# Patient Record
Sex: FEMALE | Race: WHITE | NOT HISPANIC OR LATINO | Employment: UNEMPLOYED | URBAN - NONMETROPOLITAN AREA
[De-identification: names, ages, dates, MRNs, and addresses within clinical notes are randomized per-mention and may not be internally consistent; named-entity substitution may affect disease eponyms.]

---

## 2018-06-15 ENCOUNTER — OFFICE VISIT (OUTPATIENT)
Dept: FAMILY MEDICINE CLINIC | Facility: CLINIC | Age: 58
End: 2018-06-15

## 2018-06-15 VITALS
TEMPERATURE: 98.6 F | WEIGHT: 146 LBS | HEART RATE: 90 BPM | SYSTOLIC BLOOD PRESSURE: 132 MMHG | HEIGHT: 65 IN | DIASTOLIC BLOOD PRESSURE: 88 MMHG | OXYGEN SATURATION: 99 % | BODY MASS INDEX: 24.32 KG/M2

## 2018-06-15 DIAGNOSIS — Z13.220 SCREENING FOR HYPERLIPIDEMIA: ICD-10-CM

## 2018-06-15 DIAGNOSIS — R03.0 ELEVATED BLOOD PRESSURE READING: ICD-10-CM

## 2018-06-15 DIAGNOSIS — B02.9 HERPES ZOSTER WITHOUT COMPLICATION: Primary | ICD-10-CM

## 2018-06-15 PROCEDURE — 99203 OFFICE O/P NEW LOW 30 MIN: CPT | Performed by: PHYSICIAN ASSISTANT

## 2018-06-15 RX ORDER — VALACYCLOVIR HYDROCHLORIDE 1 G/1
1000 TABLET, FILM COATED ORAL 3 TIMES DAILY
Qty: 21 TABLET | Refills: 0 | Status: SHIPPED | OUTPATIENT
Start: 2018-06-15 | End: 2018-07-18

## 2018-06-15 NOTE — PROGRESS NOTES
"Subjective   Rohini Burch is a 58 y.o. female.     Chief complaint-establish care and rash    History of Present Illness     She is here today to establish care.    Rash-  She complains of a painful rash on the left side of her chest with associated blistering that occurred spontaneously one week ago.  She reports moderate burning at the site of the rash with exquisite tenderness to palpation.  Even her shirt touching her chest causes pain.  Minimal relief with Neosporin or Benadryl cream.    The following portions of the patient's history were reviewed and updated as appropriate: allergies, current medications, past family history, past medical history, past social history, past surgical history and problem list.    Review of Systems   Constitutional: Negative for activity change, appetite change and fever.   HENT: Negative for ear pain, sinus pressure and sore throat.    Eyes: Negative for pain and visual disturbance.   Respiratory: Negative for cough and chest tightness.    Cardiovascular: Negative for chest pain and palpitations.   Gastrointestinal: Negative for abdominal pain, constipation, diarrhea, nausea and vomiting.   Endocrine: Negative for polydipsia and polyuria.   Genitourinary: Negative for dysuria and frequency.   Musculoskeletal: Negative for back pain and myalgias.   Skin: Positive for rash. Negative for color change.   Allergic/Immunologic: Negative for food allergies and immunocompromised state.   Neurological: Negative for dizziness, syncope and headaches.   Hematological: Negative for adenopathy. Does not bruise/bleed easily.   Psychiatric/Behavioral: Negative for hallucinations and suicidal ideas. The patient is not nervous/anxious.        /88   Pulse 90   Temp 98.6 °F (37 °C) (Oral)   Ht 165.1 cm (65\")   Wt 66.2 kg (146 lb)   SpO2 99%   BMI 24.30 kg/m²     Physical Exam   Constitutional: She is oriented to person, place, and time. She appears well-developed and well-nourished. "   HENT:   Head: Normocephalic and atraumatic.   Nose: Nose normal.   Mouth/Throat: Oropharynx is clear and moist.   Eyes: Conjunctivae and EOM are normal. Pupils are equal, round, and reactive to light.   Neck: Normal range of motion. Neck supple. No tracheal deviation present. No thyromegaly present.   Cardiovascular: Normal rate, regular rhythm, normal heart sounds and intact distal pulses.    No murmur heard.  Pulmonary/Chest: Effort normal and breath sounds normal. No respiratory distress. She has no wheezes.   Abdominal: Soft. Bowel sounds are normal. There is no tenderness. There is no guarding.   Musculoskeletal: Normal range of motion. She exhibits no edema or tenderness.   Lymphadenopathy:     She has no cervical adenopathy.   Neurological: She is alert and oriented to person, place, and time.   Skin: Skin is warm and dry. Rash noted.   Erythematous vesicular rash in dermatomal distribution noted left chest    Psychiatric: She has a normal mood and affect. Her behavior is normal.   Nursing note and vitals reviewed.      Assessment/Plan     Diagnoses and all orders for this visit:    Herpes zoster without complication  -     valACYclovir (VALTREX) 1000 MG tablet; Take 1 tablet by mouth 3 (Three) Times a Day.    Screening for hyperlipidemia  -     Lipid Panel; Future    Elevated blood pressure reading  -     CBC & Differential; Future  -     Comprehensive Metabolic Panel; Future  -     TSH; Future      Home wound care instructions were given verbally.  I will follow-up with her in 1 month's time to update health maintenance and review lab work.  I'll be glad to see her sooner if needed.         This document has been electronically signed by:  Elena Holbrook PA-C

## 2018-07-18 ENCOUNTER — OFFICE VISIT (OUTPATIENT)
Dept: FAMILY MEDICINE CLINIC | Facility: CLINIC | Age: 58
End: 2018-07-18

## 2018-07-18 VITALS
DIASTOLIC BLOOD PRESSURE: 90 MMHG | WEIGHT: 146 LBS | HEIGHT: 65 IN | BODY MASS INDEX: 24.32 KG/M2 | TEMPERATURE: 98.4 F | SYSTOLIC BLOOD PRESSURE: 134 MMHG | HEART RATE: 70 BPM

## 2018-07-18 DIAGNOSIS — B02.9 HERPES ZOSTER WITHOUT COMPLICATION: Primary | ICD-10-CM

## 2018-07-18 DIAGNOSIS — Z00.00 ROUTINE HEALTH MAINTENANCE: ICD-10-CM

## 2018-07-18 DIAGNOSIS — M79.604 PAIN IN BOTH LOWER EXTREMITIES: ICD-10-CM

## 2018-07-18 DIAGNOSIS — Z13.220 SCREENING FOR HYPERLIPIDEMIA: ICD-10-CM

## 2018-07-18 DIAGNOSIS — R03.0 ELEVATED BLOOD PRESSURE READING: ICD-10-CM

## 2018-07-18 DIAGNOSIS — M79.605 PAIN IN BOTH LOWER EXTREMITIES: ICD-10-CM

## 2018-07-18 LAB
25(OH)D3 SERPL-MCNC: 22 NG/ML
ALBUMIN SERPL-MCNC: 4.3 G/DL (ref 3.5–5)
ALBUMIN/GLOB SERPL: 1.5 G/DL (ref 1.5–2.5)
ALP SERPL-CCNC: 46 U/L (ref 35–104)
ALT SERPL W P-5'-P-CCNC: 17 U/L (ref 10–36)
ANION GAP SERPL CALCULATED.3IONS-SCNC: 3.2 MMOL/L (ref 3.6–11.2)
AST SERPL-CCNC: 19 U/L (ref 10–30)
BASOPHILS # BLD AUTO: 0.04 10*3/MM3 (ref 0–0.3)
BASOPHILS NFR BLD AUTO: 0.7 % (ref 0–2)
BILIRUB SERPL-MCNC: 0.4 MG/DL (ref 0.2–1.8)
BUN BLD-MCNC: 8 MG/DL (ref 7–21)
BUN/CREAT SERPL: 9.3 (ref 7–25)
CALCIUM SPEC-SCNC: 9.5 MG/DL (ref 7.7–10)
CHLORIDE SERPL-SCNC: 105 MMOL/L (ref 99–112)
CHOLEST SERPL-MCNC: 190 MG/DL (ref 0–200)
CO2 SERPL-SCNC: 29.8 MMOL/L (ref 24.3–31.9)
CREAT BLD-MCNC: 0.86 MG/DL (ref 0.43–1.29)
DEPRECATED RDW RBC AUTO: 43.6 FL (ref 37–54)
EOSINOPHIL # BLD AUTO: 0.07 10*3/MM3 (ref 0–0.7)
EOSINOPHIL NFR BLD AUTO: 1.3 % (ref 0–5)
ERYTHROCYTE [DISTWIDTH] IN BLOOD BY AUTOMATED COUNT: 12 % (ref 11.5–14.5)
GFR SERPL CREATININE-BSD FRML MDRD: 68 ML/MIN/1.73
GLOBULIN UR ELPH-MCNC: 2.8 GM/DL
GLUCOSE BLD-MCNC: 75 MG/DL (ref 70–110)
HBA1C MFR BLD: 5.1 % (ref 4.5–5.7)
HCT VFR BLD AUTO: 39.4 % (ref 37–47)
HDLC SERPL-MCNC: 66 MG/DL (ref 60–100)
HGB BLD-MCNC: 13.6 G/DL (ref 12–16)
IMM GRANULOCYTES # BLD: 0.02 10*3/MM3 (ref 0–0.03)
IMM GRANULOCYTES NFR BLD: 0.4 % (ref 0–0.5)
LDLC SERPL CALC-MCNC: 90 MG/DL (ref 0–100)
LDLC/HDLC SERPL: 1.37 {RATIO}
LYMPHOCYTES # BLD AUTO: 2.04 10*3/MM3 (ref 1–3)
LYMPHOCYTES NFR BLD AUTO: 37.9 % (ref 21–51)
MCH RBC QN AUTO: 34.7 PG (ref 27–33)
MCHC RBC AUTO-ENTMCNC: 34.5 G/DL (ref 33–37)
MCV RBC AUTO: 100.5 FL (ref 80–94)
MONOCYTES # BLD AUTO: 0.72 10*3/MM3 (ref 0.1–0.9)
MONOCYTES NFR BLD AUTO: 13.4 % (ref 0–10)
NEUTROPHILS # BLD AUTO: 2.49 10*3/MM3 (ref 1.4–6.5)
NEUTROPHILS NFR BLD AUTO: 46.3 % (ref 30–70)
OSMOLALITY SERPL CALC.SUM OF ELEC: 272.7 MOSM/KG (ref 273–305)
PLATELET # BLD AUTO: 232 10*3/MM3 (ref 130–400)
PMV BLD AUTO: 9.8 FL (ref 6–10)
POTASSIUM BLD-SCNC: 3.9 MMOL/L (ref 3.5–5.3)
PROT SERPL-MCNC: 7.1 G/DL (ref 6–8)
RBC # BLD AUTO: 3.92 10*6/MM3 (ref 4.2–5.4)
SODIUM BLD-SCNC: 138 MMOL/L (ref 135–153)
T4 FREE SERPL-MCNC: 0.87 NG/DL (ref 0.89–1.76)
TRIGL SERPL-MCNC: 168 MG/DL (ref 0–150)
TSH SERPL DL<=0.05 MIU/L-ACNC: 1.38 MIU/ML (ref 0.55–4.78)
VIT B12 BLD-MCNC: 322 PG/ML (ref 211–911)
VLDLC SERPL-MCNC: 33.6 MG/DL
WBC NRBC COR # BLD: 5.38 10*3/MM3 (ref 4.5–12.5)

## 2018-07-18 PROCEDURE — 80061 LIPID PANEL: CPT | Performed by: PHYSICIAN ASSISTANT

## 2018-07-18 PROCEDURE — 85025 COMPLETE CBC W/AUTO DIFF WBC: CPT | Performed by: PHYSICIAN ASSISTANT

## 2018-07-18 PROCEDURE — 36415 COLL VENOUS BLD VENIPUNCTURE: CPT | Performed by: NURSE PRACTITIONER

## 2018-07-18 PROCEDURE — 83036 HEMOGLOBIN GLYCOSYLATED A1C: CPT | Performed by: NURSE PRACTITIONER

## 2018-07-18 PROCEDURE — 80053 COMPREHEN METABOLIC PANEL: CPT | Performed by: PHYSICIAN ASSISTANT

## 2018-07-18 PROCEDURE — 84439 ASSAY OF FREE THYROXINE: CPT | Performed by: PHYSICIAN ASSISTANT

## 2018-07-18 PROCEDURE — 82306 VITAMIN D 25 HYDROXY: CPT | Performed by: PHYSICIAN ASSISTANT

## 2018-07-18 PROCEDURE — 84443 ASSAY THYROID STIM HORMONE: CPT | Performed by: PHYSICIAN ASSISTANT

## 2018-07-18 PROCEDURE — 82607 VITAMIN B-12: CPT | Performed by: PHYSICIAN ASSISTANT

## 2018-07-18 PROCEDURE — 99214 OFFICE O/P EST MOD 30 MIN: CPT | Performed by: NURSE PRACTITIONER

## 2018-07-18 RX ORDER — ROPINIROLE 0.5 MG/1
0.5 TABLET, FILM COATED ORAL NIGHTLY
Qty: 30 TABLET | Refills: 0 | Status: SHIPPED | OUTPATIENT
Start: 2018-07-18 | End: 2019-05-03 | Stop reason: SINTOL

## 2018-07-18 NOTE — PROGRESS NOTES
"Subjective   Rohini Burch is a 58 y.o. female.     Chief Complaint   Patient presents with   • Follow-up     follow up for shingles       History of Present Illness     Here for a follow up for shingles.  She has taken all of Valtrex that was prescribed.  She continues to have some itching if \"I get hot\".  Mostly burning and stinging.  Pain seems to be most present at night.  Areas continue to be present on her left upper chest wall but does not have any on her back.  She does have sticking pain on her back  On the upper left side over scapular region.    Leg Pain-reports she has been having leg pain that is worse with standing.  She does have some vessels that are dilated when she stands for prolonged periods.  She has obtained some compression socks to wear for when she stands.  She reports aching at night when she lays down.  She has to \"get up and down\" and tries to prop legs up.  She reports has been \"off and on\" for the last year.  Some sensation of being cold.  No injury to her legs.  Not at goal.    Labs-needs some updated labs.  Former patient at [facility].      The following portions of the patient's history were reviewed and updated as appropriate: allergies, current medications, past family history, past medical history, past social history, past surgical history and problem list.    Review of Systems   Constitutional: Negative for appetite change and unexpected weight change.   HENT: Positive for congestion, rhinorrhea and sinus pressure. Negative for trouble swallowing.    Eyes: Negative for pain and visual disturbance.   Respiratory: Negative for cough, chest tightness and shortness of breath.    Cardiovascular: Positive for leg swelling. Negative for chest pain and palpitations.   Gastrointestinal: Negative for abdominal pain, blood in stool, constipation and nausea.   Genitourinary: Negative for difficulty urinating, dysuria and hematuria.   Musculoskeletal: Positive for arthralgias. " "Negative for back pain and neck pain.   Neurological: Negative for dizziness and headaches.   Psychiatric/Behavioral: Positive for sleep disturbance (due to leg discomfort). Negative for suicidal ideas.   All other systems reviewed and are negative.      Objective     /90   Pulse 70   Temp 98.4 °F (36.9 °C) (Oral)   Ht 165.1 cm (65\")   Wt 66.2 kg (146 lb)   BMI 24.30 kg/m²     Physical Exam   Constitutional: She is oriented to person, place, and time. She appears well-developed and well-nourished.   HENT:   Head: Normocephalic and atraumatic.   Nose: Nose normal.   Mouth/Throat: Oropharynx is clear and moist.   Eyes: Pupils are equal, round, and reactive to light. Conjunctivae and EOM are normal.   Neck: Normal range of motion. Neck supple. No tracheal deviation present. No thyromegaly present.   Cardiovascular: Normal rate, regular rhythm, normal heart sounds and intact distal pulses.    No murmur heard.  Pulmonary/Chest: Effort normal and breath sounds normal. No respiratory distress. She has no wheezes.   Abdominal: Soft. Bowel sounds are normal. There is no tenderness. There is no guarding.   Musculoskeletal: Normal range of motion. She exhibits no edema or tenderness.   Lymphadenopathy:     She has no cervical adenopathy.   Neurological: She is alert and oriented to person, place, and time.   Skin: Skin is warm and dry. Capillary refill takes less than 2 seconds. No rash noted.   Erythematous vesicular rash in dermatomal distribution noted left chest.  Lesions drying.    Multiple varicosities on BLE   Psychiatric: She has a normal mood and affect. Her behavior is normal.   Nursing note and vitals reviewed.    Assessment/Plan     Problem List Items Addressed This Visit     None      Visit Diagnoses     Herpes zoster without complication    -  Primary    resolving.    Routine health maintenance        Relevant Orders    Hemoglobin A1c (Completed)    Vitamin D 25 Hydroxy (Completed)    Vitamin B12 " (Completed)    T4, Free (Completed)    Pain in both lower extremities        Relevant Medications    rOPINIRole (REQUIP) 0.5 MG tablet    Elevated blood pressure reading        Relevant Orders    CBC Auto Differential (Completed)    Osmolality, Calculated (Completed)    Screening for hyperlipidemia            Labs today.  Skin care advised for skin lesions.  No further need for valtrex.    Trial of Requip for leg complaints.  Understands disease processes and need for medications.  Understands reasons for urgent and emergent care.  Patient (& family) verbalized agreement for treatment plan.   RTC 1 month, sooner if needed.         This document has been electronically signed by:  STEVE Gonsales, FNP-C

## 2018-07-20 NOTE — PROGRESS NOTES
Her vit d is low.  Kidney and liver function are fine.  Thyroid is normal.  Total cholesterol is normal but trigs are 168 (150 or less is normal).  Watch her diet and cut fried and fatty foods.  Blood sugars are good.

## 2018-07-23 ENCOUNTER — TELEPHONE (OUTPATIENT)
Dept: FAMILY MEDICINE CLINIC | Facility: CLINIC | Age: 58
End: 2018-07-23

## 2018-08-15 ENCOUNTER — OFFICE VISIT (OUTPATIENT)
Dept: FAMILY MEDICINE CLINIC | Facility: CLINIC | Age: 58
End: 2018-08-15

## 2018-08-15 VITALS
WEIGHT: 149 LBS | SYSTOLIC BLOOD PRESSURE: 100 MMHG | OXYGEN SATURATION: 97 % | HEART RATE: 75 BPM | DIASTOLIC BLOOD PRESSURE: 76 MMHG | HEIGHT: 65 IN | TEMPERATURE: 98.4 F | BODY MASS INDEX: 24.83 KG/M2

## 2018-08-15 DIAGNOSIS — M77.11 LATERAL EPICONDYLITIS OF RIGHT ELBOW: Primary | ICD-10-CM

## 2018-08-15 PROCEDURE — 99213 OFFICE O/P EST LOW 20 MIN: CPT | Performed by: NURSE PRACTITIONER

## 2018-08-15 PROCEDURE — 20605 DRAIN/INJ JOINT/BURSA W/O US: CPT | Performed by: NURSE PRACTITIONER

## 2018-08-15 RX ORDER — HYDROCODONE BITARTRATE AND ACETAMINOPHEN 5; 325 MG/1; MG/1
1 TABLET ORAL EVERY 6 HOURS PRN
Qty: 12 TABLET | Refills: 0 | Status: SHIPPED | OUTPATIENT
Start: 2018-08-15 | End: 2019-05-03 | Stop reason: DRUGHIGH

## 2018-08-15 NOTE — PROGRESS NOTES
Subjective   Rohini Burch is a 58 y.o. female.     Chief Complaint   Patient presents with   • Right arm swollen     sharp to aching pain- been happening since yesterday        History of Present Illness     Right elbow/forearm complaint-some discomfort intermittently but reports she noted an increase while she was lifting something at work.  Greatest pain is over her elbow but she does note the pain down the top of her arm and into her 4th and 5th digit.  She has noted increase in pain in her .  She reports she does do repetetive lifting at work and she is right hand dominant.   No numbness in fingers.  She reports she did apply ice today to her arm at work.  She reports it did ease the pain minimally.  No falls on her hand or arm.  No acute injury.  She did hit her elbow last year on a cart but there was not symptoms after that incident.  Ongoing.      The following portions of the patient's history were reviewed and updated as appropriate: allergies, current medications, past family history, past medical history, past social history, past surgical history and problem list.    Review of Systems   Constitutional: Negative for appetite change and unexpected weight change.   HENT: Negative for congestion, rhinorrhea, sinus pressure and trouble swallowing.    Eyes: Negative for pain and visual disturbance.   Respiratory: Negative for cough, chest tightness and shortness of breath.    Cardiovascular: Positive for leg swelling. Negative for chest pain and palpitations.   Gastrointestinal: Negative for abdominal pain, blood in stool, constipation and nausea.   Genitourinary: Negative for difficulty urinating, dysuria and hematuria.   Musculoskeletal: Positive for arthralgias and joint swelling. Negative for back pain and neck pain.   Neurological: Negative for dizziness and headaches.   Psychiatric/Behavioral: Negative for sleep disturbance and suicidal ideas.   All other systems reviewed and are  "negative.      Objective     /76 (BP Location: Right arm, Patient Position: Sitting, Cuff Size: Adult)   Pulse 75   Temp 98.4 °F (36.9 °C) (Oral)   Ht 165.1 cm (65\")   Wt 67.6 kg (149 lb)   SpO2 97%   BMI 24.79 kg/m²     Physical Exam   Constitutional: She is oriented to person, place, and time. She appears well-developed and well-nourished. No distress.   HENT:   Head: Normocephalic and atraumatic.   Right Ear: External ear normal.   Left Ear: External ear normal.   Nose: Nose normal.   Mouth/Throat: Oropharynx is clear and moist.   Eyes: Pupils are equal, round, and reactive to light. EOM are normal. No scleral icterus.   Neck: Normal range of motion. Neck supple. No JVD present. No thyromegaly present.   Cardiovascular: Normal rate, regular rhythm and normal heart sounds.    Pulmonary/Chest: Effort normal and breath sounds normal.   Abdominal: Soft. Bowel sounds are normal. There is no tenderness.   Musculoskeletal:        Right elbow: Tenderness found. Lateral epicondyle tenderness noted.        Right wrist: She exhibits tenderness and swelling (mild).        Right forearm: She exhibits tenderness.        Right hand: She exhibits tenderness and swelling (over 4th and 5th digit). She exhibits normal capillary refill. Decreased strength noted. She exhibits wrist extension trouble.   Neurological: She is alert and oriented to person, place, and time. No cranial nerve deficit. Coordination normal.   Skin: Skin is warm and dry.   Psychiatric: She has a normal mood and affect. Her behavior is normal. Judgment and thought content normal.   Vitals reviewed.      Assessment/Plan     Problem List Items Addressed This Visit     None      Visit Diagnoses     Lateral epicondylitis of right elbow    -  Primary    Relevant Medications    HYDROcodone-acetaminophen (NORCO) 5-325 MG per tablet        Understands disease processes and need for medications.  Understands reasons for urgent and emergent care.  Patient (& " family) verbalized agreement for treatment plan.   Emotional support and active listening provided.  Patient provided time to verbalize feelings.  Risk and benefits explained of procedure including but not limited to:  Red man syndrome, blood sugar changes, jitteriness, injection site reaction, bleeding, risk of infection, nerve paralysis, and/or tendon rupture.  Patient wishes to proceed with procedure.  Verbal wound instructions provided to patient.  Questions answered.   RTC PRN 3-5 days for worsening or non resolving symptoms        PROCEDURE NOTE:    Procedure:right elbow intra-articular injection  The procedure risks and benefits were explained to patient and the patient gave verbal consent to have the procedure performed.  Indication: right elbow pain/tendonitis  Provider: STEVE Vásquez FNP-C  Description: The right elbow was prepped and draped in sterile fashion.  An injection was given using 1 cc of 1% lidocaine, 1 cc Decadron and 1 cc of Kenalog.  Pressure was held and a sterile dressing was placed.  No consultations  Estimated blood loss: Minimal  Patient tolerated the procedure well.     1% lidocaine 20 mg per 2 mL NDC # 64813-361-22 Batch: [id number], Exp 03/2020  Kenalog 40 mg per 1 mL NDC #6503-5965-66 Lot # [id number], Exp 12/2019  Decadron 4 mg per 1 mL NDC #14575-704-04 Lot # [id number], Exp 09/2019          This document has been electronically signed by:  STEVE Gonsales, REYNA-C

## 2019-05-03 ENCOUNTER — OFFICE VISIT (OUTPATIENT)
Dept: FAMILY MEDICINE CLINIC | Facility: CLINIC | Age: 59
End: 2019-05-03

## 2019-05-03 ENCOUNTER — PRIOR AUTHORIZATION (OUTPATIENT)
Dept: FAMILY MEDICINE CLINIC | Facility: CLINIC | Age: 59
End: 2019-05-03

## 2019-05-03 VITALS
OXYGEN SATURATION: 97 % | TEMPERATURE: 97.7 F | WEIGHT: 142 LBS | HEART RATE: 91 BPM | HEIGHT: 65 IN | SYSTOLIC BLOOD PRESSURE: 110 MMHG | DIASTOLIC BLOOD PRESSURE: 70 MMHG | BODY MASS INDEX: 23.66 KG/M2

## 2019-05-03 DIAGNOSIS — I83.813 VARICOSE VEINS OF BOTH LOWER EXTREMITIES WITH PAIN: ICD-10-CM

## 2019-05-03 DIAGNOSIS — M25.50 MULTIPLE JOINT PAIN: Primary | ICD-10-CM

## 2019-05-03 PROCEDURE — 99214 OFFICE O/P EST MOD 30 MIN: CPT | Performed by: NURSE PRACTITIONER

## 2019-05-03 RX ORDER — MELOXICAM 7.5 MG/1
7.5 TABLET ORAL DAILY
Qty: 30 TABLET | Refills: 5 | Status: SHIPPED | OUTPATIENT
Start: 2019-05-03 | End: 2019-06-04 | Stop reason: SDUPTHER

## 2019-05-03 RX ORDER — HYDROCODONE BITARTRATE AND ACETAMINOPHEN 7.5; 325 MG/1; MG/1
.5-1 TABLET ORAL EVERY 8 HOURS PRN
Qty: 45 TABLET | Refills: 0 | Status: SHIPPED | OUTPATIENT
Start: 2019-05-03 | End: 2019-06-04 | Stop reason: SDUPTHER

## 2019-05-03 NOTE — PROGRESS NOTES
"Subjective   Rohini Burch is a 58 y.o. female.     Chief Complaint   Patient presents with   • Epicondylitis of right elbow       History of Present Illness     Joint pain-reports \"elbows and legs are killing me\".  She does stand for work.  she reports she is wearing linda copper compression every day she works.  She reports she did try the Ropinirole 0.5 mg nightly but reports it made her very fatigued the next day.  She has not been on any other meds.  Pain in her bilateral legs from knees down.  Throbbing and stinging in nature.  Multiple varicosities noted.      The following portions of the patient's history were reviewed and updated as appropriate: allergies, current medications, past family history, past medical history, past social history, past surgical history and problem list.    Review of Systems   Constitutional: Negative for appetite change and fatigue.   HENT: Positive for congestion and rhinorrhea.         Using OTC meds for sinus relief   Respiratory: Negative for cough and shortness of breath.    Cardiovascular: Positive for leg swelling. Negative for chest pain and palpitations.   Gastrointestinal: Negative for abdominal pain, constipation and diarrhea.   Genitourinary: Negative for dysuria and hematuria.   Musculoskeletal: Positive for arthralgias and joint swelling.        R elbow pain     Neurological: Negative for dizziness and headaches.   Hematological: Bruises/bleeds easily.   Psychiatric/Behavioral: Negative for dysphoric mood, sleep disturbance and suicidal ideas. The patient is not nervous/anxious.    All other systems reviewed and are negative.      Objective     /70 (BP Location: Right arm, Patient Position: Sitting, Cuff Size: Adult)   Pulse 91   Temp 97.7 °F (36.5 °C) (Temporal)   Ht 165.1 cm (65\")   Wt 64.4 kg (142 lb)   SpO2 97%   BMI 23.63 kg/m²     Physical Exam   Constitutional: She is oriented to person, place, and time. She appears well-developed and " well-nourished. No distress.   HENT:   Head: Normocephalic and atraumatic.   Right Ear: Hearing, tympanic membrane, external ear and ear canal normal.   Left Ear: Hearing, tympanic membrane, external ear and ear canal normal.   Nose: No mucosal edema or rhinorrhea.   Mouth/Throat: Oropharynx is clear and moist and mucous membranes are normal.   Eyes: Conjunctivae, EOM and lids are normal. Pupils are equal, round, and reactive to light. No scleral icterus. Right eye exhibits normal extraocular motion and no nystagmus. Left eye exhibits normal extraocular motion and no nystagmus.   Neck: Normal range of motion. Neck supple. No JVD present. No tracheal tenderness present. Carotid bruit is not present. No thyromegaly present.   Cardiovascular: Normal rate, regular rhythm, S1 normal, S2 normal, normal heart sounds and intact distal pulses.   No murmur heard.  Pulmonary/Chest: Effort normal and breath sounds normal. She exhibits no tenderness.   Abdominal: Soft. Bowel sounds are normal. She exhibits no mass. There is no hepatosplenomegaly. There is no tenderness.   Musculoskeletal: Normal range of motion. She exhibits no edema.        Right elbow: Tenderness found. Lateral epicondyle tenderness noted.        Left elbow: Tenderness found.        Right knee: Tenderness found. Medial joint line and lateral joint line tenderness noted.        Left knee: Tenderness found. Medial joint line tenderness noted.        Right lower leg: She exhibits tenderness.        Left lower leg: She exhibits tenderness.   Gait normal.   equal bilaterally. No muscular atrophy or flaccidity.   Lymphadenopathy:     She has no cervical adenopathy.        Right cervical: No superficial cervical adenopathy present.       Left cervical: No superficial cervical adenopathy present.   Neurological: She is alert and oriented to person, place, and time. She has normal strength and normal reflexes. She displays no tremor. No cranial nerve deficit or  sensory deficit. She exhibits normal muscle tone. Coordination and gait normal.   Skin: Skin is warm and dry. Capillary refill takes less than 2 seconds. She is not diaphoretic. No cyanosis. No pallor. Nails show no clubbing.   Multiple varicosities in BLE   Psychiatric: She has a normal mood and affect. Her speech is normal and behavior is normal. Judgment and thought content normal. She is not actively hallucinating. Cognition and memory are normal. She is attentive.   Vitals reviewed.      Assessment/Plan     Problem List Items Addressed This Visit        Cardiovascular and Mediastinum    Varicose veins of both lower extremities with pain    Current Assessment & Plan     Continue compression sock use.            Nervous and Auditory    Multiple joint pain - Primary    Relevant Medications    HYDROcodone-acetaminophen (NORCO) 7.5-325 MG per tablet    meloxicam (MOBIC) 7.5 MG tablet          Patient's Body mass index is 23.63 kg/m². BMI is above normal parameters. Recommendations include: nutrition counseling.   (Normal BMI:  18.5-24.9, OW 25-29.9, Obesity 30 or greater)  Rohini does not use tobacco products.      Understands disease processes and need for medications.  Understands reasons for urgent and emergent care.  Patient (& family) verbalized agreement for treatment plan.   Emotional support and active listening provided.  Patient provided time to verbalize feelings.    MICHELLE reviewed today and consistent.  Will refill prescribed controlled medication today.  Patient is aware they cannot receive narcotics from any other provider except if under care of pain management or speciality clinic.  Risk and benefits of medication use has been reviewed.  History and physical exam exhibit continued safe and appropriate use of controlled substances.  The patient is aware of the potential for addiction and dependence.  This patient has been made aware of the appropriate use of such medications, including potential risk  of somnolence, limited ability to drive and / or work safely, and potential for overdose.    It has also been made clear that these medications are for use by this patient only, without concomitant use of alcohol or other substances unless prescribed/advised by medical provider.  Patient understands they may be subject to UDS and pill counts at random.      Patient considered to be low risk for addiction due to use of Single controlled medications.  Patient understands and accepts these risks.      Goal of [place]: Patient will have reduction in her joint pain and be able to tolerate multiple shifts consecutively for work.  Patient will not have any negative side effects of medication  Discussed KAREEM of Mobic.  Patient understands to limit other NSAID use.  Understands to stop if she notes any increase in bleeding or GI discomfort.    RTC 1 month, sooner if needed.           This document has been electronically signed by:  STEVE Gonsales, REYNA-C

## 2019-06-04 ENCOUNTER — OFFICE VISIT (OUTPATIENT)
Dept: FAMILY MEDICINE CLINIC | Facility: CLINIC | Age: 59
End: 2019-06-04

## 2019-06-04 VITALS
OXYGEN SATURATION: 98 % | DIASTOLIC BLOOD PRESSURE: 60 MMHG | TEMPERATURE: 98.4 F | HEIGHT: 65 IN | HEART RATE: 78 BPM | BODY MASS INDEX: 23.66 KG/M2 | SYSTOLIC BLOOD PRESSURE: 100 MMHG | WEIGHT: 142 LBS

## 2019-06-04 DIAGNOSIS — M25.50 MULTIPLE JOINT PAIN: Primary | ICD-10-CM

## 2019-06-04 DIAGNOSIS — M25.511 ACUTE PAIN OF RIGHT SHOULDER: ICD-10-CM

## 2019-06-04 DIAGNOSIS — M77.11 RIGHT LATERAL EPICONDYLITIS: ICD-10-CM

## 2019-06-04 PROBLEM — M77.12 LEFT LATERAL EPICONDYLITIS: Status: ACTIVE | Noted: 2019-06-04

## 2019-06-04 PROCEDURE — 99214 OFFICE O/P EST MOD 30 MIN: CPT | Performed by: NURSE PRACTITIONER

## 2019-06-04 PROCEDURE — 20605 DRAIN/INJ JOINT/BURSA W/O US: CPT | Performed by: NURSE PRACTITIONER

## 2019-06-04 RX ORDER — MELOXICAM 7.5 MG/1
7.5 TABLET ORAL 2 TIMES DAILY
Qty: 60 TABLET | Refills: 5 | Status: SHIPPED | OUTPATIENT
Start: 2019-06-04 | End: 2019-11-19

## 2019-06-04 RX ORDER — HYDROCODONE BITARTRATE AND ACETAMINOPHEN 7.5; 325 MG/1; MG/1
.5-1 TABLET ORAL EVERY 8 HOURS PRN
Qty: 45 TABLET | Refills: 0 | Status: SHIPPED | OUTPATIENT
Start: 2019-06-04 | End: 2019-07-02 | Stop reason: SDUPTHER

## 2019-06-04 NOTE — PROGRESS NOTES
"Subjective   Rohini Burch is a 59 y.o. female.     Chief Complaint   Patient presents with   • Joint Pain       History of Present Illness     Joint pain- patient was started on meloxicam 7.5 mg at her last appointment with addition of PRN Norco for her pain symptoms.  She has changed jobs since her last visit.  She reports she is now doing a lot of walking.  She reports leg pain has been increased so she has been wearing her compression socks.    Elbow pain-right.  She reports she is having elbow pain with ROM and lifting.  She reports some pain and  changes like had before.  She request a joint injection.    Right shoulder pain-has noted she feels her shoulder is coming out of socket when she reaches her arm backward .  She reports with return to normal position the bone \"goes back\".  She reports she has not had any shoulder injury but about \"8-9 years ago\" she hurt rotator cuff on the left.  She reports the area will be painful the next day to her elbow region.  She reports initially she was afraid she \"broke my shoulder\".  Ongoing.     The following portions of the patient's history were reviewed and updated as appropriate: allergies, current medications, past family history, past medical history, past social history, past surgical history and problem list.    Review of Systems   Constitutional: Negative for appetite change and fatigue.   HENT: Positive for congestion and rhinorrhea.         Using OTC meds for sinus relief   Respiratory: Negative for cough and shortness of breath.    Cardiovascular: Positive for leg swelling. Negative for chest pain and palpitations.   Gastrointestinal: Negative for abdominal pain, constipation and diarrhea.   Genitourinary: Negative for dysuria and hematuria.   Musculoskeletal: Positive for arthralgias and joint swelling.        R elbow pain     Neurological: Negative for dizziness and headaches.   Hematological: Bruises/bleeds easily.   Psychiatric/Behavioral: Negative " "for dysphoric mood, sleep disturbance and suicidal ideas. The patient is not nervous/anxious.    All other systems reviewed and are negative.      Objective     /60   Pulse 78   Temp 98.4 °F (36.9 °C) (Temporal)   Ht 165.1 cm (65\")   Wt 64.4 kg (142 lb)   SpO2 98%   BMI 23.63 kg/m²     Physical Exam   Constitutional: She is oriented to person, place, and time. She appears well-developed and well-nourished. No distress.   HENT:   Head: Normocephalic and atraumatic.   Right Ear: Hearing, tympanic membrane, external ear and ear canal normal.   Left Ear: Hearing, tympanic membrane, external ear and ear canal normal.   Nose: No mucosal edema or rhinorrhea.   Mouth/Throat: Oropharynx is clear and moist and mucous membranes are normal.   Eyes: Conjunctivae, EOM and lids are normal. Pupils are equal, round, and reactive to light. No scleral icterus. Right eye exhibits normal extraocular motion and no nystagmus. Left eye exhibits normal extraocular motion and no nystagmus.   Neck: Normal range of motion. Neck supple. No JVD present. No tracheal tenderness present. Carotid bruit is not present. No thyromegaly present.   Cardiovascular: Normal rate, regular rhythm, S1 normal, S2 normal, normal heart sounds and intact distal pulses.   No murmur heard.  Pulmonary/Chest: Effort normal and breath sounds normal. She exhibits no tenderness.   Abdominal: Soft. Bowel sounds are normal. She exhibits no mass. There is no hepatosplenomegaly. There is no tenderness.   Musculoskeletal: Normal range of motion. She exhibits no edema.        Right elbow: Tenderness found. Lateral epicondyle tenderness noted.        Left elbow: Tenderness found.        Right knee: Tenderness found. Medial joint line and lateral joint line tenderness noted.        Left knee: Tenderness found. Medial joint line tenderness noted.        Right lower leg: She exhibits tenderness.        Left lower leg: She exhibits tenderness.   Gait normal.   " equal bilaterally. No muscular atrophy or flaccidity.   Lymphadenopathy:     She has no cervical adenopathy.        Right cervical: No superficial cervical adenopathy present.       Left cervical: No superficial cervical adenopathy present.   Neurological: She is alert and oriented to person, place, and time. She has normal strength and normal reflexes. She displays no tremor. No cranial nerve deficit or sensory deficit. She exhibits normal muscle tone. Coordination and gait normal.   Skin: Skin is warm and dry. Capillary refill takes less than 2 seconds. She is not diaphoretic. No cyanosis. No pallor. Nails show no clubbing.   Multiple varicosities in BLE   Psychiatric: She has a normal mood and affect. Her speech is normal and behavior is normal. Judgment and thought content normal. She is not actively hallucinating. Cognition and memory are normal. She is attentive.   Vitals reviewed.      Assessment/Plan     Problem List Items Addressed This Visit        Nervous and Auditory    Multiple joint pain - Primary    Relevant Medications    HYDROcodone-acetaminophen (NORCO) 7.5-325 MG per tablet    meloxicam (MOBIC) 7.5 MG tablet       Musculoskeletal and Integument    Right lateral epicondylitis    Current Assessment & Plan     Joint injection today           Other Visit Diagnoses     Acute pain of right shoulder        Relevant Orders    XR Shoulder 2+ View Right          Patient's Body mass index is 23.63 kg/m². BMI is above normal parameters. Recommendations include: nutrition counseling.   (Normal BMI:  18.5-24.9, OW 25-29.9, Obesity 30 or greater)    Understands disease processes and need for medications.  Understands reasons for urgent and emergent care.  Patient (& family) verbalized agreement for treatment plan.   Emotional support and active listening provided.  Patient provided time to verbalize feelings.    MICHELLE # [id number] reviewed today and consistent.  Will refill prescribed controlled medication today.   Patient is aware they cannot receive narcotics from any other provider except if under care of pain management or speciality clinic.  Risk and benefits of medication use has been reviewed.  History and physical exam exhibit continued safe and appropriate use of controlled substances.  The patient is aware of the potential for addiction and dependence.  This patient has been made aware of the appropriate use of such medications, including potential risk of somnolence, limited ability to drive and / or work safely, and potential for overdose.    It has also been made clear that these medications are for use by this patient only, without concomitant use of alcohol or other substances unless prescribed/advised by medical provider.  Patient understands they may be subject to UDS and pill counts at random.      Patient considered to be low risk for addiction due to use of single controlled medications.  Patient understands and accepts these risks.  Patient need for medication will be reassessed at each visit.  Doses will be adjusted according to patient need and findings.    Goal of TX: Patient will not have any adverse reactions of medication.  Patient will have reduction in her joint pain symptoms with use of PRN [place].  UDS requested per [facility] while patient in office today.  Buccal swab or blood sample will be obtained if patient is unable to provide specimen.    RTC 1 month, sooner if needed for problems or concerns    PROCEDURE NOTE:    Procedure: Right elbow intra-articular injection  The procedure risks and benefits were explained to patient and the patient gave verbal consent to have the procedure performed.  Indication: Right elbow pain/tendonitis  Provider: STEVE Vásquez, WILLIAMP-C  Description: The right elbow was prepped and draped in sterile fashion.  An injection was given using 1 cc of 1% lidocaine, 1 cc Decadron and 1 cc of Kenalog.  Pressure was held and a sterile dressing was placed.  No  consultations  Estimated blood loss: Minimal  Patient tolerated the procedure well.     1% lidocaine 500 mg per 50 mL NDC # 5962-3788-23 lot #[id number] EXP October 1, 2020  Triamcinolone acetate 40 mg per 1 mL NDC #[address]-6 lot number [id number] exp 4/2020  Decadron 4 mg per 1 mL NDC #91621-296-79 lot #[id number] EXP 10/2020              This document has been electronically signed by:  STEVE Gonsales, WILLIAMP-C

## 2019-06-05 ENCOUNTER — TELEPHONE (OUTPATIENT)
Dept: FAMILY MEDICINE CLINIC | Facility: CLINIC | Age: 59
End: 2019-06-05

## 2019-06-05 NOTE — TELEPHONE ENCOUNTER
Called [facility] about the patient's norco medication. They had to do a manual override and now the patient should be able to  medication. The reference number to the call was [id number].

## 2019-07-02 ENCOUNTER — OFFICE VISIT (OUTPATIENT)
Dept: FAMILY MEDICINE CLINIC | Facility: CLINIC | Age: 59
End: 2019-07-02

## 2019-07-02 VITALS
HEIGHT: 65 IN | SYSTOLIC BLOOD PRESSURE: 110 MMHG | OXYGEN SATURATION: 96 % | DIASTOLIC BLOOD PRESSURE: 70 MMHG | TEMPERATURE: 97.8 F | HEART RATE: 112 BPM | BODY MASS INDEX: 23.16 KG/M2 | WEIGHT: 139 LBS

## 2019-07-02 DIAGNOSIS — M77.11 RIGHT LATERAL EPICONDYLITIS: ICD-10-CM

## 2019-07-02 DIAGNOSIS — J30.1 SEASONAL ALLERGIC RHINITIS DUE TO POLLEN: Primary | ICD-10-CM

## 2019-07-02 DIAGNOSIS — M25.50 MULTIPLE JOINT PAIN: ICD-10-CM

## 2019-07-02 PROCEDURE — 99214 OFFICE O/P EST MOD 30 MIN: CPT | Performed by: NURSE PRACTITIONER

## 2019-07-02 RX ORDER — KETOTIFEN FUMARATE 0.35 MG/ML
1 SOLUTION/ DROPS OPHTHALMIC 2 TIMES DAILY
Qty: 10 ML | Refills: 5 | Status: SHIPPED | OUTPATIENT
Start: 2019-07-02 | End: 2019-08-27 | Stop reason: SDUPTHER

## 2019-07-02 RX ORDER — CETIRIZINE HYDROCHLORIDE 10 MG/1
10 TABLET ORAL DAILY PRN
Qty: 30 TABLET | Refills: 5 | Status: SHIPPED | OUTPATIENT
Start: 2019-07-02 | End: 2020-04-27 | Stop reason: SDUPTHER

## 2019-07-02 RX ORDER — HYDROCODONE BITARTRATE AND ACETAMINOPHEN 7.5; 325 MG/1; MG/1
.5-1 TABLET ORAL EVERY 8 HOURS PRN
Qty: 60 TABLET | Refills: 0 | Status: SHIPPED | OUTPATIENT
Start: 2019-07-02 | End: 2019-07-30 | Stop reason: SDUPTHER

## 2019-07-02 NOTE — PROGRESS NOTES
"Subjective   Rohini Burch is a 59 y.o. female.     Chief Complaint   Patient presents with   • Shoulder Pain       History of Present Illness     Shoulder pain-\"has not been too bad\".  She reports she has been moving furniture at work but is not having any difficulty.  She reports today that is mostly been her knees that are bothering her the most and she is climbing steps multiple times per day.  Ongoing  Leg pain-reports has been worse since she is walking more.  She is is wearing her compression hose.  She is taking Meloxicam 7.5 mg.    Varicose veins-reports she had a muscle spasm in her LLE and since that time she has had tenderness and bruising on her tib/fib area on the same leg.  She reports has been present 4-5 days.  Area of ecchymosis remains over the region.  She has not noted any swelling or warmth.  Ongoing  Elbow Pain-impoved since joint injection.  She denies any acute concerns today.  Eye irritation-reports she has noted some dryness and itching of her eyes.  She reports is worse when allergy season is worse.  Recent pollen count has been elevated.  She requests medication for allergies and any eyedrops that might alleviate her symptoms of itching.    The following portions of the patient's history were reviewed and updated as appropriate: allergies, current medications, past family history, past medical history, past social history, past surgical history and problem list.    Review of Systems   Constitutional: Negative for appetite change, fatigue and unexpected weight change.   HENT: Negative for congestion, ear pain, nosebleeds, postnasal drip, rhinorrhea, sore throat, trouble swallowing and voice change.    Eyes: Negative for pain and visual disturbance.   Respiratory: Negative for cough, shortness of breath and wheezing.    Cardiovascular: Negative for chest pain and palpitations.   Gastrointestinal: Negative for abdominal pain, blood in stool, constipation and diarrhea.   Endocrine: Negative " "for cold intolerance and polydipsia.   Genitourinary: Negative for difficulty urinating, flank pain and hematuria.   Musculoskeletal: Negative for arthralgias, back pain, gait problem, joint swelling and myalgias.   Skin: Negative for color change and rash.   Allergic/Immunologic: Negative.    Neurological: Negative for syncope, numbness and headaches.   Hematological: Negative.    Psychiatric/Behavioral: Negative for sleep disturbance and suicidal ideas.   All other systems reviewed and are negative.      Objective     /70   Pulse 112   Temp 97.8 °F (36.6 °C) (Tympanic)   Ht 165.1 cm (65\")   Wt 63 kg (139 lb)   SpO2 96%   BMI 23.13 kg/m²     Physical Exam   Constitutional: She is oriented to person, place, and time. She appears well-developed and well-nourished. No distress.   HENT:   Head: Normocephalic and atraumatic.   Right Ear: Hearing, tympanic membrane, external ear and ear canal normal.   Left Ear: Hearing, tympanic membrane, external ear and ear canal normal.   Nose: No mucosal edema or rhinorrhea.   Mouth/Throat: Oropharynx is clear and moist and mucous membranes are normal.   Eyes: Conjunctivae, EOM and lids are normal. Pupils are equal, round, and reactive to light. No scleral icterus. Right eye exhibits normal extraocular motion and no nystagmus. Left eye exhibits normal extraocular motion and no nystagmus.   Neck: Normal range of motion. Neck supple. No JVD present. No tracheal tenderness present. Carotid bruit is not present. No thyromegaly present.   Cardiovascular: Normal rate, regular rhythm, S1 normal, S2 normal, normal heart sounds and intact distal pulses.   No murmur heard.  Pulmonary/Chest: Effort normal and breath sounds normal. She exhibits no tenderness.   Abdominal: Soft. Bowel sounds are normal. She exhibits no mass. There is no hepatosplenomegaly. There is no tenderness.   Musculoskeletal: Normal range of motion. She exhibits no edema.        Right elbow: Tenderness found. " Lateral epicondyle tenderness noted.        Left elbow: Tenderness found.        Right knee: Tenderness found. Medial joint line and lateral joint line tenderness noted.        Left knee: Tenderness found. Medial joint line tenderness noted.        Right lower leg: She exhibits tenderness.        Left lower leg: She exhibits tenderness.   Gait normal.   equal bilaterally. No muscular atrophy or flaccidity.   Lymphadenopathy:     She has no cervical adenopathy.        Right cervical: No superficial cervical adenopathy present.       Left cervical: No superficial cervical adenopathy present.   Neurological: She is alert and oriented to person, place, and time. She has normal strength and normal reflexes. She displays no tremor. No cranial nerve deficit or sensory deficit. She exhibits normal muscle tone. Coordination and gait normal.   Skin: Skin is warm and dry. Capillary refill takes less than 2 seconds. She is not diaphoretic. No cyanosis. No pallor. Nails show no clubbing.   Multiple varicosities in BLE   Psychiatric: She has a normal mood and affect. Her speech is normal and behavior is normal. Judgment and thought content normal. She is not actively hallucinating. Cognition and memory are normal. She is attentive.   Vitals reviewed.      Assessment/Plan     Problem List Items Addressed This Visit        Nervous and Auditory    Multiple joint pain    Relevant Medications    HYDROcodone-acetaminophen (NORCO) 7.5-325 MG per tablet       Musculoskeletal and Integument    Right lateral epicondylitis    Current Assessment & Plan     Stable.  Continue conservative measures PRN heat and ice.           Other Visit Diagnoses     Seasonal allergic rhinitis due to pollen    -  Primary    Relevant Medications    ketotifen (ALAWAY) 0.025 % ophthalmic solution    cetirizine (zyrTEC) 10 MG tablet          Patient's Body mass index is 23.13 kg/m². BMI is above normal parameters. Recommendations include: nutrition  counseling.   (Normal BMI:  18.5-24.9, OW 25-29.9, Obesity 30 or greater)  Understands disease processes and need for medications.  Understands reasons for urgent and emergent care.  Patient (& family) verbalized agreement for treatment plan.   Emotional support and active listening provided.  Patient provided time to verbalize feelings.    MICHELLE #[id number] reviewed today and consistent.  Will refill prescribed controlled medication today.  Patient is aware they cannot receive narcotics from any other provider except if under care of pain management or speciality clinic.  Risk and benefits of medication use has been reviewed.  History and physical exam exhibit continued safe and appropriate use of controlled substances.  The patient is aware of the potential for addiction and dependence.  This patient has been made aware of the appropriate use of such medications, including potential risk of somnolence, limited ability to drive and / or work safely, and potential for overdose.    It has also been made clear that these medications are for use by this patient only, without concomitant use of alcohol or other substances unless prescribed/advised by medical provider.  Patient understands they may be subject to UDS and pill counts at random.      Patient considered to be low risk for addiction due to use of single controlled medications.  Patient understands and accepts these risks.  Patient need for medication will be reassessed at each visit.  Doses will be adjusted according to patient need and findings.    Goal of TX: Patient will not have any adverse reactions of medication.  Patient will have reduction in her shoulder and back pain symptoms with use of Norco PRN.  Patient will be able to remain active and work in an outside of her home without interference from voiding symptoms.    RTC 1 month, sooner if needed for problems or concerns          This document has been electronically signed by:  STEVE Gonsales,  FNP-C    Dragon disclaimer:  Much of this encounter note is an electronic transcription/translation of spoken language to printed text. The electronic translation of spoken language may permit erroneous, or at times, nonsensical words or phrases to be inadvertently transcribed; Although I have reviewed the note for such errors, some may still exist.

## 2019-07-30 ENCOUNTER — OFFICE VISIT (OUTPATIENT)
Dept: FAMILY MEDICINE CLINIC | Facility: CLINIC | Age: 59
End: 2019-07-30

## 2019-07-30 VITALS
BODY MASS INDEX: 23.49 KG/M2 | WEIGHT: 141 LBS | DIASTOLIC BLOOD PRESSURE: 80 MMHG | HEIGHT: 65 IN | HEART RATE: 85 BPM | TEMPERATURE: 98 F | SYSTOLIC BLOOD PRESSURE: 122 MMHG | OXYGEN SATURATION: 97 %

## 2019-07-30 DIAGNOSIS — M25.50 MULTIPLE JOINT PAIN: ICD-10-CM

## 2019-07-30 DIAGNOSIS — J30.1 SEASONAL ALLERGIC RHINITIS DUE TO POLLEN: ICD-10-CM

## 2019-07-30 DIAGNOSIS — I83.813 VARICOSE VEINS OF BOTH LOWER EXTREMITIES WITH PAIN: Primary | ICD-10-CM

## 2019-07-30 PROCEDURE — 99214 OFFICE O/P EST MOD 30 MIN: CPT | Performed by: NURSE PRACTITIONER

## 2019-07-30 RX ORDER — HYDROCODONE BITARTRATE AND ACETAMINOPHEN 7.5; 325 MG/1; MG/1
1 TABLET ORAL EVERY 8 HOURS PRN
Qty: 75 TABLET | Refills: 0 | Status: SHIPPED | OUTPATIENT
Start: 2019-07-30 | End: 2019-08-27 | Stop reason: SDUPTHER

## 2019-08-01 PROBLEM — J30.1 SEASONAL ALLERGIC RHINITIS DUE TO POLLEN: Status: ACTIVE | Noted: 2019-08-01

## 2019-08-01 NOTE — ASSESSMENT & PLAN NOTE
Continue zyrtec and [place] for symptoms.  Avoid known allergy and respiratory triggers.  May use Saline spray PRN as desired

## 2019-08-27 ENCOUNTER — OFFICE VISIT (OUTPATIENT)
Dept: FAMILY MEDICINE CLINIC | Facility: CLINIC | Age: 59
End: 2019-08-27

## 2019-08-27 VITALS
DIASTOLIC BLOOD PRESSURE: 70 MMHG | OXYGEN SATURATION: 98 % | HEART RATE: 78 BPM | TEMPERATURE: 99.1 F | SYSTOLIC BLOOD PRESSURE: 110 MMHG | WEIGHT: 136 LBS | BODY MASS INDEX: 22.66 KG/M2 | HEIGHT: 65 IN

## 2019-08-27 DIAGNOSIS — M25.50 MULTIPLE JOINT PAIN: ICD-10-CM

## 2019-08-27 DIAGNOSIS — J30.1 SEASONAL ALLERGIC RHINITIS DUE TO POLLEN: ICD-10-CM

## 2019-08-27 DIAGNOSIS — H10.533 CONTACT BLEPHAROCONJUNCTIVITIS OF BOTH EYES: Primary | ICD-10-CM

## 2019-08-27 PROCEDURE — 99214 OFFICE O/P EST MOD 30 MIN: CPT | Performed by: NURSE PRACTITIONER

## 2019-08-27 RX ORDER — KETOTIFEN FUMARATE 0.35 MG/ML
1 SOLUTION/ DROPS OPHTHALMIC 2 TIMES DAILY
Qty: 10 ML | Refills: 5 | Status: SHIPPED | OUTPATIENT
Start: 2019-08-27 | End: 2020-03-24 | Stop reason: SDUPTHER

## 2019-08-27 RX ORDER — HYDROCODONE BITARTRATE AND ACETAMINOPHEN 7.5; 325 MG/1; MG/1
1 TABLET ORAL EVERY 8 HOURS PRN
Qty: 75 TABLET | Refills: 0 | Status: SHIPPED | OUTPATIENT
Start: 2019-08-27 | End: 2019-09-24 | Stop reason: SDUPTHER

## 2019-08-27 RX ORDER — NEOMYCIN/POLYMYXIN B/HYDROCORT 3.5-10K-1
1 SUSPENSION, DROPS(FINAL DOSAGE FORM)(ML) OPHTHALMIC (EYE)
Qty: 7.5 ML | Refills: 0 | Status: SHIPPED | OUTPATIENT
Start: 2019-08-27 | End: 2019-10-22

## 2019-08-27 RX ORDER — AZITHROMYCIN 250 MG/1
TABLET, FILM COATED ORAL
Qty: 6 TABLET | Refills: 0 | Status: SHIPPED | OUTPATIENT
Start: 2019-08-27 | End: 2019-09-24

## 2019-08-27 NOTE — PROGRESS NOTES
"Subjective   Rohini Burch is a 59 y.o. female.     Chief Complaint   Patient presents with   • Leg Pain       History of Present Illness     Leg and back pain-reports leg pain has been bad enough that \"I didn't go to work today\".  She is wearing her compression stockings for work.    Eyes itching and watering-reports some allergies for last several days.  Some congestion and face pressure.  Coughing and sneezing.  She has zyrtec to take but \"forgot it this morning\".      The following portions of the patient's history were reviewed and updated as appropriate: allergies, current medications, past family history, past medical history, past social history, past surgical history and problem list.    Review of Systems   Constitutional: Negative for appetite change, fatigue and unexpected weight change.   HENT: Negative for congestion, ear pain, nosebleeds, postnasal drip, rhinorrhea, sore throat, trouble swallowing and voice change.    Eyes: Negative for pain and visual disturbance.   Respiratory: Negative for cough, shortness of breath and wheezing.    Cardiovascular: Positive for leg swelling. Negative for chest pain and palpitations.   Gastrointestinal: Negative for abdominal pain, blood in stool, constipation and diarrhea.   Endocrine: Negative for cold intolerance and polydipsia.   Genitourinary: Negative for difficulty urinating, flank pain and hematuria.   Musculoskeletal: Negative for arthralgias, back pain, gait problem, joint swelling and myalgias.   Skin: Negative for color change and rash.   Allergic/Immunologic: Negative.    Neurological: Negative for syncope, numbness and headaches.   Hematological: Negative.    Psychiatric/Behavioral: Negative for sleep disturbance and suicidal ideas.   All other systems reviewed and are negative.      Objective     /70   Pulse 78   Temp 99.1 °F (37.3 °C) (Temporal)   Ht 165.1 cm (65\")   Wt 61.7 kg (136 lb)   SpO2 98%   BMI 22.63 kg/m²     Physical Exam "   Constitutional: She is oriented to person, place, and time. She appears well-developed and well-nourished. No distress.   HENT:   Head: Normocephalic and atraumatic.   Right Ear: Hearing, external ear and ear canal normal. A middle ear effusion is present.   Left Ear: Hearing, external ear and ear canal normal. A middle ear effusion is present.   Nose: Rhinorrhea present. No mucosal edema. Right sinus exhibits maxillary sinus tenderness. Left sinus exhibits maxillary sinus tenderness.   Mouth/Throat: Oropharynx is clear and moist and mucous membranes are normal.   Eyes: Conjunctivae, EOM and lids are normal. Pupils are equal, round, and reactive to light. Right eye exhibits no discharge. Left eye exhibits no discharge. Right conjunctiva is not injected. Left conjunctiva is not injected. No scleral icterus. Right eye exhibits normal extraocular motion and no nystagmus. Left eye exhibits normal extraocular motion and no nystagmus.   Neck: Normal range of motion. Neck supple. No JVD present. No tracheal tenderness present. Carotid bruit is not present. No thyromegaly present.   Cardiovascular: Normal rate, regular rhythm, S1 normal, S2 normal, normal heart sounds and intact distal pulses.   No murmur heard.  Pulmonary/Chest: Effort normal and breath sounds normal. She has no wheezes. She exhibits no tenderness.   Abdominal: Soft. Bowel sounds are normal. She exhibits no mass. There is no hepatosplenomegaly. There is no tenderness.   Musculoskeletal: Normal range of motion. She exhibits no edema.        Right elbow: Tenderness found. No lateral epicondyle tenderness noted.        Left elbow: Tenderness found.        Right knee: Tenderness found. Medial joint line and lateral joint line tenderness noted.        Left knee: Tenderness found. Medial joint line tenderness noted.        Right lower leg: She exhibits tenderness.        Left lower leg: She exhibits tenderness.   Gait normal.   equal bilaterally. No  muscular atrophy or flaccidity.   Lymphadenopathy:     She has no cervical adenopathy.        Right cervical: No superficial cervical adenopathy present.       Left cervical: No superficial cervical adenopathy present.   Neurological: She is alert and oriented to person, place, and time. She has normal strength and normal reflexes. She displays no tremor. No cranial nerve deficit or sensory deficit. She exhibits normal muscle tone. Coordination and gait normal.   Skin: Skin is warm and dry. Capillary refill takes less than 2 seconds. She is not diaphoretic. No cyanosis. No pallor. Nails show no clubbing.   Multiple varicosities in BLE   Psychiatric: She has a normal mood and affect. Her speech is normal and behavior is normal. Judgment and thought content normal. She is not actively hallucinating. Cognition and memory are normal. She is attentive.   Vitals reviewed.      Assessment/Plan     Problem List Items Addressed This Visit        Respiratory    Seasonal allergic rhinitis due to pollen    Relevant Medications    ketotifen (ALAWAY) 0.025 % ophthalmic solution    azithromycin (ZITHROMAX) 250 MG tablet       Nervous and Auditory    Multiple joint pain    Relevant Medications    HYDROcodone-acetaminophen (NORCO) 7.5-325 MG per tablet      Other Visit Diagnoses     Contact blepharoconjunctivitis of both eyes    -  Primary    Relevant Medications    ketotifen (ALAWAY) 0.025 % ophthalmic solution    neomycin-polymyxin-hydrocortisone (CORTISPORIN) 3.5-92909-7 ophthalmic suspension          Patient's Body mass index is 22.63 kg/m². BMI is above normal parameters. Recommendations include: nutrition counseling.   (Normal BMI:  18.5-24.9, OW 25-29.9, Obesity 30 or greater)    Understands disease processes and need for medications.  Understands reasons for urgent and emergent care.  Patient (& family) verbalized agreement for treatment plan.   Emotional support and active listening provided.  Patient provided time to  verbalize feelings.    [facility] #[id number] reviewed today and consistent.  Will refill prescribed controlled medication today.  Patient is aware they cannot receive narcotics from any other provider except if under care of pain management or speciality clinic.  Risk and benefits of medication use has been reviewed.  History and physical exam exhibit continued safe and appropriate use of controlled substances.  The patient is aware of the potential for addiction and dependence.  This patient has been made aware of the appropriate use of such medications, including potential risk of somnolence, limited ability to drive and / or work safely, and potential for overdose.    It has also been made clear that these medications are for use by this patient only, without concomitant use of alcohol or other substances unless prescribed/advised by medical provider.  Patient understands they may be subject to UDS and pill counts at random.      Patient considered to be low risk for addiction due to use of single controlled medications.  Patient understands and accepts these risks.  Patient need for medication will be reassessed at each visit.  Doses will be adjusted according to patient need and findings.    Goal of TX: Patient will not have any adverse reactions of medication.  Patient will have reduction in her joint pain and leg pain symptoms with use of hydrocodone PRN.    Z-Roderick ordered.  Patient advised to start medication if she begins to have thick productive sputum that changes color or increase in her facial pain and headache symptoms.    RTC 1 month, sooner if needed.           This document has been electronically signed by:  STEVE Gonsales FNP-C Dragon disclaimer:  Much of this encounter note is an electronic transcription/translation of spoken language to printed text. The electronic translation of spoken language may permit erroneous, or at times, nonsensical words or phrases to be inadvertently transcribed;  Although I have reviewed the note for such errors, some may still exist.

## 2019-09-24 ENCOUNTER — OFFICE VISIT (OUTPATIENT)
Dept: FAMILY MEDICINE CLINIC | Facility: CLINIC | Age: 59
End: 2019-09-24

## 2019-09-24 VITALS
OXYGEN SATURATION: 95 % | SYSTOLIC BLOOD PRESSURE: 110 MMHG | HEART RATE: 96 BPM | TEMPERATURE: 98.9 F | WEIGHT: 141 LBS | DIASTOLIC BLOOD PRESSURE: 70 MMHG | BODY MASS INDEX: 23.49 KG/M2 | HEIGHT: 65 IN

## 2019-09-24 DIAGNOSIS — M25.50 MULTIPLE JOINT PAIN: ICD-10-CM

## 2019-09-24 DIAGNOSIS — J30.1 SEASONAL ALLERGIC RHINITIS DUE TO POLLEN: Primary | ICD-10-CM

## 2019-09-24 PROCEDURE — 99213 OFFICE O/P EST LOW 20 MIN: CPT | Performed by: NURSE PRACTITIONER

## 2019-09-24 RX ORDER — HYDROCODONE BITARTRATE AND ACETAMINOPHEN 7.5; 325 MG/1; MG/1
1 TABLET ORAL EVERY 8 HOURS PRN
Qty: 75 TABLET | Refills: 0 | Status: SHIPPED | OUTPATIENT
Start: 2019-09-24 | End: 2019-10-22 | Stop reason: SDUPTHER

## 2019-09-24 NOTE — PROGRESS NOTES
"Subjective   Rohini Burch is a 59 y.o. female.     Chief Complaint   Patient presents with   • Varicose Veins       History of Present Illness     Leg pain-chronic and ongoing.  She does do excessive walking with work.  She reports she got her feet wet this morning and had to take her compression socks off.  She reports legs are \"throbbing\".  She is taking Meloxicam 7.5 mg but mostly PRN.  She reports she is taking Norco as directed.      The following portions of the patient's history were reviewed and updated as appropriate: allergies, current medications, past family history, past medical history, past social history, past surgical history and problem list.    Review of Systems   Constitutional: Negative for appetite change, fatigue and unexpected weight change.   HENT: Negative for congestion, ear pain, nosebleeds, postnasal drip, rhinorrhea, sore throat, trouble swallowing and voice change.    Eyes: Negative for pain and visual disturbance.   Respiratory: Negative for cough, shortness of breath and wheezing.    Cardiovascular: Negative for chest pain and palpitations.   Gastrointestinal: Negative for abdominal pain, blood in stool, constipation and diarrhea.   Endocrine: Negative for cold intolerance and polydipsia.   Genitourinary: Negative for difficulty urinating, flank pain and hematuria.   Musculoskeletal: Negative for arthralgias, back pain, gait problem, joint swelling and myalgias.   Skin: Negative for color change and rash.   Allergic/Immunologic: Negative.    Neurological: Negative for syncope, numbness and headaches.   Hematological: Negative.    Psychiatric/Behavioral: Negative for sleep disturbance and suicidal ideas.   All other systems reviewed and are negative.      Objective     /70   Pulse 96   Temp 98.9 °F (37.2 °C) (Temporal)   Ht 165.1 cm (65\")   Wt 64 kg (141 lb)   SpO2 95%   BMI 23.46 kg/m²     Physical Exam   Constitutional: She is oriented to person, place, and time. She " appears well-developed and well-nourished. No distress.   HENT:   Head: Normocephalic and atraumatic.   Right Ear: Hearing, tympanic membrane, external ear and ear canal normal.   Left Ear: Hearing, tympanic membrane, external ear and ear canal normal.   Nose: No mucosal edema or rhinorrhea.   Mouth/Throat: Oropharynx is clear and moist and mucous membranes are normal.   Eyes: Conjunctivae, EOM and lids are normal. Pupils are equal, round, and reactive to light. Right eye exhibits no discharge. Left eye exhibits no discharge. Right conjunctiva is not injected. Left conjunctiva is not injected. No scleral icterus. Right eye exhibits normal extraocular motion and no nystagmus. Left eye exhibits normal extraocular motion and no nystagmus.   Neck: Normal range of motion. Neck supple. No JVD present. No tracheal tenderness present. Carotid bruit is not present. No thyromegaly present.   Cardiovascular: Normal rate, regular rhythm, S1 normal, S2 normal, normal heart sounds and intact distal pulses.   No murmur heard.  Pulmonary/Chest: Effort normal and breath sounds normal. She has no wheezes. She exhibits no tenderness.   Abdominal: Soft. Bowel sounds are normal. She exhibits no mass. There is no hepatosplenomegaly. There is no tenderness.   Musculoskeletal: Normal range of motion. She exhibits no edema.        Right elbow: Tenderness found. No lateral epicondyle tenderness noted.        Left elbow: Tenderness found.        Right knee: Tenderness found. Medial joint line and lateral joint line tenderness noted.        Left knee: Tenderness found. Medial joint line tenderness noted.        Right lower leg: She exhibits tenderness.        Left lower leg: She exhibits tenderness.   Gait normal.   equal bilaterally. No muscular atrophy or flaccidity.   Lymphadenopathy:     She has no cervical adenopathy.        Right cervical: No superficial cervical adenopathy present.       Left cervical: No superficial cervical  adenopathy present.   Neurological: She is alert and oriented to person, place, and time. She has normal strength and normal reflexes. She displays no tremor. No cranial nerve deficit or sensory deficit. She exhibits normal muscle tone. Coordination and gait normal.   Skin: Skin is warm and dry. Capillary refill takes less than 2 seconds. She is not diaphoretic. No cyanosis. No pallor. Nails show no clubbing.   Multiple varicosities in BLE   Psychiatric: She has a normal mood and affect. Her speech is normal and behavior is normal. Judgment and thought content normal. She is not actively hallucinating. Cognition and memory are normal. She is attentive.   Vitals reviewed.      Assessment/Plan     Problem List Items Addressed This Visit        Respiratory    Seasonal allergic rhinitis due to pollen - Primary    Current Assessment & Plan     Continue zyrtec and Alaway for symptoms.  Avoid known allergy and respiratory triggers.  May use Saline spray PRN as desired            Nervous and Auditory    Multiple joint pain    Relevant Medications    HYDROcodone-acetaminophen (NORCO) 7.5-325 MG per tablet          Patient's Body mass index is 23.46 kg/m². BMI is within normal parameters. No follow-up required..   (Normal BMI:  18.5-24.9, OW 25-29.9, Obesity 30 or greater)    Understands disease processes and need for medications.  Understands reasons for urgent and emergent care.  Patient (& family) verbalized agreement for treatment plan.   Emotional support and active listening provided.  Patient provided time to verbalize feelings.    [facility] #[id number] reviewed today and consistent.  Will refill prescribed controlled medication today.  Patient is aware they cannot receive narcotics from any other provider except if under care of pain management or speciality clinic.  Risk and benefits of medication use has been reviewed.  History and physical exam exhibit continued safe and appropriate use of controlled substances.  The  patient is aware of the potential for addiction and dependence.  This patient has been made aware of the appropriate use of such medications, including potential risk of somnolence, limited ability to drive and / or work safely, and potential for overdose.    It has also been made clear that these medications are for use by this patient only, without concomitant use of alcohol or other substances unless prescribed/advised by medical provider.  Patient understands they may be subject to UDS and pill counts at random.      Patient considered to be low risk for addiction due to use of single controlled medications.  Patient understands and accepts these risks.  Patient need for medication will be reassessed at each visit.  Doses will be adjusted according to patient need and findings.    Goal of TX: Patient will not have any adverse reactions of medication.  Patient will have reduction in symptoms with use of Norco PRN.      RTC 1 month, sooner if needed.           This document has been electronically signed by:  STEVE Gonsales, FNP-C    Dragon disclaimer:  Much of this encounter note is an electronic transcription/translation of spoken language to printed text. The electronic translation of spoken language may permit erroneous, or at times, nonsensical words or phrases to be inadvertently transcribed; Although I have reviewed the note for such errors, some may still exist.

## 2019-10-22 ENCOUNTER — OFFICE VISIT (OUTPATIENT)
Dept: FAMILY MEDICINE CLINIC | Facility: CLINIC | Age: 59
End: 2019-10-22

## 2019-10-22 VITALS
OXYGEN SATURATION: 98 % | HEIGHT: 65 IN | DIASTOLIC BLOOD PRESSURE: 84 MMHG | WEIGHT: 135 LBS | BODY MASS INDEX: 22.49 KG/M2 | HEART RATE: 88 BPM | SYSTOLIC BLOOD PRESSURE: 130 MMHG | TEMPERATURE: 98.9 F

## 2019-10-22 DIAGNOSIS — M25.50 MULTIPLE JOINT PAIN: ICD-10-CM

## 2019-10-22 DIAGNOSIS — J30.1 SEASONAL ALLERGIC RHINITIS DUE TO POLLEN: Primary | ICD-10-CM

## 2019-10-22 PROCEDURE — 99213 OFFICE O/P EST LOW 20 MIN: CPT | Performed by: NURSE PRACTITIONER

## 2019-10-22 RX ORDER — HYDROCODONE BITARTRATE AND ACETAMINOPHEN 7.5; 325 MG/1; MG/1
1 TABLET ORAL EVERY 8 HOURS PRN
Qty: 75 TABLET | Refills: 0 | Status: SHIPPED | OUTPATIENT
Start: 2019-10-22 | End: 2019-11-19 | Stop reason: SDUPTHER

## 2019-10-22 NOTE — PROGRESS NOTES
"Subjective   Rohini Burch is a 59 y.o. female.     Chief Complaint   Patient presents with   • Joint Pain       History of Present Illness     Chronic joint pain-exacerbated due to recent damp weather.  Reports she had to get up last night multiple times due to discomfort.  She is taking norco 7.5 mg as directed.  She reports her shoulder and elbow are doing good for \"now\".  She does try to avoid lifting and tugging.    Allergies-chronic and ongoing.  Taking zyrtec and using allergy eye drops.      The following portions of the patient's history were reviewed and updated as appropriate: ROS, allergies, current medications, past family history, past medical history, past social history, past surgical history and problem list.      Review of Systems   Constitutional: Negative for appetite change and fatigue.   HENT: Positive for congestion and rhinorrhea. Negative for ear pain, postnasal drip and sore throat.    Eyes: Negative for photophobia.   Respiratory: Negative for cough and shortness of breath.    Cardiovascular: Negative for chest pain, palpitations and leg swelling.   Gastrointestinal: Negative for abdominal pain, constipation, diarrhea and nausea.   Genitourinary: Negative for dysuria and hematuria.   Musculoskeletal: Positive for arthralgias and myalgias.   Neurological: Negative for dizziness and headaches.   Psychiatric/Behavioral: Negative for dysphoric mood, sleep disturbance and suicidal ideas. The patient is not nervous/anxious.    All other systems reviewed and are negative.      Objective     /84   Pulse 88   Temp 98.9 °F (37.2 °C) (Temporal)   Ht 165.1 cm (65\")   Wt 61.2 kg (135 lb)   SpO2 98%   BMI 22.47 kg/m²     Physical Exam   Constitutional: She is oriented to person, place, and time. She appears well-developed and well-nourished. No distress.   HENT:   Head: Normocephalic and atraumatic.   Right Ear: Hearing, tympanic membrane, external ear and ear canal normal.   Left Ear: " Hearing, tympanic membrane, external ear and ear canal normal.   Nose: Nose normal. No mucosal edema or rhinorrhea.   Mouth/Throat: Oropharynx is clear and moist and mucous membranes are normal. No oropharyngeal exudate.   Eyes: Conjunctivae, EOM and lids are normal. Pupils are equal, round, and reactive to light. Right eye exhibits no discharge. Left eye exhibits no discharge. Right conjunctiva is not injected. Left conjunctiva is not injected. No scleral icterus. Right eye exhibits normal extraocular motion and no nystagmus. Left eye exhibits normal extraocular motion and no nystagmus.   Neck: Normal range of motion. Neck supple. No JVD present. No tracheal tenderness present. Carotid bruit is not present. No thyromegaly present.   Cardiovascular: Normal rate, regular rhythm, S1 normal, S2 normal, normal heart sounds and intact distal pulses.   No murmur heard.  Pulmonary/Chest: Effort normal and breath sounds normal. She has no wheezes. She exhibits no tenderness.   Abdominal: Soft. Bowel sounds are normal. She exhibits no mass. There is no hepatosplenomegaly. There is no tenderness.   Musculoskeletal: Normal range of motion. She exhibits no edema.        Right elbow: Tenderness found. No lateral epicondyle tenderness noted.        Left elbow: Tenderness found.        Right knee: Tenderness found. Medial joint line and lateral joint line tenderness noted.        Left knee: Tenderness found. Medial joint line tenderness noted.        Right lower leg: She exhibits tenderness.        Left lower leg: She exhibits tenderness.   Gait normal.   equal bilaterally. No muscular atrophy or flaccidity.   Lymphadenopathy:     She has no cervical adenopathy.        Right cervical: No superficial cervical adenopathy present.       Left cervical: No superficial cervical adenopathy present.   Neurological: She is alert and oriented to person, place, and time. She has normal strength and normal reflexes. She displays no  tremor. No cranial nerve deficit or sensory deficit. She exhibits normal muscle tone. Coordination and gait normal.   Skin: Skin is warm and dry. Capillary refill takes less than 2 seconds. She is not diaphoretic. No cyanosis. No pallor. Nails show no clubbing.   Multiple varicosities in BLE   Psychiatric: She has a normal mood and affect. Her speech is normal and behavior is normal. Judgment and thought content normal. She is not actively hallucinating. Cognition and memory are normal. She is attentive.   Vitals reviewed.      Assessment/Plan     Problem List Items Addressed This Visit        Respiratory    Seasonal allergic rhinitis due to pollen - Primary    Current Assessment & Plan     Continue zyrtec and Alaway for symptoms.  Avoid known allergy and respiratory triggers.  May use Saline spray PRN as desired            Nervous and Auditory    Multiple joint pain    Relevant Medications    HYDROcodone-acetaminophen (NORCO) 7.5-325 MG per tablet          Patient's Body mass index is 22.47 kg/m². BMI is within normal parameters. No follow-up required..     Understands disease processes and need for medications.  Understands reasons for urgent and emergent care.  Patient (& family) verbalized agreement for treatment plan.     Emotional support and active listening provided.  Patient provided time to verbalize feelings.    [facility] #[id number] reviewed today and consistent.  Will refill prescribed controlled medication today.  Patient is aware they cannot receive narcotics from any other provider except if under care of pain management or speciality clinic.  Risk and benefits of medication use has been reviewed.  History and physical exam exhibit continued safe and appropriate use of controlled substances.  The patient is aware of the potential for addiction and dependence.  This patient has been made aware of the appropriate use of such medications, including potential risk of somnolence, limited ability to drive and /  or work safely, and potential for overdose.    It has also been made clear that these medications are for use by this patient only, without concomitant use of alcohol or other substances unless prescribed/advised by medical provider.  Patient understands they may be subject to UDS and pill counts at random.      Patient considered to be low risk for addiction due to use of single controlled medications.  Patient understands and accepts these risks.  Patient need for medication will be reassessed at each visit.  Doses will be adjusted according to patient need and findings.    Goal of TX: Patient will not have any adverse reactions of medication.  Patient will have reduction in symptoms with use of Norco PRN.      RTC 1 month, sooner if needed.               This document has been electronically signed by:  STEVE Gonsales, FNP-C    Dragon disclaimer:  Much of this encounter note is an electronic transcription/translation of spoken language to printed text. The electronic translation of spoken language may permit erroneous, or at times, nonsensical words or phrases to be inadvertently transcribed; Although I have reviewed the note for such errors, some may still exist.

## 2019-11-19 ENCOUNTER — OFFICE VISIT (OUTPATIENT)
Dept: FAMILY MEDICINE CLINIC | Facility: CLINIC | Age: 59
End: 2019-11-19

## 2019-11-19 VITALS
BODY MASS INDEX: 23.99 KG/M2 | HEIGHT: 65 IN | OXYGEN SATURATION: 96 % | TEMPERATURE: 98.6 F | DIASTOLIC BLOOD PRESSURE: 82 MMHG | WEIGHT: 144 LBS | SYSTOLIC BLOOD PRESSURE: 138 MMHG | HEART RATE: 94 BPM

## 2019-11-19 DIAGNOSIS — M25.50 MULTIPLE JOINT PAIN: Primary | ICD-10-CM

## 2019-11-19 DIAGNOSIS — J30.1 SEASONAL ALLERGIC RHINITIS DUE TO POLLEN: ICD-10-CM

## 2019-11-19 PROCEDURE — 99213 OFFICE O/P EST LOW 20 MIN: CPT | Performed by: NURSE PRACTITIONER

## 2019-11-19 RX ORDER — CELECOXIB 100 MG/1
100 CAPSULE ORAL 2 TIMES DAILY PRN
Qty: 60 CAPSULE | Refills: 2 | Status: SHIPPED | OUTPATIENT
Start: 2019-11-19 | End: 2020-03-24 | Stop reason: SDUPTHER

## 2019-11-19 RX ORDER — HYDROCODONE BITARTRATE AND ACETAMINOPHEN 7.5; 325 MG/1; MG/1
1 TABLET ORAL EVERY 8 HOURS PRN
Qty: 75 TABLET | Refills: 0 | Status: SHIPPED | OUTPATIENT
Start: 2019-11-19 | End: 2019-12-16 | Stop reason: SDUPTHER

## 2019-11-19 NOTE — PROGRESS NOTES
"Subjective   Rohini Burch is a 59 y.o. female.     Chief Complaint   Patient presents with   • Allergic Rhinitis   • Joint pain       History of Present Illness     Allergic rhinitis-reports she has had an increase in congestion and eye itching.  She is having some rhinorrhea.  She is taking zyrtec but \"helps some\".  She reports heating with wood at home.  She reports \"like allergies\".  No sore throat or ear pain.  Ongoing.   Leg, shoulder, and elbow pain-reports doing \"alright\".  She reports leg pain is worse after work day.  She reports the right leg \"is about to kill me\" for approx 4 days.  She has been elevating.  No leg edema.  She reports over her hip area.  \"like a tooth ache\".   She has not been taking Mobic due to some bleeding and bruising more.  She did use a OTC rub today.  She reports it was somewhat helpful.  She continues to wear her compression hose.      The following portions of the patient's history were reviewed and updated as appropriate: CC, ROS, allergies, current medications, past family history, past medical history, past social history, past surgical history and problem list.    Review of Systems   Constitutional: Negative for appetite change, fatigue and unexpected weight change.   HENT: Positive for congestion and rhinorrhea. Negative for ear pain, nosebleeds, postnasal drip, sore throat, trouble swallowing and voice change.    Eyes: Negative for photophobia, pain and visual disturbance.   Respiratory: Negative for cough, shortness of breath and wheezing.    Cardiovascular: Negative for chest pain, palpitations and leg swelling.   Gastrointestinal: Negative for abdominal pain, blood in stool, constipation, diarrhea and nausea.   Endocrine: Negative for cold intolerance and polydipsia.   Genitourinary: Negative for difficulty urinating, dysuria, flank pain and hematuria.   Musculoskeletal: Positive for arthralgias and myalgias. Negative for back pain, gait problem and joint swelling. " "  Skin: Negative for color change and rash.   Allergic/Immunologic: Negative.    Neurological: Negative for dizziness, syncope, numbness and headaches.   Hematological: Negative.    Psychiatric/Behavioral: Negative for dysphoric mood, sleep disturbance and suicidal ideas. The patient is not nervous/anxious.    All other systems reviewed and are negative.      Objective     /82   Pulse 94   Temp 98.6 °F (37 °C) (Temporal)   Ht 165.1 cm (65\")   Wt 65.3 kg (144 lb)   SpO2 96%   BMI 23.96 kg/m²     Physical Exam   Constitutional: She is oriented to person, place, and time. She appears well-developed and well-nourished. No distress.   HENT:   Head: Normocephalic and atraumatic.   Right Ear: Hearing, tympanic membrane, external ear and ear canal normal.   Left Ear: Hearing, tympanic membrane, external ear and ear canal normal.   Nose: Rhinorrhea present. No mucosal edema. Right sinus exhibits frontal sinus tenderness. Left sinus exhibits frontal sinus tenderness.   Mouth/Throat: Oropharynx is clear and moist and mucous membranes are normal. No oropharyngeal exudate.   Eyes: Conjunctivae, EOM and lids are normal. Pupils are equal, round, and reactive to light. Right eye exhibits no discharge. Left eye exhibits no discharge. Right conjunctiva is not injected. Left conjunctiva is not injected. No scleral icterus. Right eye exhibits normal extraocular motion and no nystagmus. Left eye exhibits normal extraocular motion and no nystagmus.   Neck: Normal range of motion. Neck supple. No JVD present. No tracheal tenderness present. Carotid bruit is not present. No thyromegaly present.   Cardiovascular: Normal rate, regular rhythm, S1 normal, S2 normal, normal heart sounds and intact distal pulses.   No murmur heard.  Pulmonary/Chest: Effort normal and breath sounds normal. She has no wheezes. She exhibits no tenderness.   Abdominal: Soft. Bowel sounds are normal. She exhibits no mass. There is no hepatosplenomegaly. " There is no tenderness.   Musculoskeletal: Normal range of motion. She exhibits no edema.        Right elbow: Tenderness found. No lateral epicondyle tenderness noted.        Left elbow: Tenderness found.        Right knee: Tenderness found. Medial joint line and lateral joint line tenderness noted.        Left knee: Tenderness found. Medial joint line tenderness noted.        Right lower leg: She exhibits tenderness.        Left lower leg: She exhibits tenderness.   Gait normal.   equal bilaterally. No muscular atrophy or flaccidity.   Lymphadenopathy:     She has no cervical adenopathy.        Right cervical: No superficial cervical adenopathy present.       Left cervical: No superficial cervical adenopathy present.   Neurological: She is alert and oriented to person, place, and time. She has normal strength and normal reflexes. She displays no tremor. No cranial nerve deficit or sensory deficit. She exhibits normal muscle tone. Coordination and gait normal.   Skin: Skin is warm and dry. Capillary refill takes less than 2 seconds. She is not diaphoretic. No cyanosis. No pallor. Nails show no clubbing.   Multiple varicosities in BLE   Psychiatric: She has a normal mood and affect. Her speech is normal and behavior is normal. Judgment and thought content normal. She is not actively hallucinating. Cognition and memory are normal. She is attentive.   Vitals reviewed.      Assessment/Plan     Problem List Items Addressed This Visit        Respiratory    Seasonal allergic rhinitis due to pollen    Relevant Medications    celecoxib (CELEBREX) 100 MG capsule       Nervous and Auditory    Multiple joint pain - Primary    Relevant Medications    celecoxib (CELEBREX) 100 MG capsule    HYDROcodone-acetaminophen (NORCO) 7.5-325 MG per tablet          Patient's Body mass index is 23.96 kg/m². BMI is within normal parameters. No follow-up required..    Emotional support and active listening provided.  Patient provided time  to verbalize feelings.  Understands disease processes and need for medications.  Understands reasons for urgent and emergent care.  Patient (& family) verbalized agreement for treatment plan    MICHELLE #[id number] reviewed today and consistent.  Will refill prescribed controlled medication today.  Patient is aware they cannot receive narcotics from any other provider except if under care of pain management or speciality clinic.  Risk and benefits of medication use has been reviewed.  History and physical exam exhibit continued safe and appropriate use of controlled substances.  The patient is aware of the potential for addiction and dependence.  This patient has been made aware of the appropriate use of such medications, including potential risk of somnolence, limited ability to drive and / or work safely, and potential for overdose.    It has also been made clear that these medications are for use by this patient only, without concomitant use of alcohol or other substances unless prescribed/advised by medical provider.  Patient understands they may be subject to UDS and pill counts at random.      Patient considered to be low risk for addiction due to use of single controlled medications.  Patient understands and accepts these risks.  Patient need for medication will be reassessed at each visit.  Doses will be adjusted according to patient need and findings.    Goal of TX: Patient will not have any adverse reactions of medication.  Patient will have reduction in chronic joint pain symptoms with use of PRN Norco.    RTC 1 month, sooner if needed for problems or concerns        This document has been electronically signed by:  STEVE Gonsales FNP-C Dragon disclaimer:  Much of this encounter note is an electronic transcription/translation of spoken language to printed text. The electronic translation of spoken language may permit erroneous, or at times, nonsensical words or phrases to be inadvertently  transcribed; Although I have reviewed the note for such errors, some may still exist.

## 2019-12-16 ENCOUNTER — OFFICE VISIT (OUTPATIENT)
Dept: FAMILY MEDICINE CLINIC | Facility: CLINIC | Age: 59
End: 2019-12-16

## 2019-12-16 VITALS
DIASTOLIC BLOOD PRESSURE: 82 MMHG | TEMPERATURE: 98.3 F | BODY MASS INDEX: 23.82 KG/M2 | SYSTOLIC BLOOD PRESSURE: 122 MMHG | HEART RATE: 89 BPM | OXYGEN SATURATION: 96 % | HEIGHT: 65 IN | WEIGHT: 143 LBS

## 2019-12-16 DIAGNOSIS — J30.1 SEASONAL ALLERGIC RHINITIS DUE TO POLLEN: Primary | ICD-10-CM

## 2019-12-16 DIAGNOSIS — M25.50 MULTIPLE JOINT PAIN: ICD-10-CM

## 2019-12-16 PROCEDURE — 99213 OFFICE O/P EST LOW 20 MIN: CPT | Performed by: NURSE PRACTITIONER

## 2019-12-16 RX ORDER — HYDROCODONE BITARTRATE AND ACETAMINOPHEN 7.5; 325 MG/1; MG/1
1 TABLET ORAL EVERY 8 HOURS PRN
Qty: 75 TABLET | Refills: 0 | Status: SHIPPED | OUTPATIENT
Start: 2019-12-16 | End: 2020-01-14 | Stop reason: SDUPTHER

## 2019-12-16 NOTE — PROGRESS NOTES
Subjective   Rohini Burch is a 59 y.o. female.     Chief Complaint   Patient presents with   • Joint pain   • Allergic Rhinitis       History of Present Illness     Joint pain-chronic and ongoing.  Taking Norco 7.5 mg as directed.  She is doing well with Celebrex.  She has not noted any bruising. She feels her joint pain has decreased.  She denies any GI side effects.  Her elbow and shoulder pain has been stable.  She has been avoiding lifting as that tends to exacerbate her symptoms.  Ongoing  Rhinitis-has been stable despite multiple weather changes.  She is taking Zyrtec as needed and using Alaway allergy eyedrops daily as directed.  She reports that she is not having any eye discharge or itching symptoms.  No changes in vision.  Ongoing    The following portions of the patient's history were reviewed and updated as appropriate: CC, ROS, allergies, current medications, past family history, past medical history, past social history, past surgical history and problem list.      Review of Systems   Constitutional: Negative for appetite change, fatigue and unexpected weight change.   HENT: Positive for congestion and rhinorrhea. Negative for ear pain, nosebleeds, postnasal drip, sore throat, trouble swallowing and voice change.    Eyes: Negative for photophobia, pain and visual disturbance.   Respiratory: Negative for cough, shortness of breath and wheezing.    Cardiovascular: Negative for chest pain, palpitations and leg swelling.   Gastrointestinal: Negative for abdominal pain, blood in stool, constipation, diarrhea and nausea.   Endocrine: Negative for cold intolerance and polydipsia.   Genitourinary: Negative for difficulty urinating, dysuria, flank pain and hematuria.   Musculoskeletal: Positive for arthralgias and myalgias. Negative for back pain, gait problem and joint swelling.   Skin: Negative for color change and rash.   Allergic/Immunologic: Negative.    Neurological: Negative for dizziness, syncope,  "numbness and headaches.   Hematological: Negative.    Psychiatric/Behavioral: Negative for dysphoric mood, sleep disturbance and suicidal ideas. The patient is not nervous/anxious.    All other systems reviewed and are negative.      Objective     /82   Pulse 89   Temp 98.3 °F (36.8 °C) (Temporal)   Ht 165.1 cm (65\")   Wt 64.9 kg (143 lb)   SpO2 96%   BMI 23.80 kg/m²     Physical Exam   Constitutional: She is oriented to person, place, and time. She appears well-developed and well-nourished. No distress.   HENT:   Head: Normocephalic and atraumatic.   Right Ear: Hearing, tympanic membrane, external ear and ear canal normal.   Left Ear: Hearing, tympanic membrane, external ear and ear canal normal.   Nose: Rhinorrhea present. No mucosal edema. Right sinus exhibits frontal sinus tenderness. Left sinus exhibits frontal sinus tenderness.   Mouth/Throat: Oropharynx is clear and moist and mucous membranes are normal. No oropharyngeal exudate.   Eyes: Pupils are equal, round, and reactive to light. Conjunctivae, EOM and lids are normal. Right eye exhibits no discharge. Left eye exhibits no discharge. Right conjunctiva is not injected. Left conjunctiva is not injected. No scleral icterus. Right eye exhibits normal extraocular motion and no nystagmus. Left eye exhibits normal extraocular motion and no nystagmus.   Neck: Normal range of motion. Neck supple. No JVD present. No tracheal tenderness present. Carotid bruit is not present. No thyromegaly present.   Cardiovascular: Normal rate, regular rhythm, S1 normal, S2 normal, normal heart sounds and intact distal pulses.   No murmur heard.  Pulmonary/Chest: Effort normal and breath sounds normal. She has no wheezes. She exhibits no tenderness.   Abdominal: Soft. Bowel sounds are normal. She exhibits no mass. There is no hepatosplenomegaly. There is no tenderness.   Musculoskeletal: Normal range of motion. She exhibits no edema.        Right elbow: Tenderness found. " No lateral epicondyle tenderness noted.        Left elbow: Tenderness found.        Right knee: Tenderness found. Medial joint line and lateral joint line tenderness noted.        Left knee: Tenderness found. Medial joint line tenderness noted.        Right lower leg: She exhibits tenderness.        Left lower leg: She exhibits tenderness.   Gait normal.   equal bilaterally. No muscular atrophy or flaccidity.   Lymphadenopathy:     She has no cervical adenopathy.        Right cervical: No superficial cervical adenopathy present.       Left cervical: No superficial cervical adenopathy present.   Neurological: She is alert and oriented to person, place, and time. She has normal strength and normal reflexes. She displays no tremor. No cranial nerve deficit or sensory deficit. She exhibits normal muscle tone. Coordination and gait normal.   Skin: Skin is warm and dry. Capillary refill takes less than 2 seconds. She is not diaphoretic. No cyanosis. No pallor. Nails show no clubbing.   Multiple varicosities in BLE   Psychiatric: She has a normal mood and affect. Her speech is normal and behavior is normal. Judgment and thought content normal. She is not actively hallucinating. Cognition and memory are normal. She is attentive.   Vitals reviewed.      Assessment/Plan     Problem List Items Addressed This Visit        Respiratory    Seasonal allergic rhinitis due to pollen - Primary    Current Assessment & Plan     Continue zyrtec and Alaway for symptoms.  Avoid known allergy and respiratory triggers.  May use Saline spray PRN as desired            Nervous and Auditory    Multiple joint pain    Relevant Medications    HYDROcodone-acetaminophen (NORCO) 7.5-325 MG per tablet        Patient's Body mass index is 23.8 kg/m². BMI is within normal parameters. No follow-up required..    Understands disease processes and need for medications.  Understands reasons for urgent and emergent care.  Patient (& family) verbalized  agreement for treatment plan.   Emotional support and active listening provided.  Patient provided time to verbalize feelings.    MICHELLE #[id number] reviewed today and consistent.  Will refill prescribed controlled medication today.  Patient is aware they cannot receive narcotics from any other provider except if under care of pain management or speciality clinic.  Risk and benefits of medication use has been reviewed.  History and physical exam exhibit continued safe and appropriate use of controlled substances.  The patient is aware of the potential for addiction and dependence.  This patient has been made aware of the appropriate use of such medications, including potential risk of somnolence, limited ability to drive and / or work safely, and potential for overdose.    It has also been made clear that these medications are for use by this patient only, without concomitant use of alcohol or other substances unless prescribed/advised by medical provider.  Patient understands they may be subject to UDS and pill counts at random.      Patient considered to be low risk for addiction due to use of single controlled medications.  Patient understands and accepts these risks.  Patient need for medication will be reassessed at each visit.  Doses will be adjusted according to patient need and findings.    Goal of TX: Patient will not have any adverse reactions of medication.  Patient will have reduction in her joint and leg pain symptoms with use of hydrocodone as directed.  Patient will be able to remain active and gainfully employed with use of medication.    RTC 1 month, sooner if needed.             This document has been electronically signed by:  STEVE Gonsales FNP-C Dragon disclaimer:  Much of this encounter note is an electronic transcription/translation of spoken language to printed text. The electronic translation of spoken language may permit erroneous, or at times, nonsensical words or phrases to be  inadvertently transcribed; Although I have reviewed the note for such errors, some may still exist.

## 2020-01-14 DIAGNOSIS — M25.50 MULTIPLE JOINT PAIN: ICD-10-CM

## 2020-01-14 RX ORDER — HYDROCODONE BITARTRATE AND ACETAMINOPHEN 7.5; 325 MG/1; MG/1
1 TABLET ORAL EVERY 8 HOURS PRN
Qty: 20 TABLET | Refills: 0 | Status: SHIPPED | OUTPATIENT
Start: 2020-01-14 | End: 2020-02-25 | Stop reason: SDUPTHER

## 2020-02-25 ENCOUNTER — OFFICE VISIT (OUTPATIENT)
Dept: FAMILY MEDICINE CLINIC | Facility: CLINIC | Age: 60
End: 2020-02-25

## 2020-02-25 VITALS
DIASTOLIC BLOOD PRESSURE: 70 MMHG | WEIGHT: 137 LBS | HEIGHT: 65 IN | TEMPERATURE: 97.6 F | OXYGEN SATURATION: 98 % | HEART RATE: 106 BPM | SYSTOLIC BLOOD PRESSURE: 120 MMHG | BODY MASS INDEX: 22.82 KG/M2

## 2020-02-25 DIAGNOSIS — M25.50 MULTIPLE JOINT PAIN: ICD-10-CM

## 2020-02-25 DIAGNOSIS — R68.89 FLU-LIKE SYMPTOMS: Primary | ICD-10-CM

## 2020-02-25 DIAGNOSIS — J30.1 SEASONAL ALLERGIC RHINITIS DUE TO POLLEN: ICD-10-CM

## 2020-02-25 DIAGNOSIS — J06.9 ACUTE URI: ICD-10-CM

## 2020-02-25 LAB
EXPIRATION DATE: NORMAL
FLUAV AG NPH QL: NEGATIVE
FLUBV AG NPH QL: NEGATIVE
INTERNAL CONTROL: NORMAL
Lab: NORMAL

## 2020-02-25 PROCEDURE — 99214 OFFICE O/P EST MOD 30 MIN: CPT | Performed by: NURSE PRACTITIONER

## 2020-02-25 PROCEDURE — 87804 INFLUENZA ASSAY W/OPTIC: CPT | Performed by: NURSE PRACTITIONER

## 2020-02-25 RX ORDER — HYDROCODONE BITARTRATE AND ACETAMINOPHEN 7.5; 325 MG/1; MG/1
1 TABLET ORAL EVERY 8 HOURS PRN
Qty: 70 TABLET | Refills: 0 | Status: SHIPPED | OUTPATIENT
Start: 2020-02-25 | End: 2020-03-24 | Stop reason: SDUPTHER

## 2020-02-25 RX ORDER — GUAIFENESIN AND DEXTROMETHORPHAN HYDROBROMIDE 100; 10 MG/5ML; MG/5ML
10 SOLUTION ORAL EVERY 6 HOURS PRN
Qty: 180 ML | Refills: 0 | Status: SHIPPED | OUTPATIENT
Start: 2020-02-25 | End: 2020-03-24

## 2020-02-25 RX ORDER — AMOXICILLIN AND CLAVULANATE POTASSIUM 875; 125 MG/1; MG/1
1 TABLET, FILM COATED ORAL EVERY 12 HOURS SCHEDULED
Qty: 20 TABLET | Refills: 0 | Status: SHIPPED | OUTPATIENT
Start: 2020-02-25 | End: 2020-03-06

## 2020-02-25 NOTE — ASSESSMENT & PLAN NOTE
Continue Zyrtec and Alaway eyedrops.  Avoid known allergy and respiratory triggers.  May use Saline spray PRN as desired

## 2020-02-25 NOTE — PROGRESS NOTES
Subjective   Rohini Burch is a 59 y.o. female.     Chief Complaint   Patient presents with   • Cough   • Flu Symptoms       History of Present Illness     URI symptoms-reports she has been exposed to flu.  She began with symptoms on Saturday.  She reports sinus pressure and pain.  Some fever on Sunday.  Intermittent chills.  Voice hoarseness.  HA that is frontal and temporal in nature.  She reports some cough that is dry in nature.  She has no ear pain.  HA is dull and throbbing in nature.  She reports she has taken Nyquil at home for symptoms.  She has also taken Tylenol sinus.  Ongoing.    Allergies-on zyrtec and alaway allergy drops.    Back and joint pain-chronic and ongoing.  Reports leg pain is the worse.  Multiple areas of joint pain.  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  She also has multiple flights of stairs to walk daily several times per day.  She denies any BLE edema.  She continues to wear her compression socks for work.  Ongoing    The following portions of the patient's history were reviewed and updated as appropriate: CC, ROS, allergies, current medications, past family history, past medical history, past social history, past surgical history and problem list.      Review of Systems   Constitutional: Positive for chills and fatigue. Negative for appetite change and unexpected weight change.   HENT: Positive for congestion and sneezing. Negative for ear pain, nosebleeds, postnasal drip, rhinorrhea, sore throat, trouble swallowing and voice change.    Eyes: Negative for photophobia, pain and visual disturbance.   Respiratory: Positive for cough. Negative for chest tightness, shortness of breath and wheezing.    Cardiovascular: Negative for chest pain and palpitations.   Gastrointestinal: Negative for abdominal pain, blood in stool, constipation, diarrhea and nausea.   Endocrine: Negative for cold intolerance and  "polydipsia.   Genitourinary: Negative for difficulty urinating, flank pain, frequency and hematuria.   Musculoskeletal: Negative for arthralgias, back pain, gait problem, joint swelling and myalgias.   Skin: Negative for color change and rash.   Allergic/Immunologic: Negative.    Neurological: Negative for dizziness, syncope, numbness and headaches.   Hematological: Negative.    Psychiatric/Behavioral: Negative for dysphoric mood, sleep disturbance and suicidal ideas. The patient is not nervous/anxious.    All other systems reviewed and are negative.      Objective     /70   Pulse 106   Temp 97.6 °F (36.4 °C) (Temporal)   Ht 165.1 cm (65\")   Wt 62.1 kg (137 lb)   SpO2 98%   BMI 22.80 kg/m²     Physical Exam   Constitutional: She is oriented to person, place, and time. She appears well-developed and well-nourished. No distress.   HENT:   Head: Normocephalic and atraumatic.   Right Ear: Hearing, external ear and ear canal normal. Tympanic membrane is erythematous. A middle ear effusion is present.   Left Ear: Hearing, external ear and ear canal normal. Tympanic membrane is erythematous. A middle ear effusion is present.   Nose: Mucosal edema and rhinorrhea present. Right sinus exhibits maxillary sinus tenderness and frontal sinus tenderness. Left sinus exhibits maxillary sinus tenderness and frontal sinus tenderness.   Mouth/Throat: Mucous membranes are normal. Oropharyngeal exudate and posterior oropharyngeal erythema present.   Eyes: Pupils are equal, round, and reactive to light. Conjunctivae, EOM and lids are normal. No scleral icterus. Right eye exhibits normal extraocular motion and no nystagmus. Left eye exhibits normal extraocular motion and no nystagmus.   Neck: Normal range of motion. Neck supple. No JVD present. No tracheal tenderness present. Carotid bruit is not present. No thyromegaly present.   Cardiovascular: Normal rate, regular rhythm, S1 normal, S2 normal, normal heart sounds and intact " distal pulses.   No murmur heard.  Pulmonary/Chest: Effort normal and breath sounds normal. She exhibits no tenderness.   Abdominal: Soft. Bowel sounds are normal. She exhibits no mass. There is no hepatosplenomegaly. There is no tenderness.   Musculoskeletal: She exhibits no edema.        Lumbar back: She exhibits decreased range of motion, tenderness, pain and spasm.   Gait normal.   equal bilaterally. No muscular atrophy or flaccidity.   Lymphadenopathy:     She has cervical adenopathy.        Right cervical: Superficial cervical adenopathy present.        Left cervical: Superficial cervical adenopathy present.   Neurological: She is alert and oriented to person, place, and time. She has normal strength and normal reflexes. She displays no tremor. No cranial nerve deficit or sensory deficit. She exhibits normal muscle tone. Coordination and gait normal.   Skin: Skin is warm and dry. Capillary refill takes less than 2 seconds. She is not diaphoretic. No cyanosis. No pallor. Nails show no clubbing.   Psychiatric: She has a normal mood and affect. Her speech is normal and behavior is normal. Judgment and thought content normal. She is not actively hallucinating. Cognition and memory are normal. She is attentive.   Vitals reviewed.      Assessment/Plan     Problem List Items Addressed This Visit        Respiratory    Seasonal allergic rhinitis due to pollen    Current Assessment & Plan     Continue Zyrtec and Alaway eyedrops.  Avoid known allergy and respiratory triggers.  May use Saline spray PRN as desired            Nervous and Auditory    Multiple joint pain    Relevant Medications    HYDROcodone-acetaminophen (NORCO) 7.5-325 MG per tablet      Other Visit Diagnoses     Flu-like symptoms    -  Primary    Relevant Orders    POCT Influenza A/B (Completed)    Acute URI        Relevant Medications    amoxicillin-clavulanate (AUGMENTIN) 875-125 MG per tablet    dextromethorphan-guaifenesin (ROBITUSSIN-DM)   MG/5ML syrup          Patient's Body mass index is 22.8 kg/m². BMI is within normal parameters. No follow-up required..  Understands disease processes and need for medications.  Understands reasons for urgent and emergent care.  Patient (& family) verbalized agreement for treatment plan.   Emotional support and active listening provided.  Patient provided time to verbalize feelings.    Flu swab negative    [facility] #[id number] reviewed today and consistent.  Will refill prescribed controlled medication today.  Patient is aware they cannot receive narcotics from any other provider except if under care of pain management or speciality clinic.  Risk and benefits of medication use has been reviewed.  History and physical exam exhibit continued safe and appropriate use of controlled substances.  The patient is aware of the potential for addiction and dependence.  This patient has been made aware of the appropriate use of such medications, including potential risk of somnolence, limited ability to drive and / or work safely, and potential for overdose.    It has also been made clear that these medications are for use by this patient only, without concomitant use of alcohol or other substances unless prescribed/advised by medical provider.  Patient understands they may be subject to UDS and pill counts at random.      Patient considered to be low risk for addiction due to use of single controlled medications.  Patient understands and accepts these risks.  Patient need for medication will be reassessed at each visit.  Doses will be adjusted according to patient need and findings.    Goal of TX: Patient will not have any adverse reactions of medication.   Patient have reduction in pain symptoms with use of PRN Norco as directed.  Patient will not have any adverse side effects from medication.  Patient will be able to remain active in an outside of home without interference from pain symptoms.     instructed to complete all of  antibiotics for acute illness.  Increase PO fluids, avoid/limit caffeine.  Do not save any of the meds for later use.  Rest PRN  Steam expectoration, warm compress to sinus, Saline PRN for moisture    RTC 1 month, sooner if needed.             This document has been electronically signed by:  STEVE Gonsales FNP-C Dragon disclaimer:  Much of this encounter note is an electronic transcription/translation of spoken language to printed text. The electronic translation of spoken language may permit erroneous, or at times, nonsensical words or phrases to be inadvertently transcribed; Although I have reviewed the note for such errors, some may still exist.

## 2020-03-24 ENCOUNTER — OFFICE VISIT (OUTPATIENT)
Dept: FAMILY MEDICINE CLINIC | Facility: CLINIC | Age: 60
End: 2020-03-24

## 2020-03-24 VITALS
DIASTOLIC BLOOD PRESSURE: 70 MMHG | WEIGHT: 144 LBS | OXYGEN SATURATION: 98 % | HEIGHT: 65 IN | BODY MASS INDEX: 23.99 KG/M2 | TEMPERATURE: 98.3 F | HEART RATE: 82 BPM | SYSTOLIC BLOOD PRESSURE: 110 MMHG

## 2020-03-24 DIAGNOSIS — I83.813 VARICOSE VEINS OF BOTH LOWER EXTREMITIES WITH PAIN: ICD-10-CM

## 2020-03-24 DIAGNOSIS — J30.1 SEASONAL ALLERGIC RHINITIS DUE TO POLLEN: ICD-10-CM

## 2020-03-24 DIAGNOSIS — M25.50 MULTIPLE JOINT PAIN: Primary | ICD-10-CM

## 2020-03-24 PROCEDURE — 99214 OFFICE O/P EST MOD 30 MIN: CPT | Performed by: NURSE PRACTITIONER

## 2020-03-24 RX ORDER — HYDROCODONE BITARTRATE AND ACETAMINOPHEN 7.5; 325 MG/1; MG/1
1 TABLET ORAL EVERY 8 HOURS PRN
Qty: 70 TABLET | Refills: 0 | Status: SHIPPED | OUTPATIENT
Start: 2020-03-24 | End: 2020-04-27 | Stop reason: SDUPTHER

## 2020-03-24 RX ORDER — CELECOXIB 100 MG/1
100 CAPSULE ORAL 2 TIMES DAILY PRN
Qty: 60 CAPSULE | Refills: 2 | Status: SHIPPED | OUTPATIENT
Start: 2020-03-24 | End: 2020-05-27 | Stop reason: SDUPTHER

## 2020-03-24 RX ORDER — KETOTIFEN FUMARATE 0.35 MG/ML
1 SOLUTION/ DROPS OPHTHALMIC 2 TIMES DAILY
Qty: 10 ML | Refills: 5 | Status: SHIPPED | OUTPATIENT
Start: 2020-03-24 | End: 2020-07-23 | Stop reason: SDUPTHER

## 2020-03-24 NOTE — PROGRESS NOTES
"Subjective   Rohini Burch is a 59 y.o. female.     Chief Complaint   Patient presents with   • Joint pain   • Allergic Rhinitis       History of Present Illness     Allergies-Increased with recent weather changes and blooming of trees.  She reports she is taking zyrtec and alaway.  No significant concerns.    Joint and leg pain-chronic and ongoing.  Taking Celebrex and Norco.  No negative side effects.  She continues to work in maintenance.   she reports \"right down my shin\".    Varicosities-ongoing.  Wearing compression socks.  No acute concerns.  No worsening symptoms.  Worse of discomfort is in the evenings after her work shift.  Ongoing    The following portions of the patient's history were reviewed and updated as appropriate: CC, ROS, allergies, current medications, past family history, past medical history, past social history, past surgical history and problem list.    Review of Systems   Constitutional: Negative for appetite change, fatigue and unexpected weight change.   HENT: Positive for rhinorrhea. Negative for congestion, ear pain, nosebleeds, postnasal drip, sore throat, trouble swallowing and voice change.    Eyes: Negative for photophobia, pain and visual disturbance.   Respiratory: Negative for cough, shortness of breath and wheezing.    Cardiovascular: Negative for chest pain, palpitations and leg swelling.   Gastrointestinal: Negative for abdominal pain, blood in stool, constipation, diarrhea and nausea.   Endocrine: Negative for cold intolerance and polydipsia.   Genitourinary: Negative for difficulty urinating, dysuria, flank pain and hematuria.   Musculoskeletal: Positive for arthralgias and myalgias. Negative for back pain, gait problem and joint swelling.   Skin: Negative for color change and rash.   Allergic/Immunologic: Negative.    Neurological: Negative for dizziness, syncope, numbness and headaches.   Hematological: Negative.    Psychiatric/Behavioral: Negative for dysphoric mood, " "sleep disturbance and suicidal ideas. The patient is not nervous/anxious.    All other systems reviewed and are negative.      Objective     /70   Pulse 82   Temp 98.3 °F (36.8 °C) (Temporal)   Ht 165.1 cm (65\")   Wt 65.3 kg (144 lb)   SpO2 98%   BMI 23.96 kg/m²     Physical Exam   Constitutional: She is oriented to person, place, and time. She appears well-developed and well-nourished. No distress.   HENT:   Head: Normocephalic and atraumatic.   Right Ear: Hearing, tympanic membrane, external ear and ear canal normal.   Left Ear: Hearing, tympanic membrane, external ear and ear canal normal.   Nose: Nose normal. No mucosal edema or rhinorrhea.   Mouth/Throat: Oropharynx is clear and moist and mucous membranes are normal. No oropharyngeal exudate.   Eyes: Pupils are equal, round, and reactive to light. Conjunctivae, EOM and lids are normal. No scleral icterus. Right eye exhibits normal extraocular motion and no nystagmus. Left eye exhibits normal extraocular motion and no nystagmus.   Neck: Normal range of motion. Neck supple. No JVD present. No tracheal tenderness present. Carotid bruit is not present. No thyromegaly present.   Cardiovascular: Normal rate, regular rhythm, S1 normal, S2 normal, normal heart sounds and intact distal pulses.   No murmur heard.  Pulmonary/Chest: Effort normal and breath sounds normal. She exhibits no tenderness.   Abdominal: Soft. Bowel sounds are normal. She exhibits no mass. There is no hepatosplenomegaly. There is no tenderness.   Musculoskeletal: Normal range of motion. She exhibits no edema or tenderness.   Gait normal.   equal bilaterally. No muscular atrophy or flaccidity.  Multiple areas of joint pain and stiffness   Lymphadenopathy:     She has no cervical adenopathy.        Right cervical: No superficial cervical adenopathy present.       Left cervical: No superficial cervical adenopathy present.   Neurological: She is alert and oriented to person, place, and " time. She has normal strength and normal reflexes. She displays no tremor. No cranial nerve deficit or sensory deficit. She exhibits normal muscle tone. Coordination and gait normal.   Skin: Skin is warm and dry. Capillary refill takes less than 2 seconds. She is not diaphoretic. No cyanosis. No pallor. Nails show no clubbing.   Multiple BLE varicosities   Psychiatric: She has a normal mood and affect. Her speech is normal and behavior is normal. Judgment and thought content normal. She is not actively hallucinating. Cognition and memory are normal. She is attentive.   Vitals reviewed.      Assessment/Plan     Problem List Items Addressed This Visit        Cardiovascular and Mediastinum    Varicose veins of both lower extremities with pain    Current Assessment & Plan     Continue compression sock use.  Report any changes noted or any new swelling or pain.            Respiratory    Seasonal allergic rhinitis due to pollen    Relevant Medications    ketotifen (Alaway) 0.025 % ophthalmic solution    celecoxib (CeleBREX) 100 MG capsule       Nervous and Auditory    Multiple joint pain - Primary    Relevant Medications    HYDROcodone-acetaminophen (NORCO) 7.5-325 MG per tablet    celecoxib (CeleBREX) 100 MG capsule    Other Relevant Orders    Urine Drug Screen - Urine, Clean Catch          Patient's Body mass index is 23.96 kg/m². BMI is within normal parameters. No follow-up required..  Emotional support and active listening provided.  Patient provided time to verbalize feelings.    Understands disease processes and need for medications.  Understands reasons for urgent and emergent care.  Patient (& family) verbalized agreement for treatment plan.     [facility] #[id number] reviewed today and consistent.  Will refill prescribed controlled medication today.  Patient is aware they cannot receive narcotics from any other provider except if under care of pain management or speciality clinic.  Risk and benefits of medication use  has been reviewed.  History and physical exam exhibit continued safe and appropriate use of controlled substances.  The patient is aware of the potential for addiction and dependence.  This patient has been made aware of the appropriate use of such medications, including potential risk of somnolence, limited ability to drive and / or work safely, and potential for overdose.  Patient understands not to take medication and drive until they know how medication may affect their cognition/decision making.   It has also been made clear that these medications are for use by this patient only, without concomitant use of alcohol or other substances unless prescribed/advised by medical provider.  Patient understands they may be subject to UDS and pill counts at random.    Patient considered to be low risk for addiction due to use of single controlled medications.  Patient understands and accepts these risks.  Patient need for medication will be reassessed at each visit.  Doses will be adjusted according to patient need and findings.    Goal of TX: Patient will not have any adverse reactions of medication.  Patient have reduction in pain symptoms with use of PRN Norco as directed.  Patient will not have any adverse side effects from medication.  Patient will be able to remain active in an outside of home without interference from pain symptoms.    UDS requested per [facility] while patient in office today.  Buccal swab or blood sample will be obtained if patient is unable to provide specimen.     RTC 1 month, sooner if needed.             This document has been electronically signed by:  STEVE Gonsales FNP-C Dragon disclaimer:  Much of this encounter note is an electronic transcription/translation of spoken language to printed text. The electronic translation of spoken language may permit erroneous, or at times, nonsensical words or phrases to be inadvertently transcribed; Although I have reviewed the note for such  errors, some may still exist.

## 2020-04-27 ENCOUNTER — OFFICE VISIT (OUTPATIENT)
Dept: FAMILY MEDICINE CLINIC | Facility: CLINIC | Age: 60
End: 2020-04-27

## 2020-04-27 DIAGNOSIS — M25.50 MULTIPLE JOINT PAIN: ICD-10-CM

## 2020-04-27 DIAGNOSIS — J30.1 SEASONAL ALLERGIC RHINITIS DUE TO POLLEN: ICD-10-CM

## 2020-04-27 PROCEDURE — 99441 PR PHYS/QHP TELEPHONE EVALUATION 5-10 MIN: CPT | Performed by: NURSE PRACTITIONER

## 2020-04-27 RX ORDER — CETIRIZINE HYDROCHLORIDE 10 MG/1
10 TABLET ORAL DAILY PRN
Qty: 30 TABLET | Refills: 5 | Status: SHIPPED | OUTPATIENT
Start: 2020-04-27 | End: 2020-07-23 | Stop reason: SDUPTHER

## 2020-04-27 RX ORDER — HYDROCODONE BITARTRATE AND ACETAMINOPHEN 7.5; 325 MG/1; MG/1
1 TABLET ORAL EVERY 8 HOURS PRN
Qty: 70 TABLET | Refills: 0 | Status: SHIPPED | OUTPATIENT
Start: 2020-04-27 | End: 2020-05-27 | Stop reason: SDUPTHER

## 2020-04-27 NOTE — PROGRESS NOTES
"You have chosen to receive care through a telephone visit today. Do you consent to use a telephone visit for your medical care today? Yes    Establish patient makes contact with office of APRN to discuss health conditions.  Patient is due for routine checkup but due to current pandemic is not recommended to present to the office for face-to-face evaluation.      Chief Complaint   Patient presents with   • Leg Pain       Brief HPI/ROS obtained as follows:    Leg Pain-chronic and ongoing.  She is taking Norco PRN for symptoms.  No significant concerns as she has not been as busy at work.  She reports \"weather made me hurt yesterday\".  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  She continues to wear her compression socks.  She is also taking Celebrex 100 mg twice daily PRN.  She reports that that helps with some of the joint stiffness that she was experiencing in her knees and ankles.  Allergies-some acute flare but is taking meds.  She is using allergy eye drops as well.  She reports \"Need refills\".  Reports she had some outdoor exposures.    Review of Systems   Constitutional: Negative for appetite change, fatigue and unexpected weight change.   HENT: Positive for congestion and rhinorrhea. Negative for ear pain, nosebleeds, postnasal drip, sore throat, trouble swallowing and voice change.    Eyes: Positive for itching. Negative for photophobia, pain and visual disturbance.   Respiratory: Negative for cough, chest tightness, shortness of breath and wheezing.    Cardiovascular: Negative for chest pain, palpitations and leg swelling.   Gastrointestinal: Negative for abdominal pain, blood in stool, constipation, diarrhea and nausea.   Endocrine: Negative for cold intolerance and polydipsia.   Genitourinary: Negative for difficulty urinating, flank pain, frequency and hematuria.   Musculoskeletal: Positive for arthralgias. Negative for back " pain, gait problem, joint swelling and myalgias.   Skin: Negative for color change and rash.   Allergic/Immunologic: Negative.    Neurological: Negative for dizziness, syncope, numbness and headaches.   Hematological: Negative.    Psychiatric/Behavioral: Negative for dysphoric mood, sleep disturbance and suicidal ideas. The patient is not nervous/anxious.    All other systems reviewed and are negative.      The following portions of the patient's history were reviewed and updated as appropriate: CC, ROS, allergies, current medications, past family history, past medical history, past social history, past surgical history and problem list.    Assessment     Physical Exam     Patient alert and oriented.  Able to follow conversation without sounding breathless.  No obvious distress.  Thought processes congruent with conversation.  Answers and ask questions without difficulty.    Problem List Items Addressed This Visit        Respiratory    Seasonal allergic rhinitis due to pollen    Relevant Medications    cetirizine (zyrTEC) 10 MG tablet       Nervous and Auditory    Multiple joint pain    Relevant Medications    HYDROcodone-acetaminophen (NORCO) 7.5-325 MG per tablet            Patient instructed and advised to call if symptoms are increasing or new symptoms occur.    Understands reasons for urgent and emergent care.  Patient (& family) verbalized agreement for treatment plan.     Generalized precautions advised including social distancing, hand washing, cough/sneeze hygiene.     [facility] #[id number] reviewed today and consistent.  Will refill prescribed controlled medication today.  Patient is aware they cannot receive narcotics from any other provider except if under care of pain management or speciality clinic.  Risk and benefits of medication use has been reviewed.  History and physical exam exhibit continued safe and appropriate use of controlled substances.  The patient is aware of the potential for addiction and  dependence.  This patient has been made aware of the appropriate use of such medications, including potential risk of somnolence, limited ability to drive and / or work safely, and potential for overdose.  Patient understands not to take medication and drive until they know how medication may affect their cognition/decision making.   It has also been made clear that these medications are for use by this patient only, without concomitant use of alcohol or other substances unless prescribed/advised by medical provider.  Patient understands they may be subject to UDS and pill counts at random.    Patient considered to be low risk for addiction due to use of single controlled medications.  Patient understands and accepts these risks.  Patient need for medication will be reassessed at each visit.  Doses will be adjusted according to patient need and findings.    Goal of TX: Patient will not have any adverse reactions of medication.  Patient have reduction in pain symptoms with use of PRN Norco as directed.  Patient will not have any adverse side effects from medication.  Patient will be able to remain active in and outside of home without interference from pain symptoms.    RTC 1 month, sooner if needed.       This visit has been rescheduled as a phone visit to comply with patient safety concerns in accordance with CDC recommendations. Total time of discussion was 7 minutes.        This document has been electronically signed by:  STEVE Gonsales FNP-C

## 2020-05-27 ENCOUNTER — OFFICE VISIT (OUTPATIENT)
Dept: FAMILY MEDICINE CLINIC | Facility: CLINIC | Age: 60
End: 2020-05-27

## 2020-05-27 VITALS
HEIGHT: 65 IN | OXYGEN SATURATION: 95 % | TEMPERATURE: 97.8 F | DIASTOLIC BLOOD PRESSURE: 100 MMHG | HEART RATE: 96 BPM | SYSTOLIC BLOOD PRESSURE: 150 MMHG | WEIGHT: 144 LBS | BODY MASS INDEX: 23.99 KG/M2

## 2020-05-27 DIAGNOSIS — M25.50 MULTIPLE JOINT PAIN: ICD-10-CM

## 2020-05-27 DIAGNOSIS — J30.1 SEASONAL ALLERGIC RHINITIS DUE TO POLLEN: Primary | ICD-10-CM

## 2020-05-27 PROCEDURE — 99213 OFFICE O/P EST LOW 20 MIN: CPT | Performed by: NURSE PRACTITIONER

## 2020-05-27 RX ORDER — HYDROCODONE BITARTRATE AND ACETAMINOPHEN 7.5; 325 MG/1; MG/1
1 TABLET ORAL EVERY 8 HOURS PRN
Qty: 70 TABLET | Refills: 0 | Status: SHIPPED | OUTPATIENT
Start: 2020-05-27 | End: 2020-06-25 | Stop reason: SDUPTHER

## 2020-05-27 RX ORDER — CELECOXIB 100 MG/1
100 CAPSULE ORAL 2 TIMES DAILY PRN
Qty: 60 CAPSULE | Refills: 2 | Status: SHIPPED | OUTPATIENT
Start: 2020-05-27 | End: 2020-09-09 | Stop reason: SDUPTHER

## 2020-05-27 NOTE — PROGRESS NOTES
"Subjective   Rohini Burch is a 60 y.o. female.     Chief Complaint   Patient presents with   • Allergies       History of Present Illness     Allergies-chronic and ongoing.  She reports she forgot to take her meds and use her eye drops.  She reports eyes watering.  She reports they \"mowed at work\" and \"I am miserable\".  She reports her face is burning.    Joint and leg pain-chronic and ongoing.  Taking Norco PRN.  She is also taking Celebrex.  She reports \"legs are bad\".  \"Weather\".  Some increase in knee pain due to \"being cold today\".  She does brace the knee at times.        The following portions of the patient's history were reviewed and updated as appropriate: CC, ROS, allergies, current medications, past family history, past medical history, past social history, past surgical history and problem list.      Review of Systems   Constitutional: Negative for appetite change, fatigue and unexpected weight change.   HENT: Positive for postnasal drip and rhinorrhea. Negative for congestion, ear pain, nosebleeds, sore throat, trouble swallowing and voice change.    Eyes: Positive for redness. Negative for photophobia, pain and visual disturbance.        Eyes watering   Respiratory: Negative for cough, chest tightness, shortness of breath and wheezing.    Cardiovascular: Negative for chest pain and palpitations.   Gastrointestinal: Negative for abdominal pain, blood in stool, constipation, diarrhea and nausea.   Endocrine: Negative for cold intolerance and polydipsia.   Genitourinary: Negative for difficulty urinating, flank pain, frequency and hematuria.   Musculoskeletal: Positive for arthralgias and back pain. Negative for gait problem, joint swelling and myalgias.   Skin: Negative for color change and rash.   Allergic/Immunologic: Negative.    Neurological: Negative for dizziness, syncope, numbness and headaches.   Hematological: Negative.    Psychiatric/Behavioral: Negative for dysphoric mood, sleep disturbance " "and suicidal ideas. The patient is not nervous/anxious.    All other systems reviewed and are negative.      Objective     /100   Pulse 96   Temp 97.8 °F (36.6 °C) (Temporal)   Ht 165.1 cm (65\")   Wt 65.3 kg (144 lb)   SpO2 95%   BMI 23.96 kg/m²     Physical Exam   Constitutional: She is oriented to person, place, and time. She appears well-developed and well-nourished. No distress.   HENT:   Head: Normocephalic and atraumatic.   Right Ear: Hearing, tympanic membrane, external ear and ear canal normal.   Left Ear: Hearing, tympanic membrane, external ear and ear canal normal.   Oropharynx not examined.  Patient is presently wearing a face covering/mask due to COVID-19 pandemic.   Eyes: Pupils are equal, round, and reactive to light. Conjunctivae, EOM and lids are normal. No scleral icterus. Right eye exhibits normal extraocular motion and no nystagmus. Left eye exhibits normal extraocular motion and no nystagmus.   Bilateral eye tearing and redness noted   Neck: Normal range of motion. Neck supple. No JVD present. No tracheal tenderness present. Carotid bruit is not present. No thyromegaly present.   Cardiovascular: Normal rate, regular rhythm, S1 normal, S2 normal, normal heart sounds and intact distal pulses.   No murmur heard.  Pulmonary/Chest: Effort normal and breath sounds normal. She exhibits no tenderness.   Abdominal: Soft. Bowel sounds are normal. She exhibits no mass. There is no hepatosplenomegaly. There is no tenderness.   Musculoskeletal: Normal range of motion. She exhibits no edema.        Right lower leg: She exhibits tenderness.        Left lower leg: She exhibits tenderness.   Gait normal.   equal bilaterally. No muscular atrophy or flaccidity.  Multiple areas of joint pain and stiffness   Lymphadenopathy:     She has no cervical adenopathy.        Right cervical: No superficial cervical adenopathy present.       Left cervical: No superficial cervical adenopathy present. "   Neurological: She is alert and oriented to person, place, and time. She has normal strength and normal reflexes. She displays no tremor. No cranial nerve deficit or sensory deficit. She exhibits normal muscle tone. Coordination and gait normal.   Skin: Skin is warm and dry. Capillary refill takes less than 2 seconds. She is not diaphoretic. No cyanosis. No pallor. Nails show no clubbing.   Multiple BLE varicosities   Psychiatric: She has a normal mood and affect. Her speech is normal and behavior is normal. Judgment and thought content normal. She is not actively hallucinating. Cognition and memory are normal. She is attentive.   Vitals reviewed.      Assessment/Plan     Problem List Items Addressed This Visit        Respiratory    Seasonal allergic rhinitis due to pollen - Primary    Current Assessment & Plan     Continue medications as directed         Relevant Medications    celecoxib (CeleBREX) 100 MG capsule       Nervous and Auditory    Multiple joint pain    Relevant Medications    HYDROcodone-acetaminophen (NORCO) 7.5-325 MG per tablet    celecoxib (CeleBREX) 100 MG capsule          Patient's Body mass index is 23.96 kg/m². BMI is within normal parameters. No follow-up required..       Understands disease processes and need for medications.  Understands reasons for urgent and emergent care.  Patient (& family) verbalized agreement for treatment plan.   Emotional support and active listening provided.  Patient provided time to verbalize feelings.    [facility] #[id number] reviewed today and consistent.  Will refill prescribed controlled medication today.  Patient is aware they cannot receive narcotics from any other provider except if under care of pain management or speciality clinic.  Risk and benefits of medication use has been reviewed.  History and physical exam exhibit continued safe and appropriate use of controlled substances.  The patient is aware of the potential for addiction and dependence.  This  patient has been made aware of the appropriate use of such medications, including potential risk of somnolence, limited ability to drive and / or work safely, and potential for overdose.    It has also been made clear that these medications are for use by this patient only, without concomitant use of alcohol or other substances unless prescribed/advised by medical provider.  Patient understands they may be subject to UDS and pill counts at random.      Patient considered to be low risk for addiction due to use of single controlled medications.  Patient understands and accepts these risks.  Patient need for medication will be reassessed at each visit.  Doses will be adjusted according to patient need and findings.    Goal of TX: Patient will not have any adverse reactions of medication.    Patient have reduction in pain symptoms with use of PRN Norco as directed.  Patient will not have any adverse side effects from medication.  Patient will be able to remain active in an outside of home without interference from pain symptoms.        Advised patient to return home and take allergy medicine as soon as possible.  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    Refills on routine medications today.    RTC 1 month, sooner if needed.               This document has been electronically signed by:  STEVE Gonsales, FNP-C    Dragon disclaimer:  Much of this encounter note is an electronic transcription/translation of spoken language to printed text. The electronic translation of spoken language may permit erroneous, or at times, nonsensical words or phrases to be inadvertently transcribed; Although I have reviewed the note for such errors, some may still exist.

## 2020-06-25 ENCOUNTER — OFFICE VISIT (OUTPATIENT)
Dept: FAMILY MEDICINE CLINIC | Facility: CLINIC | Age: 60
End: 2020-06-25

## 2020-06-25 DIAGNOSIS — M25.50 MULTIPLE JOINT PAIN: ICD-10-CM

## 2020-06-25 DIAGNOSIS — J30.1 SEASONAL ALLERGIC RHINITIS DUE TO POLLEN: Primary | ICD-10-CM

## 2020-06-25 PROCEDURE — 99441 PR PHYS/QHP TELEPHONE EVALUATION 5-10 MIN: CPT | Performed by: NURSE PRACTITIONER

## 2020-06-25 RX ORDER — HYDROCODONE BITARTRATE AND ACETAMINOPHEN 7.5; 325 MG/1; MG/1
1 TABLET ORAL EVERY 8 HOURS PRN
Qty: 70 TABLET | Refills: 0 | Status: SHIPPED | OUTPATIENT
Start: 2020-06-25 | End: 2020-07-23 | Stop reason: SDUPTHER

## 2020-06-25 NOTE — PROGRESS NOTES
You have chosen to receive care through a telephone visit today. Do you consent to use a telephone visit for your medical care today? Yes    Establish patient makes contact with office of APRN to discuss health conditions.  Patient is due for routine checkup but due to current pandemic is not recommended to present to the office for face-to-face evaluation.      Chief Complaint   Patient presents with   • Joint pain       Brief HPI/ROS obtained as follows:    Joint Pain-chronic and ongoing.  She is taking Celebrex 100 mg PRN and PRN Hydrocodone 7.5 mg.  She also has leg pain.  Multiple varicosities are reported on her BLE.  She wears compression socks when she is at work.  She tries to avoid overuse activities such as prolonged bending or standing.  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  Ongoing  Allergies-On Zytrec 10 mg and using Alaway eye drops.  She reports intermittent rhinorrhea.  Some dry eyes and itching.  Symptoms are relieved with medication.  She does not have any concerns today.      The following portions of the patient's history were reviewed and updated as appropriate: CC, ROS, allergies, current medications, past family history, past medical history, past social history, past surgical history and problem list.    Review of Systems   Constitutional: Positive for fatigue. Negative for appetite change and unexpected weight change.   HENT: Positive for postnasal drip and rhinorrhea. Negative for congestion, ear pain, nosebleeds, sore throat, trouble swallowing and voice change.    Eyes: Negative for photophobia, pain and visual disturbance.   Respiratory: Negative for cough, chest tightness, shortness of breath and wheezing.    Cardiovascular: Negative for chest pain and palpitations.   Gastrointestinal: Negative for abdominal pain, blood in stool, constipation, diarrhea and nausea.   Endocrine: Negative for cold intolerance and polydipsia.    Genitourinary: Negative for difficulty urinating, flank pain, frequency and hematuria.   Musculoskeletal: Positive for arthralgias, back pain and myalgias. Negative for gait problem and joint swelling.   Skin: Negative for color change and rash.   Allergic/Immunologic: Negative.    Neurological: Negative for dizziness, syncope, numbness and headaches.   Hematological: Negative.    Psychiatric/Behavioral: Negative for dysphoric mood, sleep disturbance and suicidal ideas. The patient is not nervous/anxious.    All other systems reviewed and are negative.      Assessment     Physical Exam     Patient alert and oriented.  Able to follow conversation without sounding breathless.  No obvious distress.  Thought processes congruent with conversation.  Answers and ask questions without difficulty.      Problem List Items Addressed This Visit        Respiratory    Seasonal allergic rhinitis due to pollen - Primary    Current Assessment & Plan     Continue Zyrtec as directed.  Continue Alaway eyedrops.  Avoid known respiratory allergens or triggers.            Nervous and Auditory    Multiple joint pain    Relevant Medications    HYDROcodone-acetaminophen (NORCO) 7.5-325 MG per tablet            Patient instructed and advised to call if symptoms are increasing or new symptoms occur.    Understands reasons for urgent and emergent care.  Patient (& family) verbalized agreement for treatment plan.     Generalized precautions advised including social distancing, hand washing, cough/sneeze hygiene.     [facility] #[id number] reviewed today and consistent.  Will refill prescribed controlled medication today.  Patient is aware they cannot receive narcotics from any other provider except if under care of pain management or speciality clinic.  Risk and benefits of medication use has been reviewed.  History and physical exam exhibit continued safe and appropriate use of controlled substances.  The patient is aware of the potential for  addiction and dependence.  This patient has been made aware of the appropriate use of such medications, including potential risk of somnolence, limited ability to drive and / or work safely, and potential for overdose.    It has also been made clear that these medications are for use by this patient only, without concomitant use of alcohol or other substances unless prescribed/advised by medical provider.  Patient understands they may be subject to UDS and pill counts at random.      Patient considered to be low risk for addiction due to use of single controlled medications.  Patient understands and accepts these risks.  Patient need for medication will be reassessed at each visit.  Doses will be adjusted according to patient need and findings.    Goal of TX: Patient will not have any adverse reactions of medication.  Patient have reduction in pain symptoms with use of PRN Norco as directed.  Patient will not have any adverse side effects from medication.  Patient will be able to remain active in an outside of home without interference from pain symptoms.    RTC 1 month, sooner if needed for problems or concerns    This visit has been rescheduled as a phone visit to comply with patient safety concerns in accordance with CDC recommendations. Total time of discussion was 10 minutes.        This document has been electronically signed by:  STEVE Gonsales, FNP-C    Dragon disclaimer:  Much of this encounter note is an electronic transcription/translation of spoken language to printed text. The electronic translation of spoken language may permit erroneous, or at times, nonsensical words or phrases to be inadvertently transcribed; Although I have reviewed the note for such errors, some may still exist.

## 2020-06-25 NOTE — ASSESSMENT & PLAN NOTE
Continue Zyrtec as directed.  Continue Alaway eyedrops.  Avoid known respiratory allergens or triggers.

## 2020-07-23 ENCOUNTER — OFFICE VISIT (OUTPATIENT)
Dept: FAMILY MEDICINE CLINIC | Facility: CLINIC | Age: 60
End: 2020-07-23

## 2020-07-23 VITALS
SYSTOLIC BLOOD PRESSURE: 124 MMHG | HEART RATE: 75 BPM | HEIGHT: 65 IN | OXYGEN SATURATION: 95 % | TEMPERATURE: 97.6 F | WEIGHT: 142 LBS | BODY MASS INDEX: 23.66 KG/M2 | DIASTOLIC BLOOD PRESSURE: 82 MMHG | RESPIRATION RATE: 16 BRPM

## 2020-07-23 DIAGNOSIS — J30.1 SEASONAL ALLERGIC RHINITIS DUE TO POLLEN: ICD-10-CM

## 2020-07-23 DIAGNOSIS — Z00.00 ROUTINE HEALTH MAINTENANCE: Primary | ICD-10-CM

## 2020-07-23 DIAGNOSIS — M25.50 MULTIPLE JOINT PAIN: ICD-10-CM

## 2020-07-23 PROCEDURE — 99213 OFFICE O/P EST LOW 20 MIN: CPT | Performed by: NURSE PRACTITIONER

## 2020-07-23 RX ORDER — KETOTIFEN FUMARATE 0.35 MG/ML
1 SOLUTION/ DROPS OPHTHALMIC 2 TIMES DAILY
Qty: 10 ML | Refills: 5 | Status: SHIPPED | OUTPATIENT
Start: 2020-07-23 | End: 2020-11-24 | Stop reason: SDUPTHER

## 2020-07-23 RX ORDER — HYDROCODONE BITARTRATE AND ACETAMINOPHEN 7.5; 325 MG/1; MG/1
1 TABLET ORAL EVERY 8 HOURS PRN
Qty: 90 TABLET | Refills: 0 | Status: SHIPPED | OUTPATIENT
Start: 2020-07-23 | End: 2020-09-09 | Stop reason: SDUPTHER

## 2020-07-23 RX ORDER — CETIRIZINE HYDROCHLORIDE 10 MG/1
10 TABLET ORAL DAILY PRN
Qty: 30 TABLET | Refills: 5 | Status: SHIPPED | OUTPATIENT
Start: 2020-07-23 | End: 2020-11-24 | Stop reason: SDUPTHER

## 2020-07-23 NOTE — PROGRESS NOTES
Subjective   Rohini Burch is a 60 y.o. female.     Chief Complaint   Patient presents with   • Back Pain   • Leg Pain       History of Present Illness     Back and leg pain-chronic and ongoing.  Presently taking Norco and Celebrex.  She denies any negative side effects.    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  She reports that her symptoms are not any worse than they normally are when she has busy at work.  Pain is improved with medication use.  Ongoing  Allergies-chronic and ongoing.  Taking zyrtec 10 mg and using Alaway eye drops.  No negative side effects.   Some itching and watering of eyes but has improved since she has been using medications consistently.    The following portions of the patient's history were reviewed and updated as appropriate: CC, ROS, allergies, current medications, past family history, past medical history, past social history, past surgical history and problem list.      Review of Systems   Constitutional: Negative for appetite change, fatigue and unexpected weight change.   HENT: Negative for congestion, ear pain, nosebleeds, postnasal drip, rhinorrhea, sore throat, trouble swallowing and voice change.    Eyes: Positive for itching. Negative for photophobia, pain and visual disturbance.   Respiratory: Negative for cough, chest tightness, shortness of breath and wheezing.    Cardiovascular: Negative for chest pain and palpitations.   Gastrointestinal: Negative for abdominal pain, blood in stool, constipation, diarrhea and nausea.   Endocrine: Negative for cold intolerance and polydipsia.   Genitourinary: Negative for difficulty urinating, flank pain, frequency and hematuria.   Musculoskeletal: Positive for arthralgias, back pain and myalgias. Negative for gait problem and joint swelling.   Skin: Negative for color change and rash.   Allergic/Immunologic: Negative.    Neurological:  "Negative for dizziness, syncope, numbness and headaches.   Hematological: Negative.    Psychiatric/Behavioral: Negative for dysphoric mood, sleep disturbance and suicidal ideas. The patient is not nervous/anxious.    All other systems reviewed and are negative.      Objective     /82 (BP Location: Left arm, Patient Position: Sitting)   Pulse 75   Temp 97.6 °F (36.4 °C) (Temporal)   Resp 16   Ht 165.1 cm (65\")   Wt 64.4 kg (142 lb)   SpO2 95%   BMI 23.63 kg/m²     Physical Exam   Constitutional: She is oriented to person, place, and time. She appears well-developed and well-nourished. No distress.   HENT:   Head: Normocephalic and atraumatic.   Right Ear: Hearing, tympanic membrane, external ear and ear canal normal.   Left Ear: Hearing, tympanic membrane, external ear and ear canal normal.   Oropharynx not examined.  Patient is presently wearing a face covering/mask due to COVID-19 pandemic.   Eyes: Pupils are equal, round, and reactive to light. Conjunctivae, EOM and lids are normal. No scleral icterus. Right eye exhibits normal extraocular motion and no nystagmus. Left eye exhibits normal extraocular motion and no nystagmus.   Neck: Normal range of motion. Neck supple. No JVD present. No tracheal tenderness present. Carotid bruit is not present. No thyromegaly present.   Cardiovascular: Normal rate, regular rhythm, S1 normal, S2 normal, normal heart sounds and intact distal pulses.   No murmur heard.  Pulmonary/Chest: Effort normal and breath sounds normal. She exhibits no tenderness.   Abdominal: Soft. Bowel sounds are normal. She exhibits no mass. There is no hepatosplenomegaly. There is no tenderness.   Musculoskeletal: Normal range of motion. She exhibits no edema.        Right knee: Tenderness found.        Left knee: Tenderness found.        Right lower leg: She exhibits tenderness.        Left lower leg: She exhibits tenderness.   Gait normal.   equal bilaterally. No muscular atrophy or " flaccidity.   Lymphadenopathy:     She has no cervical adenopathy.        Right cervical: No superficial cervical adenopathy present.       Left cervical: No superficial cervical adenopathy present.   Neurological: She is alert and oriented to person, place, and time. She has normal reflexes. She displays no atrophy and no tremor. No cranial nerve deficit or sensory deficit. She exhibits normal muscle tone. Coordination normal.   Skin: Skin is warm and dry. Capillary refill takes less than 2 seconds. She is not diaphoretic. No cyanosis. No pallor. Nails show no clubbing.   Psychiatric: She has a normal mood and affect. Her speech is normal and behavior is normal. Judgment and thought content normal. She is not actively hallucinating. Cognition and memory are normal. She is attentive.   Vitals reviewed.      Assessment/Plan     Problem List Items Addressed This Visit        Respiratory    Seasonal allergic rhinitis due to pollen    Relevant Medications    cetirizine (zyrTEC) 10 MG tablet    ketotifen (Alaway) 0.025 % ophthalmic solution    Other Relevant Orders    CBC & Differential    Comprehensive Metabolic Panel    Lipid Panel    TSH    Hemoglobin A1c    Vitamin D 25 Hydroxy    T4, Free    Vitamin B12       Nervous and Auditory    Multiple joint pain    Relevant Medications    HYDROcodone-acetaminophen (NORCO) 7.5-325 MG per tablet    Other Relevant Orders    Urine Drug Screen - Urine, Clean Catch    CBC & Differential    Comprehensive Metabolic Panel    Lipid Panel    TSH    Hemoglobin A1c    Vitamin D 25 Hydroxy    T4, Free    Vitamin B12      Other Visit Diagnoses     Routine health maintenance    -  Primary    Relevant Orders    CBC & Differential    Comprehensive Metabolic Panel    Lipid Panel    TSH    Hemoglobin A1c    Vitamin D 25 Hydroxy    T4, Free    Vitamin B12          Patient's Body mass index is 23.63 kg/m². BMI is above normal parameters. Recommendations include: nutrition counseling.            Understands disease processes and need for medications.  Understands reasons for urgent and emergent care.  Patient (& family) verbalized agreement for treatment plan.   Emotional support and active listening provided.  Patient provided time to verbalize feelings.    [facility] #[id number] reviewed today and consistent.  Will refill prescribed controlled medication today.  Patient is aware they cannot receive narcotics from any other provider except if under care of pain management or speciality clinic.  Risk and benefits of medication use has been reviewed.  History and physical exam exhibit continued safe and appropriate use of controlled substances.  The patient is aware of the potential for addiction and dependence.  This patient has been made aware of the appropriate use of such medications, including potential risk of somnolence, limited ability to drive and / or work safely, and potential for overdose.    It has also been made clear that these medications are for use by this patient only, without concomitant use of alcohol or other substances unless prescribed/advised by medical provider.  Patient understands they may be subject to UDS and pill counts at random.      Patient considered to be low risk for addiction due to use of single controlled medications.  Patient understands and accepts these risks.  Patient need for medication will be reassessed at each visit.  Doses will be adjusted according to patient need and findings.    Goal of TX: Patient will not have any adverse reactions of medication.   Patient have reduction in pain symptoms with use of PRN Norco as directed.  Patient will not have any adverse side effects from medication.  Patient will be able to remain active in an outside of home without interference from pain symptoms.    UDS requested per [facility] while patient in office today.  Buccal swab or blood sample will be obtained if patient is unable to provide specimen.    Patient to return to the  clinic to have labs done.    RTC 2 months, sooner if needed.             This document has been electronically signed by:  STEVE Gonsales, FNP-C    Dragon disclaimer:  Much of this encounter note is an electronic transcription/translation of spoken language to printed text. The electronic translation of spoken language may permit erroneous, or at times, nonsensical words or phrases to be inadvertently transcribed; Although I have reviewed the note for such errors, some may still exist.

## 2020-09-09 ENCOUNTER — OFFICE VISIT (OUTPATIENT)
Dept: FAMILY MEDICINE CLINIC | Facility: CLINIC | Age: 60
End: 2020-09-09

## 2020-09-09 VITALS
DIASTOLIC BLOOD PRESSURE: 76 MMHG | HEIGHT: 65 IN | SYSTOLIC BLOOD PRESSURE: 134 MMHG | WEIGHT: 138 LBS | BODY MASS INDEX: 22.99 KG/M2 | HEART RATE: 90 BPM | OXYGEN SATURATION: 98 % | TEMPERATURE: 97.9 F

## 2020-09-09 DIAGNOSIS — M25.50 MULTIPLE JOINT PAIN: ICD-10-CM

## 2020-09-09 DIAGNOSIS — J30.1 SEASONAL ALLERGIC RHINITIS DUE TO POLLEN: Primary | ICD-10-CM

## 2020-09-09 PROCEDURE — 99213 OFFICE O/P EST LOW 20 MIN: CPT | Performed by: NURSE PRACTITIONER

## 2020-09-09 RX ORDER — CELECOXIB 100 MG/1
100 CAPSULE ORAL 2 TIMES DAILY PRN
Qty: 60 CAPSULE | Refills: 2 | Status: SHIPPED | OUTPATIENT
Start: 2020-09-09 | End: 2020-11-24 | Stop reason: SDUPTHER

## 2020-09-09 RX ORDER — HYDROCODONE BITARTRATE AND ACETAMINOPHEN 7.5; 325 MG/1; MG/1
1 TABLET ORAL EVERY 8 HOURS PRN
Qty: 90 TABLET | Refills: 0 | Status: SHIPPED | OUTPATIENT
Start: 2020-09-09 | End: 2020-10-21 | Stop reason: SDUPTHER

## 2020-09-09 NOTE — PROGRESS NOTES
Subjective   Rohini Burch is a 60 y.o. female.     Chief Complaint   Patient presents with   • Back pain   • Leg pain   • Allergic Rhinitis       History of Present Illness     Back and leg pain-chronic and ongoing.  On Celebrex 100 mg mostly PRN.  She is also taking Norco as directed. No negative side effects.  No change in symptoms.  She reports damp and cooler weather increase her leg discomfort.   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   Allergies-On zyrtec 10 mg.  Intermittent symptoms.  No acute concerns today.     The following portions of the patient's history were reviewed and updated as appropriate: CC, ROS, allergies, current medications, past family history, past medical history, past social history, past surgical history and problem list.      Review of Systems   Constitutional: Negative for appetite change, fatigue and unexpected weight change.   HENT: Negative for congestion, ear pain, nosebleeds, postnasal drip, rhinorrhea, sore throat, trouble swallowing and voice change.    Eyes: Negative for photophobia, pain, itching and visual disturbance.   Respiratory: Negative for cough, chest tightness, shortness of breath and wheezing.    Cardiovascular: Negative for chest pain and palpitations.   Gastrointestinal: Negative for abdominal pain, blood in stool, constipation, diarrhea and nausea.   Endocrine: Negative for cold intolerance and polydipsia.   Genitourinary: Negative for difficulty urinating, flank pain, frequency and hematuria.   Musculoskeletal: Positive for arthralgias, back pain and myalgias. Negative for gait problem and joint swelling.   Skin: Negative for color change and rash.   Allergic/Immunologic: Negative.    Neurological: Negative for dizziness, syncope, numbness and headaches.   Hematological: Negative.    Psychiatric/Behavioral: Negative for dysphoric mood, sleep disturbance and suicidal ideas. The patient is not nervous/anxious.   "  All other systems reviewed and are negative.      Objective     /76   Pulse 90   Temp 97.9 °F (36.6 °C) (Temporal)   Ht 165.1 cm (65\")   Wt 62.6 kg (138 lb)   SpO2 98%   BMI 22.96 kg/m²     Physical Exam   Constitutional: She is oriented to person, place, and time. She appears well-developed and well-nourished. No distress.   HENT:   Head: Normocephalic and atraumatic.   Right Ear: Hearing, tympanic membrane, external ear and ear canal normal.   Left Ear: Hearing, tympanic membrane, external ear and ear canal normal.   Oropharynx not examined.  Patient is presently wearing a face covering/mask due to COVID-19 pandemic.   Eyes: Pupils are equal, round, and reactive to light. Conjunctivae, EOM and lids are normal. No scleral icterus. Right eye exhibits normal extraocular motion and no nystagmus. Left eye exhibits normal extraocular motion and no nystagmus.   Neck: Normal range of motion. Neck supple. No JVD present. No tracheal tenderness present. Carotid bruit is not present. No thyromegaly present.   Cardiovascular: Normal rate, regular rhythm, S1 normal, S2 normal, normal heart sounds and intact distal pulses.   No murmur heard.  Pulmonary/Chest: Effort normal and breath sounds normal. She exhibits no tenderness.   Abdominal: Soft. Bowel sounds are normal. She exhibits no mass. There is no hepatosplenomegaly. There is no tenderness.   Musculoskeletal: Normal range of motion. She exhibits no edema.        Lumbar back: She exhibits tenderness.        Right lower leg: She exhibits tenderness. She exhibits no edema.        Left lower leg: She exhibits tenderness. She exhibits no edema.   Gait normal.   equal bilaterally. No muscular atrophy or flaccidity.   Lymphadenopathy:     She has no cervical adenopathy.        Right cervical: No superficial cervical adenopathy present.       Left cervical: No superficial cervical adenopathy present.   Neurological: She is alert and oriented to person, place, " and time. She has normal reflexes. She displays no atrophy and no tremor. No cranial nerve deficit or sensory deficit. She exhibits normal muscle tone. Coordination normal.   Skin: Skin is warm and dry. Capillary refill takes less than 2 seconds. She is not diaphoretic. No cyanosis. No pallor. Nails show no clubbing.   multiple varicose veins   Psychiatric: She has a normal mood and affect. Her speech is normal and behavior is normal. Judgment and thought content normal. She is not actively hallucinating. Cognition and memory are normal. She is attentive.   Vitals reviewed.      Assessment/Plan     Problem List Items Addressed This Visit        Respiratory    Seasonal allergic rhinitis due to pollen - Primary    Current Assessment & Plan     Continue Zyrtec as directed.  Continue Alaway eyedrops.  Avoid known respiratory allergens or triggers.         Relevant Medications    celecoxib (CeleBREX) 100 MG capsule       Nervous and Auditory    Multiple joint pain    Relevant Medications    HYDROcodone-acetaminophen (NORCO) 7.5-325 MG per tablet    celecoxib (CeleBREX) 100 MG capsule          Patient's Body mass index is 22.96 kg/m². BMI is within normal parameters. No follow-up required..       Understands disease processes and need for medications.  Understands reasons for urgent and emergent care.  Patient (& family) verbalized agreement for treatment plan.   Emotional support and active listening provided.  Patient provided time to verbalize feelings.    [facility] #[id number] reviewed today and consistent.  Will refill prescribed controlled medication today.  Patient is aware they cannot receive narcotics from any other provider except if under care of pain management or speciality clinic.  Risk and benefits of medication use has been reviewed.  History and physical exam exhibit continued safe and appropriate use of controlled substances.  The patient is aware of the potential for addiction and dependence.  This patient  has been made aware of the appropriate use of such medications, including potential risk of somnolence, limited ability to drive and / or work safely, and potential for overdose.    It has also been made clear that these medications are for use by this patient only, without concomitant use of alcohol or other substances unless prescribed/advised by medical provider.  Patient understands they may be subject to UDS and pill counts at random.      Patient considered to be low risk for addiction due to use of single controlled medications.  Patient understands and accepts these risks.  Patient need for medication will be reassessed at each visit.  Doses will be adjusted according to patient need and findings.    Goal of TX: Patient will not have any adverse reactions of medication.  Patient have reduction in pain symptoms with use of PRN Norco as directed.  Patient will not have any adverse side effects from medication.  Patient will be able to remain active in an outside of home without interference from pain symptoms.    RTC 1 month, sooner if needed.             This document has been electronically signed by:  STEVE Gonsales, FNP-C    Dragon disclaimer:  Much of this encounter note is an electronic transcription/translation of spoken language to printed text. The electronic translation of spoken language may permit erroneous, or at times, nonsensical words or phrases to be inadvertently transcribed; Although I have reviewed the note for such errors, some may still exist.

## 2020-10-21 ENCOUNTER — RESULTS ENCOUNTER (OUTPATIENT)
Dept: FAMILY MEDICINE CLINIC | Facility: CLINIC | Age: 60
End: 2020-10-21

## 2020-10-21 ENCOUNTER — OFFICE VISIT (OUTPATIENT)
Dept: FAMILY MEDICINE CLINIC | Facility: CLINIC | Age: 60
End: 2020-10-21

## 2020-10-21 VITALS
OXYGEN SATURATION: 97 % | DIASTOLIC BLOOD PRESSURE: 80 MMHG | HEIGHT: 65 IN | HEART RATE: 87 BPM | TEMPERATURE: 97.1 F | WEIGHT: 144 LBS | BODY MASS INDEX: 23.99 KG/M2 | SYSTOLIC BLOOD PRESSURE: 120 MMHG

## 2020-10-21 DIAGNOSIS — Z00.00 ROUTINE HEALTH MAINTENANCE: ICD-10-CM

## 2020-10-21 DIAGNOSIS — Z12.11 COLON CANCER SCREENING: ICD-10-CM

## 2020-10-21 DIAGNOSIS — J30.1 SEASONAL ALLERGIC RHINITIS DUE TO POLLEN: ICD-10-CM

## 2020-10-21 DIAGNOSIS — M25.50 MULTIPLE JOINT PAIN: ICD-10-CM

## 2020-10-21 DIAGNOSIS — I83.813 VARICOSE VEINS OF BOTH LOWER EXTREMITIES WITH PAIN: Primary | ICD-10-CM

## 2020-10-21 DIAGNOSIS — Z12.31 ENCOUNTER FOR SCREENING MAMMOGRAM FOR MALIGNANT NEOPLASM OF BREAST: ICD-10-CM

## 2020-10-21 PROCEDURE — 99214 OFFICE O/P EST MOD 30 MIN: CPT | Performed by: NURSE PRACTITIONER

## 2020-10-21 RX ORDER — HYDROCODONE BITARTRATE AND ACETAMINOPHEN 7.5; 325 MG/1; MG/1
1 TABLET ORAL EVERY 8 HOURS PRN
Qty: 90 TABLET | Refills: 0 | Status: SHIPPED | OUTPATIENT
Start: 2020-10-21 | End: 2020-11-24 | Stop reason: SDUPTHER
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
## 2020-10-21 NOTE — ASSESSMENT & PLAN NOTE
Flu vaccine declined today.  Risk and benefits explained.  Patient to report if they decide to have vaccine at later time.   Will plan for updated mammogram and patient agrees to cologuard.  Patient to consider updated pap.   Will plan for updated fasting labs.

## 2020-11-24 ENCOUNTER — OFFICE VISIT (OUTPATIENT)
Dept: FAMILY MEDICINE CLINIC | Facility: CLINIC | Age: 60
End: 2020-11-24

## 2020-11-24 VITALS
HEART RATE: 81 BPM | SYSTOLIC BLOOD PRESSURE: 130 MMHG | BODY MASS INDEX: 23.82 KG/M2 | OXYGEN SATURATION: 97 % | TEMPERATURE: 97.1 F | DIASTOLIC BLOOD PRESSURE: 80 MMHG | WEIGHT: 143 LBS | HEIGHT: 65 IN

## 2020-11-24 DIAGNOSIS — R03.0 ELEVATED BP WITHOUT DIAGNOSIS OF HYPERTENSION: ICD-10-CM

## 2020-11-24 DIAGNOSIS — I83.813 VARICOSE VEINS OF BOTH LOWER EXTREMITIES WITH PAIN: ICD-10-CM

## 2020-11-24 DIAGNOSIS — Z00.00 VISIT FOR ANNUAL HEALTH EXAMINATION: Primary | ICD-10-CM

## 2020-11-24 DIAGNOSIS — J30.1 SEASONAL ALLERGIC RHINITIS DUE TO POLLEN: ICD-10-CM

## 2020-11-24 DIAGNOSIS — Z13.21 ENCOUNTER FOR VITAMIN DEFICIENCY SCREENING: ICD-10-CM

## 2020-11-24 DIAGNOSIS — Z11.59 NEED FOR HEPATITIS C SCREENING TEST: ICD-10-CM

## 2020-11-24 DIAGNOSIS — M25.50 MULTIPLE JOINT PAIN: ICD-10-CM

## 2020-11-24 DIAGNOSIS — Z53.20: ICD-10-CM

## 2020-11-24 PROCEDURE — 99396 PREV VISIT EST AGE 40-64: CPT | Performed by: NURSE PRACTITIONER

## 2020-11-24 RX ORDER — HYDROCODONE BITARTRATE AND ACETAMINOPHEN 7.5; 325 MG/1; MG/1
1 TABLET ORAL EVERY 8 HOURS PRN
Qty: 90 TABLET | Refills: 0 | Status: SHIPPED | OUTPATIENT
Start: 2020-11-24 | End: 2020-12-28 | Stop reason: SDUPTHER

## 2020-11-24 RX ORDER — CETIRIZINE HYDROCHLORIDE 10 MG/1
10 TABLET ORAL DAILY PRN
Qty: 30 TABLET | Refills: 5 | Status: SHIPPED | OUTPATIENT
Start: 2020-11-24 | End: 2021-05-19 | Stop reason: SDUPTHER

## 2020-11-24 RX ORDER — CELECOXIB 100 MG/1
100 CAPSULE ORAL 2 TIMES DAILY PRN
Qty: 60 CAPSULE | Refills: 2 | Status: SHIPPED | OUTPATIENT
Start: 2020-11-24 | End: 2020-12-28 | Stop reason: SDUPTHER

## 2020-11-24 RX ORDER — KETOTIFEN FUMARATE 0.35 MG/ML
1 SOLUTION/ DROPS OPHTHALMIC 2 TIMES DAILY
Qty: 10 ML | Refills: 5 | Status: SHIPPED | OUTPATIENT
Start: 2020-11-24 | End: 2021-05-19 | Stop reason: SDUPTHER

## 2020-11-24 NOTE — ASSESSMENT & PLAN NOTE
Body mechanics reviewed.  Avoid overuse of back.  Maintain correct posture as much as possible.  Continue meds as directed.

## 2020-11-24 NOTE — ASSESSMENT & PLAN NOTE
Continue compression sock use.  Report any changes noted or any new swelling or pain.  Avoid activities that exacerbate leg pain

## 2020-11-24 NOTE — PROGRESS NOTES
"Subjective   Rohini Burch is a 60 y.o. female.     Chief Complaint   Patient presents with   • Annual Exam       History of Present Illness     Annual physical-here for an annual physical.  Elevated BP-noted on Triage.  170/100.  Patient does not have history of hypertension.  Reports \"rushing to get here.\"  Back & Leg pain-chronic and ongoing.  On Celebrex 100 mg and Norco 7.5 mg BID PRN.  No negative side effects.  She reports back pain has been less as she has not been having to lift much at work. \"mostly my legs\".    Varicose veins_BLE.  Uses compression socks for relief.    Allergies-chronic.  Reports she is taking zyrtec 10 mg and using Alaway eye drops for relief of itchy, watery eyes.  She reports sinus pressure over her right eye this morning.  No sore throat or ear pain.    Labs-patient is due for updated fasting labs.  She would like to obtain later in the week as she will be off work.     The following portions of the patient's history were reviewed and updated as appropriate: CC, ROS, allergies, current medications, past family history, past medical history, past social history, past surgical history and problem list.      Review of Systems   Constitutional: Positive for fatigue. Negative for appetite change and fever.   HENT: Positive for sinus pressure. Negative for congestion, ear pain, nosebleeds, postnasal drip, rhinorrhea, sore throat, tinnitus, trouble swallowing and voice change.    Eyes: Negative for blurred vision, photophobia and visual disturbance.   Respiratory: Negative for cough, chest tightness, shortness of breath and wheezing.    Cardiovascular: Negative for chest pain and palpitations.   Gastrointestinal: Negative for abdominal pain, blood in stool, constipation, diarrhea, nausea and GERD.   Endocrine: Negative for cold intolerance, heat intolerance and polydipsia.   Genitourinary: Negative for decreased urine volume, difficulty urinating, dysuria and hematuria.   Musculoskeletal: " "Positive for arthralgias and back pain. Negative for gait problem and myalgias.   Skin: Negative for color change, pallor and bruise.   Allergic/Immunologic: Negative.    Neurological: Negative for dizziness, syncope, numbness and headache.   Hematological: Negative.    Psychiatric/Behavioral: Positive for stress. Negative for decreased concentration, sleep disturbance, suicidal ideas and depressed mood. The patient is not nervous/anxious.    All other systems reviewed and are negative.      Objective     /80   Pulse 81   Temp 97.1 °F (36.2 °C) (Temporal)   Ht 165.1 cm (65\")   Wt 64.9 kg (143 lb)   SpO2 97%   BMI 23.80 kg/m²     Physical Exam  Vitals signs reviewed.   Constitutional:       General: She is not in acute distress.     Appearance: She is well-developed. She is not diaphoretic.   HENT:      Head: Normocephalic and atraumatic.      Comments: Oropharynx not examined.  Patient is presently wearing a face covering/mask due to COVID-19 pandemic.     Right Ear: Hearing, tympanic membrane, ear canal and external ear normal.      Left Ear: Hearing, tympanic membrane, ear canal and external ear normal.   Eyes:      General: Lids are normal. No scleral icterus.     Extraocular Movements:      Right eye: Normal extraocular motion and no nystagmus.      Left eye: Normal extraocular motion and no nystagmus.      Conjunctiva/sclera: Conjunctivae normal.      Pupils: Pupils are equal, round, and reactive to light.   Neck:      Musculoskeletal: Normal range of motion and neck supple.      Thyroid: No thyromegaly.      Vascular: No carotid bruit or JVD.      Trachea: No tracheal tenderness.   Cardiovascular:      Rate and Rhythm: Normal rate and regular rhythm.      Heart sounds: Normal heart sounds, S1 normal and S2 normal. No murmur.   Pulmonary:      Effort: Pulmonary effort is normal.      Breath sounds: Normal breath sounds.   Chest:      Chest wall: No tenderness.   Abdominal:      General: Bowel sounds " are normal.      Palpations: Abdomen is soft. There is no hepatomegaly, splenomegaly or mass.      Tenderness: There is no abdominal tenderness.   Musculoskeletal:         General: No tenderness.      Right lower leg: No edema.      Left lower leg: No edema.      Comments: Gait normal.   equal bilaterally. No muscular atrophy or flaccidity.   Lymphadenopathy:      Cervical: No cervical adenopathy.      Right cervical: No superficial cervical adenopathy.     Left cervical: No superficial cervical adenopathy.   Skin:     General: Skin is warm and dry.      Capillary Refill: Capillary refill takes less than 2 seconds.      Coloration: Skin is not pale.      Findings: No erythema.      Nails: There is no clubbing.     Neurological:      Mental Status: She is alert and oriented to person, place, and time.      Cranial Nerves: No cranial nerve deficit.      Sensory: No sensory deficit.      Motor: No tremor, atrophy or abnormal muscle tone.      Coordination: Coordination normal.      Deep Tendon Reflexes: Reflexes are normal and symmetric.   Psychiatric:         Attention and Perception: She is attentive.         Mood and Affect: Mood normal.         Speech: Speech normal.         Behavior: Behavior normal.         Thought Content: Thought content normal.         Judgment: Judgment normal.         Assessment/Plan      Visual Acuity Screening    Right eye Left eye Both eyes   Without correction: 20/25 20/30 20/30   With correction:          Functional & Cognitive Status 11/24/2020   Do you have difficulty preparing food and eating? No   Do you have difficulty bathing yourself, getting dressed or grooming yourself? No   Do you have difficulty using the toilet? No   Do you have difficulty moving around from place to place? No   Do you have trouble with steps or getting out of a bed or a chair? No   Current Diet Well Balanced Diet   Dental Exam Up to date   Eye Exam Up to date   Exercise (times per week) 7 times per  week   Current Exercises Include Walking   Do you need help using the phone?  No   Are you deaf or do you have serious difficulty hearing?  No   Do you need help with transportation? No   Do you need help shopping? No   Do you need help preparing meals?  No   Do you need help with housework?  No   Do you need help with laundry? No   Do you need help taking your medications? No   Do you need help managing money? No   Do you ever drive or ride in a car without wearing a seat belt? No   Have you felt unusual stress, anger or loneliness in the last month? No   Who do you live with? Spouse   If you need help, do you have trouble finding someone available to you? No   Have you been bothered in the last four weeks by sexual problems? No   Do you have difficulty concentrating, remembering or making decisions? No         PHQ-9 Depression Screening  Little interest or pleasure in doing things? 0   Feeling down, depressed, or hopeless? 0   Trouble falling or staying asleep, or sleeping too much? 0   Feeling tired or having little energy? 3   Poor appetite or overeating? 0   Feeling bad about yourself - or that you are a failure or have let yourself or your family down? 0   Trouble concentrating on things, such as reading the newspaper or watching television? 0   Moving or speaking so slowly that other people could have noticed? Or the opposite - being so fidgety or restless that you have been moving around a lot more than usual? 0   Thoughts that you would be better off dead, or of hurting yourself in some way? 0   PHQ-9 Total Score 3   If you checked off any problems, how difficult have these problems made it for you to do your work, take care of things at home, or get along with other people? Not difficult at all         Problem List Items Addressed This Visit        Cardiovascular and Mediastinum    Varicose veins of both lower extremities with pain    Current Assessment & Plan     Continue compression sock use.  Report any  changes noted or any new swelling or pain.  Avoid activities that exacerbate leg pain            Respiratory    Seasonal allergic rhinitis due to pollen    Relevant Medications    cetirizine (zyrTEC) 10 MG tablet    ketotifen (Alaway) 0.025 % ophthalmic solution    celecoxib (CeleBREX) 100 MG capsule       Nervous and Auditory    Multiple joint pain    Current Assessment & Plan     Body mechanics reviewed.  Avoid overuse of back.  Maintain correct posture as much as possible.  Continue meds as directed.          Relevant Medications    HYDROcodone-acetaminophen (NORCO) 7.5-325 MG per tablet    celecoxib (CeleBREX) 100 MG capsule      Other Visit Diagnoses     Visit for annual health examination    -  Primary    Relevant Orders    Lipid Panel    TSH    T4, Free    Need for hepatitis C screening test        Relevant Orders    Hepatitis C antibody    Elevated BP without diagnosis of hypertension        Relevant Orders    CBC & Differential    Comprehensive Metabolic Panel    Lipid Panel    Diabetes mellitus screening declined by patient        Relevant Orders    Hemoglobin A1c    Encounter for vitamin deficiency screening        Relevant Orders    Vitamin D 25 Hydroxy    Vitamin B12          Patient's Body mass index is 23.8 kg/m². BMI is within normal parameters. No follow-up required..    Rohini Burch  reports that she has never smoked. She has never used smokeless tobacco..     Understands disease processes and need for medications.  Understands reasons for urgent and emergent care.  Patient (& family) verbalized agreement for treatment plan.   Emotional support and active listening provided.  Patient provided time to verbalize feelings.  Counseling provided today including importance of good nutrition, exercise as tolerated, dental health, stress reduction and mental health. Importance of immunizations discussed.   Appropriate screenings based on gender (paps, breast exam, mammogram, PSA, colon screens, etc).        Counseled on safe sex practices and STD prevention.   Counseling regarding gun and water safety, domestic violence, and seatbelt use.      MICHELLE reviewed today and consistent.  Will refill prescribed controlled medication today.  Patient is aware they cannot receive narcotics from any other provider except if under care of pain management or speciality clinic.  Risk and benefits of medication use has been reviewed.  History and physical exam exhibit continued safe and appropriate use of controlled substances.  The patient is aware of the potential for addiction and dependence.  This patient has been made aware of the appropriate use of such medications, including potential risk of somnolence, limited ability to drive and / or work safely, and potential for overdose.    It has also been made clear that these medications are for use by this patient only, without concomitant use of alcohol or other substances unless prescribed/advised by medical provider.  Patient understands they may be subject to UDS and pill counts at random.      Patient considered to be low risk for addiction due to use of single controlled medications.  Patient understands and accepts these risks.  Patient need for medication will be reassessed at each visit.  Doses will be adjusted according to patient need and findings.    Goal of TX: Patient will not have any adverse reactions of medication.  Patient have reduction in pain symptoms with use of PRN Norco as directed.  Patient will not have any adverse side effects from medication.  Patient will be able to remain active in an outside of home without interference from pain symptoms.    Medication Dispense Information    Hydrocodone/Acetaminophen   Dispensed: 10/21/2020 12:00 AM   Written:  10/21/2020   Unit strength: 325MG/7.5MG   Days supply: 30   Dispense Note: GivenName=DENNYSGabriel=GAMBRELBirthDate=[birth year]Gssuhpk=[address] [place], [place], [address]   Quantity: 90 each    [facility]: [facility]   Authorizing provider: JOHN HOLLEY   Received from: [employer] [facility] (Fill History)   Brand or Generic:  Unknown     UDS requested per [employer] while patient in office today.  Buccal swab or blood sample will be obtained if patient is unable to provide specimen.     Has cologuard at home and plans to do while off on vacation.    Will plan updated labs.    Patient to consider updated pap.     RTC 1 month, sooner if needed.               This document has been electronically signed by:  STEVE Gonsales, FNP-C    Dragon disclaimer:  Much of this encounter note is an electronic transcription/translation of spoken language to printed text. The electronic translation of spoken language may permit erroneous, or at times, nonsensical words or phrases to be inadvertently transcribed; Although I have reviewed the note for such errors, some may still exist.

## 2020-12-28 ENCOUNTER — OFFICE VISIT (OUTPATIENT)
Dept: FAMILY MEDICINE CLINIC | Facility: CLINIC | Age: 60
End: 2020-12-28

## 2020-12-28 VITALS
TEMPERATURE: 96.1 F | HEIGHT: 65 IN | SYSTOLIC BLOOD PRESSURE: 130 MMHG | BODY MASS INDEX: 23.82 KG/M2 | HEART RATE: 80 BPM | WEIGHT: 143 LBS | DIASTOLIC BLOOD PRESSURE: 80 MMHG | OXYGEN SATURATION: 98 %

## 2020-12-28 DIAGNOSIS — J30.1 SEASONAL ALLERGIC RHINITIS DUE TO POLLEN: Primary | ICD-10-CM

## 2020-12-28 DIAGNOSIS — M25.50 MULTIPLE JOINT PAIN: ICD-10-CM

## 2020-12-28 DIAGNOSIS — I83.813 VARICOSE VEINS OF BOTH LOWER EXTREMITIES WITH PAIN: ICD-10-CM

## 2020-12-28 LAB
25(OH)D3 SERPL-MCNC: 25.3 NG/ML (ref 30–100)
ALBUMIN SERPL-MCNC: 4.5 G/DL (ref 3.5–5.2)
ALBUMIN/GLOB SERPL: 1.4 G/DL
ALP SERPL-CCNC: 47 U/L (ref 39–117)
ALT SERPL W P-5'-P-CCNC: 11 U/L (ref 1–33)
ANION GAP SERPL CALCULATED.3IONS-SCNC: 9.7 MMOL/L (ref 5–15)
AST SERPL-CCNC: 19 U/L (ref 1–32)
BASOPHILS # BLD AUTO: 0.05 10*3/MM3 (ref 0–0.2)
BASOPHILS NFR BLD AUTO: 1 % (ref 0–1.5)
BILIRUB SERPL-MCNC: 1 MG/DL (ref 0–1.2)
BUN SERPL-MCNC: 15 MG/DL (ref 6–20)
BUN/CREAT SERPL: 15.6 (ref 7–25)
CALCIUM SPEC-SCNC: 9.4 MG/DL (ref 8.6–10.5)
CHLORIDE SERPL-SCNC: 102 MMOL/L (ref 98–107)
CHOLEST SERPL-MCNC: 216 MG/DL (ref 0–200)
CO2 SERPL-SCNC: 25.3 MMOL/L (ref 22–29)
CREAT SERPL-MCNC: 0.96 MG/DL (ref 0.57–1)
DEPRECATED RDW RBC AUTO: 43.6 FL (ref 37–54)
EOSINOPHIL # BLD AUTO: 0.04 10*3/MM3 (ref 0–0.4)
EOSINOPHIL NFR BLD AUTO: 0.8 % (ref 0.3–6.2)
ERYTHROCYTE [DISTWIDTH] IN BLOOD BY AUTOMATED COUNT: 11.8 % (ref 12.3–15.4)
GFR SERPL CREATININE-BSD FRML MDRD: 59 ML/MIN/1.73
GLOBULIN UR ELPH-MCNC: 3.2 GM/DL
GLUCOSE SERPL-MCNC: 95 MG/DL (ref 65–99)
HBA1C MFR BLD: 5 % (ref 4.8–5.6)
HCT VFR BLD AUTO: 40.6 % (ref 34–46.6)
HCV AB SER DONR QL: NORMAL
HDLC SERPL-MCNC: 89 MG/DL (ref 40–60)
HGB BLD-MCNC: 14.2 G/DL (ref 12–15.9)
IMM GRANULOCYTES # BLD AUTO: 0.02 10*3/MM3 (ref 0–0.05)
IMM GRANULOCYTES NFR BLD AUTO: 0.4 % (ref 0–0.5)
LDLC SERPL CALC-MCNC: 99 MG/DL (ref 0–100)
LDLC/HDLC SERPL: 1.04 {RATIO}
LYMPHOCYTES # BLD AUTO: 1.65 10*3/MM3 (ref 0.7–3.1)
LYMPHOCYTES NFR BLD AUTO: 32 % (ref 19.6–45.3)
MCH RBC QN AUTO: 34.8 PG (ref 26.6–33)
MCHC RBC AUTO-ENTMCNC: 35 G/DL (ref 31.5–35.7)
MCV RBC AUTO: 99.5 FL (ref 79–97)
MONOCYTES # BLD AUTO: 0.55 10*3/MM3 (ref 0.1–0.9)
MONOCYTES NFR BLD AUTO: 10.7 % (ref 5–12)
NEUTROPHILS NFR BLD AUTO: 2.84 10*3/MM3 (ref 1.7–7)
NEUTROPHILS NFR BLD AUTO: 55.1 % (ref 42.7–76)
NRBC BLD AUTO-RTO: 0 /100 WBC (ref 0–0.2)
PLATELET # BLD AUTO: 299 10*3/MM3 (ref 140–450)
PMV BLD AUTO: 9.9 FL (ref 6–12)
POTASSIUM SERPL-SCNC: 4 MMOL/L (ref 3.5–5.2)
PROT SERPL-MCNC: 7.7 G/DL (ref 6–8.5)
RBC # BLD AUTO: 4.08 10*6/MM3 (ref 3.77–5.28)
SODIUM SERPL-SCNC: 137 MMOL/L (ref 136–145)
T4 FREE SERPL-MCNC: 0.99 NG/DL (ref 0.93–1.7)
TRIGL SERPL-MCNC: 170 MG/DL (ref 0–150)
TSH SERPL DL<=0.05 MIU/L-ACNC: 1.73 UIU/ML (ref 0.27–4.2)
VIT B12 BLD-MCNC: 305 PG/ML (ref 211–946)
VLDLC SERPL-MCNC: 28 MG/DL (ref 5–40)
WBC # BLD AUTO: 5.15 10*3/MM3 (ref 3.4–10.8)

## 2020-12-28 PROCEDURE — 82607 VITAMIN B-12: CPT | Performed by: NURSE PRACTITIONER

## 2020-12-28 PROCEDURE — 80050 GENERAL HEALTH PANEL: CPT | Performed by: NURSE PRACTITIONER

## 2020-12-28 PROCEDURE — 82306 VITAMIN D 25 HYDROXY: CPT | Performed by: NURSE PRACTITIONER

## 2020-12-28 PROCEDURE — 80061 LIPID PANEL: CPT | Performed by: NURSE PRACTITIONER

## 2020-12-28 PROCEDURE — 86803 HEPATITIS C AB TEST: CPT | Performed by: NURSE PRACTITIONER

## 2020-12-28 PROCEDURE — 99214 OFFICE O/P EST MOD 30 MIN: CPT | Performed by: NURSE PRACTITIONER

## 2020-12-28 PROCEDURE — 84439 ASSAY OF FREE THYROXINE: CPT | Performed by: NURSE PRACTITIONER

## 2020-12-28 PROCEDURE — 83036 HEMOGLOBIN GLYCOSYLATED A1C: CPT | Performed by: NURSE PRACTITIONER

## 2020-12-28 RX ORDER — HYDROCODONE BITARTRATE AND ACETAMINOPHEN 7.5; 325 MG/1; MG/1
1 TABLET ORAL EVERY 8 HOURS PRN
Qty: 90 TABLET | Refills: 0 | Status: SHIPPED | OUTPATIENT
Start: 2020-12-28 | End: 2021-01-27 | Stop reason: SDUPTHER

## 2020-12-28 RX ORDER — CELECOXIB 100 MG/1
100 CAPSULE ORAL 2 TIMES DAILY PRN
Qty: 60 CAPSULE | Refills: 2 | Status: SHIPPED | OUTPATIENT
Start: 2020-12-28 | End: 2021-05-19 | Stop reason: SDUPTHER

## 2020-12-28 NOTE — PROGRESS NOTES
"Subjective   Rohini Burch is a 60 y.o. female.     Chief Complaint   Patient presents with   • Allergies       History of Present Illness     Allergies-chronic and ongoing.  Taking zyrtec 10 mg.  No negative side effects.  Using Alaway eye drops.  She does feel that her eyes are dry and irritated.    Leg and generalized joint pain-chronic and ongoing.   Taking celebrex and Norco 7.5 mg.  No negative side effects.  Increase in leg pain due to cold weather.  She is wearing multiple layers of clothing.  She does also have chronic leg pain.  It is exacerbated when she does not wear her compression stockings.  She also reports that her leg and joint pain is worse when she is cold.  She is not been able to walk much at work and feels that her symptoms have been mildly more tolerable than they usually are \"this time of year\".    The following portions of the patient's history were reviewed and updated as appropriate: CC, ROS, allergies, current medications, past family history, past medical history, past social history, past surgical history and problem list.      Review of Systems   Constitutional: Negative for appetite change, fatigue and fever.   HENT: Negative for congestion, ear pain, nosebleeds, postnasal drip, rhinorrhea, sore throat, tinnitus, trouble swallowing and voice change.    Eyes: Positive for itching. Negative for blurred vision, photophobia and visual disturbance.   Respiratory: Negative for cough, chest tightness, shortness of breath and wheezing.    Cardiovascular: Negative for chest pain and palpitations.   Gastrointestinal: Negative for abdominal pain, blood in stool, constipation, diarrhea, nausea and GERD.   Endocrine: Negative for cold intolerance, heat intolerance and polydipsia.   Genitourinary: Negative for decreased urine volume, difficulty urinating, dysuria and hematuria.   Musculoskeletal: Positive for arthralgias (legs worse when \"they get cold\") and myalgias. Negative for back pain and " "gait problem.   Skin: Negative for color change, pallor and bruise.   Allergic/Immunologic: Negative.    Neurological: Negative for dizziness, syncope, numbness and headache.   Hematological: Negative.    Psychiatric/Behavioral: Negative for decreased concentration, sleep disturbance, suicidal ideas and depressed mood. The patient is not nervous/anxious.    All other systems reviewed and are negative.      Objective     /80   Pulse 80   Temp 96.1 °F (35.6 °C) (Temporal)   Ht 165.1 cm (65\")   Wt 64.9 kg (143 lb)   SpO2 98%   BMI 23.80 kg/m²     Physical Exam  Vitals signs reviewed.   Constitutional:       General: She is not in acute distress.     Appearance: She is well-developed. She is not diaphoretic.   HENT:      Head: Normocephalic and atraumatic.      Comments: Oropharynx not examined.  Patient is presently wearing a face covering/mask due to COVID-19 pandemic.     Right Ear: Hearing, tympanic membrane, ear canal and external ear normal.      Left Ear: Hearing, tympanic membrane, ear canal and external ear normal.   Eyes:      General: Lids are normal. No scleral icterus.     Extraocular Movements:      Right eye: Normal extraocular motion and no nystagmus.      Left eye: Normal extraocular motion and no nystagmus.      Conjunctiva/sclera: Conjunctivae normal.      Pupils: Pupils are equal, round, and reactive to light.   Neck:      Musculoskeletal: Normal range of motion and neck supple.      Thyroid: No thyromegaly.      Vascular: No carotid bruit or JVD.      Trachea: No tracheal tenderness.   Cardiovascular:      Rate and Rhythm: Normal rate and regular rhythm.      Heart sounds: Normal heart sounds, S1 normal and S2 normal. No murmur.   Pulmonary:      Effort: Pulmonary effort is normal.      Breath sounds: Normal breath sounds.   Chest:      Chest wall: No tenderness.   Abdominal:      General: Bowel sounds are normal.      Palpations: Abdomen is soft. There is no hepatomegaly, splenomegaly " or mass.      Tenderness: There is no abdominal tenderness.   Musculoskeletal:      Lumbar back: She exhibits tenderness.      Right lower leg: She exhibits tenderness. No edema.      Left lower leg: She exhibits tenderness. No edema.      Comments: Gait normal.   equal bilaterally. No muscular atrophy or flaccidity.   Lymphadenopathy:      Cervical: No cervical adenopathy.      Right cervical: No superficial cervical adenopathy.     Left cervical: No superficial cervical adenopathy.   Skin:     General: Skin is warm and dry.      Capillary Refill: Capillary refill takes less than 2 seconds.      Coloration: Skin is not pale.      Findings: No erythema.      Nails: There is no clubbing.     Neurological:      Mental Status: She is alert and oriented to person, place, and time.      Cranial Nerves: No cranial nerve deficit.      Sensory: No sensory deficit.      Motor: No tremor, atrophy or abnormal muscle tone.      Coordination: Coordination normal.      Deep Tendon Reflexes: Reflexes are normal and symmetric.   Psychiatric:         Attention and Perception: She is attentive.         Mood and Affect: Mood normal.         Speech: Speech normal.         Behavior: Behavior normal.         Thought Content: Thought content normal.         Judgment: Judgment normal.       Assessment/Plan     Problem List Items Addressed This Visit        Allergies and Adverse Reactions    Seasonal allergic rhinitis due to pollen - Primary    Current Assessment & Plan     Continue aloe eyedrops.  Encourage not to overuse.  Discussed that patient may need lubricating eyedrop to aid with dry sensation.  Also discussed with patient that she may choose to hold her Zyrtec for a few days to see if dryness improves as the antihistamine may be causing some dry and can also cause dry mouth.         Relevant Medications    cetirizine (zyrTEC) 10 MG tablet    ketotifen (Alaway) 0.025 % ophthalmic solution    celecoxib (CeleBREX) 100 MG capsule         Cardiac and Vasculature    Varicose veins of both lower extremities with pain    Current Assessment & Plan     Continue compression sock use.  Report any changes noted or any new swelling or pain.  Avoid activities that exacerbate leg pain            Musculoskeletal and Injuries    Multiple joint pain    Relevant Medications    celecoxib (CeleBREX) 100 MG capsule    HYDROcodone-acetaminophen (NORCO) 7.5-325 MG per tablet          Patient's Body mass index is 23.8 kg/m². BMI is above normal parameters. Recommendations include: nutrition counseling.       Understands disease processes and need for medications.  Understands reasons for urgent and emergent care.  Patient (& family) verbalized agreement for treatment plan.   Emotional support and active listening provided.  Patient provided time to verbalize feelings.    MICHELLE reviewed today and consistent.  Will refill prescribed controlled medication today.  Patient is aware they cannot receive narcotics from any other provider except if under care of pain management or speciality clinic.  Risk and benefits of medication use has been reviewed.  History and physical exam exhibit continued safe and appropriate use of controlled substances.  The patient is aware of the potential for addiction and dependence.  This patient has been made aware of the appropriate use of such medications, including potential risk of somnolence, limited ability to drive and / or work safely, and potential for overdose.    It has also been made clear that these medications are for use by this patient only, without concomitant use of alcohol or other substances unless prescribed/advised by medical provider.  Patient understands they may be subject to UDS and pill counts at random.      Patient considered to be low risk for addiction due to use of single controlled medications.  Patient understands and accepts these risks.  Patient need for medication will be reassessed at each visit.   Doses will be adjusted according to patient need and findings.    Goal of TX: Patient will not have any adverse reactions of medication.  Patient have reduction in pain symptoms with use of PRN Norco as directed.  Patient will not have any adverse side effects from medication.  Patient will be able to remain active in an outside of home without interference from pain symptoms.    Medication Dispense Information    Hydrocodone/Acetaminophen   Dispensed: 11/24/2020 12:00 AM   Written:  11/24/2020   Unit strength: 325MG/7.5MG   Days supply: 30   Dispense Note: GivenName=Gris=BRETTBirthDate=[id number]=[address] [place], [place], [address]   Quantity: 90 each   Pharmacy: [facility]   Authorizing provider: JOHN HOLLEY   Received from: [employer] (Fill History)   Brand or Generic:         Labs today.  Will call patient with results.      Reminded patient to return her Cologuard sample for processing.    RTC 1 month, sooner if needed.             This document has been electronically signed by:  STEVE Gonsales, FNP-C    Dragon disclaimer:  Much of this encounter note is an electronic transcription/translation of spoken language to printed text. The electronic translation of spoken language may permit erroneous, or at times, nonsensical words or phrases to be inadvertently transcribed; Although I have reviewed the note for such errors, some may still exist.

## 2020-12-30 NOTE — ASSESSMENT & PLAN NOTE
Continue aloe eyedrops.  Encourage not to overuse.  Discussed that patient may need lubricating eyedrop to aid with dry sensation.  Also discussed with patient that she may choose to hold her Zyrtec for a few days to see if dryness improves as the antihistamine may be causing some dry and can also cause dry mouth.

## 2021-01-27 ENCOUNTER — OFFICE VISIT (OUTPATIENT)
Dept: FAMILY MEDICINE CLINIC | Facility: CLINIC | Age: 61
End: 2021-01-27

## 2021-01-27 VITALS
BODY MASS INDEX: 23.82 KG/M2 | HEART RATE: 100 BPM | SYSTOLIC BLOOD PRESSURE: 130 MMHG | OXYGEN SATURATION: 98 % | HEIGHT: 65 IN | WEIGHT: 143 LBS | TEMPERATURE: 97.2 F | DIASTOLIC BLOOD PRESSURE: 78 MMHG

## 2021-01-27 DIAGNOSIS — J06.9 ACUTE URI: ICD-10-CM

## 2021-01-27 DIAGNOSIS — M25.50 MULTIPLE JOINT PAIN: Primary | ICD-10-CM

## 2021-01-27 DIAGNOSIS — J30.1 SEASONAL ALLERGIC RHINITIS DUE TO POLLEN: ICD-10-CM

## 2021-01-27 PROCEDURE — 99214 OFFICE O/P EST MOD 30 MIN: CPT | Performed by: NURSE PRACTITIONER

## 2021-01-27 RX ORDER — HYDROCODONE BITARTRATE AND ACETAMINOPHEN 7.5; 325 MG/1; MG/1
1 TABLET ORAL EVERY 8 HOURS PRN
Qty: 90 TABLET | Refills: 0 | Status: SHIPPED | OUTPATIENT
Start: 2021-01-27 | End: 2021-02-25 | Stop reason: SDUPTHER

## 2021-01-27 RX ORDER — AZITHROMYCIN 250 MG/1
TABLET, FILM COATED ORAL
Qty: 6 TABLET | Refills: 0 | Status: SHIPPED | OUTPATIENT
Start: 2021-01-27 | End: 2021-03-23

## 2021-01-27 NOTE — PROGRESS NOTES
"Subjective   Rohini Burch is a 60 y.o. female.     Chief Complaint   Patient presents with   • Med Refill       History of Present Illness     Joint/leg pain-chronic and ongoing.  Patient is presently taking hydrocodone 7.5 twice daily and Celebrex 100 daily as needed.  No negative side effects.  She is here today for refill of medication.   Leg pain is worse with excessive walking.  She reports yesterday \"about killed me\".  She reports \"knees popping\".  She reports no swelling.  Aching \"like a toothache\".  She has on leggings to \"keep them warm\".  She reports the warmer the \"better\" her pain is.    Allergies-chronic and ongoing.  She is on zyrtec 10 mg PRN and using alaway eye drops.   She reports she feels like \"my head is going to explode\".  Face is \"burning\".  Left ear pain with radiation into the side of her neck.  No sore throat.        The following portions of the patient's history were reviewed and updated as appropriate: CC, ROS, allergies, current medications, past family history, past medical history, past social history, past surgical history and problem list.  Review of Systems   Constitutional: Positive for fatigue. Negative for activity change, appetite change and fever.   HENT: Negative for congestion, ear pain, nosebleeds, postnasal drip, rhinorrhea, sore throat, tinnitus, trouble swallowing and voice change.    Eyes: Positive for itching. Negative for blurred vision, photophobia and visual disturbance.   Respiratory: Negative for cough, chest tightness, shortness of breath and wheezing.    Cardiovascular: Negative for chest pain, palpitations and leg swelling.   Gastrointestinal: Negative for abdominal pain, blood in stool, constipation, diarrhea, nausea and GERD.   Endocrine: Negative for cold intolerance, heat intolerance and polydipsia.   Genitourinary: Negative for decreased urine volume, difficulty urinating, dysuria and hematuria.   Musculoskeletal: Positive for arthralgias, back pain and " "myalgias. Negative for gait problem.   Skin: Negative for color change, pallor and bruise.   Allergic/Immunologic: Negative.    Neurological: Negative for dizziness, syncope, numbness and headache.   Hematological: Negative.    Psychiatric/Behavioral: Negative for decreased concentration, sleep disturbance, suicidal ideas and depressed mood. The patient is not nervous/anxious.    All other systems reviewed and are negative.      Objective     /78   Pulse 100   Temp 97.2 °F (36.2 °C)   Ht 165.1 cm (65\")   Wt 64.9 kg (143 lb)   SpO2 98%   BMI 23.80 kg/m²     Physical Exam  Vitals signs reviewed.   Constitutional:       General: She is not in acute distress.     Appearance: She is well-developed. She is not diaphoretic.   HENT:      Head: Normocephalic and atraumatic.      Comments: Oropharynx not examined.  Patient is presently wearing a face covering/mask due to COVID-19 pandemic.     Right Ear: Hearing, tympanic membrane, ear canal and external ear normal.      Left Ear: Hearing, tympanic membrane, ear canal and external ear normal.      Nose:      Right Sinus: Maxillary sinus tenderness and frontal sinus tenderness present.      Left Sinus: Maxillary sinus tenderness and frontal sinus tenderness present.   Eyes:      General: Lids are normal. No scleral icterus.     Extraocular Movements:      Right eye: Normal extraocular motion and no nystagmus.      Left eye: Normal extraocular motion and no nystagmus.      Conjunctiva/sclera: Conjunctivae normal.      Pupils: Pupils are equal, round, and reactive to light.   Neck:      Musculoskeletal: Normal range of motion and neck supple.      Thyroid: No thyromegaly.      Vascular: No carotid bruit or JVD.      Trachea: No tracheal tenderness.   Cardiovascular:      Rate and Rhythm: Normal rate and regular rhythm.      Heart sounds: Normal heart sounds, S1 normal and S2 normal. No murmur.   Pulmonary:      Effort: Pulmonary effort is normal.      Breath sounds: " Normal breath sounds.   Chest:      Chest wall: No tenderness.   Abdominal:      General: Bowel sounds are normal.      Palpations: Abdomen is soft. There is no hepatomegaly, splenomegaly or mass.      Tenderness: There is no abdominal tenderness.   Musculoskeletal:      Lumbar back: She exhibits tenderness.      Right lower leg: She exhibits tenderness. No edema.      Left lower leg: She exhibits tenderness. No edema.      Comments: Gait normal.   equal bilaterally. No muscular atrophy or flaccidity.   Lymphadenopathy:      Cervical: No cervical adenopathy.      Right cervical: No superficial cervical adenopathy.     Left cervical: No superficial cervical adenopathy.   Skin:     General: Skin is warm and dry.      Capillary Refill: Capillary refill takes less than 2 seconds.      Coloration: Skin is not pale.      Findings: No erythema.      Nails: There is no clubbing.     Neurological:      Mental Status: She is alert and oriented to person, place, and time.      Cranial Nerves: No cranial nerve deficit.      Sensory: No sensory deficit.      Motor: No tremor, atrophy or abnormal muscle tone.      Coordination: Coordination normal.      Deep Tendon Reflexes: Reflexes are normal and symmetric.   Psychiatric:         Attention and Perception: She is attentive.         Mood and Affect: Mood normal.         Speech: Speech normal.         Behavior: Behavior normal.         Thought Content: Thought content normal.         Judgment: Judgment normal.       Assessment/Plan     Problem List Items Addressed This Visit        Allergies and Adverse Reactions    Seasonal allergic rhinitis due to pollen    Relevant Medications    cetirizine (zyrTEC) 10 MG tablet    ketotifen (Alaway) 0.025 % ophthalmic solution    celecoxib (CeleBREX) 100 MG capsule       Musculoskeletal and Injuries    Multiple joint pain - Primary    Relevant Medications    celecoxib (CeleBREX) 100 MG capsule    HYDROcodone-acetaminophen (NORCO) 7.5-325  MG per tablet      Other Visit Diagnoses     Acute URI        Relevant Medications    azithromycin (Zithromax Z-Roderick) 250 MG tablet          Patient's Body mass index is 23.8 kg/m². BMI is above normal parameters. Recommendations include: nutrition counseling.     Understands disease processes and need for medications.  Understands reasons for urgent and emergent care.  Patient (& family) verbalized agreement for treatment plan.   Emotional support and active listening provided.  Patient provided time to verbalize feelings.    MICHELLE reviewed today and consistent.  Will refill prescribed controlled medication today.  Patient is aware they cannot receive narcotics from any other provider except if under care of pain management or speciality clinic.  Risk and benefits of medication use has been reviewed.  History and physical exam exhibit continued safe and appropriate use of controlled substances.  The patient is aware of the potential for addiction and dependence.  This patient has been made aware of the appropriate use of such medications, including potential risk of somnolence, limited ability to drive and / or work safely, and potential for overdose.  Patient understands not to take medication and drive until they know how medication may affect their cognition/decision making.   It has also been made clear that these medications are for use by this patient only, without concomitant use of alcohol or other substances unless prescribed/advised by medical provider.  Patient understands they may be subject to UDS and pill counts at random.    Patient considered to be low risk for addiction due to use of single controlled medications.  Patient understands and accepts these risks.  Patient need for medication will be reassessed at each visit.  Doses will be adjusted according to patient need and findings.    Goal of TX: Patient will not have any adverse reactions of medication.  Patient have reduction in pain symptoms  with use of PRN Norco as directed.  Patient will not have any adverse side effects from medication.  Patient will be able to remain active in an outside of home without interference from pain symptoms.    Medication Dispense Information    Hydrocodone/Acetaminophen   Dispensed: 12/28/2020 12:00 AM   Written:  12/28/2020   Unit strength: 325MG/7.5MG   Days supply: 30   Dispense Note: GivenName=Gris=BRETTBirthDate=[id number]=[address] [place], [place], [address]   Quantity: 90 each   Pharmacy: [facility]   Authorizing provider: JOHN HOLLEY   Received from: [employer] (Fill History)   Brand or Generic:         Instructed to complete all of antibiotics for acute illness.  Increase PO fluids, avoid/limit caffeine.  Do not save any of the meds for later use.  Rest PRN  Steam expectoration, warm compress to sinus, Saline PRN for moisture    RTC 1 month, sooner if needed.           This document has been electronically signed by:  STEVE Gonsales, FNP-C    Dragon disclaimer:  Much of this encounter note is an electronic transcription/translation of spoken language to printed text. The electronic translation of spoken language may permit erroneous, or at times, nonsensical words or phrases to be inadvertently transcribed; Although I have reviewed the note for such errors, some may still exist.

## 2021-02-25 ENCOUNTER — OFFICE VISIT (OUTPATIENT)
Dept: FAMILY MEDICINE CLINIC | Facility: CLINIC | Age: 61
End: 2021-02-25

## 2021-02-25 VITALS — WEIGHT: 143 LBS | BODY MASS INDEX: 23.82 KG/M2 | HEIGHT: 65 IN

## 2021-02-25 DIAGNOSIS — M25.50 MULTIPLE JOINT PAIN: ICD-10-CM

## 2021-02-25 PROCEDURE — 99441 PR PHYS/QHP TELEPHONE EVALUATION 5-10 MIN: CPT | Performed by: NURSE PRACTITIONER

## 2021-02-25 RX ORDER — ONDANSETRON 4 MG/1
4 TABLET, FILM COATED ORAL EVERY 8 HOURS PRN
Qty: 45 TABLET | Refills: 1 | Status: SHIPPED | OUTPATIENT
Start: 2021-02-25 | End: 2021-12-08

## 2021-02-25 RX ORDER — PROMETHAZINE HYDROCHLORIDE 25 MG/1
25 TABLET ORAL EVERY 6 HOURS PRN
Qty: 45 TABLET | Refills: 1 | Status: SHIPPED | OUTPATIENT
Start: 2021-02-25 | End: 2021-12-08

## 2021-02-25 RX ORDER — HYDROCODONE BITARTRATE AND ACETAMINOPHEN 7.5; 325 MG/1; MG/1
1 TABLET ORAL EVERY 8 HOURS PRN
Qty: 90 TABLET | Refills: 0 | Status: SHIPPED | OUTPATIENT
Start: 2021-02-25 | End: 2021-03-23 | Stop reason: SDUPTHER

## 2021-02-25 NOTE — PROGRESS NOTES
You have chosen to receive care through a telephone visit today. Do you consent to use a telephone visit for your medical care today? Yes    Establish patient makes contact with office of APRN to discuss health conditions.  Patient is due for routine checkup but due to current pandemic is not recommended to present to the office for face-to-face evaluation.      Chief Complaint   Patient presents with   • GI Problem   • Joint Pain       Brief HPI/ROS obtained as follows:    GI complaint-acute vomiting and diarrhea that began yesterday morning.  She reports no fever.  Abdominal pain that is sharp in nature.  She reports cramping.  She reports she has been to have a BM 3 times this morning.  She reports she has tried to drink but is still having intermittent vomiting.    Back and leg pain-chronic and ongoing.  She is presently on Norco but has not been able to take for the last 2 days due to illness.  She reports she is mostly achy today is not having any significant pain.  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  Allergies-continue to be chronic in nature.  She is taking her medications but has missed the last several days due to her GI upset.  She reports some increasing rhinorrhea and congestion today but did not have any significant concerns otherwise.    The following portions of the patient's history were reviewed and updated as appropriate: CC, ROS, allergies, current medications, past family history, past medical history, past social history, past surgical history and problem list.    Review of Systems   Constitutional: Positive for fatigue. Negative for appetite change and fever.   HENT: Positive for congestion and rhinorrhea. Negative for ear pain, nosebleeds, postnasal drip, sore throat, tinnitus, trouble swallowing and voice change.    Eyes: Negative for blurred vision, photophobia and visual  "disturbance.   Respiratory: Negative for cough, chest tightness, shortness of breath and wheezing.    Cardiovascular: Negative for chest pain and palpitations.   Gastrointestinal: Positive for diarrhea, nausea and vomiting. Negative for abdominal pain, blood in stool, constipation and GERD.   Endocrine: Negative for cold intolerance, heat intolerance and polydipsia.   Genitourinary: Negative for decreased urine volume, difficulty urinating, dysuria and hematuria.   Musculoskeletal: Positive for arthralgias and back pain. Negative for gait problem and myalgias.   Skin: Negative for color change, pallor and bruise.   Allergic/Immunologic: Negative.    Neurological: Negative for dizziness, syncope, numbness and headache.   Hematological: Negative.    Psychiatric/Behavioral: Negative for decreased concentration, sleep disturbance, suicidal ideas and depressed mood. The patient is not nervous/anxious.    All other systems reviewed and are negative.      Assessment     Ht 165.1 cm (65\")   Wt 64.9 kg (143 lb)   BMI 23.80 kg/m²     Physical Exam     Patient alert and oriented.  Able to follow conversation without sounding breathless.  No obvious distress.  Thought processes congruent with conversation.  Answers and ask questions without difficulty.      Assessment     Problem List Items Addressed This Visit        Musculoskeletal and Injuries    Multiple joint pain    Relevant Medications    celecoxib (CeleBREX) 100 MG capsule    HYDROcodone-acetaminophen (NORCO) 7.5-325 MG per tablet          Patient instructed and advised to call if symptoms are increasing or new symptoms occur.    Understands reasons for urgent and emergent care.  Patient (& family) verbalized agreement for treatment plan.   Generalized precautions advised including social distancing, hand washing, cough/sneeze hygiene.     As needed meds for nausea.  If unable to hold fluids by tomorrow report to the office for IV hydration.  Encouraged bland foods " once GI symptoms have improved    MICHELLE reviewed today and consistent.  Will refill prescribed controlled medication today.  Patient is aware they cannot receive narcotics from any other provider except if under care of pain management or speciality clinic.  Risk and benefits of medication use has been reviewed.  History and physical exam exhibit continued safe and appropriate use of controlled substances.  The patient is aware of the potential for addiction and dependence.  This patient has been made aware of the appropriate use of such medications, including potential risk of somnolence, limited ability to drive and / or work safely, and potential for overdose.  Patient understands not to take medication and drive until they know how medication may affect their cognition/decision making.   It has also been made clear that these medications are for use by this patient only, without concomitant use of alcohol or other substances unless prescribed/advised by medical provider.  Patient understands they may be subject to UDS and pill counts at random.      Patient considered to be low risk for addiction due to use of single controlled medications.  Patient understands and accepts these risks.  Patient need for medication will be reassessed at each visit.  Doses will be adjusted according to patient need and findings.    Goal of TX: Patient will not have any adverse reactions of medication.  Patient have reduction in pain symptoms with use of PRN Norco as directed.  Patient will not have any adverse side effects from medication.  Patient will be able to remain active in an outside of home without interference from pain symptoms.    Medication Dispense Information    Hydrocodone/Acetaminophen   Dispensed: 1/27/2021 12:00 AM   Written:  1/27/2021   Unit strength: 325MG/7.5MG   Days supply: 30   Dispense Note: GivenName=Gris=BRETTBirthDate=[id number]=[address] [place], [place], [address]   Quantity:  90 each   Pharmacy: [facility]   Authorizing provider: JOHN HOLLEY   Received from: [employer] (Fill History)   Brand or Generic:         RTC 1 month, sooner if needed for problems or concerns    Work note provided for patient to obtain before she reports back to work  This visit has been rescheduled as a phone visit to comply with patient safety concerns in accordance with CDC recommendations. Total time of discussion was 10 minutes.        This document has been electronically signed by:  STEVE Gonsales FNP-C Dragon disclaimer:  Much of this encounter note is an electronic transcription/translation of spoken language to printed text. The electronic translation of spoken language may permit erroneous, or at times, nonsensical words or phrases to be inadvertently transcribed; Although I have reviewed the note for such errors, some may still exist.

## 2021-03-23 ENCOUNTER — TELEPHONE (OUTPATIENT)
Dept: FAMILY MEDICINE CLINIC | Facility: CLINIC | Age: 61
End: 2021-03-23

## 2021-03-23 ENCOUNTER — OFFICE VISIT (OUTPATIENT)
Dept: FAMILY MEDICINE CLINIC | Facility: CLINIC | Age: 61
End: 2021-03-23

## 2021-03-23 VITALS
HEIGHT: 65 IN | WEIGHT: 141.8 LBS | DIASTOLIC BLOOD PRESSURE: 80 MMHG | OXYGEN SATURATION: 99 % | BODY MASS INDEX: 23.63 KG/M2 | HEART RATE: 96 BPM | SYSTOLIC BLOOD PRESSURE: 124 MMHG | TEMPERATURE: 97.3 F

## 2021-03-23 DIAGNOSIS — Z12.31 ENCOUNTER FOR SCREENING MAMMOGRAM FOR MALIGNANT NEOPLASM OF BREAST: ICD-10-CM

## 2021-03-23 DIAGNOSIS — M25.50 MULTIPLE JOINT PAIN: ICD-10-CM

## 2021-03-23 DIAGNOSIS — J30.1 SEASONAL ALLERGIC RHINITIS DUE TO POLLEN: ICD-10-CM

## 2021-03-23 DIAGNOSIS — M25.50 MULTIPLE JOINT PAIN: Primary | ICD-10-CM

## 2021-03-23 DIAGNOSIS — I83.813 VARICOSE VEINS OF BOTH LOWER EXTREMITIES WITH PAIN: ICD-10-CM

## 2021-03-23 LAB
6-ACETYL MORPHINE: NEGATIVE
AMPHET+METHAMPHET UR QL: NEGATIVE
BARBITURATES UR QL SCN: NEGATIVE
BENZODIAZ UR QL SCN: NEGATIVE
BUPRENORPHINE SERPL-MCNC: NEGATIVE NG/ML
CANNABINOIDS SERPL QL: NEGATIVE
COCAINE UR QL: NEGATIVE
METHADONE UR QL SCN: NEGATIVE
OPIATES UR QL: POSITIVE
OXYCODONE UR QL SCN: NEGATIVE
PCP UR QL SCN: NEGATIVE

## 2021-03-23 PROCEDURE — 99214 OFFICE O/P EST MOD 30 MIN: CPT | Performed by: NURSE PRACTITIONER

## 2021-03-23 PROCEDURE — 80307 DRUG TEST PRSMV CHEM ANLYZR: CPT | Performed by: NURSE PRACTITIONER

## 2021-03-23 RX ORDER — HYDROCODONE BITARTRATE AND ACETAMINOPHEN 7.5; 325 MG/1; MG/1
1 TABLET ORAL EVERY 8 HOURS PRN
Qty: 90 TABLET | Refills: 0 | Status: SHIPPED | OUTPATIENT
Start: 2021-03-23 | End: 2021-04-20 | Stop reason: SDUPTHER

## 2021-03-23 RX ORDER — HYDROCODONE BITARTRATE AND ACETAMINOPHEN 7.5; 325 MG/1; MG/1
1 TABLET ORAL EVERY 8 HOURS PRN
Qty: 90 TABLET | Refills: 0 | Status: SHIPPED | OUTPATIENT
Start: 2021-03-23 | End: 2021-03-23 | Stop reason: SDUPTHER

## 2021-03-23 NOTE — PROGRESS NOTES
Subjective   Rohini Burch is a 60 y.o. female.     Chief Complaint   Patient presents with   • Leg Pain       History of Present Illness     Back/leg pain-ongoing.  Patient is presently taking hydrocodone 7.5 mg up to 3 daily PRN and Celebrex 100 mg.  She denies any negative side effects.  She is not taking Celebrex daily.  She does have multiple varicose veins in her BLE.   Allergies-chronic and ongoing.  Taking zyrtec 10 mg and using her Alaway eye drops.  No negative side effects.  She reports some sinus pressure today.    Routine health-patient still has not done Cologuard stool test.  She also missed her last order for mammogram.  Would like to have rescheduled.  She has not noted any bowel changes or Breast changes.    The following portions of the patient's history were reviewed and updated as appropriate: CC, ROS, allergies, current medications, past family history, past medical history, past social history, past surgical history and problem list.    Review of Systems   Constitutional: Negative for appetite change, fatigue and fever.   HENT: Positive for sinus pressure. Negative for congestion, ear pain, nosebleeds, postnasal drip, rhinorrhea, sore throat, tinnitus, trouble swallowing and voice change.    Eyes: Negative for blurred vision, photophobia and visual disturbance.   Respiratory: Negative for cough, chest tightness, shortness of breath and wheezing.    Cardiovascular: Negative for chest pain, palpitations and leg swelling.   Gastrointestinal: Negative for abdominal pain, blood in stool, constipation, diarrhea, nausea and GERD.   Endocrine: Negative for cold intolerance, heat intolerance and polydipsia.   Genitourinary: Negative for decreased urine volume, difficulty urinating, dysuria and hematuria.   Musculoskeletal: Positive for arthralgias and back pain. Negative for gait problem and myalgias.   Skin: Negative for color change, pallor and bruise.   Allergic/Immunologic: Negative.   "  Neurological: Negative for dizziness, syncope, numbness and headache.   Hematological: Negative.    Psychiatric/Behavioral: Negative for decreased concentration, sleep disturbance, suicidal ideas, depressed mood and stress. The patient is not nervous/anxious.    All other systems reviewed and are negative.      Objective     /80   Pulse 96   Temp 97.3 °F (36.3 °C)   Ht 165.1 cm (65\")   Wt 64.3 kg (141 lb 12.8 oz)   SpO2 99%   BMI 23.60 kg/m²     Physical Exam  Vitals reviewed.   Constitutional:       General: She is not in acute distress.     Appearance: She is well-developed. She is not diaphoretic.   HENT:      Head: Normocephalic and atraumatic.      Comments: Oropharynx not examined.  Patient is presently wearing a face covering/mask due to COVID-19 pandemic.     Right Ear: Hearing, tympanic membrane, ear canal and external ear normal.      Left Ear: Hearing, tympanic membrane, ear canal and external ear normal.   Eyes:      General: Lids are normal. No scleral icterus.     Extraocular Movements:      Right eye: Normal extraocular motion and no nystagmus.      Left eye: Normal extraocular motion and no nystagmus.      Conjunctiva/sclera: Conjunctivae normal.      Pupils: Pupils are equal, round, and reactive to light.   Neck:      Thyroid: No thyromegaly.      Vascular: No carotid bruit or JVD.      Trachea: No tracheal tenderness.   Cardiovascular:      Rate and Rhythm: Normal rate and regular rhythm.      Heart sounds: Normal heart sounds, S1 normal and S2 normal. No murmur heard.     Pulmonary:      Effort: Pulmonary effort is normal.      Breath sounds: Normal breath sounds.   Chest:      Chest wall: No tenderness.   Abdominal:      General: Bowel sounds are normal.      Palpations: Abdomen is soft. There is no hepatomegaly, splenomegaly or mass.      Tenderness: There is no abdominal tenderness.   Musculoskeletal:      Cervical back: Normal range of motion and neck supple.      Lumbar back: " Spasms, tenderness and bony tenderness present.      Right lower leg: No edema.      Left lower leg: No edema.      Comments: Gait normal.  No muscular atrophy or flaccidity.   Lymphadenopathy:      Cervical: No cervical adenopathy.      Right cervical: No superficial cervical adenopathy.     Left cervical: No superficial cervical adenopathy.   Skin:     General: Skin is warm and dry.      Capillary Refill: Capillary refill takes less than 2 seconds.      Coloration: Skin is not pale.      Findings: No erythema.      Nails: There is no clubbing.      Comments: Multiple varicosities BLE   Neurological:      Mental Status: She is alert and oriented to person, place, and time.      Cranial Nerves: No cranial nerve deficit or facial asymmetry.      Sensory: No sensory deficit.      Motor: No tremor, atrophy or abnormal muscle tone.      Coordination: Coordination normal.      Deep Tendon Reflexes: Reflexes are normal and symmetric.   Psychiatric:         Attention and Perception: She is attentive.         Mood and Affect: Mood normal.         Speech: Speech normal.         Behavior: Behavior normal.         Thought Content: Thought content normal.         Judgment: Judgment normal.       Assessment/Plan     Problem List Items Addressed This Visit        Allergies and Adverse Reactions    Seasonal allergic rhinitis due to pollen    Relevant Medications    cetirizine (zyrTEC) 10 MG tablet    ketotifen (Alaway) 0.025 % ophthalmic solution    celecoxib (CeleBREX) 100 MG capsule       Cardiac and Vasculature    Varicose veins of both lower extremities with pain       Musculoskeletal and Injuries    Multiple joint pain - Primary    Relevant Medications    celecoxib (CeleBREX) 100 MG capsule    HYDROcodone-acetaminophen (NORCO) 7.5-325 MG per tablet    Other Relevant Orders    Urine Drug Screen - Urine, Random Void      Other Visit Diagnoses     Encounter for screening mammogram for malignant neoplasm of breast        Relevant  Orders    Mammo Screening Digital Tomosynthesis Bilateral With CAD          Patient's Body mass index is 23.6 kg/m². BMI is within normal parameters. No follow-up required..       Understands disease processes and need for medications.  Understands reasons for urgent and emergent care.  Patient (& family) verbalized agreement for treatment plan.   Emotional support and active listening provided.  Patient provided time to verbalize feelings.    MICHELLE reviewed today and consistent.  Will refill prescribed controlled medication today.  Patient is aware they cannot receive narcotics from any other provider except if under care of pain management or speciality clinic.  Risk and benefits of medication use has been reviewed.  History and physical exam exhibit continued safe and appropriate use of controlled substances.  The patient is aware of the potential for addiction and dependence.  This patient has been made aware of the appropriate use of such medications, including potential risk of somnolence, limited ability to drive and / or work safely, and potential for overdose.  Patient understands not to take medication and drive until they know how medication may affect their cognition/decision making.   It has also been made clear that these medications are for use by this patient only, without concomitant use of alcohol or other substances unless prescribed/advised by medical provider.  Patient understands they may be subject to UDS and pill counts at random.      Patient considered to be moderate risk for addiction due to use of multiple controlled medications.  Patient understands and accepts these risks.  Patient need for medication will be reassessed at each visit.  Doses will be adjusted according to patient need and findings.    Goal of TX: Patient will not have any adverse reactions of medication.  Patient have reduction in pain symptoms with use of PRN Norco as directed.  Patient will not have any adverse side  effects from medication.  Patient will be able to remain active in an outside of home without interference from pain symptoms.    Medication Dispense Information    Hydrocodone/Acetaminophen   Dispensed: 2/25/2021 12:00 AM   Written:  2/25/2021   Unit strength: 325MG/7.5MG   Days supply: 30   Dispense Note: GivenName=Gris=BRETTBirthDate=[id number]=[address] [place], [place], [address]   Quantity: 90 each   Pharmacy: [facility]   Authorizing provider: JOHN HOLLEY   Received from: [employer] [employer] (Fill History)   Brand or Generic:       Reminded to complete Cologuard for colon cancer screening    We will plan for updated fasting labs    UDS done today.  Will send to [facility].    RTC 1 month, sooner if needed for problems or concerns          This document has been electronically signed by:  STEVE Gonsales, FNP-C    Dragon disclaimer:  Much of this encounter note is an electronic transcription/translation of spoken language to printed text. The electronic translation of spoken language may permit erroneous, or at times, nonsensical words or phrases to be inadvertently transcribed; Although I have reviewed the note for such errors, some may still exist.

## 2021-03-23 NOTE — TELEPHONE ENCOUNTER
PATIENT CALLED TO SEE IF HER MEDICATION HAD BEEN ORDERED. SHE THOUGHT IT WAS SENT AFTER HER APPOINTMENT.    Rohini Burch E   [phone]     HYDROcodone-acetaminophen (NORCO) 7.5-325 MG per tablet      [facility] [place], [place] - [address] - [phone]  - [phone] FX

## 2021-04-20 ENCOUNTER — OFFICE VISIT (OUTPATIENT)
Dept: FAMILY MEDICINE CLINIC | Facility: CLINIC | Age: 61
End: 2021-04-20

## 2021-04-20 VITALS — WEIGHT: 141 LBS | BODY MASS INDEX: 23.49 KG/M2 | HEIGHT: 65 IN

## 2021-04-20 DIAGNOSIS — M25.50 MULTIPLE JOINT PAIN: ICD-10-CM

## 2021-04-20 DIAGNOSIS — R11.0 NAUSEA: Primary | ICD-10-CM

## 2021-04-20 PROCEDURE — 99441 PR PHYS/QHP TELEPHONE EVALUATION 5-10 MIN: CPT | Performed by: NURSE PRACTITIONER

## 2021-04-20 RX ORDER — HYDROCODONE BITARTRATE AND ACETAMINOPHEN 7.5; 325 MG/1; MG/1
1 TABLET ORAL EVERY 8 HOURS PRN
Qty: 90 TABLET | Refills: 0 | Status: SHIPPED | OUTPATIENT
Start: 2021-04-20 | End: 2021-05-19 | Stop reason: SDUPTHER

## 2021-04-20 NOTE — PROGRESS NOTES
You have chosen to receive care through a telephone visit today. Do you consent to use a telephone visit for your medical care today? Yes    Establish patient makes contact with office of APRN to discuss health conditions.  Patient is due for routine checkup but due to current pandemic is not recommended to present to the office for face-to-face evaluation.      Chief Complaint   Patient presents with   • GI Problem   • Joint Pain       Brief HPI/ROS obtained as follows:    GI complaint-reports she has had vomiting and diarrhea.  She reports started approx 1 AM this morning. She has meds at home for nausea.  She reports she had an exposure at work to GI illness.   She has some rhinorrhea and congestion from excessive vomiting this morning.  She has taken as needed Zofran.  Back and leg pain-chronic and ongoing.  Patient has not taken any medication today.  She is using hydrocodone 7.5 mg.  No negative side effects.  Pain is worse if she has to climb excessive stairs or do lifting at work.  She has multiple areas of varicose veins.  She does wear compression socks to aid with leg pain reduction.  She is also taking Celebrex 100 mg mostly as needed.      The following portions of the patient's history were reviewed and updated as appropriate: CC, ROS, allergies, current medications, past family history, past medical history, past social history, past surgical history and problem list.    Review of Systems   Constitutional: Positive for appetite change and chills. Negative for fatigue and fever.   HENT: Positive for congestion and rhinorrhea. Negative for ear pain, nosebleeds, postnasal drip, sore throat, trouble swallowing and voice change.    Eyes: Negative for blurred vision, photophobia and visual disturbance.   Respiratory: Negative for cough, chest tightness, shortness of breath and wheezing.    Cardiovascular: Negative for chest pain and palpitations.   Gastrointestinal: Positive for abdominal pain, diarrhea,  "nausea and vomiting. Negative for blood in stool and constipation.   Endocrine: Negative for cold intolerance, heat intolerance and polydipsia.   Genitourinary: Negative for dysuria and hematuria.   Musculoskeletal: Positive for arthralgias and back pain. Negative for gait problem and myalgias.   Skin: Negative for color change, pallor and bruise.   Allergic/Immunologic: Negative.    Neurological: Negative for dizziness and headache.   Hematological: Negative.    Psychiatric/Behavioral: Negative for sleep disturbance and suicidal ideas. The patient is not nervous/anxious.    All other systems reviewed and are negative.      Assessment     Ht 165.1 cm (65\")   Wt 64 kg (141 lb)   BMI 23.46 kg/m²     Physical Exam     Patient alert and oriented.  Able to follow conversation without sounding breathless.  No obvious distress.  Thought processes congruent with conversation.  Answers and ask questions without difficulty.      Assessment     Problem List Items Addressed This Visit        Musculoskeletal and Injuries    Multiple joint pain    Relevant Medications    celecoxib (CeleBREX) 100 MG capsule    HYDROcodone-acetaminophen (NORCO) 7.5-325 MG per tablet      Other Visit Diagnoses     Nausea    -  Primary    May continue to use PRN Zofran at home.  Encourage patient to report to the clinic if she is not holding p.o. fluids in the next several hours.  will plan IVF's          Patient instructed and advised to call if symptoms are increasing or new symptoms occur.    Understands reasons for urgent and emergent care.  Patient (& family) verbalized agreement for treatment plan.   Generalized precautions advised including social distancing, hand washing, cough/sneeze hygiene.     MICHELLE/PMDP reviewed today and consistent.  Will refill prescribed controlled medication today.  Patient is aware they cannot receive narcotics from any other provider except if under care of pain management or speciality clinic.  Risk and " benefits of medication use has been reviewed.  History and physical exam exhibit continued safe and appropriate use of controlled substances.  The patient is aware of the potential for addiction and dependence.  This patient has been made aware of the appropriate use of such medications, including potential risk of somnolence, limited ability to drive and / or work safely, and potential for overdose.    It has also been made clear that these medications are for use by this patient only, without concomitant use of alcohol or other substances unless prescribed/advised by medical provider.  Patient understands they may be subject to UDS and pill counts at random.      Patient considered to be low risk for addiction due to use of single controlled medications.  Patient understands and accepts these risks.  Patient need for medication will be reassessed at each visit.  Doses will be adjusted according to patient need and findings.    Goal of TX: Patient will not have any adverse reactions of medication.  Patient have reduction in pain symptoms with use of PRN Norco as directed.  Patient will not have any adverse side effects from medication.  Patient will be able to remain active in an outside of home without interference from pain symptoms.  Medication Dispense Information    Hydrocodone/Acetaminophen   Dispensed: 3/24/2021 12:00 AM   Written:  3/23/2021   Unit strength: 325MG/7.5MG   Days supply: 30   Dispense Note: GivenName=DENNYSSurName=GAMBRELBirthDate=[id number]=[address] [place], [place], [address]   Quantity: 90 each   Pharmacy: [facility]   Authorizing provider: JOHN HOLLEY   Received from: MICHELLE PDMP (Fill History)   Brand or Generic:       RTC 1 month, sooner if needed.       This visit has been rescheduled as a phone visit to comply with patient safety concerns in accordance with CDC recommendations. Total time of discussion was 10 minutes.        This document has been electronically  signed by:  STEVE Gonsales, FNP-C    Dragon disclaimer:  Much of this encounter note is an electronic transcription/translation of spoken language to printed text. The electronic translation of spoken language may permit erroneous, or at times, nonsensical words or phrases to be inadvertently transcribed; Although I have reviewed the note for such errors, some may still exist.

## 2021-05-19 ENCOUNTER — OFFICE VISIT (OUTPATIENT)
Dept: FAMILY MEDICINE CLINIC | Facility: CLINIC | Age: 61
End: 2021-05-19

## 2021-05-19 VITALS
WEIGHT: 142.2 LBS | HEART RATE: 86 BPM | DIASTOLIC BLOOD PRESSURE: 84 MMHG | TEMPERATURE: 97.2 F | OXYGEN SATURATION: 99 % | SYSTOLIC BLOOD PRESSURE: 142 MMHG | BODY MASS INDEX: 23.69 KG/M2 | HEIGHT: 65 IN

## 2021-05-19 DIAGNOSIS — J30.1 SEASONAL ALLERGIC RHINITIS DUE TO POLLEN: ICD-10-CM

## 2021-05-19 DIAGNOSIS — M25.50 MULTIPLE JOINT PAIN: ICD-10-CM

## 2021-05-19 PROCEDURE — 99213 OFFICE O/P EST LOW 20 MIN: CPT | Performed by: NURSE PRACTITIONER

## 2021-05-19 RX ORDER — HYDROCODONE BITARTRATE AND ACETAMINOPHEN 7.5; 325 MG/1; MG/1
1 TABLET ORAL EVERY 8 HOURS PRN
Qty: 90 TABLET | Refills: 0 | Status: SHIPPED | OUTPATIENT
Start: 2021-05-19 | End: 2021-06-16 | Stop reason: SDUPTHER

## 2021-05-19 RX ORDER — KETOTIFEN FUMARATE 0.35 MG/ML
1 SOLUTION/ DROPS OPHTHALMIC 2 TIMES DAILY
Qty: 10 ML | Refills: 5 | Status: SHIPPED | OUTPATIENT
Start: 2021-05-19 | End: 2021-10-12 | Stop reason: SDUPTHER

## 2021-05-19 RX ORDER — CELECOXIB 100 MG/1
100 CAPSULE ORAL 2 TIMES DAILY PRN
Qty: 60 CAPSULE | Refills: 2 | Status: SHIPPED | OUTPATIENT
Start: 2021-05-19 | End: 2022-01-06 | Stop reason: SDUPTHER

## 2021-05-19 RX ORDER — CETIRIZINE HYDROCHLORIDE 10 MG/1
10 TABLET ORAL DAILY PRN
Qty: 30 TABLET | Refills: 5 | Status: SHIPPED | OUTPATIENT
Start: 2021-05-19 | End: 2021-10-12 | Stop reason: SDUPTHER

## 2021-06-16 ENCOUNTER — OFFICE VISIT (OUTPATIENT)
Dept: FAMILY MEDICINE CLINIC | Facility: CLINIC | Age: 61
End: 2021-06-16

## 2021-06-16 VITALS
TEMPERATURE: 97.2 F | OXYGEN SATURATION: 96 % | BODY MASS INDEX: 23.69 KG/M2 | SYSTOLIC BLOOD PRESSURE: 124 MMHG | WEIGHT: 142.2 LBS | DIASTOLIC BLOOD PRESSURE: 82 MMHG | HEIGHT: 65 IN | HEART RATE: 58 BPM

## 2021-06-16 DIAGNOSIS — M25.50 MULTIPLE JOINT PAIN: Primary | ICD-10-CM

## 2021-06-16 DIAGNOSIS — I83.813 VARICOSE VEINS OF BOTH LOWER EXTREMITIES WITH PAIN: ICD-10-CM

## 2021-06-16 DIAGNOSIS — Z78.9 WEIGHT LOSS ADVISED: ICD-10-CM

## 2021-06-16 DIAGNOSIS — J30.1 SEASONAL ALLERGIC RHINITIS DUE TO POLLEN: ICD-10-CM

## 2021-06-16 PROCEDURE — 99214 OFFICE O/P EST MOD 30 MIN: CPT | Performed by: NURSE PRACTITIONER

## 2021-06-16 RX ORDER — PHENTERMINE HYDROCHLORIDE 37.5 MG/1
37.5 TABLET ORAL
Qty: 30 TABLET | Refills: 2 | Status: SHIPPED | OUTPATIENT
Start: 2021-06-16 | End: 2021-09-08

## 2021-06-16 RX ORDER — HYDROCODONE BITARTRATE AND ACETAMINOPHEN 7.5; 325 MG/1; MG/1
1 TABLET ORAL EVERY 8 HOURS PRN
Qty: 90 TABLET | Refills: 0 | Status: SHIPPED | OUTPATIENT
Start: 2021-06-16 | End: 2021-07-13 | Stop reason: SDUPTHER

## 2021-06-16 NOTE — PROGRESS NOTES
"Subjective   Rohini Burch is a 61 y.o. female.     Chief Complaint   Patient presents with   • Allergies   • Joint Pain       History of Present Illness     Allergies-chronic and ongoing.  She reports \"not too bad since rain\".   She is taking zyrtec 10 mg.  She is using prn eye drops.    Leg pain and varicose veins-has noted some increased leg pain since she is climbing more steps at work.  Bilateral leg pain but the right seems to be worse.  She has noted some mild swelling in the evenings.  She has not been wearing compression socks as consistently.     Weight concern-reports she has been going to weight management clinic in [place].  She reports that she is taking 1/2 tablet periodically to help curb her appetite.  She reports a RX last \"about 4 months\". She request to have an RX.      The following portions of the patient's history were reviewed and updated as appropriate: CC, ROS, allergies, current medications, past family history, past medical history, past social history, past surgical history and problem list.      Review of Systems   Constitutional: Negative for activity change, appetite change, fatigue and fever.   HENT: Negative for congestion, ear pain, nosebleeds, postnasal drip, rhinorrhea, sinus pressure, sore throat, tinnitus, trouble swallowing and voice change.    Eyes: Negative for blurred vision, photophobia and visual disturbance.   Respiratory: Negative for cough, chest tightness, shortness of breath and wheezing.    Cardiovascular: Positive for leg swelling. Negative for chest pain and palpitations.   Gastrointestinal: Negative for abdominal pain, blood in stool, constipation, diarrhea, nausea and GERD.   Endocrine: Negative for cold intolerance, heat intolerance and polydipsia.   Genitourinary: Negative for decreased urine volume, difficulty urinating, dysuria and hematuria.   Musculoskeletal: Positive for arthralgias, back pain and myalgias. Negative for gait problem.   Skin: Negative " "for color change, pallor and bruise.   Allergic/Immunologic: Negative.    Neurological: Negative for dizziness, syncope, numbness and headache.   Hematological: Negative.    Psychiatric/Behavioral: Negative for decreased concentration, self-injury, sleep disturbance, suicidal ideas and depressed mood. The patient is not nervous/anxious.    All other systems reviewed and are negative.      Objective     /82   Pulse 58   Temp 97.2 °F (36.2 °C)   Ht 165.1 cm (65\")   Wt 64.5 kg (142 lb 3.2 oz)   SpO2 96%   BMI 23.66 kg/m²     Physical Exam  Vitals reviewed.   Constitutional:       General: She is not in acute distress.     Appearance: She is well-developed. She is not diaphoretic.   HENT:      Head: Normocephalic and atraumatic.      Comments: Oropharynx not examined.  Patient is presently wearing a face covering/mask due to COVID-19 pandemic.     Right Ear: Hearing, tympanic membrane, ear canal and external ear normal.      Left Ear: Hearing, tympanic membrane, ear canal and external ear normal.   Eyes:      General: Lids are normal. No scleral icterus.     Extraocular Movements:      Right eye: Normal extraocular motion and no nystagmus.      Left eye: Normal extraocular motion and no nystagmus.      Conjunctiva/sclera: Conjunctivae normal.      Pupils: Pupils are equal, round, and reactive to light.   Neck:      Thyroid: No thyromegaly.      Vascular: No carotid bruit or JVD.      Trachea: No tracheal tenderness.   Cardiovascular:      Rate and Rhythm: Normal rate and regular rhythm.      Heart sounds: Normal heart sounds, S1 normal and S2 normal. No murmur heard.     Pulmonary:      Effort: Pulmonary effort is normal.      Breath sounds: Normal breath sounds.   Chest:      Chest wall: No tenderness.   Abdominal:      General: Bowel sounds are normal.      Palpations: Abdomen is soft. There is no hepatomegaly, splenomegaly or mass.      Tenderness: There is no abdominal tenderness.   Musculoskeletal:      " Cervical back: Normal range of motion and neck supple.      Lumbar back: Spasms, tenderness and bony tenderness present.      Right lower leg: No edema.      Left lower leg: No edema.      Comments: Gait normal.  No muscular atrophy or flaccidity.   Lymphadenopathy:      Cervical: No cervical adenopathy.      Right cervical: No superficial cervical adenopathy.     Left cervical: No superficial cervical adenopathy.   Skin:     General: Skin is warm and dry.      Capillary Refill: Capillary refill takes less than 2 seconds.      Coloration: Skin is not pale.      Findings: No erythema.      Nails: There is no clubbing.      Comments: Multiple varicosities BLE   Neurological:      Mental Status: She is alert and oriented to person, place, and time.      Cranial Nerves: No cranial nerve deficit or facial asymmetry.      Sensory: No sensory deficit.      Motor: No tremor, atrophy or abnormal muscle tone.      Coordination: Coordination normal.      Deep Tendon Reflexes: Reflexes are normal and symmetric.   Psychiatric:         Attention and Perception: She is attentive.         Mood and Affect: Mood normal.         Speech: Speech normal.         Behavior: Behavior normal.         Thought Content: Thought content normal.         Judgment: Judgment normal.       Assessment/Plan     Diagnoses and all orders for this visit:    1. Multiple joint pain (Primary)  -     HYDROcodone-acetaminophen (NORCO) 7.5-325 MG per tablet; Take 1 tablet by mouth Every 8 (Eight) Hours As Needed for Moderate Pain .  Dispense: 90 tablet; Refill: 0    2. Weight loss advised  -     phentermine (Adipex-P) 37.5 MG tablet; Take 1 tablet by mouth Every Morning Before Breakfast.  Dispense: 30 tablet; Refill: 2    3. Seasonal allergic rhinitis due to pollen  Assessment & Plan:  Continue aloe eyedrops.  Encourage not to overuse.  Discussed that patient may need lubricating eyedrop to aid with dry sensation.  Also discussed with patient that she may  choose to hold her Zyrtec for a few days to see if dryness improves as the antihistamine may be causing some dry and can also cause dry mouth.      4. Varicose veins of both lower extremities with pain  Assessment & Plan:  Continue compression sock use.  Report any changes noted or any new swelling or pain.  Avoid activities that exacerbate leg pain         Understands disease processes and need for medications.  Understands reasons for urgent and emergent care.  Patient (& family) verbalized agreement for treatment plan.   Emotional support and active listening provided.  Patient provided time to verbalize feelings.    MICHELLE/PMDP reviewed today and consistent.  Will refill prescribed controlled medication today.  Patient is aware they cannot receive narcotics from any other provider except if under care of pain management or speciality clinic.  Risk and benefits of medication use has been reviewed.  History and physical exam exhibit continued safe and appropriate use of controlled substances.  The patient is aware of the potential for addiction and dependence.  This patient has been made aware of the appropriate use of such medications, including potential risk of somnolence, limited ability to drive and / or work safely, and potential for overdose.    It has also been made clear that these medications are for use by this patient only, without concomitant use of alcohol or other substances unless prescribed/advised by medical provider.  Patient understands they may be subject to UDS and pill counts at random.      Patient considered to be moderate risk for addiction due to use of multiple controlled medications.  Patient understands and accepts these risks.  Patient need for medication will be reassessed at each visit.  Doses will be adjusted according to patient need and findings.    Goal of TX: Patient will not have any adverse reactions of medication.  Patient have reduction in pain symptoms with use of PRN  Norco as directed.  Patient will not have any adverse side effects from medication.  Patient will be able to remain active in an outside of home without interference from pain symptoms.  Patient will have reduction in weight with use of Adipex as directed with simultaneous diet and lifestyle changes.    Medication Dispense Information    Hydrocodone/Acetaminophen   Dispensed: 5/19/2021 12:00 AM   Written:  5/19/2021   Unit strength: 325MG/7.5MG   Days supply: 30   Dispense Note: GivenName=Gris=BRETTBirthDate=[id number]=[address] , [place], [place], [address]   Quantity: 90 each   Pharmacy: [facility]   Authorizing provider: JOHN HOLLEY   Received from: [facility]HEL[facility] (Fill History)   Brand or Generic:       RTC 1 month, sooner if needed.             This document has been electronically signed by:  STEVE Gonsales, FNP-C    Dragon disclaimer:  Much of this encounter note is an electronic transcription/translation of spoken language to printed text. The electronic translation of spoken language may permit erroneous, or at times, nonsensical words or phrases to be inadvertently transcribed; Although I have reviewed the note for such errors, some may still exist.

## 2021-07-13 ENCOUNTER — OFFICE VISIT (OUTPATIENT)
Dept: FAMILY MEDICINE CLINIC | Facility: CLINIC | Age: 61
End: 2021-07-13

## 2021-07-13 VITALS
DIASTOLIC BLOOD PRESSURE: 80 MMHG | SYSTOLIC BLOOD PRESSURE: 124 MMHG | OXYGEN SATURATION: 99 % | HEART RATE: 120 BPM | BODY MASS INDEX: 22.73 KG/M2 | WEIGHT: 136.4 LBS | HEIGHT: 65 IN | TEMPERATURE: 97.3 F

## 2021-07-13 DIAGNOSIS — J30.1 SEASONAL ALLERGIC RHINITIS DUE TO POLLEN: Primary | ICD-10-CM

## 2021-07-13 DIAGNOSIS — I83.813 VARICOSE VEINS OF BOTH LOWER EXTREMITIES WITH PAIN: ICD-10-CM

## 2021-07-13 DIAGNOSIS — J06.9 ACUTE URI: ICD-10-CM

## 2021-07-13 DIAGNOSIS — M25.50 MULTIPLE JOINT PAIN: ICD-10-CM

## 2021-07-13 PROCEDURE — 99214 OFFICE O/P EST MOD 30 MIN: CPT | Performed by: NURSE PRACTITIONER

## 2021-07-13 RX ORDER — HYDROCODONE BITARTRATE AND ACETAMINOPHEN 7.5; 325 MG/1; MG/1
1 TABLET ORAL EVERY 8 HOURS PRN
Qty: 90 TABLET | Refills: 0 | Status: SHIPPED | OUTPATIENT
Start: 2021-07-13 | End: 2021-08-12 | Stop reason: SDUPTHER

## 2021-07-13 RX ORDER — AZITHROMYCIN 250 MG/1
TABLET, FILM COATED ORAL
Qty: 6 TABLET | Refills: 0 | Status: SHIPPED | OUTPATIENT
Start: 2021-07-13 | End: 2021-08-12

## 2021-07-13 NOTE — PROGRESS NOTES
Subjective   Rohini Burch is a 61 y.o. female.     Chief Complaint   Patient presents with   • Allergies       History of Present Illness     Allergies-on zyrtec 10 mg and using eye drops for allergies.  She is presently exacerbated.  She does have symptoms daily.   URI symptoms-reports since Saturday.  Sinus pressure over her nose.  PND is present.  No sore throat.  No ear pain.  She reports no cough.    Weight loss-taking Adipex 37.5 mg.  No negative side effects.   She reports she is not taking adipex daily.  She reports she has noted decreased appetite.  She reports she has been drinking well.      The following portions of the patient's history were reviewed and updated as appropriate: CC, ROS, allergies, current medications, past family history, past medical history, past social history, past surgical history and problem list.      Review of Systems   Constitutional: Negative for activity change, appetite change, fatigue and fever.   HENT: Positive for congestion, postnasal drip and sinus pressure. Negative for ear pain, nosebleeds, rhinorrhea, sore throat, tinnitus, trouble swallowing and voice change.    Eyes: Negative for blurred vision, photophobia and visual disturbance.   Respiratory: Negative for cough, chest tightness, shortness of breath and wheezing.    Cardiovascular: Negative for chest pain, palpitations and leg swelling.   Gastrointestinal: Negative for abdominal pain, blood in stool, constipation, diarrhea, nausea and GERD.   Endocrine: Negative for cold intolerance, heat intolerance and polydipsia.   Genitourinary: Negative for decreased urine volume, difficulty urinating, dysuria and hematuria.   Musculoskeletal: Positive for arthralgias and myalgias. Negative for back pain and gait problem.   Skin: Negative for color change, pallor and bruise.   Allergic/Immunologic: Negative.    Neurological: Positive for headache. Negative for dizziness, syncope and numbness.   Hematological: Negative.   "  Psychiatric/Behavioral: Negative for decreased concentration, self-injury, sleep disturbance, suicidal ideas and depressed mood. The patient is not nervous/anxious.    All other systems reviewed and are negative.      Objective     /80   Pulse 120   Temp 97.3 °F (36.3 °C)   Ht 165.1 cm (65\")   Wt 61.9 kg (136 lb 6.4 oz)   SpO2 99%   BMI 22.70 kg/m²     Physical Exam  Vitals reviewed.   Constitutional:       General: She is not in acute distress.     Appearance: She is well-developed. She is not diaphoretic.   HENT:      Head: Normocephalic and atraumatic.      Comments: Oropharynx not examined.  Patient is presently wearing a face covering/mask due to COVID-19 pandemic.     Right Ear: Hearing, tympanic membrane, ear canal and external ear normal.      Left Ear: Hearing, tympanic membrane, ear canal and external ear normal.      Nose:      Right Sinus: Maxillary sinus tenderness and frontal sinus tenderness present.      Left Sinus: Maxillary sinus tenderness and frontal sinus tenderness present.   Eyes:      General: Lids are normal. No scleral icterus.     Extraocular Movements:      Right eye: Normal extraocular motion and no nystagmus.      Left eye: Normal extraocular motion and no nystagmus.      Conjunctiva/sclera: Conjunctivae normal.      Pupils: Pupils are equal, round, and reactive to light.   Neck:      Thyroid: No thyromegaly.      Vascular: No carotid bruit or JVD.      Trachea: No tracheal tenderness.   Cardiovascular:      Rate and Rhythm: Normal rate and regular rhythm.      Heart sounds: Normal heart sounds, S1 normal and S2 normal. No murmur heard.     Pulmonary:      Effort: Pulmonary effort is normal.      Breath sounds: Normal breath sounds.   Chest:      Chest wall: No tenderness.   Abdominal:      General: Bowel sounds are normal.      Palpations: Abdomen is soft. There is no mass.      Tenderness: There is no abdominal tenderness.   Musculoskeletal:      Cervical back: Normal " range of motion and neck supple.      Lumbar back: Tenderness present.      Right lower leg: No edema.      Left lower leg: No edema.      Comments: No muscular atrophy or flaccidity.  Multiple varicose veins on BLE  Generalized leg tenderness.   Lymphadenopathy:      Cervical: Cervical adenopathy present.      Right cervical: Superficial cervical adenopathy present.      Left cervical: Superficial cervical adenopathy present.   Skin:     General: Skin is warm and dry.      Capillary Refill: Capillary refill takes less than 2 seconds.      Coloration: Skin is not pale.      Findings: No erythema.      Nails: There is no clubbing.   Neurological:      Mental Status: She is alert and oriented to person, place, and time.      Cranial Nerves: No cranial nerve deficit or facial asymmetry.      Sensory: No sensory deficit.      Motor: No tremor, atrophy or abnormal muscle tone.      Coordination: Coordination normal.      Deep Tendon Reflexes: Reflexes are normal and symmetric.   Psychiatric:         Attention and Perception: She is attentive.         Mood and Affect: Mood normal.         Speech: Speech normal.         Behavior: Behavior normal.         Thought Content: Thought content normal.         Judgment: Judgment normal.       Assessment/Plan     Diagnoses and all orders for this visit:    1. Seasonal allergic rhinitis due to pollen (Primary)  Assessment & Plan:  Continue alaway eyedrops.  Encourage not to overuse.  Discussed that patient may need lubricating eyedrop to aid with dry sensation.  Also discussed with patient that she may choose to hold her Zyrtec for a few days to see if dryness improves as the antihistamine may be causing some dry and can also cause dry mouth.      2. Multiple joint pain  -     HYDROcodone-acetaminophen (NORCO) 7.5-325 MG per tablet; Take 1 tablet by mouth Every 8 (Eight) Hours As Needed for Moderate Pain .  Dispense: 90 tablet; Refill: 0    3. Acute URI  -     azithromycin  (Zithromax Z-Roderick) 250 MG tablet; Take 2 tablets by mouth on day 1, then 1 tablet daily on days 2-5  Dispense: 6 tablet; Refill: 0    4. Varicose veins of both lower extremities with pain  Assessment & Plan:  Continue compression sock use.  Report any changes noted or any new swelling or pain.  Avoid activities that exacerbate leg pain         Patient's Body mass index is 22.7 kg/m². indicating that she is within normal range (BMI 18.5-24.9). No BMI management plan needed..  Patient is taking Adipex to help obtain a goal weight.      Understands disease processes and need for medications.  Understands reasons for urgent and emergent care.  Patient (& family) verbalized agreement for treatment plan.   Emotional support and active listening provided.  Patient provided time to verbalize feelings.    MICHELLE/BELLE reviewed today and consistent.  Will refill prescribed controlled medication today.  Patient is aware they cannot receive narcotics from any other provider except if under care of pain management or speciality clinic.  Risk and benefits of medication use has been reviewed.  History and physical exam exhibit continued safe and appropriate use of controlled substances.  The patient is aware of the potential for addiction and dependence.  This patient has been made aware of the appropriate use of such medications, including potential risk of somnolence, limited ability to drive and / or work safely, and potential for overdose.    It has also been made clear that these medications are for use by this patient only, without concomitant use of alcohol or other substances unless prescribed/advised by medical provider.  Patient understands they may be subject to UDS and pill counts at random.      Patient considered to be moderate risk for addiction due to use of multiple controlled medications.  Patient understands and accepts these risks.  Patient need for medication will be reassessed at each visit.  Doses will be  adjusted according to patient need and findings.    Goal of TX: Patient will not have any adverse reactions of medication.  Patient have reduction in pain symptoms with use of PRN Norco as directed.  Patient will not have any adverse side effects from medication.  Patient will be able to remain active in an outside of home without interference from pain symptoms.  Patient will have reduction in weight with use of Adipex as directed with simultaneous diet and lifestyle changes.    Medication Dispense Information    Phentermine Hcl   Dispensed: 6/16/2021 12:00 AM   Written:  6/16/2021   Unit strength: 37.5MG   Days supply: 30   Dispense Note: GivenName=Gris=LEONARELBirthDate=[id number]=[address], [facility] [place], [place], [address]   Quantity: 30 each   Pharmacy: [facility]   Authorizing provider: JOHN HOLLEY   Received from: [facility] (Fill History)   Brand or Generic:  Unknown     Medication Dispense Information    Hydrocodone/Acetaminophen   Dispensed: 6/16/2021 12:00 AM   Written:  6/16/2021   Unit strength: 325MG/7.5MG   Days supply: 30   Dispense Note: GivenName=Gris=BRETTBirthDate=[id number]=[id number] , [facility] [place], [place], [address]   Quantity: 90 each   Pharmacy: [facility]   Authorizing provider: JOHN HOLLEY   Received from: MICHEL[facility] (Fill History)   Brand or Generic:       RTC 1 month, sooner if needed.             This document has been electronically signed by:  STEVE Gonsales FNP-C Dragon disclaimer:  Much of this encounter note is an electronic transcription/translation of spoken language to printed text. The electronic translation of spoken language may permit erroneous, or at times, nonsensical words or phrases to be inadvertently transcribed; Although I have reviewed the note for such errors, some may still exist.

## 2021-07-13 NOTE — ASSESSMENT & PLAN NOTE
Continue alaway eyedrops.  Encourage not to overuse.  Discussed that patient may need lubricating eyedrop to aid with dry sensation.  Also discussed with patient that she may choose to hold her Zyrtec for a few days to see if dryness improves as the antihistamine may be causing some dry and can also cause dry mouth.

## 2021-08-12 ENCOUNTER — OFFICE VISIT (OUTPATIENT)
Dept: FAMILY MEDICINE CLINIC | Facility: CLINIC | Age: 61
End: 2021-08-12

## 2021-08-12 VITALS
TEMPERATURE: 97.7 F | WEIGHT: 139.4 LBS | RESPIRATION RATE: 16 BRPM | OXYGEN SATURATION: 100 % | SYSTOLIC BLOOD PRESSURE: 122 MMHG | DIASTOLIC BLOOD PRESSURE: 78 MMHG | HEART RATE: 80 BPM | HEIGHT: 65 IN | BODY MASS INDEX: 23.22 KG/M2

## 2021-08-12 DIAGNOSIS — Z12.11 ENCOUNTER FOR SCREENING FECAL OCCULT BLOOD TESTING: ICD-10-CM

## 2021-08-12 DIAGNOSIS — J30.1 SEASONAL ALLERGIC RHINITIS DUE TO POLLEN: ICD-10-CM

## 2021-08-12 DIAGNOSIS — I83.813 VARICOSE VEINS OF BOTH LOWER EXTREMITIES WITH PAIN: Primary | ICD-10-CM

## 2021-08-12 DIAGNOSIS — M25.50 MULTIPLE JOINT PAIN: ICD-10-CM

## 2021-08-12 PROCEDURE — 99214 OFFICE O/P EST MOD 30 MIN: CPT | Performed by: NURSE PRACTITIONER

## 2021-08-12 RX ORDER — HYDROCODONE BITARTRATE AND ACETAMINOPHEN 7.5; 325 MG/1; MG/1
1 TABLET ORAL EVERY 8 HOURS PRN
Qty: 90 TABLET | Refills: 0 | Status: SHIPPED | OUTPATIENT
Start: 2021-08-12 | End: 2021-09-14 | Stop reason: SDUPTHER

## 2021-08-12 NOTE — PROGRESS NOTES
"Subjective   Rohini Burch is a 61 y.o. female.     Chief Complaint   Patient presents with   • Joint Pain       History of Present Illness     Joint and leg pain-chronic and ongoing.  She continues to take Norco 7.5 mg up to TID PRN.  No negative side effects.  She reports \"leg bone has hurt all morning\". She reports for 2 days.  She has not been wearing her compression hose as much and has been trying to climb stair.    Allergies-on zyrtec 10 mg.  Using eye drops as needed. She reports that she has been more exacerbated due to weather changes.  Some rhinorrhea.   She is using alaway eye drops.  No sore throat.  No sinus pressure or HA.   Weight loss-continues to work on weight loss.  She has not been taking adipex consistently. She reports at times she does forget to take it.        The following portions of the patient's history were reviewed and updated as appropriate: CC, ROS, allergies, current medications, past family history, past medical history, past social history, past surgical history and problem list.    Review of Systems   Constitutional: Negative for activity change, appetite change, fatigue and fever.   HENT: Positive for rhinorrhea. Negative for congestion, ear pain, nosebleeds, postnasal drip, sore throat, tinnitus, trouble swallowing and voice change.    Eyes: Positive for itching. Negative for blurred vision, photophobia and visual disturbance.   Respiratory: Negative for cough, chest tightness, shortness of breath and wheezing.    Cardiovascular: Negative for chest pain, palpitations and leg swelling.   Gastrointestinal: Negative for abdominal pain, blood in stool, constipation, diarrhea, nausea and GERD.   Endocrine: Negative for cold intolerance, heat intolerance and polydipsia.   Genitourinary: Negative for decreased urine volume, difficulty urinating, dysuria and hematuria.   Musculoskeletal: Positive for arthralgias and myalgias. Negative for back pain and gait problem.   Skin: Negative " "for color change, pallor and bruise.   Allergic/Immunologic: Negative.    Neurological: Negative for dizziness, syncope, numbness and headache.   Hematological: Negative.    Psychiatric/Behavioral: Negative for decreased concentration, self-injury, sleep disturbance, suicidal ideas and depressed mood. The patient is not nervous/anxious.    All other systems reviewed and are negative.      Objective     /78   Pulse 80   Temp 97.7 °F (36.5 °C)   Resp 16   Ht 165.1 cm (65\")   Wt 63.2 kg (139 lb 6.4 oz)   SpO2 100%   BMI 23.20 kg/m²     Physical Exam  Vitals reviewed.   Constitutional:       General: She is not in acute distress.     Appearance: She is well-developed. She is not diaphoretic.   HENT:      Head: Normocephalic and atraumatic.      Comments: Oropharynx not examined.  Patient is presently wearing a face covering/mask due to COVID-19 pandemic.     Right Ear: Hearing, tympanic membrane, ear canal and external ear normal.      Left Ear: Hearing, tympanic membrane, ear canal and external ear normal.   Eyes:      General: Lids are normal. No scleral icterus.     Extraocular Movements:      Right eye: Normal extraocular motion and no nystagmus.      Left eye: Normal extraocular motion and no nystagmus.      Conjunctiva/sclera: Conjunctivae normal.      Pupils: Pupils are equal, round, and reactive to light.   Neck:      Thyroid: No thyromegaly.      Vascular: No carotid bruit or JVD.      Trachea: No tracheal tenderness.   Cardiovascular:      Rate and Rhythm: Normal rate and regular rhythm.      Heart sounds: Normal heart sounds, S1 normal and S2 normal. No murmur heard.     Pulmonary:      Effort: Pulmonary effort is normal.      Breath sounds: Normal breath sounds.   Chest:      Chest wall: No tenderness.   Abdominal:      General: Bowel sounds are normal.      Palpations: Abdomen is soft. There is no mass.      Tenderness: There is no abdominal tenderness.   Musculoskeletal:         General: No " tenderness.      Cervical back: Normal range of motion and neck supple.      Right lower leg: No edema.      Left lower leg: No edema.      Comments: No muscular atrophy or flaccidity.  Multiple varicose veins and telangiectasia to BLE  Generalized leg tenderness     Lymphadenopathy:      Cervical: No cervical adenopathy.      Right cervical: No superficial cervical adenopathy.     Left cervical: No superficial cervical adenopathy.   Skin:     General: Skin is warm and dry.      Capillary Refill: Capillary refill takes less than 2 seconds.      Coloration: Skin is not pale.      Findings: No erythema.      Nails: There is no clubbing.   Neurological:      Mental Status: She is alert and oriented to person, place, and time.      Cranial Nerves: No cranial nerve deficit or facial asymmetry.      Sensory: No sensory deficit.      Motor: No tremor, atrophy or abnormal muscle tone.      Coordination: Coordination normal.      Deep Tendon Reflexes: Reflexes are normal and symmetric.   Psychiatric:         Attention and Perception: She is attentive.         Mood and Affect: Mood normal.         Speech: Speech normal.         Behavior: Behavior normal.         Thought Content: Thought content normal.         Judgment: Judgment normal.       Assessment/Plan     Diagnoses and all orders for this visit:    1. Varicose veins of both lower extremities with pain (Primary)  Assessment & Plan:  Continue to be active.  Avoid overuse activities such as repetitive stair step climbing.  Encouraged compression socks when able.      2. Seasonal allergic rhinitis due to pollen  Assessment & Plan:  Continue Alaway eyedrops.  Encouraged to use a lubricating drop for sensation of dryness.  Continue Zyrtec as directed.  Avoid respiratory triggers such as dust or mold.      3. 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    Assessment & Plan:  Continue to be active.  Avoid heavy lifting.  As needed Norco for pain.   Continue Celebrex for anti-inflammatory action    Orders:  -     HYDROcodone-acetaminophen (NORCO) 7.5-325 MG per tablet; Take 1 tablet by mouth Every 8 (Eight) Hours As Needed for Moderate Pain .  Dispense: 90 tablet; Refill: 0    4. Encounter for screening fecal occult blood testing  -     Occult Blood X 1, Stool - Stool, Per Rectum       Patient's Body mass index is 23.2 kg/m². indicating that she is within normal range (BMI 18.5-24.9). No BMI management plan needed..       Understands disease processes and need for medications.  Understands reasons for urgent and emergent care.  Patient (& family) verbalized agreement for treatment plan.   Emotional support and active listening provided.  Patient provided time to verbalize feelings.    MICHELLE reviewed today and consistent.  Will refill prescribed controlled medication today.  Patient is aware they cannot receive narcotics from any other provider except if under care of pain management or speciality clinic.  Risk and benefits of medication use has been reviewed.  History and physical exam exhibit continued safe and appropriate use of controlled substances.  The patient is aware of the potential for addiction and dependence.  This patient has been made aware of the appropriate use of such medications, including potential risk of somnolence, limited ability to drive and / or work safely, and potential for overdose.  Patient understands not to take medication and drive until they know how medication may affect their cognition/decision making.   It has also been made clear that these medications are for use by this patient only, without concomitant use of alcohol or other substances unless prescribed/advised by medical provider.  Patient understands they may be subject to UDS and pill counts at random.      Patient considered to be low risk for addiction due to use of single controlled medications.  Patient understands and accepts these risks.  Patient need for  medication will be reassessed at each visit.  Doses will be adjusted according to patient need and findings.    Goal of TX: Patient will not have any adverse reactions of medication.  Patient have reduction in pain symptoms with use of PRN Norco as directed.  Patient will not have any adverse side effects from medication.  Patient will be able to remain active in an outside of home without interference from pain symptoms.    Medication Dispense Information    Phentermine Hcl   Dispensed: 8/9/2021 12:00 AM   Written:  6/16/2021   Unit strength: 37.5MG   Days supply: 30   Dispense Note: GivenName=Gris=BRETTBirthDate=[id number]=[address], [place], [address]   Quantity: 30 each   Pharmacy: [facility]   Authorizing provider: JOHN HOLLEY   Received from: [facility]HEL[facility] (Fill History)   Brand or Generic:       Medication Dispense Information    Hydrocodone/Acetaminophen   Dispensed: 7/13/2021 12:00 AM   Written:  7/13/2021   Unit strength: 325MG/7.5MG   Days supply: 30   Dispense Note: GivenName=Gris=BRETTBirthDate=[id number]=[address] , [place], [address]   Quantity: 90 each   Pharmacy: [facility]   Authorizing provider: JOHN HOLLEY   Received from: MICHEL[facility] (Fill History)   Brand or Generic:           RTC 1 month, sooner if needed.             This document has been electronically signed by:  STEVE Gonsales, FNP-C    Dragon disclaimer:  Part of this note may be an electronic transcription/translation of spoken language to printed text using the Dragon Dictation System.

## 2021-08-22 NOTE — ASSESSMENT & PLAN NOTE
Continue to be active.  Avoid heavy lifting.  As needed Norco for pain.  Continue Celebrex for anti-inflammatory action

## 2021-08-22 NOTE — ASSESSMENT & PLAN NOTE
Continue Alaway eyedrops.  Encouraged to use a lubricating drop for sensation of dryness.  Continue Zyrtec as directed.  Avoid respiratory triggers such as dust or mold.

## 2021-08-22 NOTE — ASSESSMENT & PLAN NOTE
Continue to be active.  Avoid overuse activities such as repetitive stair step climbing.  Encouraged compression socks when able.

## 2021-09-08 DIAGNOSIS — Z78.9 WEIGHT LOSS ADVISED: ICD-10-CM

## 2021-09-08 RX ORDER — PHENTERMINE HYDROCHLORIDE 37.5 MG/1
37.5 TABLET ORAL
Qty: 30 TABLET | Refills: 0 | Status: SHIPPED | OUTPATIENT
Start: 2021-09-08 | End: 2021-10-20 | Stop reason: SDUPTHER

## 2021-09-14 ENCOUNTER — OFFICE VISIT (OUTPATIENT)
Dept: FAMILY MEDICINE CLINIC | Facility: CLINIC | Age: 61
End: 2021-09-14

## 2021-09-14 VITALS — WEIGHT: 139 LBS | BODY MASS INDEX: 23.16 KG/M2 | HEIGHT: 65 IN

## 2021-09-14 DIAGNOSIS — J30.1 SEASONAL ALLERGIC RHINITIS DUE TO POLLEN: Primary | ICD-10-CM

## 2021-09-14 DIAGNOSIS — M25.50 MULTIPLE JOINT PAIN: ICD-10-CM

## 2021-09-14 DIAGNOSIS — R63.5 UNINTENDED WEIGHT GAIN: ICD-10-CM

## 2021-09-14 PROCEDURE — 99441 PR PHYS/QHP TELEPHONE EVALUATION 5-10 MIN: CPT | Performed by: NURSE PRACTITIONER

## 2021-09-14 RX ORDER — HYDROCODONE BITARTRATE AND ACETAMINOPHEN 7.5; 325 MG/1; MG/1
1 TABLET ORAL EVERY 8 HOURS PRN
Qty: 90 TABLET | Refills: 0 | Status: SHIPPED | OUTPATIENT
Start: 2021-09-14 | End: 2021-10-12 | Stop reason: SDUPTHER

## 2021-09-14 NOTE — PROGRESS NOTES
You have chosen to receive care through a telephone visit today. Do you consent to use a telephone visit for your medical care today? Yes    Establish patient makes contact with office of APRN to discuss health conditions.  Patient is due for routine checkup but due to current pandemic is not recommended to present to the office for face-to-face evaluation.  She is currently in quarantine for a positive covid family member.  She is not presently having any symptoms of illness.  She will be out of quarantine on Sunday.      Chief Complaint   Patient presents with   • Joint Pain       Brief HPI/ROS obtained as follows:    Joint and back pain-chronic and ongoing.  She is presently taking Norco 7.5 mg TID PRN for symptoms.  She reports no concerns today  Allergies-has not been more increased due to damp weather. She is continues to take her zyrtec 10 mg and Alaway eye drops.  She does not have any concerns today.   Weight loss-continues to take adipex but not daily.  She would like to loose some additional weight.     The following portions of the patient's history were reviewed and updated as appropriate: CC, ROS, allergies, current medications, past family history, past medical history, past social history, past surgical history and problem list.    Review of Systems   Constitutional: Positive for fatigue. Negative for appetite change and fever.   HENT: Negative for congestion, ear pain, nosebleeds, postnasal drip, rhinorrhea, sore throat, tinnitus, trouble swallowing and voice change.    Eyes: Negative for blurred vision, photophobia and visual disturbance.   Respiratory: Negative for cough, chest tightness, shortness of breath and wheezing.    Cardiovascular: Negative for chest pain and palpitations.   Gastrointestinal: Negative for abdominal pain, blood in stool, constipation, diarrhea, nausea and GERD.   Endocrine: Negative for cold intolerance, heat intolerance and polydipsia.   Genitourinary: Negative for  "decreased urine volume, difficulty urinating, dysuria and hematuria.   Musculoskeletal: Positive for arthralgias. Negative for back pain, gait problem and myalgias.   Skin: Negative for color change, pallor and bruise.   Allergic/Immunologic: Negative.    Neurological: Negative for dizziness, syncope, numbness and headache.   Hematological: Negative.    Psychiatric/Behavioral: Negative for decreased concentration, sleep disturbance, suicidal ideas and depressed mood. The patient is not nervous/anxious.    All other systems reviewed and are negative.      Assessment     Ht 165.1 cm (65\")   Wt 63 kg (139 lb)   BMI 23.13 kg/m²     Physical Exam     Patient alert and oriented.  Able to follow conversation without sounding breathless.  No obvious distress.  Thought processes congruent with conversation.  Answers and ask questions without difficulty.      Assessment     Diagnoses and all orders for this visit:    1. Seasonal allergic rhinitis due to pollen (Primary)  Assessment & Plan:  Continue Alaway eyedrops.  Encouraged to use a lubricating drop for sensation of dryness.  Continue Zyrtec as directed.  Avoid respiratory triggers such as dust or mold.      2. 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    Assessment & Plan:  Continue to be active.  Avoid overuse activities  Continue PRN Norco and celebrex as directed    Orders:  -     HYDROcodone-acetaminophen (NORCO) 7.5-325 MG per tablet; Take 1 tablet by mouth Every 8 (Eight) Hours As Needed for Moderate Pain .  Dispense: 90 tablet; Refill: 0    3. Unintended weight gain  Comments:  continue to work on diet changes and take Adipex.       Patient instructed and advised to call if symptoms are increasing or new symptoms occur.    Understands reasons for urgent and emergent care.  Patient (& family) verbalized agreement for treatment plan.   Generalized precautions advised including social distancing, hand washing, cough/sneeze hygiene. "       MICHELLE/PMDP reviewed today and consistent.  Will refill prescribed controlled medication today.  Patient is aware they cannot receive narcotics from any other provider except if under care of pain management or speciality clinic.  Risk and benefits of medication use has been reviewed.  History and physical exam exhibit continued safe and appropriate use of controlled substances.  The patient is aware of the potential for addiction and dependence.  This patient has been made aware of the appropriate use of such medications, including potential risk of somnolence, limited ability to drive and / or work safely, and potential for overdose.    It has also been made clear that these medications are for use by this patient only, without concomitant use of alcohol or other substances unless prescribed/advised by medical provider.  Patient understands they may be subject to UDS and pill counts at random.      Patient considered to be low risk for addiction due to use of single controlled medications.  Patient understands and accepts these risks.  Patient need for medication will be reassessed at each visit.  Doses will be adjusted according to patient need and findings.    Goal of TX: Patient will not have any adverse reactions of medication.  Patient have reduction in pain symptoms with use of PRN Norco as directed.  Patient will not have any adverse side effects from medication.  Patient will be able to remain active in an outside of home without interference from pain symptoms.    Medication Dispense Information    Hydrocodone/Acetaminophen   Dispensed: 8/12/2021 12:00 AM   Written:  8/12/2021   Unit strength: 325MG/7.5MG   Days supply: 30   Dispense Note: GivenName=Gris=BRETTBirthDate=[id number]=[address], [place], [place], [address]   Quantity: 90 each   Pharmacy: [facility]   Authorizing provider: JOHN HOLLEY   Received from: MICHELLE [facility] (Fill History)   Brand or Generic:       RTC 1  month, sooner if needed.       This visit has been rescheduled as a phone visit to comply with patient safety concerns in accordance with CDC recommendations. Total time of discussion was 10 minutes.      This document has been electronically signed by:  STEVE Gonsales FNP-C Dragon disclaimer:  Part of this note may be an electronic transcription/translation of spoken language to printed text using the Dragon Dictation System.

## 2021-10-12 ENCOUNTER — OFFICE VISIT (OUTPATIENT)
Dept: FAMILY MEDICINE CLINIC | Facility: CLINIC | Age: 61
End: 2021-10-12

## 2021-10-12 VITALS
BODY MASS INDEX: 23.26 KG/M2 | WEIGHT: 139.6 LBS | TEMPERATURE: 97.8 F | DIASTOLIC BLOOD PRESSURE: 82 MMHG | HEIGHT: 65 IN | SYSTOLIC BLOOD PRESSURE: 126 MMHG | OXYGEN SATURATION: 97 % | HEART RATE: 84 BPM

## 2021-10-12 DIAGNOSIS — J30.1 SEASONAL ALLERGIC RHINITIS DUE TO POLLEN: ICD-10-CM

## 2021-10-12 DIAGNOSIS — Z13.21 ENCOUNTER FOR VITAMIN DEFICIENCY SCREENING: ICD-10-CM

## 2021-10-12 DIAGNOSIS — R73.09 ABNORMAL GLUCOSE: ICD-10-CM

## 2021-10-12 DIAGNOSIS — I83.813 VARICOSE VEINS OF BOTH LOWER EXTREMITIES WITH PAIN: Primary | ICD-10-CM

## 2021-10-12 DIAGNOSIS — M25.50 MULTIPLE JOINT PAIN: ICD-10-CM

## 2021-10-12 DIAGNOSIS — R63.5 UNINTENDED WEIGHT GAIN: ICD-10-CM

## 2021-10-12 LAB
25(OH)D3 SERPL-MCNC: 32.9 NG/ML
ALBUMIN SERPL-MCNC: 4.8 G/DL (ref 3.5–5.2)
ALBUMIN/GLOB SERPL: 1.8 G/DL
ALP SERPL-CCNC: 53 U/L (ref 39–117)
ALT SERPL W P-5'-P-CCNC: 10 U/L (ref 1–33)
ANION GAP SERPL CALCULATED.3IONS-SCNC: 11.1 MMOL/L (ref 5–15)
AST SERPL-CCNC: 14 U/L (ref 1–32)
BASOPHILS # BLD AUTO: 0.04 10*3/MM3 (ref 0–0.2)
BASOPHILS NFR BLD AUTO: 0.5 % (ref 0–1.5)
BILIRUB SERPL-MCNC: 0.7 MG/DL (ref 0–1.2)
BUN SERPL-MCNC: 10 MG/DL (ref 8–23)
BUN/CREAT SERPL: 13.7 (ref 7–25)
CALCIUM SPEC-SCNC: 9.2 MG/DL (ref 8.6–10.5)
CHLORIDE SERPL-SCNC: 102 MMOL/L (ref 98–107)
CHOLEST SERPL-MCNC: 193 MG/DL (ref 0–200)
CO2 SERPL-SCNC: 24.9 MMOL/L (ref 22–29)
CREAT SERPL-MCNC: 0.73 MG/DL (ref 0.57–1)
DEPRECATED RDW RBC AUTO: 46.5 FL (ref 37–54)
EOSINOPHIL # BLD AUTO: 0.04 10*3/MM3 (ref 0–0.4)
EOSINOPHIL NFR BLD AUTO: 0.5 % (ref 0.3–6.2)
ERYTHROCYTE [DISTWIDTH] IN BLOOD BY AUTOMATED COUNT: 12.2 % (ref 12.3–15.4)
GFR SERPL CREATININE-BSD FRML MDRD: 81 ML/MIN/1.73
GLOBULIN UR ELPH-MCNC: 2.7 GM/DL
GLUCOSE SERPL-MCNC: 94 MG/DL (ref 65–99)
HBA1C MFR BLD: 5.1 % (ref 4.8–5.6)
HCT VFR BLD AUTO: 40.8 % (ref 34–46.6)
HDLC SERPL-MCNC: 73 MG/DL (ref 40–60)
HGB BLD-MCNC: 13.8 G/DL (ref 12–15.9)
IMM GRANULOCYTES # BLD AUTO: 0.03 10*3/MM3 (ref 0–0.05)
IMM GRANULOCYTES NFR BLD AUTO: 0.4 % (ref 0–0.5)
LDLC SERPL CALC-MCNC: 98 MG/DL (ref 0–100)
LDLC/HDLC SERPL: 1.3 {RATIO}
LYMPHOCYTES # BLD AUTO: 1.41 10*3/MM3 (ref 0.7–3.1)
LYMPHOCYTES NFR BLD AUTO: 17 % (ref 19.6–45.3)
MCH RBC QN AUTO: 35 PG (ref 26.6–33)
MCHC RBC AUTO-ENTMCNC: 33.8 G/DL (ref 31.5–35.7)
MCV RBC AUTO: 103.6 FL (ref 79–97)
MONOCYTES # BLD AUTO: 0.59 10*3/MM3 (ref 0.1–0.9)
MONOCYTES NFR BLD AUTO: 7.1 % (ref 5–12)
NEUTROPHILS NFR BLD AUTO: 6.19 10*3/MM3 (ref 1.7–7)
NEUTROPHILS NFR BLD AUTO: 74.5 % (ref 42.7–76)
NRBC BLD AUTO-RTO: 0.1 /100 WBC (ref 0–0.2)
PLATELET # BLD AUTO: 334 10*3/MM3 (ref 140–450)
PMV BLD AUTO: 9.6 FL (ref 6–12)
POTASSIUM SERPL-SCNC: 4.6 MMOL/L (ref 3.5–5.2)
PROT SERPL-MCNC: 7.5 G/DL (ref 6–8.5)
RBC # BLD AUTO: 3.94 10*6/MM3 (ref 3.77–5.28)
SODIUM SERPL-SCNC: 138 MMOL/L (ref 136–145)
T4 FREE SERPL-MCNC: 0.97 NG/DL (ref 0.93–1.7)
TRIGL SERPL-MCNC: 126 MG/DL (ref 0–150)
TSH SERPL DL<=0.05 MIU/L-ACNC: 0.87 UIU/ML (ref 0.27–4.2)
VIT B12 BLD-MCNC: 350 PG/ML (ref 211–946)
VLDLC SERPL-MCNC: 22 MG/DL (ref 5–40)
WBC # BLD AUTO: 8.3 10*3/MM3 (ref 3.4–10.8)

## 2021-10-12 PROCEDURE — 83036 HEMOGLOBIN GLYCOSYLATED A1C: CPT | Performed by: NURSE PRACTITIONER

## 2021-10-12 PROCEDURE — 80061 LIPID PANEL: CPT | Performed by: NURSE PRACTITIONER

## 2021-10-12 PROCEDURE — 80050 GENERAL HEALTH PANEL: CPT | Performed by: NURSE PRACTITIONER

## 2021-10-12 PROCEDURE — 82306 VITAMIN D 25 HYDROXY: CPT | Performed by: NURSE PRACTITIONER

## 2021-10-12 PROCEDURE — 84439 ASSAY OF FREE THYROXINE: CPT | Performed by: NURSE PRACTITIONER

## 2021-10-12 PROCEDURE — 82607 VITAMIN B-12: CPT | Performed by: NURSE PRACTITIONER

## 2021-10-12 PROCEDURE — 99214 OFFICE O/P EST MOD 30 MIN: CPT | Performed by: NURSE PRACTITIONER

## 2021-10-12 RX ORDER — CETIRIZINE HYDROCHLORIDE 10 MG/1
10 TABLET ORAL DAILY PRN
Qty: 30 TABLET | Refills: 5 | Status: SHIPPED | OUTPATIENT
Start: 2021-10-12 | End: 2022-03-01 | Stop reason: SDUPTHER

## 2021-10-12 RX ORDER — HYDROCODONE BITARTRATE AND ACETAMINOPHEN 7.5; 325 MG/1; MG/1
1 TABLET ORAL EVERY 8 HOURS PRN
Qty: 90 TABLET | Refills: 0 | Status: SHIPPED | OUTPATIENT
Start: 2021-10-12 | End: 2021-11-10 | Stop reason: SDUPTHER

## 2021-10-12 RX ORDER — KETOTIFEN FUMARATE 0.35 MG/ML
1 SOLUTION/ DROPS OPHTHALMIC 2 TIMES DAILY
Qty: 10 ML | Refills: 5 | Status: SHIPPED | OUTPATIENT
Start: 2021-10-12 | End: 2022-03-01 | Stop reason: SDUPTHER

## 2021-10-12 NOTE — PROGRESS NOTES
Subjective   Rohini Burch is a 61 y.o. female.     Chief Complaint   Patient presents with   • Allergies       History of Present Illness     Allergies-ongoing.  Taking zyrtec daily PRN.  She reports increased due to persistent weather changes.  Some trigger at work. She reports that it causes her to have increased sinus pressure and rhinorrhea.  She reports symptoms improve when she gets home.    Desire to loose weight-taking Adipex PRN.  She would like continue to take for now.  She has no specific weight loss goal.  She has had some mild gain.    Leg and back pain-doing good.  No new concerns.  She is taking her Norco as directed.  She does climb stairs and move heavy objects at work often.   She reports that pain varies based on activity.     The following portions of the patient's history were reviewed and updated as appropriate: CC, ROS, allergies, current medications, past family history, past medical history, past social history, past surgical history and problem list.      Review of Systems   Constitutional: Negative for activity change, appetite change, fatigue and fever.   HENT: Positive for rhinorrhea and sinus pressure. Negative for congestion, ear pain, nosebleeds, postnasal drip, sore throat, tinnitus, trouble swallowing and voice change.    Eyes: Positive for itching. Negative for blurred vision, photophobia and visual disturbance.   Respiratory: Negative for cough, chest tightness, shortness of breath and wheezing.    Cardiovascular: Negative for chest pain, palpitations and leg swelling.   Gastrointestinal: Negative for abdominal pain, blood in stool, constipation, diarrhea, nausea and GERD.   Endocrine: Negative for cold intolerance, heat intolerance and polydipsia.   Genitourinary: Negative for decreased urine volume, difficulty urinating, dysuria and hematuria.   Musculoskeletal: Negative for arthralgias, back pain, gait problem and myalgias.   Skin: Negative for color change, pallor and  "bruise.   Allergic/Immunologic: Positive for environmental allergies.   Neurological: Negative for dizziness, syncope, numbness and headache.   Hematological: Negative.    Psychiatric/Behavioral: Negative for decreased concentration, self-injury, sleep disturbance, suicidal ideas and depressed mood. The patient is not nervous/anxious.    All other systems reviewed and are negative.      Objective     /82   Pulse 84   Temp 97.8 °F (36.6 °C)   Ht 165.1 cm (65\")   Wt 63.3 kg (139 lb 9.6 oz)   SpO2 97%   BMI 23.23 kg/m²     Physical Exam  Vitals reviewed.   Constitutional:       General: She is not in acute distress.     Appearance: She is well-developed. She is not diaphoretic.   HENT:      Head: Normocephalic and atraumatic.      Comments: Oropharynx not examined.  Patient is presently wearing a face covering/mask due to COVID-19 pandemic.     Right Ear: Hearing, tympanic membrane, ear canal and external ear normal.      Left Ear: Hearing, tympanic membrane, ear canal and external ear normal.   Eyes:      General: Lids are normal. No scleral icterus.     Extraocular Movements:      Right eye: Normal extraocular motion and no nystagmus.      Left eye: Normal extraocular motion and no nystagmus.      Conjunctiva/sclera: Conjunctivae normal.      Pupils: Pupils are equal, round, and reactive to light.   Neck:      Thyroid: No thyromegaly.      Vascular: No carotid bruit or JVD.      Trachea: No tracheal tenderness.   Cardiovascular:      Rate and Rhythm: Normal rate and regular rhythm.      Heart sounds: Normal heart sounds, S1 normal and S2 normal. No murmur heard.      Pulmonary:      Effort: Pulmonary effort is normal.      Breath sounds: Normal breath sounds.   Chest:      Chest wall: No tenderness.   Abdominal:      General: Bowel sounds are normal.      Palpations: Abdomen is soft. There is no mass.      Tenderness: There is no abdominal tenderness.   Musculoskeletal:      Cervical back: Normal range of " motion and neck supple.      Lumbar back: Tenderness present.      Right lower leg: No edema.      Left lower leg: No edema.      Comments: No muscular atrophy or flaccidity.  Multiple areas of joint stiffness  Multiple varicose veins   Lymphadenopathy:      Cervical: No cervical adenopathy.      Right cervical: No superficial cervical adenopathy.     Left cervical: No superficial cervical adenopathy.   Skin:     General: Skin is warm and dry.      Capillary Refill: Capillary refill takes less than 2 seconds.      Coloration: Skin is not pale.      Findings: No erythema.      Nails: There is no clubbing.   Neurological:      Mental Status: She is alert and oriented to person, place, and time.      Cranial Nerves: No cranial nerve deficit or facial asymmetry.      Sensory: No sensory deficit.      Motor: No tremor, atrophy or abnormal muscle tone.      Coordination: Coordination normal.      Deep Tendon Reflexes: Reflexes are normal and symmetric.   Psychiatric:         Attention and Perception: She is attentive.         Mood and Affect: Mood normal.         Speech: Speech normal.         Behavior: Behavior normal.         Thought Content: Thought content normal.         Judgment: Judgment normal.       Assessment/Plan     Diagnoses and all orders for this visit:    1. Varicose veins of both lower extremities with pain (Primary)  -     Urine Drug Screen - Urine, Clean Catch; Future    2. 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    Orders:  -     HYDROcodone-acetaminophen (NORCO) 7.5-325 MG per tablet; Take 1 tablet by mouth Every 8 (Eight) Hours As Needed for Moderate Pain .  Dispense: 90 tablet; Refill: 0    3. Seasonal allergic rhinitis due to pollen  -     ketotifen (Alaway) 0.025 % ophthalmic solution; Administer 1 drop to both eyes 2 (Two) Times a Day.  Dispense: 10 mL; Refill: 5  -     cetirizine (zyrTEC) 10 MG tablet; Take 1 tablet by mouth Daily As Needed for Allergies.   Dispense: 30 tablet; Refill: 5    4. Unintended weight gain  -     CBC & Differential  -     Comprehensive Metabolic Panel  -     Lipid Panel  -     TSH  -     T4, Free    5. Encounter for vitamin deficiency screening  -     Vitamin D 25 Hydroxy  -     Vitamin B12    6. Abnormal glucose  -     Hemoglobin A1c       Patient's Body mass index is 23.23 kg/m². indicating that she is within normal range (BMI 18.5-24.9). No BMI management plan needed..       Understands disease processes and need for medications.  Understands reasons for urgent and emergent care.  Patient (& family) verbalized agreement for treatment plan.   Emotional support and active listening provided.  Patient provided time to verbalize feelings.    MICHELLE/PMDP reviewed today and consistent.  Will refill prescribed controlled medication today.  Patient is aware they cannot receive narcotics from any other provider except if under care of pain management or speciality clinic.  Risk and benefits of medication use has been reviewed.  History and physical exam exhibit continued safe and appropriate use of controlled substances.  The patient is aware of the potential for addiction and dependence.  This patient has been made aware of the appropriate use of such medications, including potential risk of somnolence, limited ability to drive and / or work safely, and potential for overdose.    It has also been made clear that these medications are for use by this patient only, without concomitant use of alcohol or other substances unless prescribed/advised by medical provider.  Patient understands they may be subject to UDS and pill counts at random.      Patient considered to be low risk for addiction due to use of single controlled medications.  Patient understands and accepts these risks.  Patient need for medication will be reassessed at each visit.  Doses will be adjusted according to patient need and findings.    Goal of TX: Patient will not have any  adverse reactions of medication.  Patient have reduction in pain symptoms with use of PRN Norco as directed.  Patient will not have any adverse side effects from medication.  Patient will be able to remain active in an outside of home without interference from pain symptoms.    Medication Dispense Information    Hydrocodone/Acetaminophen   Dispensed: 9/14/2021 12:00 AM   Written:  9/14/2021   Unit strength: 325MG/7.5MG   Days supply: 30   Dispense Note: GivenName=Gris=BRETTBirthDate=[id number]=[address], [place] [facility] [place] [address]   Quantity: 90 each   Pharmacy: [facility]   Authorizing provider: JOHN HOLLEY   Received from: MICHELLE [facility] (Fill History)   Brand or Generic:       UDS requested per [employer] while patient in office today.  Buccal swab or blood sample will be obtained if patient is unable to provide specimen.     Flu vaccine declined today.  Risk and benefits explained.  Patient to report if they decide to have vaccine at later time.     Will plan for fasting labs.  Patient will do today.  Reminded to do stool sample    RTC 1 month, sooner if needed.             This document has been electronically signed by:  STEVE Gonsales, PEYTONC    Dragon disclaimer:  Part of this note may be an electronic transcription/translation of spoken language to printed text using the Dragon Dictation System.

## 2021-10-20 ENCOUNTER — TELEPHONE (OUTPATIENT)
Dept: FAMILY MEDICINE CLINIC | Facility: CLINIC | Age: 61
End: 2021-10-20

## 2021-10-20 DIAGNOSIS — Z78.9 WEIGHT LOSS ADVISED: ICD-10-CM

## 2021-10-20 RX ORDER — PHENTERMINE HYDROCHLORIDE 37.5 MG/1
37.5 TABLET ORAL
Qty: 30 TABLET | Refills: 0 | Status: SHIPPED | OUTPATIENT
Start: 2021-10-20 | End: 2021-11-10 | Stop reason: SDUPTHER

## 2021-10-20 NOTE — TELEPHONE ENCOUNTER
Caller: Rohini Burch    Relationship: Self      Medication requested (name and dosage):     Requested Prescriptions:   Requested Prescriptions     Pending Prescriptions Disp Refills   • phentermine (ADIPEX-P) 37.5 MG tablet 30 tablet 0     Sig: Take 1 tablet by mouth Every Morning Before Breakfast.        [facility] [place], [place] - [address] - [phone] [facility] [phone]   [phone]    Additional details provided by patient:     Best call back number: [phone]    Does the patient have less than a 3 day supply:  [x] Yes  [] No    Aryan George Rep   10/20/21 15:03 EDT

## 2021-10-22 DIAGNOSIS — M25.50 MULTIPLE JOINT PAIN: Primary | ICD-10-CM

## 2021-11-10 ENCOUNTER — OFFICE VISIT (OUTPATIENT)
Dept: FAMILY MEDICINE CLINIC | Facility: CLINIC | Age: 61
End: 2021-11-10

## 2021-11-10 VITALS
OXYGEN SATURATION: 98 % | BODY MASS INDEX: 23.29 KG/M2 | WEIGHT: 139.8 LBS | HEART RATE: 110 BPM | SYSTOLIC BLOOD PRESSURE: 132 MMHG | DIASTOLIC BLOOD PRESSURE: 80 MMHG | HEIGHT: 65 IN | TEMPERATURE: 97.2 F

## 2021-11-10 DIAGNOSIS — J06.9 ACUTE URI: Primary | ICD-10-CM

## 2021-11-10 DIAGNOSIS — I83.813 VARICOSE VEINS OF BOTH LOWER EXTREMITIES WITH PAIN: ICD-10-CM

## 2021-11-10 DIAGNOSIS — M25.50 MULTIPLE JOINT PAIN: ICD-10-CM

## 2021-11-10 DIAGNOSIS — R63.5 GAIN OF WEIGHT: ICD-10-CM

## 2021-11-10 PROCEDURE — 99214 OFFICE O/P EST MOD 30 MIN: CPT | Performed by: NURSE PRACTITIONER

## 2021-11-10 RX ORDER — HYDROCODONE BITARTRATE AND ACETAMINOPHEN 7.5; 325 MG/1; MG/1
1 TABLET ORAL EVERY 8 HOURS PRN
Qty: 90 TABLET | Refills: 0 | Status: SHIPPED | OUTPATIENT
Start: 2021-11-10 | End: 2021-12-08 | Stop reason: SDUPTHER

## 2021-11-10 RX ORDER — PHENTERMINE HYDROCHLORIDE 37.5 MG/1
37.5 TABLET ORAL
Qty: 30 TABLET | Refills: 0 | Status: SHIPPED | OUTPATIENT
Start: 2021-11-10 | End: 2021-12-08 | Stop reason: SDUPTHER

## 2021-11-10 RX ORDER — AMOXICILLIN 875 MG/1
875 TABLET, COATED ORAL EVERY 12 HOURS SCHEDULED
Qty: 20 TABLET | Refills: 0 | Status: SHIPPED | OUTPATIENT
Start: 2021-11-10 | End: 2021-12-08

## 2021-11-10 NOTE — PROGRESS NOTES
Subjective   Rohini Burch is a 61 y.o. female.     Chief Complaint   Patient presents with   • Back Pain       History of Present Illness     Back and leg pain-chronic and ongoing.  She reports some right shin pain.  Has increased with cold weather. She is wearing her compression hose and adequate shoes.  She is using her Celebrex 100 mg and Norco 7.5 up to 3 times daily.  She does repetitive walking and stair climbing for work.  At times she does lifting.  Allergies-chronic and ongoing.  On zyrtec 10 mg.  Reports some increased symptoms due to exposures from work. Ear pain, sinus pressure,  HA.  She reports that she is blowing out discolored secretions.  She reports symptoms have not improved much since her exposure approximately 3 to 4 days ago.  She feels she is getting a sinus infection and her face is tender.  Weight loss obs-continues to try to reduce weight.  She is not taking Adipex daily. She reports she is not watching her diet well.  She is busy with work but is not actively exercising.  She reports that she does not need Adipex refill today.  She request that we send it to the pharmacy to be put on file for later use.    The following portions of the patient's history were reviewed and updated as appropriate: CC, ROS, allergies, current medications, past family history, past medical history, past social history, past surgical history and problem list.      Review of Systems   Constitutional: Positive for fatigue. Negative for activity change, appetite change and fever.   HENT: Positive for congestion, ear pain, rhinorrhea, sinus pressure and sore throat. Negative for nosebleeds, postnasal drip, tinnitus, trouble swallowing and voice change.    Eyes: Positive for itching. Negative for blurred vision, photophobia and visual disturbance.   Respiratory: Negative for cough, chest tightness, shortness of breath and wheezing.    Cardiovascular: Negative for chest pain, palpitations and leg swelling.  "  Gastrointestinal: Negative for abdominal pain, blood in stool, constipation, diarrhea, nausea and GERD.   Endocrine: Negative for cold intolerance, heat intolerance and polydipsia.   Genitourinary: Negative for decreased urine volume, difficulty urinating, dysuria and hematuria.   Musculoskeletal: Positive for arthralgias, gait problem and myalgias. Negative for back pain.   Skin: Negative for color change, pallor and bruise.   Allergic/Immunologic: Negative.    Neurological: Negative for dizziness, syncope, numbness and headache.   Hematological: Negative.    Psychiatric/Behavioral: Negative for decreased concentration, self-injury, sleep disturbance, suicidal ideas and depressed mood. The patient is not nervous/anxious.    All other systems reviewed and are negative.      Objective     /80   Pulse 110   Temp 97.2 °F (36.2 °C)   Ht 165.1 cm (65\")   Wt 63.4 kg (139 lb 12.8 oz)   SpO2 98%   BMI 23.26 kg/m²     Physical Exam  Vitals reviewed.   Constitutional:       General: She is not in acute distress.     Appearance: She is well-developed. She is not diaphoretic.   HENT:      Head: Normocephalic and atraumatic.      Comments: Oropharynx not examined.  Patient is presently wearing a face covering/mask due to COVID-19 pandemic.     Right Ear: Hearing, ear canal and external ear normal. A middle ear effusion is present. Tympanic membrane is injected.      Left Ear: Hearing, ear canal and external ear normal. A middle ear effusion is present. Tympanic membrane is injected.      Nose: Rhinorrhea present.      Right Sinus: Maxillary sinus tenderness and frontal sinus tenderness present.      Left Sinus: Maxillary sinus tenderness and frontal sinus tenderness present.   Eyes:      General: Lids are normal. No scleral icterus.     Extraocular Movements:      Right eye: Normal extraocular motion and no nystagmus.      Left eye: Normal extraocular motion and no nystagmus.      Conjunctiva/sclera: Conjunctivae " normal.      Pupils: Pupils are equal, round, and reactive to light.   Neck:      Thyroid: No thyromegaly.      Vascular: No carotid bruit or JVD.      Trachea: No tracheal tenderness.   Cardiovascular:      Rate and Rhythm: Normal rate and regular rhythm.      Heart sounds: Normal heart sounds, S1 normal and S2 normal. No murmur heard.      Pulmonary:      Effort: Pulmonary effort is normal.      Breath sounds: Normal breath sounds.   Chest:      Chest wall: No tenderness.   Abdominal:      General: Bowel sounds are normal.      Palpations: Abdomen is soft. There is no mass.      Tenderness: There is no abdominal tenderness.   Musculoskeletal:      Cervical back: Normal range of motion and neck supple.      Lumbar back: Tenderness present.      Right lower leg: No edema.      Left lower leg: No edema.      Comments: No muscular atrophy or flaccidity.  Multiple areas of joint stiffness  Multiple varicose veins   Lymphadenopathy:      Cervical: Cervical adenopathy present.      Right cervical: Superficial cervical adenopathy present.      Left cervical: Superficial cervical adenopathy present.   Skin:     General: Skin is warm and dry.      Capillary Refill: Capillary refill takes less than 2 seconds.      Coloration: Skin is not pale.      Findings: No erythema.      Nails: There is no clubbing.   Neurological:      Mental Status: She is alert and oriented to person, place, and time.      Cranial Nerves: No cranial nerve deficit or facial asymmetry.      Sensory: No sensory deficit.      Motor: No tremor, atrophy or abnormal muscle tone.      Coordination: Coordination normal.      Deep Tendon Reflexes: Reflexes are normal and symmetric.   Psychiatric:         Attention and Perception: She is attentive.         Mood and Affect: Mood normal.         Speech: Speech normal.         Behavior: Behavior normal.         Thought Content: Thought content normal.         Judgment: Judgment normal.       Assessment/Plan      Diagnoses and all orders for this visit:    1. Acute URI (Primary)  Comments:  Continue allergy meds including Zyrtec and eyedrops.  Amoxicillin ordered today.  Patient to complete all of medication  Orders:  -     amoxicillin (AMOXIL) 875 MG tablet; Take 1 tablet by mouth Every 12 (Twelve) Hours.  Dispense: 20 tablet; Refill: 0    2. Multiple joint pain  Comments:  Avoid overuse activity.  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    Orders:  -     HYDROcodone-acetaminophen (NORCO) 7.5-325 MG per tablet; Take 1 tablet by mouth Every 8 (Eight) Hours As Needed for Moderate Pain .  Dispense: 90 tablet; Refill: 0    3. Gain of weight  Comments:  continue to work on diet changes  Orders:  -     phentermine (ADIPEX-P) 37.5 MG tablet; Take 1 tablet by mouth Every Morning Before Breakfast.  Dispense: 30 tablet; Refill: 0    4. Varicose veins of both lower extremities with pain  Assessment & Plan:  Continue compression hose.           Patient's Body mass index is 23.26 kg/m². indicating that she is within normal range (BMI 18.5-24.9). No BMI management plan needed..       Understands disease processes and need for medications.  Understands reasons for urgent and emergent care.  Patient (& family) verbalized agreement for treatment plan.   Emotional support and active listening provided.  Patient provided time to verbalize feelings.    MICHELLE/PMDP reviewed today and consistent.  Will refill prescribed controlled medication today.  Patient is aware they cannot receive narcotics from any other provider except if under care of pain management or speciality clinic.  Risk and benefits of medication use has been reviewed.  History and physical exam exhibit continued safe and appropriate use of controlled substances.  The patient is aware of the potential for addiction and dependence.  This patient has been made aware of the appropriate use of such medications, including  potential risk of somnolence, limited ability to drive and / or work safely, and potential for overdose.    It has also been made clear that these medications are for use by this patient only, without concomitant use of alcohol or other substances unless prescribed/advised by medical provider.  Patient understands they may be subject to UDS and pill counts at random.      Patient considered to be low risk for addiction due to use of single controlled medications.  Patient understands and accepts these risks.  Patient need for medication will be reassessed at each visit.  Doses will be adjusted according to patient need and findings.    Goal of TX: Patient will not have any adverse reactions of medication.  Patient have reduction in pain symptoms with use of PRN Norco as directed.  Patient will not have any adverse side effects from medication.  Patient will be able to remain active in an outside of home without interference from pain symptoms.    Medication Dispense Information    Hydrocodone/Acetaminophen   Dispensed: 10/12/2021 12:00 AM   Written:  10/12/2021   Unit strength: 325MG/7.5MG   Days supply: 30   Dispense Note: GivenName=Gris=BRETTBirthDate=[id number]=[address], [place], [address]   Quantity: 90 each   Pharmacy: [facility]   Authorizing provider: JOHN HOLLEY   Received from: [facility] (Fill History)   Brand or Generic:       Medication Dispense Information    Phentermine Hcl   Dispensed: 10/20/2021 12:00 AM   Written:  10/20/2021   Unit strength: 37.5MG   Days supply: 30   Dispense Note: GivenName=Gris=LEONARELBirthDate=[id number]=[address], [place], [place], [address]   Quantity: 30 each   Pharmacy: [facility]   Authorizing provider: JOHN HOLLEY   Received from: [facility] (Fill History)   Brand or Generic:       RTC 1 month, sooner if needed.           This document has been electronically signed by:  STEVE Gonsales,  FNP-C    Dragon disclaimer:  Part of this note may be an electronic transcription/translation of spoken language to printed text using the Dragon Dictation System.

## 2021-12-08 ENCOUNTER — OFFICE VISIT (OUTPATIENT)
Dept: FAMILY MEDICINE CLINIC | Facility: CLINIC | Age: 61
End: 2021-12-08

## 2021-12-08 VITALS
HEIGHT: 65 IN | TEMPERATURE: 97.3 F | SYSTOLIC BLOOD PRESSURE: 124 MMHG | DIASTOLIC BLOOD PRESSURE: 78 MMHG | WEIGHT: 143 LBS | HEART RATE: 104 BPM | OXYGEN SATURATION: 100 % | BODY MASS INDEX: 23.82 KG/M2

## 2021-12-08 DIAGNOSIS — I83.813 VARICOSE VEINS OF BOTH LOWER EXTREMITIES WITH PAIN: ICD-10-CM

## 2021-12-08 DIAGNOSIS — M25.50 MULTIPLE JOINT PAIN: ICD-10-CM

## 2021-12-08 DIAGNOSIS — J30.1 SEASONAL ALLERGIC RHINITIS DUE TO POLLEN: Primary | ICD-10-CM

## 2021-12-08 DIAGNOSIS — R63.5 GAIN OF WEIGHT: ICD-10-CM

## 2021-12-08 PROCEDURE — 99214 OFFICE O/P EST MOD 30 MIN: CPT | Performed by: NURSE PRACTITIONER

## 2021-12-08 RX ORDER — PHENTERMINE HYDROCHLORIDE 37.5 MG/1
37.5 TABLET ORAL
Qty: 30 TABLET | Refills: 0 | Status: SHIPPED | OUTPATIENT
Start: 2021-12-08 | End: 2022-01-06 | Stop reason: SDUPTHER

## 2021-12-08 RX ORDER — HYDROCODONE BITARTRATE AND ACETAMINOPHEN 7.5; 325 MG/1; MG/1
1 TABLET ORAL EVERY 8 HOURS PRN
Qty: 90 TABLET | Refills: 0 | Status: SHIPPED | OUTPATIENT
Start: 2021-12-08 | End: 2022-01-06 | Stop reason: SDUPTHER

## 2022-01-06 ENCOUNTER — OFFICE VISIT (OUTPATIENT)
Dept: FAMILY MEDICINE CLINIC | Facility: CLINIC | Age: 62
End: 2022-01-06

## 2022-01-06 VITALS
HEIGHT: 65 IN | BODY MASS INDEX: 24.32 KG/M2 | DIASTOLIC BLOOD PRESSURE: 82 MMHG | SYSTOLIC BLOOD PRESSURE: 126 MMHG | OXYGEN SATURATION: 100 % | WEIGHT: 146 LBS | TEMPERATURE: 98.2 F | HEART RATE: 112 BPM

## 2022-01-06 DIAGNOSIS — R63.5 GAIN OF WEIGHT: ICD-10-CM

## 2022-01-06 DIAGNOSIS — J30.1 SEASONAL ALLERGIC RHINITIS DUE TO POLLEN: Primary | ICD-10-CM

## 2022-01-06 DIAGNOSIS — M25.50 MULTIPLE JOINT PAIN: ICD-10-CM

## 2022-01-06 PROCEDURE — 99214 OFFICE O/P EST MOD 30 MIN: CPT | Performed by: NURSE PRACTITIONER

## 2022-01-06 RX ORDER — PHENTERMINE HYDROCHLORIDE 37.5 MG/1
37.5 TABLET ORAL
Qty: 30 TABLET | Refills: 0 | Status: SHIPPED | OUTPATIENT
Start: 2022-01-06 | End: 2022-02-01 | Stop reason: SDUPTHER

## 2022-01-06 RX ORDER — CELECOXIB 100 MG/1
100 CAPSULE ORAL 2 TIMES DAILY PRN
Qty: 60 CAPSULE | Refills: 2 | Status: SHIPPED | OUTPATIENT
Start: 2022-01-06 | End: 2022-04-04 | Stop reason: SDUPTHER

## 2022-01-06 RX ORDER — HYDROCODONE BITARTRATE AND ACETAMINOPHEN 7.5; 325 MG/1; MG/1
1 TABLET ORAL EVERY 8 HOURS PRN
Qty: 90 TABLET | Refills: 0 | Status: SHIPPED | OUTPATIENT
Start: 2022-01-06 | End: 2022-02-01 | Stop reason: SDUPTHER

## 2022-01-06 NOTE — PROGRESS NOTES
Subjective   Rohini Burch is a 61 y.o. female.     Chief Complaint   Patient presents with   • Joint Pain       History of Present Illness     Weight gain-patient has had persistent weight gain through the holidays despite Adipex use.  She has been inconsistent with the Adipex use.  She is not having any negative side effects.  Back/leg/joint pain-chronic and ongoing.  Patient is currently taking Celebrex 100 mg daily as needed and Norco 7.5 mg every 8 hours as needed.  No negative side effects.  She has history of multiple varicosities in her legs.  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  Allergies-chronic and ongoing.  Patient is currently taking Zyrtec 10 mg and using allergy eyedrops.  She denies any side effects.    The following portions of the patient's history were reviewed and updated as appropriate: CC, ROS, allergies, current medications, past family history, past medical history, past social history, past surgical history and problem list.    Review of Systems   Constitutional: Negative for appetite change, fatigue and fever.   HENT: Negative for congestion, ear pain, nosebleeds, postnasal drip, rhinorrhea, sore throat, tinnitus, trouble swallowing and voice change.    Eyes: Positive for itching. Negative for blurred vision, photophobia and visual disturbance.   Respiratory: Negative for cough, chest tightness, shortness of breath and wheezing.    Cardiovascular: Negative for chest pain and palpitations.   Gastrointestinal: Negative for abdominal pain, blood in stool, constipation, diarrhea, nausea and GERD.   Endocrine: Negative for cold intolerance, heat intolerance and polydipsia.   Genitourinary: Negative for decreased urine volume, difficulty urinating, dysuria and hematuria.   Musculoskeletal: Positive for arthralgias, back pain and myalgias. Negative for gait problem.   Skin: Negative for color change, pallor and  "bruise.   Allergic/Immunologic: Negative.    Neurological: Negative for dizziness, syncope, numbness and headache.   Hematological: Negative.    Psychiatric/Behavioral: Negative for decreased concentration, sleep disturbance, suicidal ideas, depressed mood and stress. The patient is not nervous/anxious.    All other systems reviewed and are negative.      Objective     /82 (BP Location: Right arm, Patient Position: Sitting)   Pulse 112   Temp 98.2 °F (36.8 °C)   Ht 165.1 cm (65\")   Wt 66.2 kg (146 lb)   SpO2 100%   BMI 24.30 kg/m²     Physical Exam  Vitals reviewed.   Constitutional:       General: She is not in acute distress.     Appearance: She is well-developed. She is not diaphoretic.   HENT:      Head: Normocephalic and atraumatic.      Jaw: No tenderness.      Comments: Oropharynx not examined.  Patient is presently wearing a face covering/mask due to COVID-19 pandemic.     Right Ear: Hearing, tympanic membrane, ear canal and external ear normal.      Left Ear: Hearing, tympanic membrane, ear canal and external ear normal.   Eyes:      General: Lids are normal. No scleral icterus.     Extraocular Movements:      Right eye: Normal extraocular motion and no nystagmus.      Left eye: Normal extraocular motion and no nystagmus.      Conjunctiva/sclera: Conjunctivae normal.      Pupils: Pupils are equal, round, and reactive to light.   Neck:      Thyroid: No thyromegaly or thyroid tenderness.      Vascular: No carotid bruit or JVD.      Trachea: No tracheal tenderness.   Cardiovascular:      Rate and Rhythm: Normal rate and regular rhythm.      Pulses:           Dorsalis pedis pulses are 2+ on the right side and 2+ on the left side.        Posterior tibial pulses are 2+ on the right side and 2+ on the left side.      Heart sounds: Normal heart sounds, S1 normal and S2 normal. No murmur heard.      Pulmonary:      Effort: Pulmonary effort is normal. No accessory muscle usage, prolonged expiration or " respiratory distress.      Breath sounds: Normal breath sounds.   Chest:      Chest wall: No tenderness.   Abdominal:      General: Bowel sounds are normal.      Palpations: Abdomen is soft. There is no mass.      Tenderness: There is no abdominal tenderness.   Musculoskeletal:      Cervical back: Normal range of motion and neck supple. Tenderness present.      Thoracic back: Tenderness present.      Lumbar back: Spasms and tenderness present. Decreased range of motion. Positive right straight leg raise test and positive left straight leg raise test.      Right lower leg: No edema.      Left lower leg: No edema.      Comments: No muscular atrophy or flaccidity.   Lymphadenopathy:      Head:      Right side of head: No submental or submandibular adenopathy.      Left side of head: No submental or submandibular adenopathy.      Cervical: No cervical adenopathy.      Right cervical: No superficial cervical adenopathy.     Left cervical: No superficial cervical adenopathy.   Skin:     General: Skin is warm and dry.      Capillary Refill: Capillary refill takes less than 2 seconds.      Coloration: Skin is not jaundiced or pale.      Findings: No erythema.      Nails: There is no clubbing.      Comments: Multiple varicose veins   Neurological:      Mental Status: She is alert and oriented to person, place, and time.      Cranial Nerves: No cranial nerve deficit or facial asymmetry.      Sensory: No sensory deficit.      Motor: No tremor, atrophy or abnormal muscle tone.      Coordination: Coordination normal.      Deep Tendon Reflexes: Reflexes are normal and symmetric.   Psychiatric:         Attention and Perception: She is attentive.         Mood and Affect: Mood normal.         Speech: Speech normal.         Behavior: Behavior normal. Behavior is cooperative.         Thought Content: Thought content normal.         Judgment: Judgment normal.       Assessment/Plan     Diagnoses and all orders for this visit:    1.  Seasonal allergic rhinitis due to pollen (Primary)  Assessment & Plan:  Continue Alaway eyedrops.  Encouraged to use a lubricating drop for sensation of dryness.  Continue Zyrtec as directed.  Avoid respiratory triggers such as dust or mold.      2. Gain of weight  Comments:  continue to work on diet changes  Orders:  -     phentermine (ADIPEX-P) 37.5 MG tablet; Take 1 tablet by mouth Every Morning Before Breakfast.  Dispense: 30 tablet; Refill: 0    3. Multiple joint pain  Comments:  Avoid overuse activity.  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    Orders:  -     celecoxib (CeleBREX) 100 MG capsule; Take 1 capsule by mouth 2 (Two) Times a Day As Needed for Mild Pain .  Dispense: 60 capsule; Refill: 2  -     HYDROcodone-acetaminophen (NORCO) 7.5-325 MG per tablet; Take 1 tablet by mouth Every 8 (Eight) Hours As Needed for Moderate Pain .  Dispense: 90 tablet; Refill: 0       Patient's Body mass index is 24.3 kg/m². indicating that she is within normal range (BMI 18.5-24.9). No BMI management plan needed..       Understands disease processes and need for medications.  Understands reasons for urgent and emergent care.  Patient (& family) verbalized agreement for treatment plan.   Emotional support and active listening provided.  Patient provided time to verbalize feelings.    MICHELLE reviewed today and consistent.  Will refill prescribed controlled medication today.  Patient is aware they cannot receive narcotics from any other provider except if under care of pain management or speciality clinic.  Risk and benefits of medication use has been reviewed.  History and physical exam exhibit continued safe and appropriate use of controlled substances.  The patient is aware of the potential for addiction and dependence.  This patient has been made aware of the appropriate use of such medications, including potential risk of somnolence, limited ability to drive and / or  work safely, and potential for overdose.  Patient understands not to take medication and drive until they know how medication may affect their cognition/decision making.   It has also been made clear that these medications are for use by this patient only, without concomitant use of alcohol or other substances unless prescribed/advised by medical provider.  Patient understands they may be subject to UDS and pill counts at random.        Patient considered to be low risk for addiction due to use of single controlled medications.  Patient understands and accepts these risks.  Patient need for medication will be reassessed at each visit.  Doses will be adjusted according to patient need and findings.    Goal of TX: Patient will not have any adverse reactions of medication.  Patient have reduction in pain symptoms with use of PRN Norco as directed.  Patient will not have any adverse side effects from medication.  Patient will be able to remain active in an outside of home without interference from pain symptoms.  Patient will have reduction in weight with use of Adipex as directed with simultaneous diet and lifestyle changes.    Medication Dispense Information    Hydrocodone/Acetaminophen   Dispensed: 12/8/2021 12:00 AM   Written:  12/8/2021   Unit strength: 325MG/7.5MG   Days supply: 30   Dispense Note: GivenName=Gris=GAMBRELBirthDate=[id number]=[address], [place], [place], [address]   Quantity: 90 each   Pharmacy: [facility]   Authorizing provider: JOHN HOLLEY   Received from: MICHELLE PDMP (Fill History)   Brand or Generic:       Medication Dispense Information    Phentermine Hcl   Dispensed: 12/14/2021 12:00 AM   Written:  12/8/2021   Unit strength: 37.5MG   Days supply: 30   Dispense Note: GivenName=Antoniome=GAMBRELBirthDate=05/19/1965809Cpxpalx=[address], [place], [place], [address]   Quantity: 30 each   Pharmacy: [facility]   Authorizing provider: JOHN HOLLEY   Received  from: MICHELLE PDMP (Fill History)   Brand or Generic:       RTC 1 month, sooner if needed for problems or concerns            This document has been electronically signed by:  STEVE Gonsales, PEYTONC    Dragon disclaimer:  Part of this note may be an electronic transcription/translation of spoken language to printed text using the Dragon Dictation System.

## 2022-02-01 ENCOUNTER — OFFICE VISIT (OUTPATIENT)
Dept: FAMILY MEDICINE CLINIC | Facility: CLINIC | Age: 62
End: 2022-02-01

## 2022-02-01 VITALS
SYSTOLIC BLOOD PRESSURE: 130 MMHG | BODY MASS INDEX: 23.32 KG/M2 | RESPIRATION RATE: 16 BRPM | HEART RATE: 84 BPM | WEIGHT: 140 LBS | DIASTOLIC BLOOD PRESSURE: 84 MMHG | TEMPERATURE: 98 F | HEIGHT: 65 IN

## 2022-02-01 DIAGNOSIS — J30.1 SEASONAL ALLERGIC RHINITIS DUE TO POLLEN: ICD-10-CM

## 2022-02-01 DIAGNOSIS — I83.813 VARICOSE VEINS OF BOTH LOWER EXTREMITIES WITH PAIN: Primary | ICD-10-CM

## 2022-02-01 DIAGNOSIS — M25.50 MULTIPLE JOINT PAIN: ICD-10-CM

## 2022-02-01 DIAGNOSIS — R63.5 GAIN OF WEIGHT: ICD-10-CM

## 2022-02-01 PROCEDURE — 99214 OFFICE O/P EST MOD 30 MIN: CPT | Performed by: NURSE PRACTITIONER

## 2022-02-01 RX ORDER — HYDROCODONE BITARTRATE AND ACETAMINOPHEN 7.5; 325 MG/1; MG/1
1 TABLET ORAL EVERY 8 HOURS PRN
Qty: 90 TABLET | Refills: 0 | Status: SHIPPED | OUTPATIENT
Start: 2022-02-01 | End: 2022-03-01 | Stop reason: SDUPTHER

## 2022-02-01 RX ORDER — PHENTERMINE HYDROCHLORIDE 37.5 MG/1
37.5 TABLET ORAL
Qty: 30 TABLET | Refills: 0 | Status: SHIPPED | OUTPATIENT
Start: 2022-02-01 | End: 2022-03-01 | Stop reason: SDUPTHER

## 2022-02-01 NOTE — PROGRESS NOTES
Subjective   Rohini Burch is a 61 y.o. female.     Chief Complaint   Patient presents with   • Allergies       History of Present Illness     Chronic allergic rhinitis-ongoing.  Currently taking zyrtec 10 mg and using PRN Alaway eye drops.  She reports she is tolerating the medication well.  No negative side effects.   Back and joint pain-chronic and ongoing.  She is presently taking Celebrex 100 mg PRN and Hydrocodone 7.5 mg TID PRN.  No negative side effects.  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  She continues to try to be active.    She reports more joint pain since cold and damp weather has been more consistent.      The following portions of the patient's history were reviewed and updated as appropriate: CC, ROS, allergies, current medications, past family history, past medical history, past social history, past surgical history and problem list.      Review of Systems   Constitutional: Positive for fatigue. Negative for activity change, appetite change and fever.   HENT: Positive for rhinorrhea. Negative for congestion, ear pain, nosebleeds, postnasal drip, sore throat, tinnitus, trouble swallowing and voice change.    Eyes: Positive for itching. Negative for blurred vision, photophobia and visual disturbance.   Respiratory: Negative for cough, chest tightness, shortness of breath and wheezing.    Cardiovascular: Negative for chest pain, palpitations and leg swelling.   Gastrointestinal: Negative for abdominal pain, blood in stool, constipation, diarrhea, nausea, vomiting and GERD.   Endocrine: Negative for cold intolerance, heat intolerance and polydipsia.   Genitourinary: Negative for decreased urine volume, difficulty urinating, dysuria and hematuria.   Musculoskeletal: Positive for arthralgias, back pain and myalgias. Negative for gait problem.   Skin: Negative for color change, pallor and wound.   Allergic/Immunologic:  "Negative.    Neurological: Negative for dizziness, syncope, numbness and headache.   Hematological: Negative.    Psychiatric/Behavioral: Negative for decreased concentration, dysphoric mood, self-injury, sleep disturbance, suicidal ideas, depressed mood and stress. The patient is not nervous/anxious.    All other systems reviewed and are negative.      Objective     /84   Pulse 84   Temp 98 °F (36.7 °C)   Resp 16   Ht 165.1 cm (65\")   Wt 63.5 kg (140 lb)   BMI 23.30 kg/m²     Physical Exam  Vitals reviewed.   Constitutional:       General: She is not in acute distress.     Appearance: She is well-developed. She is not diaphoretic.   HENT:      Head: Normocephalic and atraumatic.      Jaw: No tenderness.      Comments: Oropharynx not examined.  Patient is presently wearing a face covering/mask due to COVID-19 pandemic.     Right Ear: Hearing, tympanic membrane, ear canal and external ear normal.      Left Ear: Hearing, tympanic membrane, ear canal and external ear normal.      Nose: Rhinorrhea present.   Eyes:      General: Lids are normal. No scleral icterus.     Extraocular Movements:      Right eye: Normal extraocular motion and no nystagmus.      Left eye: Normal extraocular motion and no nystagmus.      Conjunctiva/sclera: Conjunctivae normal.      Pupils: Pupils are equal, round, and reactive to light.   Neck:      Thyroid: No thyromegaly or thyroid tenderness.      Vascular: No carotid bruit or JVD.      Trachea: No tracheal tenderness.   Cardiovascular:      Rate and Rhythm: Normal rate and regular rhythm.      Pulses:           Dorsalis pedis pulses are 2+ on the right side and 2+ on the left side.        Posterior tibial pulses are 2+ on the right side and 2+ on the left side.      Heart sounds: Normal heart sounds, S1 normal and S2 normal. No murmur heard.      Pulmonary:      Effort: Pulmonary effort is normal. No accessory muscle usage, prolonged expiration or respiratory distress.      " Breath sounds: Normal breath sounds.   Chest:      Chest wall: No tenderness.   Abdominal:      General: Bowel sounds are normal.      Palpations: Abdomen is soft. There is no mass.      Tenderness: There is no abdominal tenderness.   Musculoskeletal:         General: Tenderness present.      Cervical back: Normal range of motion and neck supple. Tenderness present.      Thoracic back: Tenderness present.      Lumbar back: Spasms and tenderness present. Decreased range of motion. Positive right straight leg raise test and positive left straight leg raise test.      Right lower leg: No edema.      Left lower leg: No edema.      Comments: No muscular atrophy or flaccidity.   Lymphadenopathy:      Head:      Right side of head: No submental or submandibular adenopathy.      Left side of head: No submental or submandibular adenopathy.      Cervical: No cervical adenopathy.      Right cervical: No superficial cervical adenopathy.     Left cervical: No superficial cervical adenopathy.   Skin:     General: Skin is warm and dry.      Capillary Refill: Capillary refill takes less than 2 seconds.      Coloration: Skin is not jaundiced or pale.      Findings: No erythema.      Nails: There is no clubbing.      Comments: Multiple varicose veins   Neurological:      Mental Status: She is alert and oriented to person, place, and time.      Cranial Nerves: No cranial nerve deficit or facial asymmetry.      Sensory: No sensory deficit.      Motor: No tremor, atrophy or abnormal muscle tone.      Coordination: Coordination normal.      Deep Tendon Reflexes: Reflexes are normal and symmetric.   Psychiatric:         Attention and Perception: She is attentive.         Mood and Affect: Mood normal.         Speech: Speech normal.         Behavior: Behavior normal. Behavior is cooperative.         Thought Content: Thought content normal.         Judgment: Judgment normal.       Assessment/Plan     Diagnoses and all orders for this  visit:    1. Varicose veins of both lower extremities with pain (Primary)  Assessment & Plan:  Continue compression hose.        2. Multiple joint pain  Comments:  Avoid overuse activity.  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    Orders:  -     HYDROcodone-acetaminophen (NORCO) 7.5-325 MG per tablet; Take 1 tablet by mouth Every 8 (Eight) Hours As Needed for Moderate Pain .  Dispense: 90 tablet; Refill: 0    3. Gain of weight  Comments:  continue to work on diet changes  Orders:  -     phentermine (ADIPEX-P) 37.5 MG tablet; Take 1 tablet by mouth Every Morning Before Breakfast.  Dispense: 30 tablet; Refill: 0    4. Seasonal allergic rhinitis due to pollen  Assessment & Plan:  Continue Alaway eyedrops.  Encouraged to use a lubricating drop for sensation of dryness.  Continue Zyrtec as directed.  Avoid respiratory triggers such as dust or mold.         Patient's Body mass index is 23.3 kg/m². indicating that she is within normal range (BMI 18.5-24.9). No BMI management plan needed..     Understands disease processes and need for medications.  Understands reasons for urgent and emergent care.  Patient (& family) verbalized agreement for treatment plan.   Emotional support and active listening provided.  Patient provided time to verbalize feelings.    MICHELLE/BELLE reviewed today and consistent.  Will refill prescribed controlled medication today.  Patient is aware they cannot receive narcotics from any other provider except if under care of pain management or speciality clinic.  Risk and benefits of medication use has been reviewed.  History and physical exam exhibit continued safe and appropriate use of controlled substances.  The patient is aware of the potential for addiction and dependence.  This patient has been made aware of the appropriate use of such medications, including potential risk of somnolence, limited ability to drive and / or work safely, and  potential for overdose.    It has also been made clear that these medications are for use by this patient only, without concomitant use of alcohol or other substances unless prescribed/advised by medical provider.  Patient understands they may be subject to UDS and pill counts at random.      Patient considered to be moderate risk for addiction due to use of multiple controlled medications.  Patient understands and accepts these risks.  Patient need for medication will be reassessed at each visit.  Doses will be adjusted according to patient need and findings.    Goal of TX: Patient will not have any adverse reactions of medication.  Patient have reduction in pain symptoms with use of PRN Norco as directed.  Patient will not have any adverse side effects from medication.  Patient will be able to remain active in an outside of home without interference from pain symptoms.  Patient will have reduction in weight with use of Adipex as directed with simultaneous diet and lifestyle changes.    Medication Dispense Information    Hydrocodone/Acetaminophen   Dispensed: 1/6/2022 12:00 AM   Written:  1/6/2022   Unit strength: 325MG/7.5MG   Days supply: 30   Dispense Note: GivenName=Gris=LEONARELBirthDate=[id number]=[address] [facility], [place], [place], [address]   Quantity: 90 each   Pharmacy: [facility]   Authorizing provider: JOHN HOLLEY   Received from: MICHELLE PDM (Fill History)   Brand or Generic:       Medication Dispense Information    Phentermine Hcl   Dispensed: 1/10/2022 12:00 AM   Written:  1/6/2022   Unit strength: 37.5MG   Days supply: 30   Dispense Note: GivenName=Gris=LEONARELBirthDate=[birth year]80[birth year]Tkyqbxm=[address] [place], [address]   Quantity: 30 each   Pharmacy: [facility]   Authorizing provider: JOHN HOLLEY   Received from: MICHELLE HANNA (Fill History)   Brand or Generic:       RTC 1 month, sooner if needed.           This document has been electronically signed  by:  STEVE Gonsales, FNP-C    Dragon disclaimer:  Part of this note may be an electronic transcription/translation of spoken language to printed text using the Dragon Dictation System.

## 2022-03-01 ENCOUNTER — OFFICE VISIT (OUTPATIENT)
Dept: FAMILY MEDICINE CLINIC | Facility: CLINIC | Age: 62
End: 2022-03-01

## 2022-03-01 VITALS
OXYGEN SATURATION: 100 % | DIASTOLIC BLOOD PRESSURE: 82 MMHG | HEIGHT: 65 IN | HEART RATE: 97 BPM | BODY MASS INDEX: 23.82 KG/M2 | TEMPERATURE: 97.1 F | WEIGHT: 143 LBS | SYSTOLIC BLOOD PRESSURE: 112 MMHG

## 2022-03-01 DIAGNOSIS — R63.5 GAIN OF WEIGHT: ICD-10-CM

## 2022-03-01 DIAGNOSIS — M25.552 LEFT HIP PAIN: ICD-10-CM

## 2022-03-01 DIAGNOSIS — M25.50 MULTIPLE JOINT PAIN: Primary | ICD-10-CM

## 2022-03-01 DIAGNOSIS — J30.1 SEASONAL ALLERGIC RHINITIS DUE TO POLLEN: ICD-10-CM

## 2022-03-01 PROCEDURE — 96372 THER/PROPH/DIAG INJ SC/IM: CPT | Performed by: NURSE PRACTITIONER

## 2022-03-01 PROCEDURE — 99214 OFFICE O/P EST MOD 30 MIN: CPT | Performed by: NURSE PRACTITIONER

## 2022-03-01 RX ORDER — HYDROCODONE BITARTRATE AND ACETAMINOPHEN 7.5; 325 MG/1; MG/1
1 TABLET ORAL EVERY 8 HOURS PRN
Qty: 90 TABLET | Refills: 0 | Status: SHIPPED | OUTPATIENT
Start: 2022-03-01 | End: 2022-04-04 | Stop reason: DRUGHIGH

## 2022-03-01 RX ORDER — KETOTIFEN FUMARATE 0.35 MG/ML
1 SOLUTION/ DROPS OPHTHALMIC 2 TIMES DAILY
Qty: 10 ML | Refills: 5 | Status: SHIPPED | OUTPATIENT
Start: 2022-03-01 | End: 2022-11-28 | Stop reason: SDUPTHER

## 2022-03-01 RX ORDER — PHENTERMINE HYDROCHLORIDE 37.5 MG/1
37.5 TABLET ORAL
Qty: 30 TABLET | Refills: 0 | Status: SHIPPED | OUTPATIENT
Start: 2022-03-01 | End: 2022-04-04 | Stop reason: SDUPTHER

## 2022-03-01 RX ORDER — DEXAMETHASONE SODIUM PHOSPHATE 4 MG/ML
8 INJECTION, SOLUTION INTRA-ARTICULAR; INTRALESIONAL; INTRAMUSCULAR; INTRAVENOUS; SOFT TISSUE ONCE
Status: COMPLETED | OUTPATIENT
Start: 2022-03-01 | End: 2022-03-01

## 2022-03-01 RX ORDER — CETIRIZINE HYDROCHLORIDE 10 MG/1
10 TABLET ORAL DAILY PRN
Qty: 30 TABLET | Refills: 5 | Status: SHIPPED | OUTPATIENT
Start: 2022-03-01 | End: 2022-08-01 | Stop reason: SDUPTHER

## 2022-03-01 RX ADMIN — DEXAMETHASONE SODIUM PHOSPHATE 8 MG: 4 INJECTION, SOLUTION INTRA-ARTICULAR; INTRALESIONAL; INTRAMUSCULAR; INTRAVENOUS; SOFT TISSUE at 10:22

## 2022-03-01 NOTE — PROGRESS NOTES
Injection  Injection performed in right dorsogluteal by Aurelia Ferris MA. Patient tolerated the procedure well without complications.  03/01/22   Aurelia Ferris MA\

## 2022-03-01 NOTE — PROGRESS NOTES
"Subjective   Rohini Burch is a 61 y.o. female.     Chief Complaint   Patient presents with   • Hip Pain   • Allergies       History of Present Illness     Hip pain-on the left.  She reports began last Tuesday.  She reports it \"Grabs\" and she has difficulty with sit to stand motion.  She reports that it is triggered by certain positions.  She reports she cannot remember a  Specific injury.  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  She has not had any new injury that she is aware of.  She has taken her medications as directed.  Joint Pain-ongoing.  Taking Celebrex 100 mg.  She is also on Norco 7.5 mg TID PRN.  No negative side effects of meds.   Weight-continues adipex but mostly PRN.   She reports she did not take much this past month.  She reports that she has not worked hard for weight decrease.  She would like to continue at this time.   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  She is currently taking Zyrtec 10 mg and using Alaway eyedrops.  She reports that this does help with her symptom relief    The following portions of the patient's history were reviewed and updated as appropriate: CC, ROS, allergies, current medications, past family history, past medical history, past social history, past surgical history and problem list.      Review of Systems   Constitutional: Positive for fatigue. Negative for appetite change and fever.   HENT: Positive for rhinorrhea, sinus pressure and sneezing. Negative for congestion, ear pain, nosebleeds, postnasal drip, sore throat, tinnitus, trouble swallowing and voice change.    Eyes: Negative for blurred vision, photophobia and visual disturbance.   Respiratory: Negative for cough, chest tightness, shortness of breath and wheezing.  " "  Cardiovascular: Negative for chest pain and palpitations.   Gastrointestinal: Negative for abdominal pain, blood in stool, constipation, diarrhea, nausea, vomiting and GERD.   Endocrine: Negative for cold intolerance, heat intolerance and polydipsia.   Genitourinary: Negative for decreased urine volume, difficulty urinating, dysuria and hematuria.   Musculoskeletal: Positive for arthralgias, back pain and gait problem. Negative for myalgias.   Skin: Negative for color change, pallor and wound.   Allergic/Immunologic: Negative.    Neurological: Negative for dizziness, syncope, numbness and headache.   Hematological: Negative.    Psychiatric/Behavioral: Negative for decreased concentration, sleep disturbance, suicidal ideas and depressed mood. The patient is not nervous/anxious.    All other systems reviewed and are negative.      Objective     /82   Pulse 97   Temp 97.1 °F (36.2 °C) (Temporal)   Ht 165.1 cm (65\")   Wt 64.9 kg (143 lb)   SpO2 100%   BMI 23.80 kg/m²     Physical Exam  Vitals reviewed.   Constitutional:       General: She is not in acute distress.     Appearance: She is well-developed. She is not diaphoretic.   HENT:      Head: Normocephalic and atraumatic.      Jaw: No tenderness.      Comments: Oropharynx not examined.  Patient is presently wearing a face covering/mask due to COVID-19 pandemic.     Right Ear: Hearing, tympanic membrane, ear canal and external ear normal.      Left Ear: Hearing, tympanic membrane, ear canal and external ear normal.   Eyes:      General: Lids are normal. No scleral icterus.     Extraocular Movements:      Right eye: Normal extraocular motion and no nystagmus.      Left eye: Normal extraocular motion and no nystagmus.      Conjunctiva/sclera: Conjunctivae normal.      Pupils: Pupils are equal, round, and reactive to light.   Neck:      Thyroid: No thyromegaly or thyroid tenderness.      Vascular: No carotid bruit or JVD.      Trachea: No tracheal " tenderness.   Cardiovascular:      Rate and Rhythm: Normal rate and regular rhythm.      Pulses:           Dorsalis pedis pulses are 2+ on the right side and 2+ on the left side.        Posterior tibial pulses are 2+ on the right side and 2+ on the left side.      Heart sounds: Normal heart sounds, S1 normal and S2 normal. No murmur heard.  Pulmonary:      Effort: Pulmonary effort is normal. No accessory muscle usage, prolonged expiration or respiratory distress.      Breath sounds: Normal breath sounds.   Chest:      Chest wall: No tenderness.   Abdominal:      General: Bowel sounds are normal. There is no distension.      Palpations: Abdomen is soft. There is no mass.      Tenderness: There is no abdominal tenderness.   Musculoskeletal:      Cervical back: Normal range of motion and neck supple.      Thoracic back: Tenderness present.      Lumbar back: Tenderness and bony tenderness present.      Right hip: Tenderness present. Decreased range of motion.      Right lower leg: Tenderness present. No edema.      Left lower leg: Tenderness present. No edema.      Comments: No muscular atrophy or flaccidity.  Multiple varicosities in BLE.  Generalized tenderness to bilateral calves   Lymphadenopathy:      Head:      Right side of head: No submental or submandibular adenopathy.      Left side of head: No submental or submandibular adenopathy.      Cervical: No cervical adenopathy.      Right cervical: No superficial cervical adenopathy.     Left cervical: No superficial cervical adenopathy.   Skin:     General: Skin is warm and dry.      Capillary Refill: Capillary refill takes less than 2 seconds.      Coloration: Skin is not jaundiced or pale.      Findings: No erythema.      Nails: There is no clubbing.   Neurological:      Mental Status: She is alert and oriented to person, place, and time.      Cranial Nerves: No cranial nerve deficit or facial asymmetry.      Sensory: No sensory deficit.      Motor: No weakness,  tremor, atrophy or abnormal muscle tone.      Coordination: Coordination normal.      Deep Tendon Reflexes: Reflexes are normal and symmetric.   Psychiatric:         Attention and Perception: She is attentive.         Mood and Affect: Mood normal.         Speech: Speech normal.         Behavior: Behavior normal. Behavior is cooperative.         Thought Content: Thought content normal.         Judgment: Judgment normal.       Assessment/Plan     Diagnoses and all orders for this visit:    1. Multiple joint pain (Primary)  Comments:  Avoid overuse activity.  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    Orders:  -     HYDROcodone-acetaminophen (NORCO) 7.5-325 MG per tablet; Take 1 tablet by mouth Every 8 (Eight) Hours As Needed for Moderate Pain .  Dispense: 90 tablet; Refill: 0  -     Urine Drug Screen - Urine, Clean Catch    2. Gain of weight  Comments:  continue to work on diet changes  Orders:  -     phentermine (ADIPEX-P) 37.5 MG tablet; Take 1 tablet by mouth Every Morning Before Breakfast.  Dispense: 30 tablet; Refill: 0    3. Seasonal allergic rhinitis due to pollen  -     cetirizine (zyrTEC) 10 MG tablet; Take 1 tablet by mouth Daily As Needed for Allergies.  Dispense: 30 tablet; Refill: 5  -     ketotifen (Alaway) 0.025 % ophthalmic solution; Administer 1 drop to both eyes 2 (Two) Times a Day.  Dispense: 10 mL; Refill: 5    4. Left hip pain  -     dexamethasone (DECADRON) injection 8 mg       Patient's Body mass index is 23.8 kg/m². indicating that she is within normal range (BMI 18.5-24.9). No BMI management plan needed..       Understands disease processes and need for medications.  Understands reasons for urgent and emergent care.  Patient (& family) verbalized agreement for treatment plan.   Emotional support and active listening provided.  Patient provided time to verbalize feelings.    MICHELLE/PMMILAN reviewed today and consistent.  Will refill prescribed  controlled medication today.  Patient is aware they cannot receive narcotics from any other provider except if under care of pain management or speciality clinic.  Risk and benefits of medication use has been reviewed.  History and physical exam exhibit continued safe and appropriate use of controlled substances.  The patient is aware of the potential for addiction and dependence.  This patient has been made aware of the appropriate use of such medications, including potential risk of somnolence, limited ability to drive and / or work safely, and potential for overdose.    It has also been made clear that these medications are for use by this patient only, without concomitant use of alcohol or other substances unless prescribed/advised by medical provider.  Patient understands they may be subject to UDS and pill counts at random.      Patient considered to be moderate risk for addiction due to use of multiple controlled medications.  Patient understands and accepts these risks.  Patient need for medication will be reassessed at each visit.  Doses will be adjusted according to patient need and findings.    Goal of TX: Patient will not have any adverse reactions of medication.  Patient have reduction in pain symptoms with use of PRN Norco as directed.  Patient will not have any adverse side effects from medication.  Patient will be able to remain active in an outside of home without interference from pain symptoms.  Patient will have reduction in weight with use of Adipex as directed with simultaneous diet and lifestyle changes.    Medication Dispense Information    Phentermine Hcl   Dispensed: 2/1/2022 12:00 AM   Written:  2/1/2022   Unit strength: 37.5MG   Days supply: 30   Dispense Note: GivenName=Gris=LEONARELBirthDate=[id number]=[address], [place], [address]   Quantity: 30 each   [facility]acy: [facility]   Authorizing provider: JOHN HOLLEY   Received from: MICHELLE [facility] (Fill  History)   Brand or Generic:       Medication Dispense Information    Hydrocodone/Acetaminophen   Dispensed: 2/2/2022 12:00 AM   Written:  2/1/2022   Unit strength: 325MG/7.5MG   Days supply: 30   Dispense Note: GivenName=Gris=BRETTBirthDate=[id number]=[address], [place] [facility] [place] [place], [address]   Quantity: 90 each   [facility]   Authorizing provider: JOHN HOLLEY   Received from: MICHELLE [facility] (Fill History)   Brand or Generic      RTC 1 month, sooner if needed.            This document has been electronically signed by:  STEVE Gonsales, WILLIAMP-C    Dragon disclaimer:  Part of this note may be an electronic transcription/translation of spoken language to printed text using the Dragon Dictation System.

## 2022-03-10 DIAGNOSIS — M25.50 MULTIPLE JOINT PAIN: Primary | ICD-10-CM

## 2022-03-10 DIAGNOSIS — R63.5 GAIN OF WEIGHT: ICD-10-CM

## 2022-04-04 ENCOUNTER — OFFICE VISIT (OUTPATIENT)
Dept: FAMILY MEDICINE CLINIC | Facility: CLINIC | Age: 62
End: 2022-04-04

## 2022-04-04 VITALS
SYSTOLIC BLOOD PRESSURE: 116 MMHG | WEIGHT: 143.8 LBS | DIASTOLIC BLOOD PRESSURE: 80 MMHG | HEART RATE: 74 BPM | BODY MASS INDEX: 23.96 KG/M2 | TEMPERATURE: 97.3 F | OXYGEN SATURATION: 99 % | HEIGHT: 65 IN

## 2022-04-04 DIAGNOSIS — R63.5 GAIN OF WEIGHT: ICD-10-CM

## 2022-04-04 DIAGNOSIS — M25.50 MULTIPLE JOINT PAIN: ICD-10-CM

## 2022-04-04 DIAGNOSIS — J30.1 SEASONAL ALLERGIC RHINITIS DUE TO POLLEN: ICD-10-CM

## 2022-04-04 DIAGNOSIS — I83.813 VARICOSE VEINS OF BOTH LOWER EXTREMITIES WITH PAIN: ICD-10-CM

## 2022-04-04 DIAGNOSIS — Z00.00 VISIT FOR ANNUAL HEALTH EXAMINATION: Primary | ICD-10-CM

## 2022-04-04 PROCEDURE — 99396 PREV VISIT EST AGE 40-64: CPT | Performed by: NURSE PRACTITIONER

## 2022-04-04 RX ORDER — CELECOXIB 100 MG/1
100 CAPSULE ORAL 2 TIMES DAILY PRN
Qty: 60 CAPSULE | Refills: 2 | Status: SHIPPED | OUTPATIENT
Start: 2022-04-04 | End: 2022-06-27 | Stop reason: SDUPTHER

## 2022-04-04 RX ORDER — HYDROCODONE BITARTRATE AND ACETAMINOPHEN 10; 325 MG/1; MG/1
1 TABLET ORAL EVERY 6 HOURS PRN
Qty: 90 TABLET | Refills: 0 | Status: SHIPPED | OUTPATIENT
Start: 2022-04-04 | End: 2022-05-04 | Stop reason: SDUPTHER

## 2022-04-04 RX ORDER — PHENTERMINE HYDROCHLORIDE 37.5 MG/1
37.5 TABLET ORAL
Qty: 30 TABLET | Refills: 0 | Status: SHIPPED | OUTPATIENT
Start: 2022-04-04 | End: 2022-06-02 | Stop reason: SDUPTHER

## 2022-04-04 NOTE — PROGRESS NOTES
"Subjective   Rohini Burch is a 61 y.o. female.     Chief Complaint   Patient presents with   • Annual Exam       History of Present Illness     Annual Physical-here for an annual physical.   Leg pain and varicosities-chronic and ongoing.   She is taking Celebrex 100 mg BID.   She continues to have symptoms if she is very busy for the day.    Weight loss desire-would like to continue to work on diet changes.  She is taking Adipex inconsistently at times.  She would like to loose several pounds.   Allergies-chronic and ongoing.  She is taking zyrtec 10 mg and using allergy eye drops.  She reports \"not too bad\".      The following portions of the patient's history were reviewed and updated as appropriate: CC, ROS, allergies, current medications, past family history, past medical history, past social history, past surgical history and problem list.    Review of Systems   Constitutional: Positive for fatigue. Negative for activity change, appetite change and fever.   HENT: Negative for congestion, ear pain, nosebleeds, postnasal drip, rhinorrhea, sore throat, tinnitus, trouble swallowing and voice change.    Eyes: Negative for blurred vision, photophobia and visual disturbance.   Respiratory: Negative for cough, chest tightness, shortness of breath and wheezing.    Cardiovascular: Negative for chest pain, palpitations and leg swelling.   Gastrointestinal: Negative for abdominal pain, blood in stool, constipation, diarrhea, nausea and GERD.   Endocrine: Negative for cold intolerance, heat intolerance and polydipsia.   Genitourinary: Negative for decreased urine volume, difficulty urinating, dysuria and hematuria.   Musculoskeletal: Positive for arthralgias and myalgias. Negative for back pain and gait problem.   Skin: Negative for color change, pallor and wound.   Allergic/Immunologic: Negative.    Neurological: Negative for dizziness, syncope, numbness and headache.   Hematological: Negative.    Psychiatric/Behavioral: " "Negative for decreased concentration, self-injury, sleep disturbance, suicidal ideas, depressed mood and stress. The patient is not nervous/anxious.    All other systems reviewed and are negative.      Objective     /80   Pulse 74   Temp 97.3 °F (36.3 °C)   Ht 165.1 cm (65\")   Wt 65.2 kg (143 lb 12.8 oz)   SpO2 99%   BMI 23.93 kg/m²     Physical Exam  Vitals reviewed.   Constitutional:       General: She is not in acute distress.     Appearance: She is well-developed. She is not diaphoretic.   HENT:      Head: Normocephalic and atraumatic.      Jaw: No tenderness.      Comments: Oropharynx not examined.  Patient is presently wearing a face covering/mask due to COVID-19 pandemic.     Right Ear: Hearing, tympanic membrane, ear canal and external ear normal.      Left Ear: Hearing, tympanic membrane, ear canal and external ear normal.   Eyes:      General: Lids are normal. No scleral icterus.     Extraocular Movements:      Right eye: Normal extraocular motion and no nystagmus.      Left eye: Normal extraocular motion and no nystagmus.      Conjunctiva/sclera: Conjunctivae normal.      Pupils: Pupils are equal, round, and reactive to light.   Neck:      Thyroid: No thyromegaly or thyroid tenderness.      Vascular: No carotid bruit or JVD.      Trachea: No tracheal tenderness.   Cardiovascular:      Rate and Rhythm: Normal rate and regular rhythm.      Pulses:           Dorsalis pedis pulses are 2+ on the right side and 2+ on the left side.        Posterior tibial pulses are 2+ on the right side and 2+ on the left side.      Heart sounds: Normal heart sounds, S1 normal and S2 normal. No murmur heard.  Pulmonary:      Effort: Pulmonary effort is normal. No accessory muscle usage, prolonged expiration or respiratory distress.      Breath sounds: Normal breath sounds.   Chest:      Chest wall: No tenderness.   Abdominal:      General: Bowel sounds are normal. There is no distension.      Palpations: Abdomen is " soft. There is no mass.      Tenderness: There is no abdominal tenderness.   Musculoskeletal:         General: No tenderness.      Cervical back: Normal range of motion and neck supple.      Right lower leg: No edema.      Left lower leg: No edema.      Comments: No muscular atrophy or flaccidity.   Lymphadenopathy:      Head:      Right side of head: No submental or submandibular adenopathy.      Left side of head: No submental or submandibular adenopathy.      Cervical: No cervical adenopathy.      Right cervical: No superficial cervical adenopathy.     Left cervical: No superficial cervical adenopathy.   Skin:     General: Skin is warm and dry.      Capillary Refill: Capillary refill takes less than 2 seconds.      Coloration: Skin is not jaundiced or pale.      Findings: No erythema.      Nails: There is no clubbing.   Neurological:      Mental Status: She is alert and oriented to person, place, and time.      Cranial Nerves: No cranial nerve deficit or facial asymmetry.      Sensory: No sensory deficit.      Motor: No weakness, tremor, atrophy or abnormal muscle tone.      Coordination: Coordination normal.      Deep Tendon Reflexes: Reflexes are normal and symmetric.   Psychiatric:         Attention and Perception: She is attentive.         Mood and Affect: Mood normal.         Speech: Speech normal.         Behavior: Behavior normal. Behavior is cooperative.         Thought Content: Thought content normal.         Judgment: Judgment normal.         Assessment/Plan      Visual Acuity Screening    Right eye Left eye Both eyes   Without correction: 20/25 20/30 20/30   With correction:          Functional & Cognitive Status 4/4/2022   Do you have difficulty preparing food and eating? No   Do you have difficulty bathing yourself, getting dressed or grooming yourself? No   Do you have difficulty using the toilet? No   Do you have difficulty moving around from place to place? No   Do you have trouble with steps or  getting out of a bed or a chair? No   Current Diet Well Balanced Diet   Dental Exam Up to date   Eye Exam Up to date   Exercise (times per week) 5 times per week   Current Exercises Include Walking   Do you need help using the phone?  No   Are you deaf or do you have serious difficulty hearing?  No   Do you need help with transportation? No   Do you need help shopping? No   Do you need help preparing meals?  No   Do you need help with housework?  No   Do you need help with laundry? No   Do you need help taking your medications? No   Do you need help managing money? No   Do you ever drive or ride in a car without wearing a seat belt? No   Have you felt unusual stress, anger or loneliness in the last month? No   Who do you live with? Spouse   If you need help, do you have trouble finding someone available to you? No   Have you been bothered in the last four weeks by sexual problems? No   Do you have difficulty concentrating, remembering or making decisions? No       PHQ-2 Depression Screening  Little interest or pleasure in doing things? 0-->not at all   Feeling down, depressed, or hopeless? 0-->not at all   PHQ-2 Total Score 0           Diagnoses and all orders for this visit:    1. Visit for annual health examination (Primary)    2. Gain of weight  Comments:  continue to work on diet changes  Orders:  -     phentermine (ADIPEX-P) 37.5 MG tablet; Take 1 tablet by mouth Every Morning Before Breakfast.  Dispense: 30 tablet; Refill: 0    3. Multiple joint pain  Comments:  Avoid overuse activity.  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    Orders:  -     HYDROcodone-acetaminophen (Norco)  MG per tablet; Take 1 tablet by mouth Every 6 (Six) Hours As Needed for Moderate Pain .  Dispense: 90 tablet; Refill: 0  -     celecoxib (CeleBREX) 100 MG capsule; Take 1 capsule by mouth 2 (Two) Times a Day As Needed for Mild Pain .  Dispense: 60 capsule; Refill: 2    4.  Varicose veins of both lower extremities with pain  Assessment & Plan:  Continue compression hose.    Continue to be active      5. Seasonal allergic rhinitis due to pollen  Assessment & Plan:  Continue Alaway eyedrops.  Encouraged to use a lubricating drop for sensation of dryness.  Continue Zyrtec as directed.  Avoid respiratory triggers such as dust or mold.        Patient's Body mass index is 23.93 kg/m². indicating that she is within normal range (BMI 18.5-24.9). No BMI management plan needed..     Rohini Burch  reports that she has never smoked. She has never used smokeless tobacco..     Understands disease processes and need for medications.  Understands reasons for urgent and emergent care.  Patient (& family) verbalized agreement for treatment plan.   Emotional support and active listening provided.  Patient provided time to verbalize feelings.  Counseling provided today including importance of good nutrition, exercise as tolerated, dental health, stress reduction and mental health. Importance of immunizations discussed.   Appropriate screenings based on gender (paps, breast exam, mammogram, PSA, colon screens, etc).     Counseled on safe sex practices and STD prevention.   Counseling regarding gun and water safety, domestic violence, and seatbelt use.      MICHELLE/PMDP reviewed today and consistent.  Will refill prescribed controlled medication today.  Patient is aware they cannot receive narcotics from any other provider except if under care of pain management or speciality clinic.  Risk and benefits of medication use has been reviewed.  History and physical exam exhibit continued safe and appropriate use of controlled substances.  The patient is aware of the potential for addiction and dependence.  This patient has been made aware of the appropriate use of such medications, including potential risk of somnolence, limited ability to drive and / or work safely, and potential for overdose.    It has also  been made clear that these medications are for use by this patient only, without concomitant use of alcohol or other substances unless prescribed/advised by medical provider.  Patient understands they may be subject to UDS and pill counts at random.      Patient considered to be low risk for addiction due to use of single controlled medications.  Patient understands and accepts these risks.  Patient need for medication will be reassessed at each visit.  Doses will be adjusted according to patient need and findings.    Goal of TX: Patient will not have any adverse reactions of medication.  Patient have reduction in pain symptoms with use of PRN Norco as directed.  Patient will not have any adverse side effects from medication.  Patient will be able to remain active in an outside of home without interference from pain symptoms.    Medication Dispense Information    Hydrocodone/Acetaminophen   Dispensed: 3/1/2022 12:00 AM   Written:  3/1/2022   Unit strength: 325MG/7.5MG   Days supply: 30   Dispense Note: GivenName=Gris=LEONARELBirthDate=[id number]=[address] [facility], [facility] [address]   Quantity: 90 each   [facility]   Authorizing provider: JOHN HOLLEY   Received from: [facility]HEL[facility] (Fill History)   Brand or Generic:       Medication Dispense Information    Phentermine Hcl   Dispensed: 3/1/2022 12:00 AM   Written:  3/1/2022   Unit strength: 37.5MG   Days supply: 30   Dispense Note: GivenName=Gris=LEONARELBirthDate=[id number]=[address] [facility], [place], [place], [address]   Quantity: 30 each   Pharmacy: [facility]   Authorizing provider: JOHN HOLLEY   Received from: [facility]HEL[facility] (Fill History)   Brand or Generic:         RTC 1 month, sooner if needed.             This document has been electronically signed by:  STEVE Gonsales FNP-C Dragon disclaimer:  Part of this note may be an electronic transcription/translation of spoken language to printed text using  the Dragon Dictation System.

## 2022-04-13 ENCOUNTER — TELEPHONE (OUTPATIENT)
Dept: FAMILY MEDICINE CLINIC | Facility: CLINIC | Age: 62
End: 2022-04-13

## 2022-04-13 RX ORDER — AMOXICILLIN 875 MG/1
875 TABLET, COATED ORAL EVERY 12 HOURS SCHEDULED
Qty: 20 TABLET | Refills: 0 | Status: SHIPPED | OUTPATIENT
Start: 2022-04-13 | End: 2022-06-02

## 2022-04-13 NOTE — TELEPHONE ENCOUNTER
----- Message from STEVE Gonsales sent at 4/13/2022  8:43 AM EDT -----  Sent amoxicillin  ----- Message -----  From: Haydee New RegSched Rep  Sent: 4/13/2022   8:22 AM EDT  To: STEVE Gonsales    Requesting antibiotic for sinus infection

## 2022-05-04 ENCOUNTER — OFFICE VISIT (OUTPATIENT)
Dept: FAMILY MEDICINE CLINIC | Facility: CLINIC | Age: 62
End: 2022-05-04

## 2022-05-04 VITALS
HEART RATE: 92 BPM | OXYGEN SATURATION: 100 % | DIASTOLIC BLOOD PRESSURE: 78 MMHG | HEIGHT: 65 IN | SYSTOLIC BLOOD PRESSURE: 130 MMHG | TEMPERATURE: 100 F | WEIGHT: 144.8 LBS | BODY MASS INDEX: 24.12 KG/M2

## 2022-05-04 DIAGNOSIS — M25.50 MULTIPLE JOINT PAIN: ICD-10-CM

## 2022-05-04 DIAGNOSIS — I83.813 VARICOSE VEINS OF BOTH LOWER EXTREMITIES WITH PAIN: Primary | ICD-10-CM

## 2022-05-04 DIAGNOSIS — J30.1 SEASONAL ALLERGIC RHINITIS DUE TO POLLEN: ICD-10-CM

## 2022-05-04 PROCEDURE — 99213 OFFICE O/P EST LOW 20 MIN: CPT | Performed by: NURSE PRACTITIONER

## 2022-05-04 RX ORDER — PSEUDOEPHEDRINE HCL 30 MG
60 TABLET ORAL 2 TIMES DAILY PRN
Qty: 30 TABLET | Refills: 2 | Status: SHIPPED | OUTPATIENT
Start: 2022-05-04 | End: 2022-08-01

## 2022-05-04 RX ORDER — HYDROCODONE BITARTRATE AND ACETAMINOPHEN 10; 325 MG/1; MG/1
1 TABLET ORAL EVERY 6 HOURS PRN
Qty: 90 TABLET | Refills: 0 | Status: SHIPPED | OUTPATIENT
Start: 2022-05-04 | End: 2022-06-02 | Stop reason: SDUPTHER

## 2022-05-04 NOTE — PROGRESS NOTES
Subjective   Rohini Burch is a 61 y.o. female.     Chief Complaint   Patient presents with   • Back Pain       History of Present Illness     Back and leg pain-ongoing.  Patient with chronic BLE varicosities.  She works in maintenance.  She does a lot of repetitive bending and lifting as well as climbing of stairs as she works at the local [employer].  She is taking Norco 10 mg up to 4 times daily.  Some days she reports she only takes it a couple times.  Other days she does not need it more than 2.  No negative side effects.  She is also taking Celebrex.  Chronic allergic rhinitis-patient is currently on Zyrtec.  She is also using Alaway eyedrops.  She has chronic itching of her eyes.  Chronic sneezing and rhinorrhea.    The following portions of the patient's history were reviewed and updated as appropriate: CC, ROS, allergies, current medications, past family history, past medical history, past social history, past surgical history and problem list.    Review of Systems   Constitutional: Positive for fatigue. Negative for appetite change and fever.   HENT: Positive for congestion, ear pain, rhinorrhea, sinus pressure and sneezing. Negative for nosebleeds, postnasal drip, sore throat, tinnitus, trouble swallowing and voice change.    Eyes: Positive for redness and itching (with some mild swelling). Negative for blurred vision, photophobia and visual disturbance.   Respiratory: Negative for cough, chest tightness, shortness of breath and wheezing.    Cardiovascular: Negative for chest pain and palpitations.   Gastrointestinal: Negative for abdominal pain, blood in stool, constipation, diarrhea, nausea, vomiting and GERD.   Endocrine: Negative for cold intolerance, heat intolerance and polydipsia.   Genitourinary: Negative for decreased urine volume, difficulty urinating, dysuria and hematuria.   Musculoskeletal: Positive for arthralgias, gait problem and myalgias. Negative for back pain.   Skin: Negative for color  "change, pallor and wound.   Allergic/Immunologic: Negative.    Neurological: Negative for dizziness, syncope, numbness and headache.   Hematological: Negative.    Psychiatric/Behavioral: Negative for decreased concentration, sleep disturbance, suicidal ideas and depressed mood. The patient is not nervous/anxious.    All other systems reviewed and are negative.      Objective     /78   Pulse 92   Temp 100 °F (37.8 °C)   Ht 165.1 cm (65\")   Wt 65.7 kg (144 lb 12.8 oz)   SpO2 100%   BMI 24.10 kg/m²     Physical Exam  Vitals reviewed.   Constitutional:       General: She is not in acute distress.     Appearance: She is well-developed. She is not diaphoretic.   HENT:      Head: Normocephalic and atraumatic.      Jaw: No tenderness.      Comments: Oropharynx not examined.  Patient is presently wearing a face covering/mask due to COVID-19 pandemic.     Right Ear: Hearing, tympanic membrane, ear canal and external ear normal.      Left Ear: Hearing, tympanic membrane, ear canal and external ear normal.   Eyes:      General: Lids are normal. Allergic shiner present. No scleral icterus.     Extraocular Movements:      Right eye: Normal extraocular motion and no nystagmus.      Left eye: Normal extraocular motion and no nystagmus.      Conjunctiva/sclera: Conjunctivae normal.      Pupils: Pupils are equal, round, and reactive to light.      Comments: Mild puffiness of eyes bilaterally   Neck:      Thyroid: No thyromegaly or thyroid tenderness.      Vascular: No carotid bruit or JVD.      Trachea: No tracheal tenderness.   Cardiovascular:      Rate and Rhythm: Normal rate and regular rhythm.      Pulses:           Dorsalis pedis pulses are 2+ on the right side and 2+ on the left side.        Posterior tibial pulses are 2+ on the right side and 2+ on the left side.      Heart sounds: Normal heart sounds, S1 normal and S2 normal. No murmur heard.  Pulmonary:      Effort: Pulmonary effort is normal. No accessory muscle " usage, prolonged expiration or respiratory distress.      Breath sounds: Normal breath sounds.   Chest:      Chest wall: No tenderness.   Abdominal:      General: Bowel sounds are normal. There is no distension.      Palpations: Abdomen is soft. There is no mass.      Tenderness: There is no abdominal tenderness.   Musculoskeletal:      Cervical back: Normal range of motion and neck supple.      Thoracic back: Tenderness present.      Lumbar back: Tenderness present.      Right lower leg: No edema.      Left lower leg: No edema.      Comments: No muscular atrophy or flaccidity.   Lymphadenopathy:      Head:      Right side of head: No submental or submandibular adenopathy.      Left side of head: No submental or submandibular adenopathy.      Cervical: No cervical adenopathy.      Right cervical: No superficial cervical adenopathy.     Left cervical: No superficial cervical adenopathy.   Skin:     General: Skin is warm and dry.      Capillary Refill: Capillary refill takes less than 2 seconds.      Coloration: Skin is not jaundiced or pale.      Findings: No erythema.      Nails: There is no clubbing.      Comments: Multiple BLE varicosities from thighs to ankles   Neurological:      Mental Status: She is alert and oriented to person, place, and time.      Cranial Nerves: No cranial nerve deficit or facial asymmetry.      Sensory: No sensory deficit.      Motor: No weakness, tremor, atrophy or abnormal muscle tone.      Coordination: Coordination normal.      Deep Tendon Reflexes: Reflexes are normal and symmetric.   Psychiatric:         Attention and Perception: She is attentive.         Mood and Affect: Mood normal.         Speech: Speech normal.         Behavior: Behavior normal. Behavior is cooperative.         Thought Content: Thought content normal.         Judgment: Judgment normal.       Assessment/Plan     Diagnoses and all orders for this visit:    1. Varicose veins of both lower extremities with pain  (Primary)  Assessment & Plan:  Continue compression hose.    Continue to be active      2. Multiple joint pain  Comments:  Avoid overuse activity.  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    Assessment & Plan:  Continue to be active.  Avoid overuse activities  Continue PRN Norco and celebrex as directed    Orders:  -     HYDROcodone-acetaminophen (Norco)  MG per tablet; Take 1 tablet by mouth Every 6 (Six) Hours As Needed for Moderate Pain .  Dispense: 90 tablet; Refill: 0    3. Seasonal allergic rhinitis due to pollen  Assessment & Plan:  Continue Alaway eyedrops.  Encouraged to use a lubricating drop for sensation of dryness.  Continue Zyrtec as directed.  Avoid respiratory triggers such as dust or mold.         BMI is within normal parameters. No follow-up required.       Understands disease processes and need for medications.  Understands reasons for urgent and emergent care.  Patient (& family) verbalized agreement for treatment plan.   Emotional support and active listening provided.  Patient provided time to verbalize feelings.    MICHELLE reviewed today and consistent.  Will refill prescribed controlled medication today.  Patient is aware they cannot receive narcotics from any other provider except if under care of pain management or speciality clinic.  Risk and benefits of medication use has been reviewed.  History and physical exam exhibit continued safe and appropriate use of controlled substances.  The patient is aware of the potential for addiction and dependence.  This patient has been made aware of the appropriate use of such medications, including potential risk of somnolence, limited ability to drive and / or work safely, and potential for overdose.  Patient understands not to take medication and drive until they know how medication may affect their cognition/decision making.   It has also been made clear that these medications are for use  by this patient only, without concomitant use of alcohol or other substances unless prescribed/advised by medical provider.  Patient understands they may be subject to UDS and pill counts at random.    Patient considered to be low risk for addiction due to use of single controlled medications.  Patient understands and accepts these risks.  Patient need for medication will be reassessed at each visit.  Doses will be adjusted according to patient need and findings.    Goal of TX: Patient will not have any adverse reactions of medication.  Patient have reduction in pain symptoms with use of PRN Norco as directed.  Patient will not have any adverse side effects from medication.  Patient will be able to remain active in an outside of home without interference from pain symptoms.    Medication Dispense Information    Hydrocodone Bitartrate/Ac   Dispensed: 4/4/2022 12:00 AM   Written:  4/4/2022   Unit strength: 325MG/10MG   Days supply: 23   Dispense Note: GivenName=Gris=LEONARELBirthDate=05/19/[id number]=[address] [address] [address] [facility] [place], [address]   Quantity: 90 each   [facility]: [facility]   Authorizing provider: JOHN HOLLEY   [facility]   Brand or Generic:       Medication Dispense Information    Phentermine Hcl   Dispensed: 4/4/2022 12:00 AM   Written:  4/4/2022   Unit strength: 37.5MG   Days supply: 30   Dispense Note: GivenName=Gris=LEONARELBirthDate=[birth year]/[id number]=[id number] [address] , [place], [place], [address]   Quantity: 30 each   Ph[facility]acy: [facility]   Authorizing provider: JOHN HOLLEY   [facility] (Fill [facility])   Brand or Generic:       RTC 1 month, sooner if needed.             This document has been electronically signed by:  STEVE Gonsales FNP-C Dragon disclaimer:  Part of this note may be an electronic transcription/translation of spoken language to printed text using the Dragon Dictation System.

## 2022-06-02 ENCOUNTER — OFFICE VISIT (OUTPATIENT)
Dept: FAMILY MEDICINE CLINIC | Facility: CLINIC | Age: 62
End: 2022-06-02

## 2022-06-02 VITALS
TEMPERATURE: 98.2 F | HEART RATE: 54 BPM | WEIGHT: 137 LBS | OXYGEN SATURATION: 100 % | SYSTOLIC BLOOD PRESSURE: 124 MMHG | BODY MASS INDEX: 22.82 KG/M2 | DIASTOLIC BLOOD PRESSURE: 82 MMHG | HEIGHT: 65 IN

## 2022-06-02 DIAGNOSIS — R63.5 GAIN OF WEIGHT: ICD-10-CM

## 2022-06-02 DIAGNOSIS — I83.813 VARICOSE VEINS OF BOTH LOWER EXTREMITIES WITH PAIN: Primary | ICD-10-CM

## 2022-06-02 DIAGNOSIS — M25.50 MULTIPLE JOINT PAIN: ICD-10-CM

## 2022-06-02 DIAGNOSIS — J30.1 SEASONAL ALLERGIC RHINITIS DUE TO POLLEN: ICD-10-CM

## 2022-06-02 DIAGNOSIS — Z79.899 LONG-TERM USE OF HIGH-RISK MEDICATION: ICD-10-CM

## 2022-06-02 PROCEDURE — 99213 OFFICE O/P EST LOW 20 MIN: CPT | Performed by: NURSE PRACTITIONER

## 2022-06-02 RX ORDER — HYDROCODONE BITARTRATE AND ACETAMINOPHEN 10; 325 MG/1; MG/1
1 TABLET ORAL EVERY 6 HOURS PRN
Qty: 90 TABLET | Refills: 0 | Status: SHIPPED | OUTPATIENT
Start: 2022-06-02 | End: 2022-06-27 | Stop reason: SDUPTHER

## 2022-06-02 RX ORDER — PHENTERMINE HYDROCHLORIDE 37.5 MG/1
37.5 TABLET ORAL
Qty: 30 TABLET | Refills: 0 | Status: SHIPPED | OUTPATIENT
Start: 2022-06-02 | End: 2022-06-27 | Stop reason: SDUPTHER

## 2022-06-02 NOTE — ASSESSMENT & PLAN NOTE
Continue Zyrtec.  Continue eyedrops as needed.  Avoid known allergy and respiratory triggers.  May use Saline spray PRN as desired

## 2022-06-02 NOTE — PROGRESS NOTES
Subjective   Rohini Burch is a 62 y.o. female.     Chief Complaint   Patient presents with   • Back Pain       History of Present Illness     Back pain-chronic and ongoing.  She is taking Norco 10 mg TID PRN.  She continues to have back and leg pain.   She has multiple varicose veins.   She is taking Celebrex.  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  No new symptoms or concerns.  She feels that she is stable at this time  Weight loss-has noted weight loss.  Taking Adipex and she has been eating less.  No negative side effects.   No concerns today.   Allergies-ongoing.  Taking meds.  Some increase due to grass pollen.  She reports that they seem to be less than they were last week.  She does not have any new concerns today.    The following portions of the patient's history were reviewed and updated as appropriate: CC, ROS, allergies, current medications, past family history, past medical history, past social history, past surgical history and problem list.      Review of Systems   Constitutional: Negative for activity change, appetite change, fatigue and fever.   HENT: Negative for congestion, ear pain, nosebleeds, postnasal drip, rhinorrhea, sore throat, tinnitus, trouble swallowing and voice change.    Eyes: Negative for blurred vision, photophobia and visual disturbance.   Respiratory: Negative for cough, chest tightness, shortness of breath and wheezing.    Cardiovascular: Negative for chest pain, palpitations and leg swelling.   Gastrointestinal: Negative for abdominal pain, blood in stool, constipation, diarrhea, nausea and GERD.   Endocrine: Negative for cold intolerance, heat intolerance and polydipsia.   Genitourinary: Negative for decreased urine volume, difficulty urinating, dysuria and hematuria.   Musculoskeletal: Positive for back pain. Negative for arthralgias, gait problem and myalgias.   Skin:  "Negative for color change, pallor and wound.   Allergic/Immunologic: Positive for environmental allergies.   Neurological: Negative for dizziness, syncope, numbness and headache.   Hematological: Negative.    Psychiatric/Behavioral: Negative for decreased concentration, self-injury, sleep disturbance, suicidal ideas and depressed mood. The patient is not nervous/anxious.    All other systems reviewed and are negative.      Objective     /82   Pulse 54   Temp 98.2 °F (36.8 °C)   Ht 165.1 cm (65\")   Wt 62.1 kg (137 lb)   SpO2 100%   BMI 22.80 kg/m²     Physical Exam  Vitals reviewed.   Constitutional:       General: She is not in acute distress.     Appearance: She is well-developed. She is not diaphoretic.   HENT:      Head: Normocephalic and atraumatic.      Jaw: No tenderness.      Comments: Oropharynx not examined.  Patient is presently wearing a face covering/mask due to COVID-19 pandemic.     Right Ear: Hearing, tympanic membrane, ear canal and external ear normal.      Left Ear: Hearing, tympanic membrane, ear canal and external ear normal.   Eyes:      General: Lids are normal. No scleral icterus.     Extraocular Movements:      Right eye: Normal extraocular motion and no nystagmus.      Left eye: Normal extraocular motion and no nystagmus.      Conjunctiva/sclera: Conjunctivae normal.      Pupils: Pupils are equal, round, and reactive to light.   Neck:      Thyroid: No thyromegaly or thyroid tenderness.      Vascular: No carotid bruit or JVD.      Trachea: No tracheal tenderness.   Cardiovascular:      Rate and Rhythm: Normal rate and regular rhythm.      Pulses:           Dorsalis pedis pulses are 2+ on the right side and 2+ on the left side.        Posterior tibial pulses are 2+ on the right side and 2+ on the left side.      Heart sounds: Normal heart sounds, S1 normal and S2 normal. No murmur heard.  Pulmonary:      Effort: Pulmonary effort is normal. No accessory muscle usage, prolonged " expiration or respiratory distress.      Breath sounds: Normal breath sounds.   Chest:      Chest wall: No tenderness.   Abdominal:      General: Bowel sounds are normal. There is no distension.      Palpations: Abdomen is soft. There is no mass.      Tenderness: There is no abdominal tenderness.   Musculoskeletal:      Cervical back: Normal range of motion and neck supple.      Thoracic back: Tenderness present.      Lumbar back: Spasms and tenderness present. Decreased range of motion.      Right lower leg: No edema.      Left lower leg: No edema.      Comments: No muscular atrophy or flaccidity.   Lymphadenopathy:      Head:      Right side of head: No submental or submandibular adenopathy.      Left side of head: No submental or submandibular adenopathy.      Cervical: No cervical adenopathy.      Right cervical: No superficial cervical adenopathy.     Left cervical: No superficial cervical adenopathy.   Skin:     General: Skin is warm and dry.      Capillary Refill: Capillary refill takes less than 2 seconds.      Coloration: Skin is not jaundiced or pale.      Findings: No erythema.      Nails: There is no clubbing.   Neurological:      Mental Status: She is alert and oriented to person, place, and time.      Cranial Nerves: No cranial nerve deficit or facial asymmetry.      Sensory: No sensory deficit.      Motor: No weakness, tremor, atrophy or abnormal muscle tone.      Coordination: Coordination normal.      Deep Tendon Reflexes: Reflexes are normal and symmetric.   Psychiatric:         Attention and Perception: She is attentive.         Mood and Affect: Mood normal.         Speech: Speech normal.         Behavior: Behavior normal. Behavior is cooperative.         Thought Content: Thought content normal.         Judgment: Judgment normal.           Diagnoses and all orders for this visit:    1. Varicose veins of both lower extremities with pain (Primary)  Assessment & Plan:  Continue conservative  management.  Avoid overuse activities such as excessive walking or climbing      2. Multiple joint pain  Comments:  Avoid overuse activity.  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    Orders:  -     HYDROcodone-acetaminophen (Norco)  MG per tablet; Take 1 tablet by mouth Every 6 (Six) Hours As Needed for Moderate Pain .  Dispense: 90 tablet; Refill: 0    3. Gain of weight  Comments:  continue to work on diet changes  Orders:  -     phentermine (ADIPEX-P) 37.5 MG tablet; Take 1 tablet by mouth Every Morning Before Breakfast.  Dispense: 30 tablet; Refill: 0    4. Seasonal allergic rhinitis due to pollen  Assessment & Plan:  Continue Zyrtec.  Continue eyedrops as needed.  Avoid known allergy and respiratory triggers.  May use Saline spray PRN as desired        5. Long-term use of high-risk medication  -     Urine Drug Screen - Urine, Clean Catch; Future       BMI is within normal parameters. No other follow-up for BMI required.       Understands disease processes and need for medications.  Understands reasons for urgent and emergent care.  Patient (& family) verbalized agreement for treatment plan.   Emotional support and active listening provided.  Patient provided time to verbalize feelings.    MICHELLE reviewed today and consistent.  Will refill prescribed controlled medication today.  Patient is aware they cannot receive narcotics from any other provider except if under care of pain management or speciality clinic.  Risk and benefits of medication use has been reviewed.  History and physical exam exhibit continued safe and appropriate use of controlled substances.  The patient is aware of the potential for addiction and dependence.  This patient has been made aware of the appropriate use of such medications, including potential risk of somnolence, limited ability to drive and / or work safely, and potential for overdose.  Patient understands not to take  medication and drive until they know how medication may affect their cognition/decision making.   It has also been made clear that these medications are for use by this patient only, without concomitant use of alcohol or other substances unless prescribed/advised by medical provider.  Patient understands they may be subject to UDS and pill counts at random.    Patient considered to be moderate risk for addiction due to use of multiple controlled medications.  Patient understands and accepts these risks.  Patient need for medication will be reassessed at each visit.  Doses will be adjusted according to patient need and findings.    Goal of TX: Patient will not have any adverse reactions of medication.  Patient will have reduction in weight with use of Adipex as directed with simultaneous diet and lifestyle changes.  Patient have reduction in pain symptoms with use of PRN Norco as directed.  Patient will not have any adverse side effects from medication.  Patient will be able to remain active in an outside of home without interference from pain symptoms.    Medication Dispense Information    Hydrocodone Bitartrate/Ac   Dispensed: 5/4/2022 12:00 AM   Written:  5/4/2022   Unit strength: 325MG/10MG   Days supply: 22   Dispense Note: GivenName=DENNYSSurName=GAMBRELBirthDate=[id number]=[address] [facility], [place], [address]   Quantity: 90 each   Pharmacy: [facility]   Authorizing provider: JOHN HOLLEY   Received from: [facility] (Fill History)   Brand or Generic:       RTC 1 month, sooner if needed for problems or concerns          This document has been electronically signed by:  STEVE Gonsales, FNP-C    Dragon disclaimer:  Part of this note may be an electronic transcription/translation of spoken language to printed text using the Dragon Dictation System.

## 2022-06-27 ENCOUNTER — OFFICE VISIT (OUTPATIENT)
Dept: FAMILY MEDICINE CLINIC | Facility: CLINIC | Age: 62
End: 2022-06-27

## 2022-06-27 VITALS
HEIGHT: 65 IN | SYSTOLIC BLOOD PRESSURE: 120 MMHG | TEMPERATURE: 98.2 F | OXYGEN SATURATION: 97 % | HEART RATE: 94 BPM | RESPIRATION RATE: 18 BRPM | BODY MASS INDEX: 23.09 KG/M2 | WEIGHT: 138.6 LBS | DIASTOLIC BLOOD PRESSURE: 74 MMHG

## 2022-06-27 DIAGNOSIS — R63.5 GAIN OF WEIGHT: ICD-10-CM

## 2022-06-27 DIAGNOSIS — J06.9 ACUTE URI: Primary | ICD-10-CM

## 2022-06-27 DIAGNOSIS — K13.79 MUCOSAL IRRITATION OF ORAL CAVITY: ICD-10-CM

## 2022-06-27 DIAGNOSIS — M25.50 MULTIPLE JOINT PAIN: ICD-10-CM

## 2022-06-27 PROCEDURE — 99214 OFFICE O/P EST MOD 30 MIN: CPT | Performed by: NURSE PRACTITIONER

## 2022-06-27 RX ORDER — HYDROCODONE BITARTRATE AND ACETAMINOPHEN 10; 325 MG/1; MG/1
1 TABLET ORAL EVERY 6 HOURS PRN
Qty: 90 TABLET | Refills: 0 | Status: SHIPPED | OUTPATIENT
Start: 2022-06-27 | End: 2022-08-01 | Stop reason: SDUPTHER

## 2022-06-27 RX ORDER — CELECOXIB 100 MG/1
100 CAPSULE ORAL 2 TIMES DAILY PRN
Qty: 60 CAPSULE | Refills: 2 | Status: SHIPPED | OUTPATIENT
Start: 2022-06-27 | End: 2022-08-01 | Stop reason: SDUPTHER

## 2022-06-27 RX ORDER — AMOXICILLIN 875 MG/1
875 TABLET, COATED ORAL EVERY 12 HOURS SCHEDULED
Qty: 20 TABLET | Refills: 0 | Status: SHIPPED | OUTPATIENT
Start: 2022-06-27 | End: 2022-08-01

## 2022-06-27 RX ORDER — PHENTERMINE HYDROCHLORIDE 37.5 MG/1
37.5 TABLET ORAL
Qty: 30 TABLET | Refills: 0 | Status: SHIPPED | OUTPATIENT
Start: 2022-06-27 | End: 2022-08-01 | Stop reason: SDUPTHER

## 2022-06-27 RX ORDER — LIDOCAINE HYDROCHLORIDE 20 MG/ML
5 SOLUTION OROPHARYNGEAL AS NEEDED
Qty: 100 ML | Refills: 0 | Status: SHIPPED | OUTPATIENT
Start: 2022-06-27 | End: 2022-10-27

## 2022-06-27 NOTE — PROGRESS NOTES
"Subjective   Rohini Burch is a 62 y.o. female.     Chief Complaint   Patient presents with   • Back Pain       History of Present Illness     Back and leg pain-chronic and ongoing.  She is presently taking Norco 10 mg up to TID and PRN celebrex.  She has multiple varicose veins. She is having right sided back pain.  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  She reports extra lifting and more need to walking up and down the stairs.  She missed work Wednesday thru Friday.  She reports that she stayed home and \"medicated\".   Allergies/Chemical Exposure-reports that she got some chemical exposure this past week.  She reports it was accidental.  She reports another co-worker were spraying for weeds and the smell was in the air.  She reports that she had some HA and cough.  Mild SOA.  She reports that breathing does seem to be better but she has noted some blisters on her mouth.  She reports that the taste lingered in her mouth.  She noted some eye redness and swelling the day of the exposure as well.  She also noted a \"Busting\" headache with some lingering symptoms even today.  She continues to take her zyrtec 10 mg and eye drops.  She uses PRN Sudafed.    Weight loss observation-she has not been as active due to her work schedule and her increase in pain.  She reports that she has been taking her Adipex sporadically. No negative side effects.      The following portions of the patient's history were reviewed and updated as appropriate: CC, ROS, allergies, current medications, past family history, past medical history, past social history, past surgical history and problem list.      Review of Systems   Constitutional: Positive for fatigue. Negative for activity change, appetite change and fever.   HENT: Positive for sinus pressure. Negative for congestion, ear pain, nosebleeds, postnasal drip, rhinorrhea, sore throat, tinnitus, trouble swallowing and voice change.    Eyes: Negative for " "blurred vision, photophobia and visual disturbance.   Respiratory: Negative for cough, chest tightness, shortness of breath and wheezing.    Cardiovascular: Negative for chest pain, palpitations and leg swelling.   Gastrointestinal: Negative for abdominal pain, blood in stool, constipation, diarrhea, nausea, vomiting and GERD.   Endocrine: Negative for cold intolerance, heat intolerance and polydipsia.   Genitourinary: Negative for decreased urine volume, difficulty urinating, dysuria and hematuria.   Musculoskeletal: Positive for arthralgias, back pain, gait problem and myalgias.   Skin: Negative for color change, pallor and wound.   Allergic/Immunologic: Negative.    Neurological: Positive for headache. Negative for dizziness, syncope and numbness.   Hematological: Negative.    Psychiatric/Behavioral: Negative for decreased concentration, self-injury, sleep disturbance, suicidal ideas, depressed mood and stress. The patient is not nervous/anxious.    All other systems reviewed and are negative.      Objective     /74   Pulse 94   Temp 98.2 °F (36.8 °C)   Resp 18   Ht 165.1 cm (65\")   Wt 62.9 kg (138 lb 9.6 oz)   SpO2 97%   BMI 23.06 kg/m²     Physical Exam  Vitals reviewed.   Constitutional:       General: She is not in acute distress.     Appearance: She is well-developed. She is not diaphoretic.   HENT:      Head: Normocephalic and atraumatic.      Jaw: No tenderness.      Comments: Oropharynx not examined.  Patient is presently wearing a face covering/mask due to COVID-19 pandemic.     Right Ear: Hearing, tympanic membrane, ear canal and external ear normal.      Left Ear: Hearing, tympanic membrane, ear canal and external ear normal.      Mouth/Throat:      Pharynx: Posterior oropharyngeal erythema (moderate) present.      Comments: Mucosal irritation to upper internal lip  Eyes:      General: Lids are normal. No scleral icterus.     Extraocular Movements:      Right eye: Normal extraocular motion " and no nystagmus.      Left eye: Normal extraocular motion and no nystagmus.      Conjunctiva/sclera: Conjunctivae normal.      Pupils: Pupils are equal, round, and reactive to light.      Comments: Generalized bilateral puffiness.  Most significant to lower lid.   Neck:      Thyroid: No thyromegaly or thyroid tenderness.      Vascular: No carotid bruit or JVD.      Trachea: No tracheal tenderness.   Cardiovascular:      Rate and Rhythm: Normal rate and regular rhythm.      Pulses:           Dorsalis pedis pulses are 2+ on the right side and 2+ on the left side.        Posterior tibial pulses are 2+ on the right side and 2+ on the left side.      Heart sounds: Normal heart sounds, S1 normal and S2 normal. No murmur heard.  Pulmonary:      Effort: Pulmonary effort is normal. No accessory muscle usage, prolonged expiration or respiratory distress.      Breath sounds: Examination of the right-upper field reveals decreased breath sounds and wheezing. Examination of the left-upper field reveals decreased breath sounds and wheezing. Decreased breath sounds and wheezing present. No rhonchi or rales.   Chest:      Chest wall: No tenderness.   Abdominal:      General: Bowel sounds are normal. There is no distension.      Palpations: Abdomen is soft. There is no mass.      Tenderness: There is no abdominal tenderness.   Musculoskeletal:      Cervical back: Normal range of motion and neck supple.      Thoracic back: Spasms and tenderness present.      Lumbar back: Spasms and tenderness present. Decreased range of motion.      Right lower leg: No edema.      Left lower leg: No edema.      Comments: No muscular atrophy or flaccidity.   Lymphadenopathy:      Head:      Right side of head: No submental or submandibular adenopathy.      Left side of head: No submental or submandibular adenopathy.      Cervical: No cervical adenopathy.      Right cervical: No superficial cervical adenopathy.     Left cervical: No superficial  cervical adenopathy.   Skin:     General: Skin is warm and dry.      Capillary Refill: Capillary refill takes less than 2 seconds.      Coloration: Skin is not jaundiced or pale.      Findings: No erythema.      Nails: There is no clubbing.      Comments: Multiple varicosities to BLE   Neurological:      Mental Status: She is alert and oriented to person, place, and time.      Cranial Nerves: No cranial nerve deficit or facial asymmetry.      Sensory: No sensory deficit.      Motor: No weakness, tremor, atrophy or abnormal muscle tone.      Coordination: Coordination normal.      Deep Tendon Reflexes: Reflexes are normal and symmetric.   Psychiatric:         Attention and Perception: She is attentive.         Mood and Affect: Mood normal.         Speech: Speech normal.         Behavior: Behavior normal. Behavior is cooperative.         Thought Content: Thought content normal.         Judgment: Judgment normal.           Diagnoses and all orders for this visit:    1. Acute URI (Primary)  Comments:  Finish all of antibiotics.  Do not save any for future use.  Orders:  -     amoxicillin (AMOXIL) 875 MG tablet; Take 1 tablet by mouth Every 12 (Twelve) Hours.  Dispense: 20 tablet; Refill: 0    2. Gain of weight  Comments:  continue to work on diet changes.  Continue Adipex  Orders:  -     phentermine (ADIPEX-P) 37.5 MG tablet; Take 1 tablet by mouth Every Morning Before Breakfast.  Dispense: 30 tablet; Refill: 0    3. Multiple joint pain  Comments:  Avoid overuse activity.  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    Orders:  -     HYDROcodone-acetaminophen (Norco)  MG per tablet; Take 1 tablet by mouth Every 6 (Six) Hours As Needed for Moderate Pain .  Dispense: 90 tablet; Refill: 0  -     celecoxib (CeleBREX) 100 MG capsule; Take 1 capsule by mouth 2 (Two) Times a Day As Needed for Mild Pain .  Dispense: 60 capsule; Refill: 2    4. Mucosal irritation of oral  cavity  Comments:  Viscous lidocaine written to use before eating.  May use warm salt soaks  Orders:  -     Lidocaine Viscous HCl (XYLOCAINE) 2 % solution; Take 5 mL by mouth As Needed for Mild Pain .  Dispense: 100 mL; Refill: 0       BMI is within normal parameters. No other follow-up for BMI required.       Understands disease processes and need for medications.  Understands reasons for urgent and emergent care.  Patient (& family) verbalized agreement for treatment plan.   Emotional support and active listening provided.  Patient provided time to verbalize feelings.    MICHELLE/PMMILAN reviewed today and consistent.  Will refill prescribed controlled medication today.  Patient is aware they cannot receive narcotics from any other provider except if under care of pain management or speciality clinic.  Risk and benefits of medication use has been reviewed.  History and physical exam exhibit continued safe and appropriate use of controlled substances.  The patient is aware of the potential for addiction and dependence.  This patient has been made aware of the appropriate use of such medications, including potential risk of somnolence, limited ability to drive and / or work safely, and potential for overdose.    It has also been made clear that these medications are for use by this patient only, without concomitant use of alcohol or other substances unless prescribed/advised by medical provider.  Patient understands they may be subject to UDS and pill counts at random.      Patient considered to be low risk for addiction due to use of single controlled medications.  Patient understands and accepts these risks.  Patient need for medication will be reassessed at each visit.  Doses will be adjusted according to patient need and findings.    Goal of TX: Patient will not have any adverse reactions of medication.  Patient have reduction in pain symptoms with use of PRN Norco as directed.  Patient will not have any adverse side  effects from medication.  Patient will be able to remain active in an outside of home without interference from pain symptoms.    Medication Dispense Information    Phentermine Hcl   Dispensed: 6/2/2022 12:00 AM   Written:  6/2/2022   Unit strength: 37.5MG   Days supply: 30   Dispense Note: GivenName=Gris=LEONARELBirthDate=[id number]=[id number], [facility] [place], [address]   Quantity: 30 each   Pharmacy: [facility]   Authorizing provider: JOHN HOLLEY   Received from: [facility] (Fill History)   Brand or Generic:           Medication Dispense Information    Hydrocodone Bitartrate/Ac   Dispensed: 6/2/2022 12:00 AM   Written:  6/2/2022   Unit strength: 325MG/10MG   Days supply: 30   Dispense Note: GivenName=Gris=BRETTBirthDate=[id number]=[id number] , [facility] [place], [address]   Quantity: 90 each   [facility]: [facility]   Authorizing provider: JOHN HOLLEY   Received from: [facility] (Fill History)   Brand or Generic:       RTC 1 month, sooner if needed for problems or concerns          This document has been electronically signed by:  STEVE Gonsales FNP-C Dragon disclaimer:  Part of this note may be an electronic transcription/translation of spoken language to printed text using the Dragon Dictation System.

## 2022-08-01 ENCOUNTER — OFFICE VISIT (OUTPATIENT)
Dept: FAMILY MEDICINE CLINIC | Facility: CLINIC | Age: 62
End: 2022-08-01

## 2022-08-01 VITALS
WEIGHT: 137 LBS | RESPIRATION RATE: 20 BRPM | OXYGEN SATURATION: 99 % | SYSTOLIC BLOOD PRESSURE: 128 MMHG | BODY MASS INDEX: 22.82 KG/M2 | HEART RATE: 106 BPM | DIASTOLIC BLOOD PRESSURE: 84 MMHG | HEIGHT: 65 IN | TEMPERATURE: 98.2 F

## 2022-08-01 DIAGNOSIS — M25.50 MULTIPLE JOINT PAIN: ICD-10-CM

## 2022-08-01 DIAGNOSIS — J30.1 SEASONAL ALLERGIC RHINITIS DUE TO POLLEN: ICD-10-CM

## 2022-08-01 DIAGNOSIS — R63.5 GAIN OF WEIGHT: ICD-10-CM

## 2022-08-01 DIAGNOSIS — I83.813 VARICOSE VEINS OF BOTH LOWER EXTREMITIES WITH PAIN: Primary | ICD-10-CM

## 2022-08-01 PROCEDURE — 99214 OFFICE O/P EST MOD 30 MIN: CPT | Performed by: NURSE PRACTITIONER

## 2022-08-01 RX ORDER — HYDROCODONE BITARTRATE AND ACETAMINOPHEN 10; 325 MG/1; MG/1
1 TABLET ORAL EVERY 6 HOURS PRN
Qty: 90 TABLET | Refills: 0 | Status: SHIPPED | OUTPATIENT
Start: 2022-08-01 | End: 2022-08-31 | Stop reason: SDUPTHER

## 2022-08-01 RX ORDER — PSEUDOEPHEDRINE HCL 120 MG/1
120 TABLET, FILM COATED, EXTENDED RELEASE ORAL EVERY 12 HOURS
Qty: 20 TABLET | Refills: 1 | Status: SHIPPED | OUTPATIENT
Start: 2022-08-01 | End: 2022-09-12

## 2022-08-01 RX ORDER — PHENTERMINE HYDROCHLORIDE 37.5 MG/1
37.5 TABLET ORAL
Qty: 30 TABLET | Refills: 0 | Status: SHIPPED | OUTPATIENT
Start: 2022-08-01 | End: 2022-08-31

## 2022-08-01 RX ORDER — CELECOXIB 100 MG/1
100 CAPSULE ORAL 2 TIMES DAILY PRN
Qty: 60 CAPSULE | Refills: 2 | Status: SHIPPED | OUTPATIENT
Start: 2022-08-01 | End: 2022-11-28 | Stop reason: SDUPTHER

## 2022-08-01 RX ORDER — CETIRIZINE HYDROCHLORIDE 10 MG/1
10 TABLET ORAL DAILY PRN
Qty: 30 TABLET | Refills: 5 | Status: SHIPPED | OUTPATIENT
Start: 2022-08-01 | End: 2023-01-26 | Stop reason: SDUPTHER

## 2022-08-01 NOTE — PROGRESS NOTES
Subjective   Rohini Burch is a 62 y.o. female.     Chief Complaint   Patient presents with   • Back Pain       History of Present Illness     Back pain-and leg pain.  Chronic and ongoing.  She is on celebrex 100 mg.  She does take PRN.  She is on Norco 10 mg TID PRN.  She continues to work as a  so she is cleaing, lifting, pulling, etc.  She also has to walk up and down multiple flights of stairs in the building which she works..   Overweight-continues to try to work on diet changes.   She is on Adipex but does not take daily.  She is not having any negative side effects.    Chronic allergic rhinitis-on zyrtec and PRN alaway allergy eye drops.  She has some orbital edema today.  She reports that her eyes are watering due to the irritation.  Headache-with swollen eyes.  She reports that the HA is primarily on the left.  She reports pressure and throbbing.  She reports that she has had the HA since yesterday.  She has been working in her garden.   She has rhinorrhea and itchy watering eyes.  Some sensation of her sinuses burning.   She has been taking Sudafed but reports that she is not getting long lasting relief. She reports that if she takes multiple doses during the day and she feels very jittery and shaky.   She reports that she has used it in the past without any negative side effects.      The following portions of the patient's history were reviewed and updated as appropriate: CC, ROS, allergies, current medications, past family history, past medical history, past social history, past surgical history and problem list.      Review of Systems   Constitutional: Positive for fatigue. Negative for activity change, appetite change and fever.   HENT: Negative for congestion, ear pain, nosebleeds, postnasal drip, rhinorrhea, sore throat, tinnitus, trouble swallowing and voice change.    Eyes: Negative for blurred vision, photophobia and visual disturbance.   Respiratory: Negative for cough, chest  "tightness, shortness of breath and wheezing.    Cardiovascular: Negative for chest pain, palpitations and leg swelling.   Gastrointestinal: Negative for abdominal pain, blood in stool, constipation, diarrhea, nausea, vomiting and GERD.   Endocrine: Negative for cold intolerance, heat intolerance and polydipsia.   Genitourinary: Negative for decreased urine volume, difficulty urinating, dysuria and hematuria.   Musculoskeletal: Positive for arthralgias, back pain and gait problem. Negative for myalgias.   Skin: Negative for color change, pallor and wound.   Allergic/Immunologic: Negative.    Neurological: Positive for headache. Negative for dizziness, syncope and numbness.   Hematological: Negative.    Psychiatric/Behavioral: Negative for decreased concentration, self-injury, sleep disturbance, suicidal ideas, depressed mood and stress. The patient is not nervous/anxious.    All other systems reviewed and are negative.      Objective     /84   Pulse 106   Temp 98.2 °F (36.8 °C)   Resp 20   Ht 165.1 cm (65\")   Wt 62.1 kg (137 lb)   SpO2 99%   Breastfeeding No   BMI 22.80 kg/m²     Physical Exam  Vitals reviewed.   Constitutional:       General: She is not in acute distress.     Appearance: She is well-developed. She is not diaphoretic.   HENT:      Head: Normocephalic and atraumatic.      Jaw: No tenderness.      Comments: Oropharynx not examined.  Patient is presently wearing a face covering/mask due to COVID-19 pandemic.     Right Ear: Hearing, tympanic membrane, ear canal and external ear normal.      Left Ear: Hearing, tympanic membrane, ear canal and external ear normal.   Eyes:      General: Lids are normal. Allergic shiner present. No scleral icterus.     Extraocular Movements:      Right eye: Normal extraocular motion and no nystagmus.      Left eye: Normal extraocular motion and no nystagmus.      Conjunctiva/sclera:      Right eye: Right conjunctiva is injected.      Left eye: Left conjunctiva " is injected.      Pupils: Pupils are equal, round, and reactive to light.      Comments: Mild periorbital edema   Neck:      Thyroid: No thyromegaly or thyroid tenderness.      Vascular: No carotid bruit or JVD.      Trachea: No tracheal tenderness.   Cardiovascular:      Rate and Rhythm: Normal rate and regular rhythm.      Pulses:           Dorsalis pedis pulses are 2+ on the right side and 2+ on the left side.        Posterior tibial pulses are 2+ on the right side and 2+ on the left side.      Heart sounds: Normal heart sounds, S1 normal and S2 normal. No murmur heard.  Pulmonary:      Effort: Pulmonary effort is normal. No accessory muscle usage, prolonged expiration or respiratory distress.      Breath sounds: Normal breath sounds.   Chest:      Chest wall: No tenderness.   Abdominal:      General: Bowel sounds are normal. There is no distension.      Palpations: Abdomen is soft. There is no mass.      Tenderness: There is no abdominal tenderness.   Musculoskeletal:         General: Tenderness present.      Cervical back: Normal range of motion and neck supple.      Lumbar back: Spasms and tenderness present.      Right lower leg: Tenderness present. No edema.      Left lower leg: Tenderness present. No edema.      Comments: No muscular atrophy or flaccidity.   Lymphadenopathy:      Head:      Right side of head: No submental or submandibular adenopathy.      Left side of head: No submental or submandibular adenopathy.      Cervical: No cervical adenopathy.      Right cervical: No superficial cervical adenopathy.     Left cervical: No superficial cervical adenopathy.   Skin:     General: Skin is warm and dry.      Capillary Refill: Capillary refill takes less than 2 seconds.      Coloration: Skin is not jaundiced or pale.      Findings: No erythema.      Nails: There is no clubbing.      Comments: Multiple varicosities to BLE   Neurological:      Mental Status: She is alert and oriented to person, place, and  time.      Cranial Nerves: No cranial nerve deficit or facial asymmetry.      Sensory: No sensory deficit.      Motor: No weakness, tremor, atrophy or abnormal muscle tone.      Coordination: Coordination normal.      Deep Tendon Reflexes: Reflexes are normal and symmetric.   Psychiatric:         Attention and Perception: She is attentive.         Mood and Affect: Mood normal.         Speech: Speech normal.         Behavior: Behavior normal. Behavior is cooperative.         Thought Content: Thought content normal.         Judgment: Judgment normal.           Diagnoses and all orders for this visit:    1. Varicose veins of both lower extremities with pain (Primary)  Assessment & Plan:  Continue conservative management.  Avoid overuse activities such as excessive walking or climbing      2. Gain of weight  Comments:  continue to work on diet changes.  Continue Adipex  Orders:  -     phentermine (ADIPEX-P) 37.5 MG tablet; Take 1 tablet by mouth Every Morning Before Breakfast.  Dispense: 30 tablet; Refill: 0    3. Multiple joint pain  Comments:  Avoid overuse activity.  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    Assessment & Plan:  Continue to be active.  Avoid overuse activities  Continue PRN Norco and celebrex as directed    Orders:  -     celecoxib (CeleBREX) 100 MG capsule; Take 1 capsule by mouth 2 (Two) Times a Day As Needed for Mild Pain .  Dispense: 60 capsule; Refill: 2  -     HYDROcodone-acetaminophen (Norco)  MG per tablet; Take 1 tablet by mouth Every 6 (Six) Hours As Needed for Moderate Pain .  Dispense: 90 tablet; Refill: 0    4. Seasonal allergic rhinitis due to pollen  Assessment & Plan:  Continue Zyrtec.  Continue eyedrops as needed.  Avoid known allergy and respiratory triggers.  May use Saline spray PRN as desired  Will add higher dose of Sudafed for symptoms    Orders:  -     cetirizine (zyrTEC) 10 MG tablet; Take 1 tablet by mouth Daily As  Needed for Allergies.  Dispense: 30 tablet; Refill: 5  -     pseudoephedrine (Sudafed 12 Hour) 120 MG 12 hr tablet; Take 1 tablet by mouth Every 12 (Twelve) Hours.  Dispense: 20 tablet; Refill: 1       BMI is within normal parameters. No other follow-up for BMI required.       Understands disease processes and need for medications.  Understands reasons for urgent and emergent care.  Patient (& family) verbalized agreement for treatment plan.   Emotional support and active listening provided.  Patient provided time to verbalize feelings.    MICHELLE/BELLE reviewed today and consistent.  Will refill prescribed controlled medication today.  Patient is aware they cannot receive narcotics from any other provider except if under care of pain management or speciality clinic.  Risk and benefits of medication use has been reviewed.  History and physical exam exhibit continued safe and appropriate use of controlled substances.  The patient is aware of the potential for addiction and dependence.  This patient has been made aware of the appropriate use of such medications, including potential risk of somnolence, limited ability to drive and / or work safely, and potential for overdose.    It has also been made clear that these medications are for use by this patient only, without concomitant use of alcohol or other substances unless prescribed/advised by medical provider.  Patient understands they may be subject to UDS and pill counts at random.      Patient considered to be low risk for addiction due to use of single controlled medications.  Patient understands and accepts these risks.  Patient need for medication will be reassessed at each visit.  Doses will be adjusted according to patient need and findings.    Goal of TX: Patient will not have any adverse reactions of medication.  Patient have reduction in pain symptoms with use of PRN Norco as directed.  Patient will not have any adverse side effects from medication.  Patient  will be able to remain active in an outside of home without interference from pain symptoms.    Medication Dispense Information    Phentermine Hcl   Dispensed: 6/27/2022 12:00 AM   Written:  6/27/2022   Unit strength: 37.5MG   Days supply: 30   Dispense Note: GivenName=Gris=LEONARELBirthDate=[id number] , [place], [place], [address]   Quantity: 30 each   Pharmacy: [facility]   Authorizing provider: JOHN HOLLEY   Received from: [facility] (Fill History)   Brand or Generic:       Medication Dispense Information    Hydrocodone Bitartrate/Ac   Dispensed: 6/27/2022 12:00 AM   Written:  6/27/2022   Unit strength: 325MG/10MG   Days supply: 23   Dispense Note: GivenName=Gris=BRETTBirthDate=[id number]=[id number] , [place], [place], [address]   Quantity: 90 each   Pharmacy: [facility]   Authorizing provider: JOHN HOLLEY   Received from: [facility] (Fill History)   Brand or Generic:       RTC 1 month, sooner if needed.           This document has been electronically signed by:  STEVE Gonsales FNP-C Dragon disclaimer:  Part of this note may be an electronic transcription/translation of spoken language to printed text using the Dragon Dictation System.

## 2022-08-01 NOTE — ASSESSMENT & PLAN NOTE
Continue Zyrtec.  Continue eyedrops as needed.  Avoid known allergy and respiratory triggers.  May use Saline spray PRN as desired  Will add higher dose of Sudafed for symptoms

## 2022-08-04 ENCOUNTER — TELEPHONE (OUTPATIENT)
Dept: FAMILY MEDICINE CLINIC | Facility: CLINIC | Age: 62
End: 2022-08-04

## 2022-08-04 RX ORDER — AMOXICILLIN AND CLAVULANATE POTASSIUM 875; 125 MG/1; MG/1
1 TABLET, FILM COATED ORAL 2 TIMES DAILY
Qty: 20 TABLET | Refills: 0 | Status: SHIPPED | OUTPATIENT
Start: 2022-08-04 | End: 2022-08-31

## 2022-08-04 NOTE — TELEPHONE ENCOUNTER
----- Message from STEVE Gonsales sent at 8/4/2022  8:26 AM EDT -----  Sent    ----- Message -----  From: Haydee New RegSched Rep  Sent: 8/4/2022   8:04 AM EDT  To: STEVE Gonsales    Requesting an antibiotic  sever sinus infection     Jagdeep

## 2022-08-29 ENCOUNTER — HOSPITAL ENCOUNTER (EMERGENCY)
Facility: HOSPITAL | Age: 62
Discharge: LEFT BEFORE DISCHARGE | End: 2022-08-29
Admitting: EMERGENCY MEDICINE

## 2022-08-29 ENCOUNTER — TELEPHONE (OUTPATIENT)
Dept: PSYCHIATRY | Facility: CLINIC | Age: 62
End: 2022-08-29

## 2022-08-29 VITALS
SYSTOLIC BLOOD PRESSURE: 120 MMHG | RESPIRATION RATE: 18 BRPM | BODY MASS INDEX: 23.32 KG/M2 | HEIGHT: 65 IN | OXYGEN SATURATION: 98 % | TEMPERATURE: 97.8 F | DIASTOLIC BLOOD PRESSURE: 81 MMHG | HEART RATE: 97 BPM | WEIGHT: 140 LBS

## 2022-08-29 LAB
ALBUMIN SERPL-MCNC: 4.78 G/DL (ref 3.5–5.2)
ALBUMIN/GLOB SERPL: 1.6 G/DL
ALP SERPL-CCNC: 57 U/L (ref 39–117)
ALT SERPL W P-5'-P-CCNC: 9 U/L (ref 1–33)
ANION GAP SERPL CALCULATED.3IONS-SCNC: 12.1 MMOL/L (ref 5–15)
AST SERPL-CCNC: 12 U/L (ref 1–32)
BACTERIA UR QL AUTO: ABNORMAL /HPF
BASOPHILS # BLD AUTO: 0.06 10*3/MM3 (ref 0–0.2)
BASOPHILS NFR BLD AUTO: 0.6 % (ref 0–1.5)
BILIRUB SERPL-MCNC: 0.4 MG/DL (ref 0–1.2)
BILIRUB UR QL STRIP: ABNORMAL
BUN SERPL-MCNC: 32 MG/DL (ref 8–23)
BUN/CREAT SERPL: 30.5 (ref 7–25)
CALCIUM SPEC-SCNC: 9.7 MG/DL (ref 8.6–10.5)
CHLORIDE SERPL-SCNC: 100 MMOL/L (ref 98–107)
CLARITY UR: ABNORMAL
CO2 SERPL-SCNC: 25.9 MMOL/L (ref 22–29)
COLOR UR: ABNORMAL
CREAT SERPL-MCNC: 1.05 MG/DL (ref 0.57–1)
DEPRECATED RDW RBC AUTO: 45.4 FL (ref 37–54)
EGFRCR SERPLBLD CKD-EPI 2021: 60.6 ML/MIN/1.73
EOSINOPHIL # BLD AUTO: 0.02 10*3/MM3 (ref 0–0.4)
EOSINOPHIL NFR BLD AUTO: 0.2 % (ref 0.3–6.2)
ERYTHROCYTE [DISTWIDTH] IN BLOOD BY AUTOMATED COUNT: 12 % (ref 12.3–15.4)
GLOBULIN UR ELPH-MCNC: 2.9 GM/DL
GLUCOSE SERPL-MCNC: 84 MG/DL (ref 65–99)
GLUCOSE UR STRIP-MCNC: ABNORMAL MG/DL
HCT VFR BLD AUTO: 44.5 % (ref 34–46.6)
HGB BLD-MCNC: 14.8 G/DL (ref 12–15.9)
HGB UR QL STRIP.AUTO: NEGATIVE
HOLD SPECIMEN: NORMAL
HOLD SPECIMEN: NORMAL
HYALINE CASTS UR QL AUTO: ABNORMAL /LPF
IMM GRANULOCYTES # BLD AUTO: 0.03 10*3/MM3 (ref 0–0.05)
IMM GRANULOCYTES NFR BLD AUTO: 0.3 % (ref 0–0.5)
KETONES UR QL STRIP: ABNORMAL
LEUKOCYTE ESTERASE UR QL STRIP.AUTO: ABNORMAL
LYMPHOCYTES # BLD AUTO: 3.41 10*3/MM3 (ref 0.7–3.1)
LYMPHOCYTES NFR BLD AUTO: 35.9 % (ref 19.6–45.3)
MCH RBC QN AUTO: 34.4 PG (ref 26.6–33)
MCHC RBC AUTO-ENTMCNC: 33.3 G/DL (ref 31.5–35.7)
MCV RBC AUTO: 103.5 FL (ref 79–97)
MONOCYTES # BLD AUTO: 0.93 10*3/MM3 (ref 0.1–0.9)
MONOCYTES NFR BLD AUTO: 9.8 % (ref 5–12)
NEUTROPHILS NFR BLD AUTO: 5.06 10*3/MM3 (ref 1.7–7)
NEUTROPHILS NFR BLD AUTO: 53.2 % (ref 42.7–76)
NITRITE UR QL STRIP: NEGATIVE
NRBC BLD AUTO-RTO: 0 /100 WBC (ref 0–0.2)
PH UR STRIP.AUTO: 5.5 [PH] (ref 5–8)
PLATELET # BLD AUTO: 351 10*3/MM3 (ref 140–450)
PMV BLD AUTO: 9.4 FL (ref 6–12)
POTASSIUM SERPL-SCNC: 4.1 MMOL/L (ref 3.5–5.2)
PROT SERPL-MCNC: 7.7 G/DL (ref 6–8.5)
PROT UR QL STRIP: ABNORMAL
RBC # BLD AUTO: 4.3 10*6/MM3 (ref 3.77–5.28)
RBC # UR STRIP: ABNORMAL /HPF
REF LAB TEST METHOD: ABNORMAL
SODIUM SERPL-SCNC: 138 MMOL/L (ref 136–145)
SP GR UR STRIP: >1.03 (ref 1–1.03)
SQUAMOUS #/AREA URNS HPF: ABNORMAL /HPF
UROBILINOGEN UR QL STRIP: ABNORMAL
WBC # UR STRIP: ABNORMAL /HPF
WBC NRBC COR # BLD: 9.51 10*3/MM3 (ref 3.4–10.8)
WHOLE BLOOD HOLD COAG: NORMAL
WHOLE BLOOD HOLD SPECIMEN: NORMAL

## 2022-08-29 PROCEDURE — 82306 VITAMIN D 25 HYDROXY: CPT | Performed by: NURSE PRACTITIONER

## 2022-08-29 PROCEDURE — 99282 EMERGENCY DEPT VISIT SF MDM: CPT

## 2022-08-29 PROCEDURE — 80061 LIPID PANEL: CPT | Performed by: NURSE PRACTITIONER

## 2022-08-29 PROCEDURE — 84436 ASSAY OF TOTAL THYROXINE: CPT | Performed by: NURSE PRACTITIONER

## 2022-08-29 PROCEDURE — 80050 GENERAL HEALTH PANEL: CPT | Performed by: PHYSICIAN ASSISTANT

## 2022-08-29 PROCEDURE — 36415 COLL VENOUS BLD VENIPUNCTURE: CPT | Performed by: PHYSICIAN ASSISTANT

## 2022-08-29 PROCEDURE — 81001 URINALYSIS AUTO W/SCOPE: CPT | Performed by: PHYSICIAN ASSISTANT

## 2022-08-29 NOTE — TELEPHONE ENCOUNTER
"Called [facility] and made them aware patient was coming by private vehicle.     Patient was seen at [facility] ER for hypertension and \"the worst head ache of her life\" out of no where. CT at [facility] showed \"Prominent symmetrical perivascular spaces versus remote lacunar infarctions within the basal ganglia\".   Patient has since continued to be hypertensive, increased weakness, confusion, facial and neck swelling. Upon seeing records and hearing patient's worsening symptoms STEVE King stated patient needed to be taken immediately to ER for evaluation due to heightened possibility of stroke.   "

## 2022-08-30 ENCOUNTER — HOSPITAL ENCOUNTER (OUTPATIENT)
Dept: MRI IMAGING | Facility: HOSPITAL | Age: 62
Discharge: HOME | End: 2022-08-30

## 2022-08-30 ENCOUNTER — HOSPITAL ENCOUNTER (OUTPATIENT)
Dept: CARDIOLOGY | Facility: HOSPITAL | Age: 62
Discharge: HOME | End: 2022-08-30

## 2022-08-30 DIAGNOSIS — Z13.29 THYROID DISORDER SCREENING: ICD-10-CM

## 2022-08-30 DIAGNOSIS — R51.9 SUDDEN ONSET OF SEVERE HEADACHE: ICD-10-CM

## 2022-08-30 DIAGNOSIS — R53.1 ACUTE LEFT-SIDED WEAKNESS: ICD-10-CM

## 2022-08-30 DIAGNOSIS — R22.0 FACIAL SWELLING: ICD-10-CM

## 2022-08-30 DIAGNOSIS — R51.9 SUDDEN ONSET OF SEVERE HEADACHE: Primary | ICD-10-CM

## 2022-08-30 DIAGNOSIS — E55.9 VITAMIN D DEFICIENCY: ICD-10-CM

## 2022-08-30 DIAGNOSIS — Z13.220 SCREENING CHOLESTEROL LEVEL: Primary | ICD-10-CM

## 2022-08-30 LAB
CHOLEST SERPL-MCNC: 213 MG/DL (ref 0–200)
HDLC SERPL-MCNC: 63 MG/DL (ref 40–60)
LDLC SERPL CALC-MCNC: 116 MG/DL (ref 0–100)
LDLC/HDLC SERPL: 1.76 {RATIO}
TRIGL SERPL-MCNC: 195 MG/DL (ref 0–150)
TSH SERPL DL<=0.05 MIU/L-ACNC: 0.57 UIU/ML (ref 0.27–4.2)
VLDLC SERPL-MCNC: 34 MG/DL (ref 5–40)

## 2022-08-30 PROCEDURE — 93880 EXTRACRANIAL BILAT STUDY: CPT

## 2022-08-30 PROCEDURE — 93880 EXTRACRANIAL BILAT STUDY: CPT | Performed by: RADIOLOGY

## 2022-08-30 PROCEDURE — 70551 MRI BRAIN STEM W/O DYE: CPT

## 2022-08-30 PROCEDURE — 70551 MRI BRAIN STEM W/O DYE: CPT | Performed by: RADIOLOGY

## 2022-08-31 ENCOUNTER — OFFICE VISIT (OUTPATIENT)
Dept: FAMILY MEDICINE CLINIC | Facility: CLINIC | Age: 62
End: 2022-08-31

## 2022-08-31 VITALS
DIASTOLIC BLOOD PRESSURE: 86 MMHG | HEIGHT: 65 IN | TEMPERATURE: 98 F | OXYGEN SATURATION: 98 % | WEIGHT: 138.6 LBS | SYSTOLIC BLOOD PRESSURE: 136 MMHG | BODY MASS INDEX: 23.09 KG/M2 | HEART RATE: 102 BPM | RESPIRATION RATE: 20 BRPM

## 2022-08-31 DIAGNOSIS — M25.50 MULTIPLE JOINT PAIN: ICD-10-CM

## 2022-08-31 DIAGNOSIS — I83.813 VARICOSE VEINS OF BOTH LOWER EXTREMITIES WITH PAIN: ICD-10-CM

## 2022-08-31 DIAGNOSIS — M54.81 OCCIPITAL NEURALGIA OF LEFT SIDE: Primary | ICD-10-CM

## 2022-08-31 DIAGNOSIS — I67.9 SMALL VESSEL DISEASE, CEREBROVASCULAR: ICD-10-CM

## 2022-08-31 DIAGNOSIS — J30.1 SEASONAL ALLERGIC RHINITIS DUE TO POLLEN: ICD-10-CM

## 2022-08-31 LAB
25(OH)D3 SERPL-MCNC: 33.3 NG/ML (ref 30–100)
T4 SERPL-MCNC: 7.25 MCG/DL (ref 4.5–11.7)

## 2022-08-31 PROCEDURE — 99214 OFFICE O/P EST MOD 30 MIN: CPT | Performed by: NURSE PRACTITIONER

## 2022-08-31 RX ORDER — PREDNISONE 20 MG/1
TABLET ORAL
Qty: 6 TABLET | Refills: 0 | Status: SHIPPED | OUTPATIENT
Start: 2022-08-31 | End: 2022-09-06 | Stop reason: SDUPTHER

## 2022-08-31 RX ORDER — HYDROCODONE BITARTRATE AND ACETAMINOPHEN 10; 325 MG/1; MG/1
1 TABLET ORAL EVERY 6 HOURS PRN
Qty: 90 TABLET | Refills: 0 | Status: SHIPPED | OUTPATIENT
Start: 2022-08-31 | End: 2022-09-27 | Stop reason: SDUPTHER

## 2022-08-31 RX ORDER — ROSUVASTATIN CALCIUM 20 MG/1
20 TABLET, COATED ORAL DAILY
Qty: 30 TABLET | Refills: 5 | Status: SHIPPED | OUTPATIENT
Start: 2022-08-31 | End: 2023-01-26 | Stop reason: SDUPTHER

## 2022-08-31 RX ORDER — CLOPIDOGREL BISULFATE 75 MG/1
75 TABLET ORAL DAILY
Qty: 30 TABLET | Refills: 5 | Status: SHIPPED | OUTPATIENT
Start: 2022-08-31 | End: 2023-01-26 | Stop reason: SDUPTHER

## 2022-08-31 NOTE — PROGRESS NOTES
Subjective   Rohini Burch is a 62 y.o. female.     Chief Complaint   Patient presents with   • Imaging follow up       History of Present Illness     Imaging follow-up- for worst headache patient has ever had.  She reports on Saturday 8/27/22.  became progressive over the day.  Reports that she got up with mild HA but around noon she noted some sharp pain in her neck and radiating into her left side of face with ear pain.  She reports that she noted a sensation of significant burning in her head and face in the front then she reports she does not recall even going to the [facility] ED. She reports on the Friday at work she noted that her left eye was red and watering.  She began to have some vision changes as well as facial edema.  Initial CT [facility] On August 27, 2022 showed prominent symmetrical perivascular spaces versus remote low nuclear infarcts within the basal ganglia.  Her blood pressure was also noted to be significantly elevated.   She continued to have significant head pain and was advised to go back to the ED.  The wait was extensive so she choose not to wait and had imaging yesterday.  Noted to have chronic Microangiopathic changes on her MRI.    Allergies-ongoing.  Patient is currently taking Zyrtec and using Alaway eyedrops.  She is using Sudafed only as needed.  She reports that it can increases her feeling of shakiness so she does not like taking unless she has to have it.  Leg pain and back pain- ongoing.  Patient is currently taking Celebrex 200 mg up to twice daily and as needed hydrocodone 10 mg.  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  She has multiple varicose veins in her BLE.  She reports that those are at times very painful if she has to stand or walk for distance.  No negative side effects of medication.      The following portions of the patient's history were reviewed and updated as  "appropriate: CC, ROS, allergies, current medications, past family history, past medical history, past social history, past surgical history and problem list.    Review of Systems   Constitutional: Positive for fatigue. Negative for activity change, appetite change and fever.   HENT: Negative for congestion, ear pain, nosebleeds, postnasal drip, rhinorrhea, sore throat, tinnitus, trouble swallowing and voice change.    Eyes: Positive for photophobia. Negative for blurred vision and visual disturbance.   Respiratory: Negative for cough, chest tightness, shortness of breath and wheezing.    Cardiovascular: Negative for chest pain, palpitations and leg swelling.   Gastrointestinal: Negative for abdominal pain, blood in stool, constipation, diarrhea, nausea and GERD.   Endocrine: Negative for cold intolerance, heat intolerance and polydipsia.   Genitourinary: Negative for decreased urine volume, difficulty urinating, dysuria and hematuria.   Musculoskeletal: Positive for arthralgias, back pain and myalgias. Negative for gait problem.   Skin: Negative for color change, pallor and wound.   Allergic/Immunologic: Negative.    Neurological: Positive for dizziness and headache. Negative for syncope and numbness.   Hematological: Negative.    Psychiatric/Behavioral: Negative for decreased concentration, self-injury, sleep disturbance, suicidal ideas and depressed mood. The patient is not nervous/anxious.    All other systems reviewed and are negative.      Objective     /86   Pulse 102   Temp 98 °F (36.7 °C)   Resp 20   Ht 165.1 cm (65\")   Wt 62.9 kg (138 lb 9.6 oz)   SpO2 98%   Breastfeeding No   BMI 23.06 kg/m²     Physical Exam  Vitals reviewed.   Constitutional:       General: She is not in acute distress.     Appearance: She is well-developed. She is not diaphoretic.   HENT:      Head: Normocephalic and atraumatic.      Jaw: No tenderness.      Comments: Oropharynx not examined.  Patient is presently wearing " a face covering/mask due to COVID-19 pandemic.     Right Ear: Hearing, tympanic membrane, ear canal and external ear normal.      Left Ear: Hearing, tympanic membrane, ear canal and external ear normal.   Eyes:      General: Lids are normal. No scleral icterus.     Extraocular Movements:      Right eye: Normal extraocular motion and no nystagmus.      Left eye: Normal extraocular motion and no nystagmus.      Conjunctiva/sclera: Conjunctivae normal.      Pupils: Pupils are equal, round, and reactive to light.      Comments: Mild periorbital edema worse on lower lids   Neck:      Thyroid: No thyromegaly or thyroid tenderness.      Vascular: No carotid bruit or JVD.      Trachea: No tracheal tenderness.      Comments: Significant tenderness on the left lower side of the skull at the base.  Consistent with occipital region.  Pain reported with palpation over occipital nerve  Cardiovascular:      Rate and Rhythm: Normal rate and regular rhythm.      Pulses:           Dorsalis pedis pulses are 2+ on the right side and 2+ on the left side.        Posterior tibial pulses are 2+ on the right side and 2+ on the left side.      Heart sounds: Normal heart sounds, S1 normal and S2 normal. No murmur heard.  Pulmonary:      Effort: Pulmonary effort is normal. No accessory muscle usage, prolonged expiration or respiratory distress.      Breath sounds: Normal breath sounds.   Chest:      Chest wall: No tenderness.   Abdominal:      General: Bowel sounds are normal. There is no distension.      Palpations: Abdomen is soft. There is no mass.      Tenderness: There is no abdominal tenderness.   Musculoskeletal:      Cervical back: Neck supple. Pain with movement, spinous process tenderness and muscular tenderness present. Decreased range of motion.      Lumbar back: Tenderness present. Decreased range of motion.      Right lower leg: No edema.      Left lower leg: No edema.      Comments: No muscular atrophy or flaccidity.    Lymphadenopathy:      Head:      Right side of head: No submental or submandibular adenopathy.      Left side of head: No submental or submandibular adenopathy.      Cervical: No cervical adenopathy.      Right cervical: No superficial cervical adenopathy.     Left cervical: No superficial cervical adenopathy.   Skin:     General: Skin is warm and dry.      Capillary Refill: Capillary refill takes less than 2 seconds.      Coloration: Skin is not jaundiced or pale.      Findings: No erythema.      Nails: There is no clubbing.      Comments: Multiple BLE varicosities and bulging veins   Neurological:      Mental Status: She is alert and oriented to person, place, and time.      Cranial Nerves: No cranial nerve deficit or facial asymmetry.      Sensory: No sensory deficit.      Motor: No weakness, tremor, atrophy or abnormal muscle tone.      Coordination: Coordination normal.      Deep Tendon Reflexes: Reflexes are normal and symmetric.   Psychiatric:         Attention and Perception: She is attentive.         Mood and Affect: Mood normal.         Speech: Speech normal.         Behavior: Behavior normal. Behavior is cooperative.         Thought Content: Thought content normal.         Judgment: Judgment normal.           Diagnoses and all orders for this visit:    1. Occipital neuralgia of left side (Primary)  Comments:  Continue conservative management at this time.  Patient to report if symptoms increase again.  Prednisone given x3 days.  Sample of Ubrelvy 100 given.  Orders:  -     predniSONE (DELTASONE) 20 MG tablet; 2 daily  Dispense: 6 tablet; Refill: 0  -     ubrogepant (ubrogepant) 100 MG tablet; Take 1 tablet by mouth 1 (One) Time for 1 dose. [id number] exp 07/2023  Dispense: 2 tablet; Refill: 0    2. Multiple joint pain  Comments:  Avoid overuse activity.  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    Orders:  -     HYDROcodone-acetaminophen (Norco)   MG per tablet; Take 1 tablet by mouth Every 6 (Six) Hours As Needed for Moderate Pain.  Dispense: 90 tablet; Refill: 0    3. Varicose veins of both lower extremities with pain  Assessment & Plan:  Continue to be active.  Avoid overuse activities  Continue PRN Norco and celebrex as directed      4. Seasonal allergic rhinitis due to pollen  Assessment & Plan:  Continue Zyrtec.  Continue eyedrops as needed.  Avoid known allergy and respiratory triggers.  May use Saline spray PRN as desired  Will add higher dose of Sudafed for symptoms      5. Small vessel disease, cerebrovascular  -     clopidogrel (Plavix) 75 MG tablet; Take 1 tablet by mouth Daily.  Dispense: 30 tablet; Refill: 5  -     rosuvastatin (Crestor) 20 MG tablet; Take 1 tablet by mouth Daily.  Dispense: 30 tablet; Refill: 5       BMI is within normal parameters. No other follow-up for BMI required.       Understands disease processes and need for medications.  Understands reasons for urgent and emergent care.  Patient (& family) verbalized agreement for treatment plan.   Emotional support and active listening provided.  Patient provided time to verbalize feelings.    Reviewed most recent labs with patient.  Discussed any abnormal findings.  Patient's questions answered.  Reviewed most recent MRI's with patient.  Discussed any abnormal findings.  Patient's questions answered.  Reviewed carotid Doppler.  Negative for stenosis or blockages    Discussed with patient no further weight loss medication    MICHELLE reviewed today and consistent.  Will refill prescribed controlled medication today.  Patient is aware they cannot receive narcotics from any other provider except if under care of pain management or speciality clinic.  Risk and benefits of medication use has been reviewed.  History and physical exam exhibit continued safe and appropriate use of controlled substances.  The patient is aware of the potential for addiction and dependence.  This patient has  been made aware of the appropriate use of such medications, including potential risk of somnolence, limited ability to drive and / or work safely, and potential for overdose.  Patient understands not to take medication and drive until they know how medication may affect their cognition/decision making.   It has also been made clear that these medications are for use by this patient only, without concomitant use of alcohol or other substances unless prescribed/advised by medical provider.  Patient understands they may be subject to UDS and pill counts at random.      Patient considered to be low risk for addiction due to use of single controlled medications.  Patient understands and accepts these risks.  Patient need for medication will be reassessed at each visit.  Doses will be adjusted according to patient need and findings.    Goal of [place]: Patient will not have any adverse reactions of medication.  Patient have reduction in pain symptoms with use of PRN Norco as directed.  Patient will not have any adverse side effects from medication.  Patient will be able to remain active in an outside of home without interference from pain symptoms.      Medication Dispense Information    Hydrocodone Bitartrate/Ac   Dispensed: 8/1/2022 12:00 AM   Written:  8/1/2022   Unit strength: 325MG/10MG   Days supply: 23   Dispense Note: GivenName=DENNYSSurName=GAMBRELBirthDate=[id number]=[address] [place], [place], [address]   Quantity: 90 each   Pharmacy: [facility]   Authorizing provider: JOHN HOLLEY   Received from: MICHELLE PDMP (Fill History)   Brand or Generic:       RTC 1 month, sooner if needed for problems or concerns            This document has been electronically signed by:  STEVE Gonsales, FNP-C    Dragon disclaimer:  Part of this note may be an electronic transcription/translation of spoken language to printed text using the Dragon Dictation System.

## 2022-09-06 DIAGNOSIS — M54.81 OCCIPITAL NEURALGIA OF LEFT SIDE: ICD-10-CM

## 2022-09-06 RX ORDER — PREDNISONE 20 MG/1
TABLET ORAL
Qty: 6 TABLET | Refills: 0 | Status: SHIPPED | OUTPATIENT
Start: 2022-09-06 | End: 2022-09-12

## 2022-09-12 ENCOUNTER — PROCEDURE VISIT (OUTPATIENT)
Dept: FAMILY MEDICINE CLINIC | Facility: CLINIC | Age: 62
End: 2022-09-12

## 2022-09-12 ENCOUNTER — CLINICAL SUPPORT (OUTPATIENT)
Dept: FAMILY MEDICINE CLINIC | Facility: CLINIC | Age: 62
End: 2022-09-12

## 2022-09-12 ENCOUNTER — OFFICE VISIT (OUTPATIENT)
Dept: FAMILY MEDICINE CLINIC | Facility: CLINIC | Age: 62
End: 2022-09-12

## 2022-09-12 VITALS — SYSTOLIC BLOOD PRESSURE: 200 MMHG | DIASTOLIC BLOOD PRESSURE: 100 MMHG

## 2022-09-12 VITALS
BODY MASS INDEX: 22.99 KG/M2 | HEART RATE: 95 BPM | TEMPERATURE: 98.2 F | OXYGEN SATURATION: 95 % | SYSTOLIC BLOOD PRESSURE: 190 MMHG | DIASTOLIC BLOOD PRESSURE: 100 MMHG | HEIGHT: 65 IN | WEIGHT: 138 LBS

## 2022-09-12 DIAGNOSIS — I10 UNCONTROLLED HYPERTENSION: Primary | ICD-10-CM

## 2022-09-12 DIAGNOSIS — R51.9 ACUTE NONINTRACTABLE HEADACHE, UNSPECIFIED HEADACHE TYPE: ICD-10-CM

## 2022-09-12 DIAGNOSIS — M54.81 OCCIPITAL NEURALGIA OF LEFT SIDE: Primary | ICD-10-CM

## 2022-09-12 DIAGNOSIS — M54.81 OCCIPITAL NEURALGIA OF LEFT SIDE: ICD-10-CM

## 2022-09-12 PROCEDURE — 99214 OFFICE O/P EST MOD 30 MIN: CPT | Performed by: PHYSICIAN ASSISTANT

## 2022-09-12 PROCEDURE — 64405 NJX AA&/STRD GR OCPL NRV: CPT | Performed by: NURSE PRACTITIONER

## 2022-09-12 RX ORDER — LIDOCAINE HYDROCHLORIDE 10 MG/ML
1.5 INJECTION, SOLUTION INFILTRATION; PERINEURAL ONCE
Status: COMPLETED | OUTPATIENT
Start: 2022-09-12 | End: 2022-09-12

## 2022-09-12 RX ORDER — CLONIDINE HYDROCHLORIDE 0.1 MG/1
0.1 TABLET ORAL 3 TIMES DAILY PRN
Qty: 60 TABLET | Refills: 1 | Status: SHIPPED | OUTPATIENT
Start: 2022-09-12

## 2022-09-12 RX ORDER — CLONIDINE HYDROCHLORIDE 0.1 MG/1
0.1 TABLET ORAL ONCE
Status: COMPLETED | OUTPATIENT
Start: 2022-09-12 | End: 2022-09-12

## 2022-09-12 RX ORDER — TRIAMCINOLONE ACETONIDE 40 MG/ML
20 INJECTION, SUSPENSION INTRA-ARTICULAR; INTRAMUSCULAR ONCE
Status: COMPLETED | OUTPATIENT
Start: 2022-09-12 | End: 2022-09-12

## 2022-09-12 RX ORDER — ATOGEPANT 60 MG/1
60 TABLET ORAL DAILY PRN
Qty: 12 TABLET | Refills: 0 | COMMUNITY
Start: 2022-09-12

## 2022-09-12 RX ADMIN — LIDOCAINE HYDROCHLORIDE 1.5 ML: 10 INJECTION, SOLUTION INFILTRATION; PERINEURAL at 18:11

## 2022-09-12 RX ADMIN — CLONIDINE HYDROCHLORIDE 0.1 MG: 0.1 TABLET ORAL at 13:13

## 2022-09-12 RX ADMIN — TRIAMCINOLONE ACETONIDE 20 MG: 40 INJECTION, SUSPENSION INTRA-ARTICULAR; INTRAMUSCULAR at 18:12

## 2022-09-12 NOTE — PROGRESS NOTES
Subjective        Chief Complaint  Headache    Subjective      History of Present Illness  Rohini Burch is a 62 y.o. female who presents today to [facility] for complaints of headache and elevated BP. Past medical history is significant for seasonal allergies, varicose veins, and recent onset of headaches.    Headaches:  Occipital neuralgia:   Evidence of prior lacunar infarcts on imaging:   Ms. Nur was recently seen in the ED of [facility] on 8/27/2022 with complaints of having the worst headache of her life. She was found to be hypertensive with /118.  She had a CT head without contrast which showed no acute process.  There were prominent symmetrical perivascular spaces versus remote lacunar infarcts within the basal ganglia.  She followed up in the office and due to the CT scan showing evidence of possible remote lacunar infarcts with recurrent significant headache, she was sent to the ED of [facility] for further evaluation.  Due to excessive wait time, she did end up leaving prior to further evaluation.  Outpatient MRI of the brain without contrast was obtained and showed no acute parenchymal mass, hemorrhage, or midline shift.  Increased T2 and FLAIR signal in the periventricular and parietal white matter probably chronic microangiopathic changes, but demyelinating process can have a similar appearance.  Bilateral carotid doppler showed no significant stenosis.    She was seen back in the office on 8/31/2022.  She was started on Plavix 75 mg daily, Crestor 20 mg daily, prednisone 20 mg 2 tablets daily x3 days, and she was given samples of Ubrogepant. She reports initial improvement in her symptoms. She did end up having to take the ubrogepant with improvement reported, however, she has ran out of samples. On this encounter, she reports that her symptoms began about 4 days ago with gradual increase. She reports left side headache associated  with left-sided facial pain, some runny nose, as well as watering and puffiness of the left eye.  She denies any associated numbness or tingling sensation.  No difficulties with speech.  No acute vision changes.  No unilateral weakness.  This is similar to her recent headaches, but not yet as bad.    Uncontrolled HTN:   She denies any known history of high BP.  Intermittent headaches do seem to have association with elevated blood pressure, however, it is difficult to say which comes first.  She has monitored her BP intermittently in the past, but has not recently been keeping track of it. BP in the office today is 200/100.      Current Outpatient Medications:   •  celecoxib (CeleBREX) 100 MG capsule, Take 1 capsule by mouth 2 (Two) Times a Day As Needed for Mild Pain ., Disp: 60 capsule, Rfl: 2  •  cetirizine (zyrTEC) 10 MG tablet, Take 1 tablet by mouth Daily As Needed for Allergies., Disp: 30 tablet, Rfl: 5  •  clopidogrel (Plavix) 75 MG tablet, Take 1 tablet by mouth Daily., Disp: 30 tablet, Rfl: 5  •  HYDROcodone-acetaminophen (Norco)  MG per tablet, Take 1 tablet by mouth Every 6 (Six) Hours As Needed for Moderate Pain., Disp: 90 tablet, Rfl: 0  •  ketotifen (Alaway) 0.025 % ophthalmic solution, Administer 1 drop to both eyes 2 (Two) Times a Day., Disp: 10 mL, Rfl: 5  •  Lidocaine Viscous HCl (XYLOCAINE) 2 % solution, Take 5 mL by mouth As Needed for Mild Pain ., Disp: 100 mL, Rfl: 0  •  rosuvastatin (Crestor) 20 MG tablet, Take 1 tablet by mouth Daily., Disp: 30 tablet, Rfl: 5  •  Atogepant (Qulipta) 60 MG tablet, Take 1 tablet by mouth Daily As Needed (Headache). Exp 11/2023. Lot [id number], Disp: 12 tablet, Rfl: 0  •  cloNIDine (Catapres) 0.1 MG tablet, Take 1 tablet by mouth 3 (Three) Times a Day As Needed for High Blood Pressure (Systolic BP >160 or .)., Disp: 60 tablet, Rfl: 1  No current facility-administered medications for this visit.      No Known Allergies    Objective     Objective  "  Vital Signs:  Blood Pressure (Abnormal) 190/100   Pulse 95   Temperature 98.2 °F (36.8 °C) (Temporal)   Height 165.1 cm (65\")   Weight 62.6 kg (138 lb)   Oxygen Saturation 95%   Body Mass Index 22.96 kg/m²   Estimated body mass index is 22.96 kg/m² as calculated from the following:    Height as of this encounter: 165.1 cm (65\").    Weight as of this encounter: 62.6 kg (138 lb).    BMI is within normal parameters. No other follow-up for BMI required.    History reviewed. No pertinent past medical history.  Past Surgical History:   Procedure Laterality Date   • HYSTERECTOMY      complete   • KNEE SURGERY       Social History     Socioeconomic History   • Marital status:      Spouse name: dolores   • Number of children: 1   • Years of education: 12   Tobacco Use   • Smoking status: Never Smoker   • Smokeless tobacco: Never Used   Vaping Use   • Vaping Use: Never used   Substance and Sexual Activity   • Alcohol use: No   • Drug use: Never   • Sexual activity: Defer      Physical Exam  Vitals and nursing note reviewed.   Constitutional:       Appearance: She is well-developed. She is ill-appearing. She is not diaphoretic.      Comments: She appears uncomfortable.    HENT:      Head: Normocephalic and atraumatic.   Eyes:      General: No scleral icterus.        Right eye: No discharge.         Left eye: Discharge (Mild tearing left eye and periorbital puffiness. ) present.     Conjunctiva/sclera: Conjunctivae normal.   Cardiovascular:      Rate and Rhythm: Normal rate and regular rhythm.      Heart sounds: Normal heart sounds. No murmur heard.    No friction rub. No gallop.   Pulmonary:      Effort: Pulmonary effort is normal. No respiratory distress.      Breath sounds: Normal breath sounds. No wheezing or rales.   Chest:      Chest wall: No tenderness.   Abdominal:      General: Bowel sounds are normal. There is no distension.      Palpations: Abdomen is soft.      Tenderness: There is no abdominal " tenderness. There is no guarding.   Musculoskeletal:         General: Normal range of motion.      Cervical back: Normal range of motion and neck supple.      Right lower leg: No edema.      Left lower leg: No edema.      Comments: Strength is symmetrical bilaterally upper and lower extremities. 4/5 upper and lower bilaterally. Sensation reported to be intact.    Skin:     General: Skin is warm and dry.      Coloration: Skin is not pale.      Findings: No erythema or rash.   Neurological:      Mental Status: She is alert and oriented to person, place, and time.   Psychiatric:         Behavior: Behavior normal.        Result Review :  The following data was reviewed by: KAREN Rangel on 09/12/2022:  Orders Only on 08/30/2022   Component Date Value Ref Range Status   • Total Cholesterol 08/29/2022 213 (A) 0 - 200 mg/dL Final   • Triglycerides 08/29/2022 195 (A) 0 - 150 mg/dL Final   • HDL Cholesterol 08/29/2022 63 (A) 40 - 60 mg/dL Final   • LDL Cholesterol  08/29/2022 116 (A) 0 - 100 mg/dL Final   • VLDL Cholesterol 08/29/2022 34  5 - 40 mg/dL Final   • LDL/HDL Ratio 08/29/2022 1.76   Final   • TSH 08/29/2022 0.571  0.270 - 4.200 uIU/mL Final   • T4, Total 08/29/2022 7.25  4.50 - 11.70 mcg/dL Final   • 25 Hydroxy, Vitamin D 08/29/2022 33.3  30.0 - 100.0 ng/ml Final      Data reviewed: Recent hospitalization notes ED visit, CT head, MRI brain.          Assessment / Plan         Assessment   Diagnoses and all orders for this visit:    1. Uncontrolled hypertension (Primary)  2. Acute nonintractable headache, unspecified headache type  3. Occipital neuralgia of left side  Patient placed on 100% O2 for about 15-20 minutes given concern for possible cluster type headache. No improvement reported with O2.   She was also given a left side occipital block with significant improvement in her pain/discomfort almost immediately. Patient appeared more comfortable after the block and reported feeling much better.  See  procedure note for details.   Patient given 0.1mg clonidine in the office with only slight improvement in BP. She was encouraged to go home (family member available to drive her) and rest. Repeat BP at home and monitor closely this evening. They are to let me know what her BP is when she gets home.   PRN clonidine given for SBP >160 or DBP >100.   She is to monitor her BP BID and keep a log for her next appt on 9/27.   She was encouraged to avoid sodium in the diet.   We do not have anymore samples of the Ubrogepant. She was given samples of Qulipta 60mg tablets, take 1 tablet daily PRN.   Discontinue pseudoephedrine as this can cause elevated BP.  -     cloNIDine (Catapres) 0.1 MG tablet; Take 1 tablet by mouth 3 (Three) Times a Day As Needed for High Blood Pressure (Systolic BP >160 or .).  Dispense: 60 tablet; Refill: 1  -     Atogepant (Qulipta) 60 MG tablet; Take 1 tablet by mouth Daily As Needed (Headache). Exp 11/2023.  Lot [id number]  Dispense: 12 tablet; Refill: 0       New Medications Ordered This Visit   Medications   • cloNIDine (CATAPRES) tablet 0.1 mg   • cloNIDine (Catapres) 0.1 MG tablet     Sig: Take 1 tablet by mouth 3 (Three) Times a Day As Needed for High Blood Pressure (Systolic BP >160 or .).     Dispense:  60 tablet     Refill:  1   • Atogepant (Qulipta) 60 MG tablet     Sig: Take 1 tablet by mouth Daily As Needed (Headache). Exp 11/2023.  Lot [id number]     Dispense:  12 tablet     Refill:  0     Health Maintenance  Overdue for screening pap smear, mammogram, and colonoscopy. Consider discussion at next visit.   Consider influenza vaccine when available.     Follow Up   Return for Next scheduled follow up as scheduled with [place]. .    Patient was given instructions and counseling regarding her condition or for health maintenance advice. Please see specific information pulled into the AVS if appropriate.       This document has been electronically signed by KAREN Rangel    September 12, 2022 14:28 EDT    Dictated Utilizing Dragon Dictation: Part of this note may be an electronic transcription/translation of spoken language to printed text using the Dragon Dictation System.

## 2022-09-12 NOTE — PROGRESS NOTES
Patient being seen in the office by different provider.  Acute occipital migraine present.  Procedure only done this visit    PROCEDURE NOTE:    Procedure: Left Occipital nerve block  The procedure risks and benefits were explained to patient and the patient gave verbal consent to have the procedure performed.  Indication: occipital pain/Neuralgia  Provider: STEVE Vásquez  Description: with patient seated on exam table head was positioned to best access occipital region.  Palpation of left greater occipital nerve regions (the medial third of a line between the inion and mastoid process).  The most tender region was identified and marked.  The occipital region was prepped with alcohol in sterile fashion.     A set of two injections was given into the point.  An injection of 1 cc of 1% lidocaine was given initially.  A second injection was given into the same region using half cc 1% lidocaine and 1/2 cc of Kenalog by injecting 1/3 of the medication into the initial injection site.  Then needles was withdrawn 1/3 of length and reinserted medially injecting another 1/3 of the medication then pull back the needle custodial Reinserted the needle laterally and inject the rest of the medication.  Pressure was held and sterile dressings were placed.  No complications  Estimated blood loss: Minimal  Patient tolerated the procedure well     One percent lidocaine 5 mg per 50 mL NDC #0409-427 6-1 7  Kenalog 400 mg per 10 mL NDC #0003-029 3-2 8            This document has been electronically signed by:  STEVE Gonsales, REYNA-C    Dragon disclaimer:  Part of this note may be an electronic transcription/translation of spoken language to printed text using the Dragon Dictation System.

## 2022-09-24 DIAGNOSIS — Z12.11 COLON CANCER SCREENING: ICD-10-CM

## 2022-09-24 DIAGNOSIS — Z12.31 ENCOUNTER FOR SCREENING MAMMOGRAM FOR MALIGNANT NEOPLASM OF BREAST: Primary | ICD-10-CM

## 2022-09-27 ENCOUNTER — OFFICE VISIT (OUTPATIENT)
Dept: FAMILY MEDICINE CLINIC | Facility: CLINIC | Age: 62
End: 2022-09-27

## 2022-09-27 VITALS
WEIGHT: 140.6 LBS | DIASTOLIC BLOOD PRESSURE: 84 MMHG | HEIGHT: 65 IN | TEMPERATURE: 98.4 F | SYSTOLIC BLOOD PRESSURE: 126 MMHG | OXYGEN SATURATION: 95 % | BODY MASS INDEX: 23.43 KG/M2 | HEART RATE: 74 BPM | RESPIRATION RATE: 14 BRPM

## 2022-09-27 DIAGNOSIS — R03.0 ELEVATED BLOOD-PRESSURE READING WITHOUT DIAGNOSIS OF HYPERTENSION: Primary | ICD-10-CM

## 2022-09-27 DIAGNOSIS — I83.813 VARICOSE VEINS OF BOTH LOWER EXTREMITIES WITH PAIN: ICD-10-CM

## 2022-09-27 DIAGNOSIS — J30.1 SEASONAL ALLERGIC RHINITIS DUE TO POLLEN: ICD-10-CM

## 2022-09-27 DIAGNOSIS — M25.50 MULTIPLE JOINT PAIN: ICD-10-CM

## 2022-09-27 DIAGNOSIS — R90.82 WHITE MATTER ABNORMALITY ON MRI OF BRAIN: ICD-10-CM

## 2022-09-27 PROBLEM — G43.909 MIGRAINE: Status: ACTIVE | Noted: 2022-09-27

## 2022-09-27 PROCEDURE — 99214 OFFICE O/P EST MOD 30 MIN: CPT | Performed by: NURSE PRACTITIONER

## 2022-09-27 RX ORDER — HYDROCODONE BITARTRATE AND ACETAMINOPHEN 10; 325 MG/1; MG/1
1 TABLET ORAL EVERY 6 HOURS PRN
Qty: 90 TABLET | Refills: 0 | Status: SHIPPED | OUTPATIENT
Start: 2022-09-27 | End: 2022-10-27 | Stop reason: SDUPTHER

## 2022-09-27 NOTE — PROGRESS NOTES
"Subjective   Rohini Burch is a 62 y.o. female.     Chief Complaint   Patient presents with   • Allergies       History of Present Illness     Allergies-ongoing.  She is on zyrtec 10 mg.  She is also on alaway eye drops.  She reports some occasional increase but is overall good today.    Headache-reports she has not had one since the occipital Block.  She has not needed Qulipa. She reports that she did not have any further symptoms shortly after the injection/block.    Elevated BP-has BP log available from home today.   2-3 elevations noted.  Otherwise as low as 100 SBP and 60's DBP.  Her HR has also been WNL.  No CP or palpitations.    Back and Leg pain-ongoing.  She reports that the weather change to cool has increased her symptoms.  She reports \"got my socks out\".  She reports that when her extremities get cold the pain significantly increases.  There are times through the following winter months that she will double layer and even where thermal pants.  Recent MRI of brain-Increased T2 and FLAIR signal in the periventricular and parietal white matter probably chronic microangiopathic change.  She was started on plavix 75 mg and Crestor 20 mg.  She has noted some occasional increase in myalgia but no other findings.     The following portions of the patient's history were reviewed and updated as appropriate: CC, ROS, allergies, current medications, past family history, past medical history, past social history, past surgical history and problem list.      Review of Systems   Constitutional: Positive for fatigue. Negative for activity change, appetite change and fever.   HENT: Negative for congestion, ear pain, nosebleeds, postnasal drip, rhinorrhea, sore throat, tinnitus, trouble swallowing and voice change.    Eyes: Negative for blurred vision, photophobia and visual disturbance.   Respiratory: Negative for cough, chest tightness, shortness of breath and wheezing.    Cardiovascular: Negative for chest pain, " "palpitations and leg swelling.   Gastrointestinal: Negative for abdominal pain, blood in stool, constipation, diarrhea, nausea, vomiting and GERD.   Endocrine: Negative for cold intolerance, heat intolerance and polydipsia.   Genitourinary: Negative for decreased urine volume, difficulty urinating, dysuria and hematuria.   Musculoskeletal: Positive for arthralgias, back pain and myalgias. Negative for gait problem.   Skin: Negative for color change, pallor and wound.   Allergic/Immunologic: Negative.    Neurological: Negative for dizziness, syncope, numbness and headache.   Hematological: Negative.    Psychiatric/Behavioral: Negative for decreased concentration, self-injury, sleep disturbance, suicidal ideas and depressed mood. The patient is not nervous/anxious.    All other systems reviewed and are negative.      Objective     /84   Pulse 74   Temp 98.4 °F (36.9 °C)   Resp 14   Ht 165.1 cm (65\")   Wt 63.8 kg (140 lb 9.6 oz)   SpO2 95%   BMI 23.40 kg/m²     Physical Exam  Vitals reviewed.   Constitutional:       General: She is not in acute distress.     Appearance: She is well-developed. She is not diaphoretic.   HENT:      Head: Normocephalic and atraumatic.      Jaw: No tenderness.      Comments: Oropharynx not examined.  Patient is presently wearing a face covering/mask due to COVID-19 pandemic.     Right Ear: Hearing, tympanic membrane, ear canal and external ear normal.      Left Ear: Hearing, tympanic membrane, ear canal and external ear normal.   Eyes:      General: Lids are normal. No scleral icterus.     Extraocular Movements:      Right eye: Normal extraocular motion and no nystagmus.      Left eye: Normal extraocular motion and no nystagmus.      Conjunctiva/sclera: Conjunctivae normal.      Pupils: Pupils are equal, round, and reactive to light.   Neck:      Thyroid: No thyromegaly or thyroid tenderness.      Vascular: No carotid bruit or JVD.      Trachea: No tracheal tenderness. "   Cardiovascular:      Rate and Rhythm: Normal rate and regular rhythm.      Pulses:           Dorsalis pedis pulses are 2+ on the right side and 2+ on the left side.        Posterior tibial pulses are 2+ on the right side and 2+ on the left side.      Heart sounds: Normal heart sounds, S1 normal and S2 normal. No murmur heard.  Pulmonary:      Effort: Pulmonary effort is normal. No accessory muscle usage, prolonged expiration or respiratory distress.      Breath sounds: Normal breath sounds.   Chest:      Chest wall: No tenderness.   Abdominal:      General: Bowel sounds are normal. There is no distension.      Palpations: Abdomen is soft. There is no mass.      Tenderness: There is no abdominal tenderness.   Musculoskeletal:         General: Tenderness present.      Cervical back: Normal range of motion and neck supple. Tenderness present.      Lumbar back: Tenderness present.      Right upper leg: Tenderness present.      Left upper leg: Tenderness present.      Right lower leg: Tenderness present. No edema.      Left lower leg: Tenderness present. No edema.      Comments: No muscular atrophy or flaccidity.   Lymphadenopathy:      Head:      Right side of head: No submental or submandibular adenopathy.      Left side of head: No submental or submandibular adenopathy.      Cervical: No cervical adenopathy.      Right cervical: No superficial cervical adenopathy.     Left cervical: No superficial cervical adenopathy.   Skin:     General: Skin is warm and dry.      Capillary Refill: Capillary refill takes less than 2 seconds.      Coloration: Skin is not jaundiced or pale.      Findings: No erythema.      Nails: There is no clubbing.   Neurological:      Mental Status: She is alert and oriented to person, place, and time.      Cranial Nerves: No cranial nerve deficit or facial asymmetry.      Sensory: No sensory deficit.      Motor: No weakness, tremor, atrophy or abnormal muscle tone.      Coordination:  Coordination normal.      Deep Tendon Reflexes: Reflexes are normal and symmetric.   Psychiatric:         Attention and Perception: She is attentive.         Mood and Affect: Mood normal.         Speech: Speech normal.         Behavior: Behavior normal. Behavior is cooperative.         Thought Content: Thought content normal.         Judgment: Judgment normal.           Diagnoses and all orders for this visit:    1. Elevated blood-pressure reading without diagnosis of hypertension (Primary)  Assessment & Plan:  Stable today.  Patient has not had any elevations at home since her head pain resolved.      2. Multiple joint pain  Comments:  Avoid overuse activity.  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    Orders:  -     HYDROcodone-acetaminophen (Norco)  MG per tablet; Take 1 tablet by mouth Every 6 (Six) Hours As Needed for Moderate Pain.  Dispense: 90 tablet; Refill: 0    3. Seasonal allergic rhinitis due to pollen  Assessment & Plan:  Continue Zyrtec.  Continue eyedrops as needed.  Avoid known allergy and respiratory triggers.  May use Saline spray PRN as desired  Will add higher dose of Sudafed for symptoms      4. Varicose veins of both lower extremities with pain  Assessment & Plan:  Continue to be active.  Avoid overuse activities  Continue PRN Norco and celebrex as directed      5. White matter abnormality on MRI of brain  Comments:  continue Plavix and Crestor       BMI is within normal parameters. No other follow-up for BMI required.       Understands disease processes and need for medications.  Understands reasons for urgent and emergent care.  Patient (& family) verbalized agreement for treatment plan.   Emotional support and active listening provided.  Patient provided time to verbalize feelings.    MICHELLE/PMMILAN reviewed today and consistent.  Will refill prescribed controlled medication today.  Patient is aware they cannot receive narcotics from  any other provider except if under care of pain management or speciality clinic.  Risk and benefits of medication use has been reviewed.  History and physical exam exhibit continued safe and appropriate use of controlled substances.  The patient is aware of the potential for addiction and dependence.  This patient has been made aware of the appropriate use of such medications, including potential risk of somnolence, limited ability to drive and / or work safely, and potential for overdose.    It has also been made clear that these medications are for use by this patient only, without concomitant use of alcohol or other substances unless prescribed/advised by medical provider.  Patient understands they may be subject to UDS and pill counts at random.      Patient considered to be low risk for addiction due to use of single controlled medications.  Patient understands and accepts these risks.  Patient need for medication will be reassessed at each visit.  Doses will be adjusted according to patient need and findings.    Goal of TX: Patient will not have any adverse reactions of medication.  Patient have reduction in pain symptoms with use of PRN Norco as directed.  Patient will not have any adverse side effects from medication.  Patient will be able to remain active in an outside of home without interference from pain symptoms.      Medication Dispense Information    Hydrocodone Bitartrate/Ac   Dispensed: 8/31/2022 12:00 AM   Written:  8/31/2022   Unit strength: 325MG/10MG   Days supply: 23   Quantity: 90 each   Pharmacy: [facility]   Authorizing provider: JOHN HOLLEY   Received from: MICHELLE PDMP (Fill History)   Brand or Generic        RTC 1 month, sooner if needed.           This document has been electronically signed by:  STEVE Gonsales FNP-C Dragon disclaimer:  Part of this note may be an electronic transcription/translation of spoken language to printed text using the Dragon Dictation  System.

## 2022-10-27 ENCOUNTER — OFFICE VISIT (OUTPATIENT)
Dept: FAMILY MEDICINE CLINIC | Facility: CLINIC | Age: 62
End: 2022-10-27

## 2022-10-27 VITALS
RESPIRATION RATE: 14 BRPM | HEART RATE: 74 BPM | TEMPERATURE: 98.3 F | OXYGEN SATURATION: 98 % | WEIGHT: 139 LBS | DIASTOLIC BLOOD PRESSURE: 84 MMHG | HEIGHT: 65 IN | SYSTOLIC BLOOD PRESSURE: 138 MMHG | BODY MASS INDEX: 23.16 KG/M2

## 2022-10-27 DIAGNOSIS — M25.50 MULTIPLE JOINT PAIN: ICD-10-CM

## 2022-10-27 DIAGNOSIS — Z79.899 LONG-TERM USE OF HIGH-RISK MEDICATION: ICD-10-CM

## 2022-10-27 DIAGNOSIS — J30.1 SEASONAL ALLERGIC RHINITIS DUE TO POLLEN: ICD-10-CM

## 2022-10-27 DIAGNOSIS — I67.9 SMALL VESSEL DISEASE, CEREBROVASCULAR: Primary | ICD-10-CM

## 2022-10-27 DIAGNOSIS — G43.819 OTHER MIGRAINE WITHOUT STATUS MIGRAINOSUS, INTRACTABLE: ICD-10-CM

## 2022-10-27 PROCEDURE — 99214 OFFICE O/P EST MOD 30 MIN: CPT | Performed by: NURSE PRACTITIONER

## 2022-10-27 RX ORDER — HYDROCODONE BITARTRATE AND ACETAMINOPHEN 10; 325 MG/1; MG/1
1 TABLET ORAL EVERY 6 HOURS PRN
Qty: 90 TABLET | Refills: 0 | Status: SHIPPED | OUTPATIENT
Start: 2022-10-27 | End: 2022-11-28 | Stop reason: SDUPTHER

## 2022-10-27 NOTE — PROGRESS NOTES
Subjective   Rohini Burch is a 62 y.o. female.     Chief Complaint   Patient presents with   • Allergies       History of Present Illness     Allergies-chronic and ongoing.  Patient is currently taking Zyrtec 10 mg and using Alaway eyedrops.  No negative side effects.  Joint pain-ongoing.  Patient is currently on Celebrex 100 mg twice daily as needed and hydrocodone 10 mg every 6 hours as needed.  No negative side effects.  Headache-patient has been ordered weekly to 60 mg as needed.  She reports that she has had some sinus HA but nothing like she had when she needed the occipital block.  She has PRN meds for BP elevations.    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  Reports she is not taking daily but at least QOD.  Patient denies any bleeding of gums, melena, hematochezia, hematuria, or epistaxis.        The following portions of the patient's history were reviewed and updated as appropriate: CC, ROS, allergies, current medications, past family history, past medical history, past social history, past surgical history and problem list.    Review of Systems   Constitutional: Positive for fatigue. Negative for activity change, appetite change and fever.   HENT: Positive for postnasal drip. Negative for congestion, ear pain, nosebleeds, rhinorrhea, sore throat, tinnitus, trouble swallowing and voice change.    Eyes: Negative for blurred vision, photophobia and visual disturbance.   Respiratory: Negative for cough, chest tightness, shortness of breath and wheezing.    Cardiovascular: Negative for chest pain and palpitations.   Gastrointestinal: Negative for abdominal pain, blood in stool, constipation, diarrhea, nausea and GERD.   Endocrine: Negative for cold intolerance, heat intolerance and polydipsia.   Genitourinary: Negative for decreased urine volume, difficulty urinating, dysuria and hematuria.   Musculoskeletal: Positive for arthralgias, back pain, gait problem and myalgias (increased leg pain  "with onset of cold weather).   Skin: Negative for color change, pallor and wound.   Allergic/Immunologic: Negative.    Neurological: Positive for headache. Negative for dizziness, syncope and numbness.   Hematological: Negative.    Psychiatric/Behavioral: Negative for decreased concentration, self-injury, sleep disturbance, suicidal ideas and depressed mood. The patient is not nervous/anxious.    All other systems reviewed and are negative.      Objective     /84   Pulse 74   Temp 98.3 °F (36.8 °C)   Resp 14   Ht 165.1 cm (65\")   Wt 63 kg (139 lb)   SpO2 98%   BMI 23.13 kg/m²     Physical Exam  Vitals reviewed.   Constitutional:       General: She is not in acute distress.     Appearance: She is well-developed. She is not diaphoretic.   HENT:      Head: Normocephalic and atraumatic.      Jaw: No tenderness.      Comments: Oropharynx not examined.  Patient is presently wearing a face covering/mask due to COVID-19 pandemic.     Right Ear: Hearing, tympanic membrane, ear canal and external ear normal.      Left Ear: Hearing, tympanic membrane, ear canal and external ear normal.   Eyes:      General: Lids are normal. No scleral icterus.     Extraocular Movements:      Right eye: Normal extraocular motion and no nystagmus.      Left eye: Normal extraocular motion and no nystagmus.      Conjunctiva/sclera: Conjunctivae normal.      Pupils: Pupils are equal, round, and reactive to light.   Neck:      Thyroid: No thyromegaly or thyroid tenderness.      Vascular: No carotid bruit or JVD.      Trachea: No tracheal tenderness.   Cardiovascular:      Rate and Rhythm: Normal rate and regular rhythm.      Pulses:           Dorsalis pedis pulses are 2+ on the right side and 2+ on the left side.        Posterior tibial pulses are 2+ on the right side and 2+ on the left side.      Heart sounds: Normal heart sounds, S1 normal and S2 normal. No murmur heard.  Pulmonary:      Effort: Pulmonary effort is normal. No " accessory muscle usage, prolonged expiration or respiratory distress.      Breath sounds: Normal breath sounds.   Chest:      Chest wall: No tenderness.   Abdominal:      General: Bowel sounds are normal. There is no distension.      Palpations: Abdomen is soft. There is no mass.      Tenderness: There is no abdominal tenderness.   Musculoskeletal:      Cervical back: Normal range of motion and neck supple. Tenderness present.      Thoracic back: Tenderness present.      Lumbar back: Spasms and tenderness present.      Right upper leg: Tenderness present.      Left upper leg: Tenderness present.      Right lower leg: Tenderness present. No edema.      Left lower leg: Tenderness present. No edema.      Comments: No muscular atrophy or flaccidity.   Lymphadenopathy:      Head:      Right side of head: No submental or submandibular adenopathy.      Left side of head: No submental or submandibular adenopathy.      Cervical: No cervical adenopathy.      Right cervical: No superficial cervical adenopathy.     Left cervical: No superficial cervical adenopathy.   Skin:     General: Skin is warm and dry.      Capillary Refill: Capillary refill takes less than 2 seconds.      Coloration: Skin is not jaundiced or pale.      Findings: No erythema.      Nails: There is no clubbing.   Neurological:      Mental Status: She is alert and oriented to person, place, and time.      Cranial Nerves: No cranial nerve deficit or facial asymmetry.      Sensory: No sensory deficit.      Motor: No weakness, tremor, atrophy or abnormal muscle tone.      Coordination: Coordination normal.      Deep Tendon Reflexes: Reflexes are normal and symmetric.   Psychiatric:         Attention and Perception: She is attentive.         Mood and Affect: Mood normal.         Speech: Speech normal.         Behavior: Behavior normal. Behavior is cooperative.         Thought Content: Thought content normal.         Judgment: Judgment normal.           Diagnoses  and all orders for this visit:    1. Small vessel disease, cerebrovascular (Primary)  Comments:  continue Plavix and crestor as tolerated    2. Multiple joint pain  Comments:  Avoid overuse activity.  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    Orders:  -     HYDROcodone-acetaminophen (Norco)  MG per tablet; Take 1 tablet by mouth Every 6 (Six) Hours As Needed for Moderate Pain.  Dispense: 90 tablet; Refill: 0    3. Seasonal allergic rhinitis due to pollen  Assessment & Plan:  Continue Zyrtec.  Continue eyedrops as needed.  Avoid known allergy and respiratory triggers.  May use Saline spray PRN as desired      4. Long-term use of high-risk medication  -     Urine Drug Screen - Urine, Clean Catch; Future    5. Other migraine without status migrainosus, intractable  Comments:  continue PRN conservative measures.  Qulipta PRN       BMI is within normal parameters. No other follow-up for BMI required.       Understands disease processes and need for medications.  Understands reasons for urgent and emergent care.  Patient (& family) verbalized agreement for treatment plan.   Emotional support and active listening provided.  Patient provided time to verbalize feelings.    MICHELLE reviewed today and consistent.  Will refill prescribed controlled medication today.  Patient is aware they cannot receive narcotics from any other provider except if under care of pain management or speciality clinic.  Risk and benefits of medication use has been reviewed.  History and physical exam exhibit continued safe and appropriate use of controlled substances.  The patient is aware of the potential for addiction and dependence.  This patient has been made aware of the appropriate use of such medications, including potential risk of somnolence, limited ability to drive and / or work safely, and potential for overdose.  Patient understands not to take medication and drive until they  know how medication may affect their cognition/decision making.   It has also been made clear that these medications are for use by this patient only, without concomitant use of alcohol or other substances unless prescribed/advised by medical provider.  Patient understands they may be subject to UDS and pill counts at random.    Patient considered to be low risk for addiction due to use of single controlled medications.  Patient understands and accepts these risks.  Patient need for medication will be reassessed at each visit.  Doses will be adjusted according to patient need and findings.    Goal of TX: Patient will not have any adverse reactions of medication.  Patient have reduction in pain symptoms with use of PRN Norco as directed.  Patient will not have any adverse side effects from medication.  Patient will be able to remain active in an outside of home without interference from pain symptoms.    Medication Dispense Information    Hydrocodone Bitartrate/Ac   Dispensed: 9/27/2022 12:00 AM   Written:  9/27/2022   Unit strength: 325MG/10MG   Days supply: 22   Quantity: 90 each   Pharmacy: [facility]   Authorizing provider: JOHN HOLLEY   Received from: MICHELLE PDMP (Fill History)   Brand or Generic:  Unknown     RTC 1 month, sooner if needed.             This document has been electronically signed by:  STEVE Gonsales FNP-C Dragon disclaimer:  Part of this note may be an electronic transcription/translation of spoken language to printed text using the Dragon Dictation System.

## 2022-11-28 ENCOUNTER — OFFICE VISIT (OUTPATIENT)
Dept: FAMILY MEDICINE CLINIC | Facility: CLINIC | Age: 62
End: 2022-11-28

## 2022-11-28 VITALS
RESPIRATION RATE: 14 BRPM | SYSTOLIC BLOOD PRESSURE: 130 MMHG | TEMPERATURE: 98.4 F | OXYGEN SATURATION: 98 % | WEIGHT: 134.4 LBS | HEIGHT: 65 IN | BODY MASS INDEX: 22.39 KG/M2 | HEART RATE: 76 BPM | DIASTOLIC BLOOD PRESSURE: 82 MMHG

## 2022-11-28 DIAGNOSIS — I67.9 SMALL VESSEL DISEASE, CEREBROVASCULAR: Primary | ICD-10-CM

## 2022-11-28 DIAGNOSIS — Z78.9 WEIGHT LOSS ADVISED: ICD-10-CM

## 2022-11-28 DIAGNOSIS — M25.50 MULTIPLE JOINT PAIN: ICD-10-CM

## 2022-11-28 DIAGNOSIS — J30.1 SEASONAL ALLERGIC RHINITIS DUE TO POLLEN: ICD-10-CM

## 2022-11-28 DIAGNOSIS — H53.9 VISION CHANGES: ICD-10-CM

## 2022-11-28 PROCEDURE — 99214 OFFICE O/P EST MOD 30 MIN: CPT | Performed by: NURSE PRACTITIONER

## 2022-11-28 RX ORDER — CELECOXIB 100 MG/1
100 CAPSULE ORAL 2 TIMES DAILY PRN
Qty: 60 CAPSULE | Refills: 2 | Status: SHIPPED | OUTPATIENT
Start: 2022-11-28 | End: 2023-02-22 | Stop reason: SDUPTHER

## 2022-11-28 RX ORDER — OLOPATADINE HYDROCHLORIDE 1 MG/ML
1 SOLUTION/ DROPS OPHTHALMIC 2 TIMES DAILY
Qty: 5 ML | Refills: 5 | Status: SHIPPED | OUTPATIENT
Start: 2022-11-28 | End: 2023-02-22 | Stop reason: SDUPTHER

## 2022-11-28 RX ORDER — HYDROCODONE BITARTRATE AND ACETAMINOPHEN 10; 325 MG/1; MG/1
1 TABLET ORAL EVERY 6 HOURS PRN
Qty: 90 TABLET | Refills: 0 | Status: SHIPPED | OUTPATIENT
Start: 2022-11-28 | End: 2022-12-29 | Stop reason: SDUPTHER

## 2022-11-28 RX ORDER — PHENTERMINE HYDROCHLORIDE 37.5 MG/1
37.5 TABLET ORAL
Qty: 30 TABLET | Refills: 0 | Status: SHIPPED | OUTPATIENT
Start: 2022-11-28 | End: 2023-01-26 | Stop reason: SDUPTHER

## 2022-11-28 NOTE — PROGRESS NOTES
Subjective   Rohini Burch is a 62 y.o. female.     Chief Complaint   Patient presents with   • Back Pain       History of Present Illness     Back and leg pain-ongoing.  She is currently not working. She is on PRN Norco and Celebrex 100 mg BID PRN.   No negative side effects.    Small vessel disease-on Plavix 75 mg and Crestor 20 mg.  No negative side effects.  Patient denies any negative side effects of cholesterol medication.  No reported myalgia or myopathies.  Allergies-ongoing.  She is on zyrtec 10 mg and allergy eye drops.  No new concerns.   Vision change-on her left eye.  She reports that she feels there is some blurring.  She is planning to get an eye exam.  She has noted some watering of her eye.      The following portions of the patient's history were reviewed and updated as appropriate: CC, ROS, allergies, current medications, past family history, past medical history, past social history, past surgical history and problem list.      Review of Systems   Constitutional: Positive for fatigue. Negative for activity change, appetite change and fever.   HENT: Negative for congestion, ear pain, nosebleeds, postnasal drip, rhinorrhea, sore throat, tinnitus, trouble swallowing and voice change.    Eyes: Negative for blurred vision, photophobia and visual disturbance.   Respiratory: Negative for cough, chest tightness, shortness of breath and wheezing.    Cardiovascular: Negative for chest pain, palpitations and leg swelling.   Gastrointestinal: Negative for abdominal pain, blood in stool, constipation, diarrhea, nausea and GERD.   Endocrine: Negative for cold intolerance, heat intolerance and polydipsia.   Genitourinary: Negative for decreased urine volume, difficulty urinating, dysuria and hematuria.   Musculoskeletal: Positive for arthralgias, back pain, gait problem and myalgias.   Skin: Negative for color change, pallor and wound.   Allergic/Immunologic: Negative.    Neurological: Negative for dizziness,  "syncope, numbness and headache.   Hematological: Negative.    Psychiatric/Behavioral: Negative for decreased concentration, self-injury, sleep disturbance, suicidal ideas and depressed mood. The patient is not nervous/anxious.    All other systems reviewed and are negative.      Objective     /82   Pulse 76   Temp 98.4 °F (36.9 °C)   Resp 14   Ht 165.1 cm (65\")   Wt 61 kg (134 lb 6.4 oz)   SpO2 98%   BMI 22.37 kg/m²     Physical Exam  Vitals reviewed.   Constitutional:       General: She is not in acute distress.     Appearance: She is well-developed. She is not diaphoretic.   HENT:      Head: Normocephalic and atraumatic.      Jaw: No tenderness.      Comments: Oropharynx not examined.  Patient is presently wearing a face covering/mask due to COVID-19 pandemic.     Right Ear: Hearing, tympanic membrane, ear canal and external ear normal.      Left Ear: Hearing, tympanic membrane, ear canal and external ear normal.   Eyes:      General: Lids are normal. No scleral icterus.     Extraocular Movements:      Right eye: Normal extraocular motion and no nystagmus.      Left eye: Normal extraocular motion and no nystagmus.      Conjunctiva/sclera: Conjunctivae normal.      Pupils: Pupils are equal, round, and reactive to light.      Comments: Left eye watering     Neck:      Thyroid: No thyromegaly or thyroid tenderness.      Vascular: No carotid bruit or JVD.      Trachea: No tracheal tenderness.   Cardiovascular:      Rate and Rhythm: Normal rate and regular rhythm.      Pulses:           Dorsalis pedis pulses are 2+ on the right side and 2+ on the left side.        Posterior tibial pulses are 2+ on the right side and 2+ on the left side.      Heart sounds: Normal heart sounds, S1 normal and S2 normal. No murmur heard.  Pulmonary:      Effort: Pulmonary effort is normal. No accessory muscle usage, prolonged expiration or respiratory distress.      Breath sounds: Normal breath sounds.   Chest:      Chest " wall: No tenderness.   Abdominal:      General: Bowel sounds are normal. There is no distension.      Palpations: Abdomen is soft. There is no mass.      Tenderness: There is no abdominal tenderness.   Musculoskeletal:         General: Tenderness present.      Cervical back: Normal range of motion and neck supple. Tenderness present.      Thoracic back: Tenderness present.      Lumbar back: Spasms and tenderness present.      Right upper leg: Tenderness present.      Left upper leg: Tenderness present.      Right lower leg: Tenderness present. No edema.      Left lower leg: Tenderness present. No edema.      Comments: No muscular atrophy or flaccidity.   Lymphadenopathy:      Head:      Right side of head: No submental or submandibular adenopathy.      Left side of head: No submental or submandibular adenopathy.      Cervical: No cervical adenopathy.      Right cervical: No superficial cervical adenopathy.     Left cervical: No superficial cervical adenopathy.   Skin:     General: Skin is warm and dry.      Capillary Refill: Capillary refill takes less than 2 seconds.      Coloration: Skin is not jaundiced or pale.      Findings: No erythema.      Nails: There is no clubbing.   Neurological:      Mental Status: She is alert and oriented to person, place, and time.      Cranial Nerves: No cranial nerve deficit or facial asymmetry.      Sensory: No sensory deficit.      Motor: No weakness, tremor, atrophy or abnormal muscle tone.      Coordination: Coordination normal.      Deep Tendon Reflexes: Reflexes are normal and symmetric.   Psychiatric:         Attention and Perception: She is attentive.         Mood and Affect: Mood normal.         Speech: Speech normal.         Behavior: Behavior normal. Behavior is cooperative.         Thought Content: Thought content normal.         Judgment: Judgment normal.           Diagnoses and all orders for this visit:    1. Small vessel disease, cerebrovascular  (Primary)  Assessment & Plan:  Continue plavix 75 mg and Crestor 20 mg      2. Multiple joint pain  Comments:  Avoid overuse activity.  Continue Norco and Celebrex as directed  Assessment & Plan:  Continue to be active.  Avoid overuse activities  Continue PRN Norco and celebrex as directed    Orders:  -     celecoxib (CeleBREX) 100 MG capsule; Take 1 capsule by mouth 2 (Two) Times a Day As Needed for Mild Pain.  Dispense: 60 capsule; Refill: 2  -     HYDROcodone-acetaminophen (Norco)  MG per tablet; Take 1 tablet by mouth Every 6 (Six) Hours As Needed for Moderate Pain.  Dispense: 90 tablet; Refill: 0    3. Seasonal allergic rhinitis due to pollen  Assessment & Plan:  Continue Zyrtec.  Continue eyedrops as needed.  Avoid known allergy and respiratory triggers.  May use Saline spray PRN as desired    Orders:  -     olopatadine (PATANOL) 0.1 % ophthalmic solution; Administer 1 drop to both eyes 2 (Two) Times a Day.  Dispense: 5 mL; Refill: 5    4. Vision changes  Comments:  get a vision exam    5. Weight loss advised  -     phentermine (Adipex-P) 37.5 MG tablet; Take 1 tablet by mouth Every Morning Before Breakfast.  Dispense: 30 tablet; Refill: 0       BMI is within normal parameters. No other follow-up for BMI required.       Understands disease processes and need for medications.  Understands reasons for urgent and emergent care.  Patient (& family) verbalized agreement for treatment plan.   Emotional support and active listening provided.  Patient provided time to verbalize feelings.    MICHELLE/PMDP reviewed today and consistent.  Will refill prescribed controlled medication today.  Patient is aware they cannot receive narcotics from any other provider except if under care of pain management or speciality clinic.  Risk and benefits of medication use has been reviewed.  History and physical exam exhibit continued safe and appropriate use of controlled substances.  The patient is aware of the potential for  addiction and dependence.  This patient has been made aware of the appropriate use of such medications, including potential risk of somnolence, limited ability to drive and / or work safely, and potential for overdose.    It has also been made clear that these medications are for use by this patient only, without concomitant use of alcohol or other substances unless prescribed/advised by medical provider.  Patient understands they may be subject to UDS and pill counts at random.      Patient considered to be low risk for addiction due to use of single controlled medications.  Patient understands and accepts these risks.  Patient need for medication will be reassessed at each visit.  Doses will be adjusted according to patient need and findings.    Goal of TX: Patient will not have any adverse reactions of medication.  Patient have reduction in pain symptoms with use of PRN Norco as directed.  Patient will not have any adverse side effects from medication.  Patient will be able to remain active in an outside of home without interference from pain symptoms.    Medication Dispense Information    Hydrocodone Bitartrate/Ac   Dispensed: 10/27/2022 12:00 AM   Written:  10/27/2022   Unit strength: 325MG/10MG   Days supply: 22   Quantity: 90 each   Refills remainin   Pharmacy: [facility]   Authorizing provider: JOHN HOLLEY   Received from: MICHELLE PDMP (Fill History)   Brand or Generic:       RTC 1 month, sooner if needed.             This document has been electronically signed by:  STEVE Gonsales FNP-C Dragon disclaimer:  Part of this note may be an electronic transcription/translation of spoken language to printed text using the Dragon Dictation System.

## 2022-12-29 ENCOUNTER — TELEPHONE (OUTPATIENT)
Dept: FAMILY MEDICINE CLINIC | Facility: CLINIC | Age: 62
End: 2022-12-29

## 2022-12-29 ENCOUNTER — OFFICE VISIT (OUTPATIENT)
Dept: FAMILY MEDICINE CLINIC | Facility: CLINIC | Age: 62
End: 2022-12-29
Payer: COMMERCIAL

## 2022-12-29 VITALS
WEIGHT: 134.8 LBS | BODY MASS INDEX: 22.46 KG/M2 | SYSTOLIC BLOOD PRESSURE: 130 MMHG | HEART RATE: 92 BPM | TEMPERATURE: 98.4 F | DIASTOLIC BLOOD PRESSURE: 84 MMHG | OXYGEN SATURATION: 94 % | HEIGHT: 65 IN | RESPIRATION RATE: 18 BRPM

## 2022-12-29 DIAGNOSIS — M25.50 MULTIPLE JOINT PAIN: ICD-10-CM

## 2022-12-29 DIAGNOSIS — R63.5 GAIN OF WEIGHT: ICD-10-CM

## 2022-12-29 DIAGNOSIS — J30.1 SEASONAL ALLERGIC RHINITIS DUE TO POLLEN: ICD-10-CM

## 2022-12-29 DIAGNOSIS — G43.819 OTHER MIGRAINE WITHOUT STATUS MIGRAINOSUS, INTRACTABLE: ICD-10-CM

## 2022-12-29 DIAGNOSIS — I67.9 SMALL VESSEL DISEASE, CEREBROVASCULAR: Primary | ICD-10-CM

## 2022-12-29 PROCEDURE — 99214 OFFICE O/P EST MOD 30 MIN: CPT | Performed by: NURSE PRACTITIONER

## 2022-12-29 RX ORDER — HYDROCODONE BITARTRATE AND ACETAMINOPHEN 10; 325 MG/1; MG/1
1 TABLET ORAL EVERY 6 HOURS PRN
Qty: 90 TABLET | Refills: 0 | Status: SHIPPED | OUTPATIENT
Start: 2022-12-29 | End: 2023-01-26 | Stop reason: SDUPTHER

## 2022-12-29 NOTE — TELEPHONE ENCOUNTER
----- Message from Aryan Hodge Rep sent at 12/29/2022  9:53 AM EST -----  [facility] needs a PA on [place] updated    please thank you

## 2022-12-29 NOTE — PROGRESS NOTES
Subjective   Rohini Burch is a 62 y.o. female.     Chief Complaint   Patient presents with   • Back Pain       History of Present Illness     Joint pain-ongoing.  Patient is currently on Celebrex twice daily as needed.  She is also on Norco 10 mg every 6 hours as needed.  No negative side effects. She continues to be active even tho she is not working.  Weight loss concern-patient continues to be worried about weight gain.   She continues to take PRN Adipex from a prescription she had at home.  She has not had a new order since November and was advised to stop the medication after several days of an acute migraine with sustained elevated BP readings.    Microvascular disease of the brain-patient is currently on Crestor 20 mg and Plavix 75 mg.  No negative side effects.  Patient denies any negative side effects of cholesterol medication.  No reported myalgia or myopathies.  Patient denies any bleeding of gums, melena, hematochezia, hematuria, or epistaxis.    Migraine-samples of Qulipta.  She reports no current concerns.  She has not had a migraine in several weeks.  No vision changes or concerns.  Allergies-chronic and ongoing.  Patient is currently taking Zyrtec 10 mg and using as needed eyedrops.  No worsening symptoms.  She reports persistent rhinorrhea that is worse than when she does miss her medications.    The following portions of the patient's history were reviewed and updated as appropriate: CC, ROS, allergies, current medications, past family history, past medical history, past social history, past surgical history and problem list.    Review of Systems   Constitutional: Positive for fatigue. Negative for activity change, appetite change and fever.   HENT: Positive for congestion. Negative for ear pain, nosebleeds, postnasal drip, rhinorrhea, sore throat, tinnitus, trouble swallowing and voice change.    Eyes: Negative for blurred vision, photophobia and visual disturbance.   Respiratory: Negative for  cough, chest tightness, shortness of breath and wheezing.    Cardiovascular: Negative for chest pain and palpitations.   Gastrointestinal: Negative for abdominal pain, blood in stool, constipation, diarrhea, nausea, vomiting and GERD.   Endocrine: Negative for cold intolerance, heat intolerance and polydipsia.   Genitourinary: Negative for decreased urine volume, difficulty urinating, dysuria and hematuria.   Musculoskeletal: Positive for arthralgias, back pain and myalgias. Negative for gait problem.   Skin: Negative for color change, pallor and wound.   Allergic/Immunologic: Negative.    Neurological: Negative for dizziness, syncope, numbness and headache.   Hematological: Negative.    Psychiatric/Behavioral: Negative for decreased concentration, self-injury, sleep disturbance, suicidal ideas and depressed mood. The patient is not nervous/anxious.    All other systems reviewed and are negative.      Objective     /84   Pulse 92   Temp 98.4 °F (36.9 °C)   Resp 18   Ht 165.1 cm (65\")   Wt 61.1 kg (134 lb 12.8 oz)   SpO2 94%   BMI 22.43 kg/m²     Physical Exam  Vitals reviewed.   Constitutional:       General: She is not in acute distress.     Appearance: She is well-developed. She is not diaphoretic.   HENT:      Head: Normocephalic and atraumatic.      Jaw: No tenderness.      Comments: Oropharynx not examined.  Patient is presently wearing a face covering/mask due to COVID-19 pandemic.     Right Ear: Hearing, tympanic membrane, ear canal and external ear normal.      Left Ear: Hearing, tympanic membrane, ear canal and external ear normal.   Eyes:      General: Lids are normal. No scleral icterus.     Extraocular Movements:      Right eye: Normal extraocular motion and no nystagmus.      Left eye: Normal extraocular motion and no nystagmus.      Conjunctiva/sclera: Conjunctivae normal.      Pupils: Pupils are equal, round, and reactive to light.   Neck:      Thyroid: No thyromegaly or thyroid  tenderness.      Vascular: No carotid bruit or JVD.      Trachea: No tracheal tenderness.   Cardiovascular:      Rate and Rhythm: Normal rate and regular rhythm.      Pulses:           Dorsalis pedis pulses are 2+ on the right side and 2+ on the left side.        Posterior tibial pulses are 2+ on the right side and 2+ on the left side.      Heart sounds: Normal heart sounds, S1 normal and S2 normal. No murmur heard.  Pulmonary:      Effort: Pulmonary effort is normal. No accessory muscle usage, prolonged expiration or respiratory distress.      Breath sounds: Normal breath sounds.   Chest:      Chest wall: No tenderness.   Abdominal:      General: Bowel sounds are normal. There is no distension.      Palpations: Abdomen is soft. There is no mass.      Tenderness: There is no abdominal tenderness.   Musculoskeletal:         General: Tenderness present.      Cervical back: Normal range of motion and neck supple.      Thoracic back: Tenderness present.      Lumbar back: Tenderness present.      Right upper leg: Tenderness present.      Left upper leg: Tenderness present.      Right lower leg: Tenderness present. No edema.      Left lower leg: Tenderness present. No edema.      Comments: No muscular atrophy or flaccidity.   Lymphadenopathy:      Head:      Right side of head: No submental or submandibular adenopathy.      Left side of head: No submental or submandibular adenopathy.      Cervical: No cervical adenopathy.      Right cervical: No superficial cervical adenopathy.     Left cervical: No superficial cervical adenopathy.   Skin:     General: Skin is warm and dry.      Capillary Refill: Capillary refill takes less than 2 seconds.      Coloration: Skin is not jaundiced or pale.      Findings: No erythema.      Nails: There is no clubbing.   Neurological:      Mental Status: She is alert and oriented to person, place, and time.      Cranial Nerves: No cranial nerve deficit or facial asymmetry.      Sensory: No  sensory deficit.      Motor: No weakness, tremor, atrophy or abnormal muscle tone.      Coordination: Coordination normal.      Gait: Gait abnormal.      Deep Tendon Reflexes: Reflexes are normal and symmetric.   Psychiatric:         Attention and Perception: She is attentive.         Mood and Affect: Mood normal.         Speech: Speech normal.         Behavior: Behavior normal. Behavior is cooperative.         Thought Content: Thought content normal.         Judgment: Judgment normal.           Diagnoses and all orders for this visit:    1. Small vessel disease, cerebrovascular (Primary)  Assessment & Plan:  Continue plavix 75 mg and Crestor 20 mg  Report any negative side effects.   Encouraged to pursue a low-cholesterol diet including limited fried foods and low-fat foods.  Be active as physically able.      2. Multiple joint pain  Comments:  Avoid overuse activity.  Continue Norco and Celebrex as directed  Assessment & Plan:  Continue to be active.  Avoid overuse activities  Continue PRN Norco and celebrex as directed    Orders:  -     HYDROcodone-acetaminophen (Norco)  MG per tablet; Take 1 tablet by mouth Every 6 (Six) Hours As Needed for Moderate Pain.  Dispense: 90 tablet; Refill: 0    3. Seasonal allergic rhinitis due to pollen  Assessment & Plan:  Continue Zyrtec.  Continue eyedrops as needed.  Avoid known allergy and respiratory triggers.  May use Saline spray PRN as desired      4. Other migraine without status migrainosus, intractable  Assessment & Plan:  Continue to avoid known headache triggers.  Continue Qulipta as needed.          5. Gain of weight  Assessment & Plan:  Stop Adipex          BMI is within normal parameters. No other follow-up for BMI required.       Understands disease processes and need for medications.  Understands reasons for urgent and emergent care.  Patient (& family) verbalized agreement for treatment plan.   Emotional support and active listening provided.  Patient  provided time to verbalize feelings.  MICHELLE reviewed today and consistent.  Will refill prescribed controlled medication today.  Patient is aware they cannot receive narcotics from any other provider except if under care of pain management or speciality clinic.  Risk and benefits of medication use has been reviewed.  History and physical exam exhibit continued safe and appropriate use of controlled substances.  The patient is aware of the potential for addiction and dependence.  This patient has been made aware of the appropriate use of such medications, including potential risk of somnolence, limited ability to drive and / or work safely, and potential for overdose.  Patient understands not to take medication and drive until they know how medication may affect their cognition/decision making.   It has also been made clear that these medications are for use by this patient only, without concomitant use of alcohol or other substances unless prescribed/advised by medical provider.  Patient understands they may be subject to UDS and pill counts at random.      Patient considered to be moderate risk for addiction due to use of multiple controlled medications.  Patient understands and accepts these risks.  Patient need for medication will be reassessed at each visit.  Doses will be adjusted according to patient need and findings.    Goal of TX: Patient will not have any adverse reactions of medication.  Patient have reduction in pain symptoms with use of PRN Norco as directed.  Patient will not have any adverse side effects from medication.  Patient will be able to remain active in an outside of home without interference from pain symptoms.      Medication Dispense Information    Hydrocodone Bitartrate/Ac   Dispensed: 2022 12:00 AM   Written:  2022   Unit strength: 325MG/10MG   Days supply: 30   Quantity: 90 each   Refills remainin   Pharmacy: [facility]   Authorizing provider: JOHN HOLLEY    Received from: MICHELLE PDMP (Fill History)   Brand or Generic:       RTC 1 month, sooner if needed.             This document has been electronically signed by:  STEVE Gonsales, FNP-C    Dragon disclaimer:  Part of this note may be an electronic transcription/translation of spoken language to printed text using the Dragon Dictation System.

## 2023-01-08 NOTE — ASSESSMENT & PLAN NOTE
Continue plavix 75 mg and Crestor 20 mg  Report any negative side effects.   Encouraged to pursue a low-cholesterol diet including limited fried foods and low-fat foods.  Be active as physically able.

## 2023-01-26 ENCOUNTER — OFFICE VISIT (OUTPATIENT)
Dept: FAMILY MEDICINE CLINIC | Facility: CLINIC | Age: 63
End: 2023-01-26
Payer: COMMERCIAL

## 2023-01-26 VITALS
RESPIRATION RATE: 14 BRPM | HEIGHT: 65 IN | SYSTOLIC BLOOD PRESSURE: 126 MMHG | OXYGEN SATURATION: 97 % | HEART RATE: 72 BPM | BODY MASS INDEX: 22.86 KG/M2 | WEIGHT: 137.2 LBS | DIASTOLIC BLOOD PRESSURE: 74 MMHG | TEMPERATURE: 98.4 F

## 2023-01-26 DIAGNOSIS — M25.50 MULTIPLE JOINT PAIN: ICD-10-CM

## 2023-01-26 DIAGNOSIS — Z78.9 WEIGHT LOSS ADVISED: ICD-10-CM

## 2023-01-26 DIAGNOSIS — J30.1 SEASONAL ALLERGIC RHINITIS DUE TO POLLEN: ICD-10-CM

## 2023-01-26 DIAGNOSIS — I67.9 SMALL VESSEL DISEASE, CEREBROVASCULAR: Primary | ICD-10-CM

## 2023-01-26 DIAGNOSIS — J06.9 ACUTE URI: ICD-10-CM

## 2023-01-26 PROCEDURE — 99214 OFFICE O/P EST MOD 30 MIN: CPT | Performed by: NURSE PRACTITIONER

## 2023-01-26 RX ORDER — HYDROCODONE BITARTRATE AND ACETAMINOPHEN 10; 325 MG/1; MG/1
1 TABLET ORAL EVERY 6 HOURS PRN
Qty: 90 TABLET | Refills: 0 | Status: SHIPPED | OUTPATIENT
Start: 2023-01-26 | End: 2023-02-22 | Stop reason: SDUPTHER

## 2023-01-26 RX ORDER — CETIRIZINE HYDROCHLORIDE 10 MG/1
10 TABLET ORAL DAILY PRN
Qty: 30 TABLET | Refills: 5 | Status: SHIPPED | OUTPATIENT
Start: 2023-01-26

## 2023-01-26 RX ORDER — CLOPIDOGREL BISULFATE 75 MG/1
75 TABLET ORAL DAILY
Qty: 30 TABLET | Refills: 5 | Status: SHIPPED | OUTPATIENT
Start: 2023-01-26

## 2023-01-26 RX ORDER — AZITHROMYCIN 250 MG/1
TABLET, FILM COATED ORAL
Qty: 6 TABLET | Refills: 0 | Status: SHIPPED | OUTPATIENT
Start: 2023-01-26 | End: 2023-02-22

## 2023-01-26 RX ORDER — ROSUVASTATIN CALCIUM 20 MG/1
20 TABLET, COATED ORAL DAILY
Qty: 30 TABLET | Refills: 5 | Status: SHIPPED | OUTPATIENT
Start: 2023-01-26

## 2023-01-26 RX ORDER — PHENTERMINE HYDROCHLORIDE 37.5 MG/1
37.5 TABLET ORAL
Qty: 30 TABLET | Refills: 0 | Status: SHIPPED | OUTPATIENT
Start: 2023-01-26

## 2023-01-26 NOTE — PROGRESS NOTES
"Subjective   Rohini Burch is a 62 y.o. female.     Chief Complaint   Patient presents with   • Back Pain       History of Present Illness     Allergies-chronic and ongoing.  Patient is currently taking Zyrtec 10 mg and using Alaway eyedrops.  No negative side effects.   She reports \"not to bad\" despite damp weather.  She reports she has noted some dental pain and some sensation of ear pain.    Joint pain-ongoing.  Patient is currently on Celebrex 100 mg twice daily as needed and hydrocodone 10 mg every 6 hours as needed.  No negative side effects.  She reports that she is aching this morning and it is cold outside.  She reports she had to sit for an extended period of time yesterday for an appt.  Headache-patient has been ordered weekly to 60 mg as needed.  She reports that she has had some sinus HA but nothing like she had when she needed the occipital block.  She has PRN meds for BP elevations.    She denies any concerns at this time.  She continues to not have had an eye check up for vision changes in her left eye.  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  Reports she is not taking daily but at least QOD.  Patient denies any bleeding of gums, melena, hematochezia, hematuria, or epistaxis.       The following portions of the patient's history were reviewed and updated as appropriate: CC, ROS, allergies, current medications, past family history, past medical history, past social history, past surgical history and problem list.      Review of Systems   Constitutional: Positive for fatigue. Negative for activity change, appetite change and fever.   HENT: Negative for congestion, ear pain, nosebleeds, postnasal drip, rhinorrhea, sore throat, tinnitus, trouble swallowing and voice change.    Eyes: Negative for blurred vision, photophobia and visual disturbance.   Respiratory: Negative for cough, chest tightness, shortness of breath and wheezing.    Cardiovascular: Negative for chest pain, palpitations " "and leg swelling.   Gastrointestinal: Negative for abdominal pain, blood in stool, constipation, diarrhea, nausea, vomiting and GERD.   Endocrine: Negative for cold intolerance, heat intolerance and polydipsia.   Genitourinary: Negative for decreased urine volume, difficulty urinating, dysuria and hematuria.   Musculoskeletal: Positive for arthralgias, back pain, gait problem and myalgias.   Skin: Negative for color change, pallor and wound.   Allergic/Immunologic: Negative.    Neurological: Negative for dizziness, syncope, numbness and headache.   Hematological: Negative.    Psychiatric/Behavioral: Negative for decreased concentration, self-injury, sleep disturbance, suicidal ideas and depressed mood. The patient is not nervous/anxious.    All other systems reviewed and are negative.      Objective     /74   Pulse 72   Temp 98.4 °F (36.9 °C)   Resp 14   Ht 165.1 cm (65\")   Wt 62.2 kg (137 lb 3.2 oz)   SpO2 97%   BMI 22.83 kg/m²     Physical Exam  Vitals reviewed.   Constitutional:       General: She is not in acute distress.     Appearance: She is well-developed. She is not diaphoretic.   HENT:      Head: Normocephalic and atraumatic.      Jaw: No tenderness.      Comments: Oropharynx not examined.  Patient is presently wearing a face covering/mask due to COVID-19 pandemic.     Right Ear: Hearing, tympanic membrane, ear canal and external ear normal.      Left Ear: Hearing, tympanic membrane, ear canal and external ear normal.   Eyes:      General: Lids are normal. No scleral icterus.     Extraocular Movements:      Right eye: Normal extraocular motion and no nystagmus.      Left eye: Normal extraocular motion and no nystagmus.      Conjunctiva/sclera: Conjunctivae normal.      Pupils: Pupils are equal, round, and reactive to light.   Neck:      Thyroid: No thyromegaly or thyroid tenderness.      Vascular: No carotid bruit or JVD.      Trachea: No tracheal tenderness.   Cardiovascular:      Rate and " Rhythm: Normal rate and regular rhythm.      Pulses:           Dorsalis pedis pulses are 2+ on the right side and 2+ on the left side.        Posterior tibial pulses are 2+ on the right side and 2+ on the left side.      Heart sounds: Normal heart sounds, S1 normal and S2 normal. No murmur heard.  Pulmonary:      Effort: Pulmonary effort is normal. No accessory muscle usage, prolonged expiration or respiratory distress.      Breath sounds: Normal breath sounds.   Chest:      Chest wall: No tenderness.   Abdominal:      General: Bowel sounds are normal. There is no distension.      Palpations: Abdomen is soft. There is no mass.      Tenderness: There is no abdominal tenderness.   Musculoskeletal:         General: Tenderness present.      Cervical back: Normal range of motion and neck supple.      Thoracic back: Tenderness present.      Lumbar back: Tenderness present.      Right lower leg: Tenderness present. No edema.      Left lower leg: Tenderness present. No edema.      Comments: No muscular atrophy or flaccidity.   Lymphadenopathy:      Head:      Right side of head: No submental or submandibular adenopathy.      Left side of head: No submental or submandibular adenopathy.      Cervical: No cervical adenopathy.      Right cervical: No superficial cervical adenopathy.     Left cervical: No superficial cervical adenopathy.   Skin:     General: Skin is warm and dry.      Capillary Refill: Capillary refill takes less than 2 seconds.      Coloration: Skin is not jaundiced or pale.      Findings: No erythema.      Nails: There is no clubbing.   Neurological:      Mental Status: She is alert and oriented to person, place, and time.      Cranial Nerves: No cranial nerve deficit or facial asymmetry.      Sensory: No sensory deficit.      Motor: No weakness, tremor, atrophy or abnormal muscle tone.      Coordination: Coordination normal.      Gait: Gait abnormal (mildly antalgic).      Deep Tendon Reflexes: Reflexes are  normal and symmetric.   Psychiatric:         Attention and Perception: She is attentive.         Mood and Affect: Mood normal.         Speech: Speech normal.         Behavior: Behavior normal. Behavior is cooperative.         Thought Content: Thought content normal.         Judgment: Judgment normal.           Diagnoses and all orders for this visit:    1. Small vessel disease, cerebrovascular (Primary)  -     clopidogrel (Plavix) 75 MG tablet; Take 1 tablet by mouth Daily.  Dispense: 30 tablet; Refill: 5  -     rosuvastatin (Crestor) 20 MG tablet; Take 1 tablet by mouth Daily.  Dispense: 30 tablet; Refill: 5    2. Multiple joint pain  Comments:  Avoid overuse activity.  Continue Norco and Celebrex as directed  Orders:  -     HYDROcodone-acetaminophen (Norco)  MG per tablet; Take 1 tablet by mouth Every 6 (Six) Hours As Needed for Moderate Pain.  Dispense: 90 tablet; Refill: 0    3. Seasonal allergic rhinitis due to pollen  -     cetirizine (zyrTEC) 10 MG tablet; Take 1 tablet by mouth Daily As Needed for Allergies.  Dispense: 30 tablet; Refill: 5    4. Acute URI  Comments:  Finish all the Zithromax do not save any for future use  Orders:  -     azithromycin (Zithromax) 250 MG tablet; Take 2 tablets the first day, then 1 tablet daily for 4 days.  Dispense: 6 tablet; Refill: 0    5. Weight loss advised  -     phentermine (Adipex-P) 37.5 MG tablet; Take 1 tablet by mouth Every Morning Before Breakfast.  Dispense: 30 tablet; Refill: 0         BMI is within normal parameters. No other follow-up for BMI required.       Understands disease processes and need for medications.  Understands reasons for urgent and emergent care.  Patient (& family) verbalized agreement for treatment plan.   Emotional support and active listening provided.  Patient provided time to verbalize feelings.    MICHELLE reviewed today and consistent.  Will refill prescribed controlled medication today.  Patient is aware they cannot receive  narcotics from any other provider except if under care of pain management or speciality clinic.  Risk and benefits of medication use has been reviewed.  History and physical exam exhibit continued safe and appropriate use of controlled substances.  The patient is aware of the potential for addiction and dependence.  This patient has been made aware of the appropriate use of such medications, including potential risk of somnolence, limited ability to drive and / or work safely, and potential for overdose.  Patient understands not to take medication and drive until they know how medication may affect their cognition/decision making.   It has also been made clear that these medications are for use by this patient only, without concomitant use of alcohol or other substances unless prescribed/advised by medical provider.  Patient understands they may be subject to UDS and pill counts at random.    Patient considered to be low risk for addiction due to use of single controlled medications.  Patient understands and accepts these risks.  Patient need for medication will be reassessed at each visit.  Doses will be adjusted according to patient need and findings.    Goal of TX: Patient will not have any adverse reactions of medication.  Patient have reduction in pain symptoms with use of PRN Norco as directed.  Patient will not have any adverse side effects from medication.  Patient will be able to remain active in an outside of home without interference from pain symptoms.    Medication Dispense Information    Hydrocodone Bitartrate/Ac   Dispensed: 2022 12:00 AM   Written:  2022   Unit strength: 325MG/10MG   Days supply: 30   Quantity: 90 each   Refills remainin   Pharmacy: [facility]   Authorizing provider: JOHN HOLLEY   Received from: MICHELLE [facility] (Fill History)   Brand or Generic:       RTC 1 month, sooner if needed.             This document has been electronically signed by:  STEVE Gonsales,  FNP-C    Dragon disclaimer:  Part of this note may be an electronic transcription/translation of spoken language to printed text using the Dragon Dictation System.

## 2023-02-22 ENCOUNTER — OFFICE VISIT (OUTPATIENT)
Dept: FAMILY MEDICINE CLINIC | Facility: CLINIC | Age: 63
End: 2023-02-22
Payer: COMMERCIAL

## 2023-02-22 VITALS
WEIGHT: 138.4 LBS | SYSTOLIC BLOOD PRESSURE: 132 MMHG | BODY MASS INDEX: 23.06 KG/M2 | HEIGHT: 65 IN | DIASTOLIC BLOOD PRESSURE: 86 MMHG | TEMPERATURE: 98.4 F | RESPIRATION RATE: 18 BRPM | HEART RATE: 96 BPM | OXYGEN SATURATION: 98 %

## 2023-02-22 DIAGNOSIS — J06.9 ACUTE URI: ICD-10-CM

## 2023-02-22 DIAGNOSIS — I67.9 SMALL VESSEL DISEASE, CEREBROVASCULAR: Primary | ICD-10-CM

## 2023-02-22 DIAGNOSIS — M25.50 MULTIPLE JOINT PAIN: ICD-10-CM

## 2023-02-22 DIAGNOSIS — J30.1 SEASONAL ALLERGIC RHINITIS DUE TO POLLEN: ICD-10-CM

## 2023-02-22 PROCEDURE — 1159F MED LIST DOCD IN RCRD: CPT | Performed by: NURSE PRACTITIONER

## 2023-02-22 PROCEDURE — 1160F RVW MEDS BY RX/DR IN RCRD: CPT | Performed by: NURSE PRACTITIONER

## 2023-02-22 PROCEDURE — 99214 OFFICE O/P EST MOD 30 MIN: CPT | Performed by: NURSE PRACTITIONER

## 2023-02-22 RX ORDER — OLOPATADINE HYDROCHLORIDE 1 MG/ML
1 SOLUTION/ DROPS OPHTHALMIC 2 TIMES DAILY
Qty: 5 ML | Refills: 5 | Status: SHIPPED | OUTPATIENT
Start: 2023-02-22

## 2023-02-22 RX ORDER — HYDROCODONE BITARTRATE AND ACETAMINOPHEN 10; 325 MG/1; MG/1
1 TABLET ORAL EVERY 6 HOURS PRN
Qty: 90 TABLET | Refills: 0 | Status: SHIPPED | OUTPATIENT
Start: 2023-02-22 | End: 2023-03-22 | Stop reason: SDUPTHER

## 2023-02-22 RX ORDER — AMOXICILLIN AND CLAVULANATE POTASSIUM 875; 125 MG/1; MG/1
1 TABLET, FILM COATED ORAL 2 TIMES DAILY
Qty: 20 TABLET | Refills: 0 | Status: SHIPPED | OUTPATIENT
Start: 2023-02-22 | End: 2023-03-22

## 2023-02-22 RX ORDER — CELECOXIB 100 MG/1
100 CAPSULE ORAL 2 TIMES DAILY PRN
Qty: 60 CAPSULE | Refills: 2 | Status: SHIPPED | OUTPATIENT
Start: 2023-02-22

## 2023-03-22 ENCOUNTER — OFFICE VISIT (OUTPATIENT)
Dept: FAMILY MEDICINE CLINIC | Facility: CLINIC | Age: 63
End: 2023-03-22
Payer: COMMERCIAL

## 2023-03-22 VITALS
SYSTOLIC BLOOD PRESSURE: 134 MMHG | HEIGHT: 65 IN | BODY MASS INDEX: 23.32 KG/M2 | HEART RATE: 82 BPM | DIASTOLIC BLOOD PRESSURE: 82 MMHG | WEIGHT: 140 LBS | RESPIRATION RATE: 16 BRPM | OXYGEN SATURATION: 98 % | TEMPERATURE: 98.4 F

## 2023-03-22 DIAGNOSIS — J30.1 SEASONAL ALLERGIC RHINITIS DUE TO POLLEN: Primary | ICD-10-CM

## 2023-03-22 DIAGNOSIS — I83.813 VARICOSE VEINS OF BOTH LOWER EXTREMITIES WITH PAIN: ICD-10-CM

## 2023-03-22 DIAGNOSIS — E55.9 VITAMIN D DEFICIENCY: ICD-10-CM

## 2023-03-22 DIAGNOSIS — Z13.220 SCREENING CHOLESTEROL LEVEL: ICD-10-CM

## 2023-03-22 DIAGNOSIS — Z13.29 THYROID DISORDER SCREENING: ICD-10-CM

## 2023-03-22 DIAGNOSIS — R73.09 ABNORMAL GLUCOSE: ICD-10-CM

## 2023-03-22 DIAGNOSIS — I67.9 SMALL VESSEL DISEASE, CEREBROVASCULAR: ICD-10-CM

## 2023-03-22 DIAGNOSIS — M25.50 MULTIPLE JOINT PAIN: ICD-10-CM

## 2023-03-22 DIAGNOSIS — R53.83 OTHER FATIGUE: ICD-10-CM

## 2023-03-22 PROCEDURE — 80050 GENERAL HEALTH PANEL: CPT | Performed by: NURSE PRACTITIONER

## 2023-03-22 PROCEDURE — 82306 VITAMIN D 25 HYDROXY: CPT | Performed by: NURSE PRACTITIONER

## 2023-03-22 PROCEDURE — 80061 LIPID PANEL: CPT | Performed by: NURSE PRACTITIONER

## 2023-03-22 PROCEDURE — 83036 HEMOGLOBIN GLYCOSYLATED A1C: CPT | Performed by: NURSE PRACTITIONER

## 2023-03-22 PROCEDURE — 82607 VITAMIN B-12: CPT | Performed by: NURSE PRACTITIONER

## 2023-03-22 PROCEDURE — 84439 ASSAY OF FREE THYROXINE: CPT | Performed by: NURSE PRACTITIONER

## 2023-03-22 RX ORDER — HYDROCODONE BITARTRATE AND ACETAMINOPHEN 10; 325 MG/1; MG/1
1 TABLET ORAL EVERY 6 HOURS PRN
Qty: 90 TABLET | Refills: 0 | Status: SHIPPED | OUTPATIENT
Start: 2023-03-22

## 2023-03-22 NOTE — PROGRESS NOTES
Subjective   Rohini Burch is a 62 y.o. female.     Chief Complaint   Patient presents with   • Weight Loss       History of Present Illness     Microvascular changes of brain-on Crestor and Plavix.  Reports she is not taking daily but at least QOD.  Patient denies any bleeding of gums, melena, hematochezia, hematuria, or epistaxis.    Allergies-chronic and ongoing.  Patient is currently taking Zyrtec 10 mg and using Alaway eyedrops.  No negative side effects.  She has been more exacerbated due to increased dampness and pollen.    Joint pain-ongoing.  Patient is currently on Celebrex 100 mg twice daily as needed and hydrocodone 10 mg every 6 hours as needed.  No negative side effects.  She is not actively working so her pain had not been as significant.  She reports some increase with the damp weather that is presently occurring.  She reports that she has also aggravated her hip.    Headache-patient has been ordered weekly to 60 mg as needed.  She reports that she has had some sinus HA but nothing like she had when she needed the occipital block.  She has PRN meds for BP elevations.    Mammogram-continues to have not obtained her mammogram.      The following portions of the patient's history were reviewed and updated as appropriate: CC, ROS, allergies, current medications, past family history, past medical history, past social history, past surgical history and problem list.      Review of Systems   Constitutional: Positive for fatigue. Negative for activity change, appetite change and fever.   HENT: Negative for congestion, ear pain, nosebleeds, postnasal drip, rhinorrhea, sore throat, tinnitus, trouble swallowing and voice change.    Eyes: Negative for blurred vision, photophobia and visual disturbance.   Respiratory: Negative for cough, chest tightness, shortness of breath and wheezing.    Cardiovascular: Negative for chest pain, palpitations and leg swelling.   Gastrointestinal: Negative for abdominal pain,  "blood in stool, constipation, diarrhea, nausea and GERD.   Endocrine: Negative for cold intolerance, heat intolerance and polydipsia.   Genitourinary: Negative for decreased urine volume, difficulty urinating, dysuria and hematuria.   Musculoskeletal: Positive for arthralgias and myalgias. Negative for back pain and gait problem.   Skin: Negative for color change, pallor and wound.   Allergic/Immunologic: Positive for environmental allergies.   Neurological: Negative for dizziness, syncope, numbness and headache.   Hematological: Negative.    Psychiatric/Behavioral: Negative for decreased concentration, self-injury, sleep disturbance, suicidal ideas and depressed mood. The patient is not nervous/anxious.    All other systems reviewed and are negative.      Objective     /82   Pulse 82   Temp 98.4 °F (36.9 °C)   Resp 16   Ht 165.1 cm (65\")   Wt 63.5 kg (140 lb)   SpO2 98%   BMI 23.30 kg/m²     Physical Exam  Vitals reviewed.   Constitutional:       General: She is not in acute distress.     Appearance: She is well-developed. She is not diaphoretic.   HENT:      Head: Normocephalic and atraumatic.      Jaw: No tenderness.      Comments: Oropharynx not examined.  Patient is presently wearing a face covering/mask due to COVID-19 pandemic.     Right Ear: Hearing, tympanic membrane, ear canal and external ear normal.      Left Ear: Hearing, tympanic membrane, ear canal and external ear normal.   Eyes:      General: Lids are normal. No scleral icterus.     Extraocular Movements:      Right eye: Normal extraocular motion and no nystagmus.      Left eye: Normal extraocular motion and no nystagmus.      Conjunctiva/sclera: Conjunctivae normal.      Pupils: Pupils are equal, round, and reactive to light.   Neck:      Thyroid: No thyromegaly or thyroid tenderness.      Vascular: No carotid bruit or JVD.      Trachea: No tracheal tenderness.   Cardiovascular:      Rate and Rhythm: Normal rate and regular rhythm.     "  Pulses:           Dorsalis pedis pulses are 2+ on the right side and 2+ on the left side.        Posterior tibial pulses are 2+ on the right side and 2+ on the left side.      Heart sounds: Normal heart sounds, S1 normal and S2 normal. No murmur heard.  Pulmonary:      Effort: Pulmonary effort is normal. No accessory muscle usage, prolonged expiration or respiratory distress.      Breath sounds: Normal breath sounds.   Chest:      Chest wall: No tenderness.   Abdominal:      General: Bowel sounds are normal. There is no distension.      Palpations: Abdomen is soft. There is no hepatomegaly, splenomegaly or mass.      Tenderness: There is no abdominal tenderness.   Musculoskeletal:         General: Tenderness present.      Cervical back: Normal range of motion and neck supple.      Thoracic back: Tenderness present.      Lumbar back: Spasms and tenderness present. Decreased range of motion.      Right upper leg: Tenderness present.      Left upper leg: Tenderness present.      Right lower leg: Tenderness present. No edema.      Left lower leg: Tenderness present. No edema.      Comments: No muscular atrophy or flaccidity.   Lymphadenopathy:      Head:      Right side of head: No submental or submandibular adenopathy.      Left side of head: No submental or submandibular adenopathy.      Cervical: No cervical adenopathy.      Right cervical: No superficial cervical adenopathy.     Left cervical: No superficial cervical adenopathy.   Skin:     General: Skin is warm and dry.      Capillary Refill: Capillary refill takes less than 2 seconds.      Coloration: Skin is not jaundiced or pale.      Findings: No erythema.      Nails: There is no clubbing.      Comments: Multiple areas of varicosities   Neurological:      Mental Status: She is alert and oriented to person, place, and time.      Cranial Nerves: No cranial nerve deficit or facial asymmetry.      Sensory: No sensory deficit.      Motor: No weakness, tremor,  atrophy or abnormal muscle tone.      Coordination: Coordination normal.      Deep Tendon Reflexes: Reflexes are normal and symmetric.   Psychiatric:         Attention and Perception: She is attentive.         Mood and Affect: Mood normal. Mood is not anxious or depressed.         Speech: Speech normal.         Behavior: Behavior normal. Behavior is cooperative.         Thought Content: Thought content normal.         Cognition and Memory: Cognition normal.         Judgment: Judgment normal.           Diagnoses and all orders for this visit:    1. Seasonal allergic rhinitis due to pollen (Primary)  Assessment & Plan:  Continue Zyrtec.  Continue eyedrops as needed.  Avoid known allergy and respiratory triggers.  May use Saline spray PRN as desired      2. Multiple joint pain  Comments:  Avoid overuse activity.  Continue Norco and Celebrex as directed  Assessment & Plan:  Continue to be active.  Avoid overuse activities  Continue PRN Norco and celebrex as directed    Orders:  -     HYDROcodone-acetaminophen (Norco)  MG per tablet; Take 1 tablet by mouth Every 6 (Six) Hours As Needed for Moderate Pain.  Dispense: 90 tablet; Refill: 0    3. Small vessel disease, cerebrovascular  Assessment & Plan:  Continue plavix 75 mg and Crestor 20 mg  Report any negative side effects.   Encouraged to pursue a low-cholesterol diet including limited fried foods and low-fat foods.  Be active as physically able.    Orders:  -     CBC & Differential  -     Comprehensive Metabolic Panel    4. Varicose veins of both lower extremities with pain  Assessment & Plan:  Continue to be active.  Avoid overuse activities  Continue PRN Norco and celebrex as directed      5. Screening cholesterol level    6. Vitamin D deficiency  -     Lipid Panel  -     Vitamin D,25-Hydroxy    7. Abnormal glucose  -     Hemoglobin A1c    8. Thyroid disorder screening  -     TSH  -     T4, Free    9. Other fatigue  -     TSH  -     Vitamin B12  -     T4, Free        BMI is within normal parameters. No other follow-up for BMI required.       Understands disease processes and need for medications.  Understands reasons for urgent and emergent care.  Patient (& family) verbalized agreement for treatment plan.   Emotional support and active listening provided.  Patient provided time to verbalize feelings.    MICHELLE/PMDP reviewed today and consistent.  Will refill prescribed controlled medication today.  Patient is aware they cannot receive narcotics from any other provider except if under care of pain management or speciality clinic.  Risk and benefits of medication use has been reviewed.  History and physical exam exhibit continued safe and appropriate use of controlled substances.  The patient is aware of the potential for addiction and dependence.  This patient has been made aware of the appropriate use of such medications, including potential risk of somnolence, limited ability to drive and / or work safely, and potential for overdose.    It has also been made clear that these medications are for use by this patient only, without concomitant use of alcohol or other substances unless prescribed/advised by medical provider.  Patient understands they may be subject to UDS and pill counts at random.      Patient considered to be moderate risk for addiction due to use of multiple controlled medications.  Patient understands and accepts these risks.  Patient need for medication will be reassessed at each visit.  Doses will be adjusted according to patient need and findings.    Goal of TX: Patient will not have any adverse reactions of medication.  Patient have reduction in pain symptoms with use of PRN Norco as directed.  Patient will not have any adverse side effects from medication.  Patient will be able to remain active in an outside of home without interference from pain symptoms.  Patient will have reduction in weight with use of Adipex as directed with simultaneous diet and  lifestyle changes.    Medication Dispense Information    Hydrocodone Bitartrate/Ac   Dispensed: 2023 12:00 AM   Written:  2023   Unit strength: 325MG/10MG   Days supply: 30   Quantity: 90 each   Refills remainin   Pharmacy: [facility]   Authorizing provider: JOHN HOLLEY   Received from: MICHELLE [facility] (Fill History)   Brand or Generic:       Medication Dispense Information    Hydrocodone Bitartrate/Ac   Dispensed: 2023 12:00 AM   Written:  2023   Unit strength: 325MG/10MG   Days supply: 30   Quantity: 90 each   Refills remainin   Pharmacy: [facility]   Authorizing provider: JOHN HOLLEY   Received from: MICHELLE [facility] (Fill History)   Brand or Generic:       Labs ordered.  Patient will obtain today.    RTC 1 month, sooner if needed.             This document has been electronically signed by:  STEVE Gonsales FNP-C Dragon disclaimer:  Part of this note may be an electronic transcription/translation of spoken language to printed text using the Dragon Dictation System.

## 2023-03-23 LAB
25(OH)D3 SERPL-MCNC: 26.7 NG/ML (ref 30–100)
ALBUMIN SERPL-MCNC: 4.6 G/DL (ref 3.5–5.2)
ALBUMIN/GLOB SERPL: 2 G/DL
ALP SERPL-CCNC: 41 U/L (ref 39–117)
ALT SERPL W P-5'-P-CCNC: 9 U/L (ref 1–33)
ANION GAP SERPL CALCULATED.3IONS-SCNC: 11.9 MMOL/L (ref 5–15)
AST SERPL-CCNC: 15 U/L (ref 1–32)
BASOPHILS # BLD AUTO: 0.03 10*3/MM3 (ref 0–0.2)
BASOPHILS NFR BLD AUTO: 0.7 % (ref 0–1.5)
BILIRUB SERPL-MCNC: 0.4 MG/DL (ref 0–1.2)
BUN SERPL-MCNC: 12 MG/DL (ref 8–23)
BUN/CREAT SERPL: 16 (ref 7–25)
CALCIUM SPEC-SCNC: 9.6 MG/DL (ref 8.6–10.5)
CHLORIDE SERPL-SCNC: 102 MMOL/L (ref 98–107)
CHOLEST SERPL-MCNC: 184 MG/DL (ref 0–200)
CO2 SERPL-SCNC: 25.1 MMOL/L (ref 22–29)
CREAT SERPL-MCNC: 0.75 MG/DL (ref 0.57–1)
DEPRECATED RDW RBC AUTO: 44.9 FL (ref 37–54)
EGFRCR SERPLBLD CKD-EPI 2021: 90.7 ML/MIN/1.73
EOSINOPHIL # BLD AUTO: 0.05 10*3/MM3 (ref 0–0.4)
EOSINOPHIL NFR BLD AUTO: 1.1 % (ref 0.3–6.2)
ERYTHROCYTE [DISTWIDTH] IN BLOOD BY AUTOMATED COUNT: 11.8 % (ref 12.3–15.4)
GLOBULIN UR ELPH-MCNC: 2.3 GM/DL
GLUCOSE SERPL-MCNC: 82 MG/DL (ref 65–99)
HBA1C MFR BLD: 4.8 % (ref 4.8–5.6)
HCT VFR BLD AUTO: 37.8 % (ref 34–46.6)
HDLC SERPL-MCNC: 65 MG/DL (ref 40–60)
HGB BLD-MCNC: 12.4 G/DL (ref 12–15.9)
IMM GRANULOCYTES # BLD AUTO: 0.01 10*3/MM3 (ref 0–0.05)
IMM GRANULOCYTES NFR BLD AUTO: 0.2 % (ref 0–0.5)
LDLC SERPL CALC-MCNC: 96 MG/DL (ref 0–100)
LDLC/HDLC SERPL: 1.43 {RATIO}
LYMPHOCYTES # BLD AUTO: 1.64 10*3/MM3 (ref 0.7–3.1)
LYMPHOCYTES NFR BLD AUTO: 35.8 % (ref 19.6–45.3)
MCH RBC QN AUTO: 33.8 PG (ref 26.6–33)
MCHC RBC AUTO-ENTMCNC: 32.8 G/DL (ref 31.5–35.7)
MCV RBC AUTO: 103 FL (ref 79–97)
MONOCYTES # BLD AUTO: 0.53 10*3/MM3 (ref 0.1–0.9)
MONOCYTES NFR BLD AUTO: 11.6 % (ref 5–12)
NEUTROPHILS NFR BLD AUTO: 2.32 10*3/MM3 (ref 1.7–7)
NEUTROPHILS NFR BLD AUTO: 50.6 % (ref 42.7–76)
NRBC BLD AUTO-RTO: 0 /100 WBC (ref 0–0.2)
PLATELET # BLD AUTO: 282 10*3/MM3 (ref 140–450)
PMV BLD AUTO: 9.6 FL (ref 6–12)
POTASSIUM SERPL-SCNC: 4.6 MMOL/L (ref 3.5–5.2)
PROT SERPL-MCNC: 6.9 G/DL (ref 6–8.5)
RBC # BLD AUTO: 3.67 10*6/MM3 (ref 3.77–5.28)
SODIUM SERPL-SCNC: 139 MMOL/L (ref 136–145)
T4 FREE SERPL-MCNC: 1.15 NG/DL (ref 0.93–1.7)
TRIGL SERPL-MCNC: 131 MG/DL (ref 0–150)
TSH SERPL DL<=0.05 MIU/L-ACNC: 2.07 UIU/ML (ref 0.27–4.2)
VIT B12 BLD-MCNC: 306 PG/ML (ref 211–946)
VLDLC SERPL-MCNC: 23 MG/DL (ref 5–40)
WBC NRBC COR # BLD: 4.58 10*3/MM3 (ref 3.4–10.8)

## 2023-03-28 NOTE — PROGRESS NOTES
Vitamin D is minimally low.  Cholesterol is good.  Blood count is okay.  Thyroid is good B12 and blood sugars are good.  Kidney and liver function look good.

## 2023-04-24 ENCOUNTER — OFFICE VISIT (OUTPATIENT)
Dept: FAMILY MEDICINE CLINIC | Facility: CLINIC | Age: 63
End: 2023-04-24
Payer: COMMERCIAL

## 2023-04-24 VITALS
SYSTOLIC BLOOD PRESSURE: 130 MMHG | HEART RATE: 110 BPM | BODY MASS INDEX: 22.29 KG/M2 | HEIGHT: 65 IN | DIASTOLIC BLOOD PRESSURE: 84 MMHG | WEIGHT: 133.8 LBS | OXYGEN SATURATION: 97 % | RESPIRATION RATE: 22 BRPM | TEMPERATURE: 98.6 F

## 2023-04-24 DIAGNOSIS — J06.9 ACUTE URI: ICD-10-CM

## 2023-04-24 DIAGNOSIS — I83.813 VARICOSE VEINS OF BOTH LOWER EXTREMITIES WITH PAIN: Primary | ICD-10-CM

## 2023-04-24 DIAGNOSIS — M25.50 MULTIPLE JOINT PAIN: ICD-10-CM

## 2023-04-24 DIAGNOSIS — Z78.9 WEIGHT LOSS ADVISED: ICD-10-CM

## 2023-04-24 RX ORDER — PHENTERMINE HYDROCHLORIDE 37.5 MG/1
37.5 TABLET ORAL
Qty: 30 TABLET | Refills: 0 | Status: SHIPPED | OUTPATIENT
Start: 2023-04-24

## 2023-04-24 RX ORDER — HYDROCODONE BITARTRATE AND ACETAMINOPHEN 10; 325 MG/1; MG/1
1 TABLET ORAL EVERY 6 HOURS PRN
Qty: 90 TABLET | Refills: 0 | Status: SHIPPED | OUTPATIENT
Start: 2023-04-24

## 2023-04-24 RX ORDER — CEFDINIR 300 MG/1
300 CAPSULE ORAL 2 TIMES DAILY
Qty: 20 CAPSULE | Refills: 0 | Status: SHIPPED | OUTPATIENT
Start: 2023-04-24

## 2023-04-24 NOTE — PROGRESS NOTES
"Subjective   Rohini Burch is a 62 y.o. female.     Chief Complaint   Patient presents with   • Back Pain       History of Present Illness     Allergies and URI symptoms-chronic and ongoing.  Patient is currently taking Zyrtec 10 mg and using Alaway eyedrops.  No negative side effects.  She is currently exacerbated.    She reports that she has some dry ticking of her throat.    Joint pain-ongoing.  Patient is currently on Celebrex 100 mg twice daily as needed and hydrocodone 10 mg every 6 hours as needed.  No negative side effects.  Joint pain is stable.  No GI irritation with Celebrex and she is not \"taking every day\".    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  Reports she is not taking daily but at least QOD.  Patient denies any bleeding of gums, melena, hematochezia, hematuria, or epistaxis.   She has not had any recent headache.      The following portions of the patient's history were reviewed and updated as appropriate: CC, ROS, allergies, current medications, past family history, past medical history, past social history, past surgical history and problem list.      Review of Systems   Constitutional: Positive for fatigue. Negative for appetite change and fever.   HENT: Positive for congestion, postnasal drip, rhinorrhea and sinus pressure. Negative for ear pain, nosebleeds, sore throat, tinnitus, trouble swallowing and voice change.    Eyes: Negative for blurred vision, photophobia and visual disturbance.   Respiratory: Positive for cough. Negative for chest tightness, shortness of breath and wheezing.    Cardiovascular: Negative for chest pain and palpitations.   Gastrointestinal: Negative for abdominal pain, blood in stool, constipation, diarrhea, nausea and GERD.   Endocrine: Negative for cold intolerance, heat intolerance and polydipsia.   Genitourinary: Negative for decreased urine volume, difficulty urinating, dysuria and hematuria.   Musculoskeletal: Positive for arthralgias and " "myalgias. Negative for back pain and gait problem.   Skin: Negative for color change, pallor and wound.   Allergic/Immunologic: Negative.    Neurological: Negative for dizziness, syncope, numbness and headache.   Hematological: Negative.    Psychiatric/Behavioral: Negative for decreased concentration, sleep disturbance, suicidal ideas, depressed mood and stress. The patient is not nervous/anxious.    All other systems reviewed and are negative.      Objective     /84   Pulse 110   Temp 98.6 °F (37 °C)   Resp 22   Ht 165.1 cm (65\")   Wt 60.7 kg (133 lb 12.8 oz)   SpO2 97%   BMI 22.27 kg/m²     Physical Exam  Vitals reviewed.   Constitutional:       General: She is not in acute distress.     Appearance: She is well-developed. She is not diaphoretic.   HENT:      Head: Normocephalic and atraumatic.      Jaw: No tenderness.      Right Ear: Hearing, ear canal and external ear normal. A middle ear effusion is present.      Left Ear: Hearing, ear canal and external ear normal. A middle ear effusion is present.      Nose: Rhinorrhea present.      Mouth/Throat:      Pharynx: Posterior oropharyngeal erythema (moderate) present.   Eyes:      General: Lids are normal. No scleral icterus.     Extraocular Movements:      Right eye: Normal extraocular motion and no nystagmus.      Left eye: Normal extraocular motion and no nystagmus.      Conjunctiva/sclera: Conjunctivae normal.      Pupils: Pupils are equal, round, and reactive to light.   Neck:      Thyroid: No thyromegaly or thyroid tenderness.      Vascular: No carotid bruit or JVD.      Trachea: No tracheal tenderness.   Cardiovascular:      Rate and Rhythm: Normal rate and regular rhythm.      Pulses:           Dorsalis pedis pulses are 2+ on the right side and 2+ on the left side.        Posterior tibial pulses are 2+ on the right side and 2+ on the left side.      Heart sounds: Normal heart sounds, S1 normal and S2 normal. No murmur heard.  Pulmonary:      " Effort: Pulmonary effort is normal. No accessory muscle usage, prolonged expiration or respiratory distress.      Breath sounds: Normal breath sounds.   Chest:      Chest wall: No tenderness.   Abdominal:      General: Bowel sounds are normal. There is no distension.      Palpations: Abdomen is soft. There is no hepatomegaly, splenomegaly or mass.      Tenderness: There is no abdominal tenderness.   Musculoskeletal:         General: Tenderness present.      Cervical back: Normal range of motion and neck supple.      Thoracic back: Tenderness present.      Lumbar back: Tenderness present.      Right lower leg: Tenderness present. No edema.      Left lower leg: Tenderness present. No edema.      Comments: No muscular atrophy or flaccidity.  Multiple varicosities to BLE   Lymphadenopathy:      Head:      Right side of head: No submental or submandibular adenopathy.      Left side of head: No submental or submandibular adenopathy.      Cervical: Cervical adenopathy present.      Right cervical: Superficial cervical adenopathy present.      Left cervical: Superficial cervical adenopathy present.   Skin:     General: Skin is warm and dry.      Capillary Refill: Capillary refill takes less than 2 seconds.      Coloration: Skin is not jaundiced or pale.      Findings: No erythema.      Nails: There is no clubbing.   Neurological:      Mental Status: She is alert and oriented to person, place, and time.      Cranial Nerves: No cranial nerve deficit or facial asymmetry.      Sensory: No sensory deficit.      Motor: No weakness, tremor, atrophy or abnormal muscle tone.      Coordination: Coordination normal.      Deep Tendon Reflexes: Reflexes are normal and symmetric.   Psychiatric:         Attention and Perception: She is attentive.         Mood and Affect: Mood normal. Mood is not anxious or depressed.         Speech: Speech normal.         Behavior: Behavior normal. Behavior is cooperative.         Thought Content: Thought  content normal.         Cognition and Memory: Cognition normal.         Judgment: Judgment normal.           Diagnoses and all orders for this visit:    1. Varicose veins of both lower extremities with pain (Primary)  Assessment & Plan:  Continue to be active.  Avoid overuse activities  Continue PRN Norco and celebrex as directed      2. Acute URI  Comments:  contiue zyrtec.  Finish antibiotics.  Orders:  -     cefdinir (OMNICEF) 300 MG capsule; Take 1 capsule by mouth 2 (Two) Times a Day.  Dispense: 20 capsule; Refill: 0    3. Multiple joint pain  Comments:  Avoid overuse activity.  Continue Norco and Celebrex as directed  Assessment & Plan:  Continue to be active.  Avoid overuse activities  Continue PRN Norco and celebrex as directed    Orders:  -     HYDROcodone-acetaminophen (Norco)  MG per tablet; Take 1 tablet by mouth Every 6 (Six) Hours As Needed for Moderate Pain.  Dispense: 90 tablet; Refill: 0    4. Weight loss advised  -     phentermine (Adipex-P) 37.5 MG tablet; Take 1 tablet by mouth Every Morning Before Breakfast.  Dispense: 30 tablet; Refill: 0       BMI is within normal parameters. No other follow-up for BMI required.       Understands disease processes and need for medications.  Understands reasons for urgent and emergent care.  Patient (& family) verbalized agreement for treatment plan.   Emotional support and active listening provided.  Patient provided time to verbalize feelings.    MICHELLE/PMDP reviewed today and consistent.  Will refill prescribed controlled medication today.  Patient is aware they cannot receive narcotics from any other provider except if under care of pain management or speciality clinic.  Risk and benefits of medication use has been reviewed.  History and physical exam exhibit continued safe and appropriate use of controlled substances.  The patient is aware of the potential for addiction and dependence.  This patient has been made aware of the appropriate use of such  medications, including potential risk of somnolence, limited ability to drive and / or work safely, and potential for overdose.    It has also been made clear that these medications are for use by this patient only, without concomitant use of alcohol or other substances unless prescribed/advised by medical provider.  Patient understands they may be subject to UDS and pill counts at random.      Patient considered to be moderate risk for addiction due to use of multiple controlled medications.  Patient understands and accepts these risks.  Patient need for medication will be reassessed at each visit.  Doses will be adjusted according to patient need and findings.    Goal of TX: Patient will not have any adverse reactions of medication.  Patient have reduction in pain symptoms with use of PRN Norco as directed.  Patient will not have any adverse side effects from medication.  Patient will be able to remain active in an outside of home without interference from pain symptoms.  Patient will have reduction in weight with use of Adipex as directed with simultaneous diet and lifestyle changes.      Medication Dispense Information    Hydrocodone Bitartrate/Ac   Dispensed: 3/22/2023 12:00 AM   Written:  3/22/2023   Unit strength: 325MG/10MG   Days supply: 23   Quantity: 90 each   Refills remainin   Pharmacy: [facility]   Authorizing provider: JOHN HOLLEY   Received from: MICHELLE PDMP (Fill History)   Brand or Generic:       RTC 1 month, sooner if needed.           This document has been electronically signed by:  STEVE Gonsales FNP-C Dragon disclaimer:  Part of this note may be an electronic transcription/translation of spoken language to printed text using the Dragon Dictation System.

## 2023-05-24 ENCOUNTER — OFFICE VISIT (OUTPATIENT)
Dept: FAMILY MEDICINE CLINIC | Facility: CLINIC | Age: 63
End: 2023-05-24
Payer: COMMERCIAL

## 2023-05-24 VITALS
OXYGEN SATURATION: 97 % | HEIGHT: 65 IN | WEIGHT: 141 LBS | SYSTOLIC BLOOD PRESSURE: 126 MMHG | TEMPERATURE: 98.6 F | HEART RATE: 70 BPM | BODY MASS INDEX: 23.49 KG/M2 | RESPIRATION RATE: 14 BRPM | DIASTOLIC BLOOD PRESSURE: 84 MMHG

## 2023-05-24 DIAGNOSIS — J30.1 SEASONAL ALLERGIC RHINITIS DUE TO POLLEN: ICD-10-CM

## 2023-05-24 DIAGNOSIS — Z79.899 LONG-TERM USE OF HIGH-RISK MEDICATION: ICD-10-CM

## 2023-05-24 DIAGNOSIS — Z78.9 WEIGHT LOSS ADVISED: ICD-10-CM

## 2023-05-24 DIAGNOSIS — M25.50 MULTIPLE JOINT PAIN: ICD-10-CM

## 2023-05-24 DIAGNOSIS — I67.9 SMALL VESSEL DISEASE, CEREBROVASCULAR: Primary | ICD-10-CM

## 2023-05-24 RX ORDER — ROSUVASTATIN CALCIUM 20 MG/1
20 TABLET, COATED ORAL DAILY
Qty: 30 TABLET | Refills: 5 | Status: SHIPPED | OUTPATIENT
Start: 2023-05-24

## 2023-05-24 RX ORDER — CETIRIZINE HYDROCHLORIDE 10 MG/1
10 TABLET ORAL DAILY PRN
Qty: 30 TABLET | Refills: 5 | Status: SHIPPED | OUTPATIENT
Start: 2023-05-24

## 2023-05-24 RX ORDER — CLOPIDOGREL BISULFATE 75 MG/1
75 TABLET ORAL DAILY
Qty: 30 TABLET | Refills: 5 | Status: SHIPPED | OUTPATIENT
Start: 2023-05-24

## 2023-05-24 RX ORDER — HYDROCODONE BITARTRATE AND ACETAMINOPHEN 10; 325 MG/1; MG/1
1 TABLET ORAL EVERY 6 HOURS PRN
Qty: 90 TABLET | Refills: 0 | Status: SHIPPED | OUTPATIENT
Start: 2023-05-24

## 2023-05-24 RX ORDER — PHENTERMINE HYDROCHLORIDE 37.5 MG/1
37.5 TABLET ORAL
Qty: 30 TABLET | Refills: 0 | Status: SHIPPED | OUTPATIENT
Start: 2023-05-24

## 2023-05-24 NOTE — PROGRESS NOTES
Subjective   Rohini Burch is a 63 y.o. female.     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  Reports she is not taking daily but at least QOD.  Patient denies any bleeding of gums, melena, hematochezia, hematuria, or epistaxis.    Allergies-chronic and ongoing.  Patient is currently taking Zyrtec 10 mg and using Alaway eyedrops.  No negative side effects.  Joint pain-ongoing.  Patient is currently on Celebrex 100 mg twice daily as needed and hydrocodone 10 mg every 6 hours as needed.  No negative side effects.  Headache-patient has been ordered weekly to 60 mg as needed.  She reports that she has had some sinus HA but nothing like she had when she needed the occipital block.  She has PRN meds for BP elevations.      The following portions of the patient's history were reviewed and updated as appropriate: CC, ROS, allergies, current medications, past family history, past medical history, past social history, past surgical history and problem list.      Review of Systems   Constitutional: Positive for fatigue. Negative for appetite change and fever.   HENT: Positive for rhinorrhea. Negative for congestion, ear pain, nosebleeds, postnasal drip, sore throat, tinnitus, trouble swallowing and voice change.    Eyes: Negative for blurred vision, photophobia and visual disturbance.   Respiratory: Negative for cough, chest tightness, shortness of breath and wheezing.    Cardiovascular: Negative for chest pain and palpitations.   Gastrointestinal: Negative for abdominal pain, blood in stool, constipation, diarrhea, nausea and GERD.   Endocrine: Negative for cold intolerance, heat intolerance and polydipsia.   Genitourinary: Negative for decreased urine volume, difficulty urinating, dysuria and hematuria.   Musculoskeletal: Positive for arthralgias, back pain, gait problem and myalgias.   Skin: Negative for color change,  "pallor and wound.   Allergic/Immunologic: Negative.    Neurological: Negative for dizziness, syncope, numbness and headache.   Hematological: Negative.    Psychiatric/Behavioral: Negative for decreased concentration, sleep disturbance, suicidal ideas and depressed mood. The patient is not nervous/anxious.    All other systems reviewed and are negative.      Objective     /84   Pulse 70   Temp 98.6 °F (37 °C)   Resp 14   Ht 165.1 cm (65\")   Wt 64 kg (141 lb)   SpO2 97%   BMI 23.46 kg/m²     Physical Exam  Vitals reviewed.   Constitutional:       General: She is not in acute distress.     Appearance: She is well-developed. She is not diaphoretic.   HENT:      Head: Normocephalic and atraumatic.      Jaw: No tenderness.      Right Ear: Hearing, tympanic membrane, ear canal and external ear normal.      Left Ear: Hearing, tympanic membrane, ear canal and external ear normal.      Nose: Nose normal. No nasal tenderness or congestion.      Right Sinus: No maxillary sinus tenderness or frontal sinus tenderness.      Left Sinus: No maxillary sinus tenderness or frontal sinus tenderness.      Mouth/Throat:      Lips: Pink.      Mouth: Mucous membranes are moist.      Pharynx: Oropharynx is clear. Uvula midline.   Eyes:      General: Lids are normal. No scleral icterus.     Extraocular Movements:      Right eye: Normal extraocular motion and no nystagmus.      Left eye: Normal extraocular motion and no nystagmus.      Conjunctiva/sclera: Conjunctivae normal.      Pupils: Pupils are equal, round, and reactive to light.   Neck:      Thyroid: No thyromegaly or thyroid tenderness.      Vascular: No carotid bruit or JVD.      Trachea: No tracheal tenderness.   Cardiovascular:      Rate and Rhythm: Normal rate and regular rhythm.      Pulses:           Dorsalis pedis pulses are 2+ on the right side and 2+ on the left side.        Posterior tibial pulses are 2+ on the right side and 2+ on the left side.      Heart " sounds: Normal heart sounds, S1 normal and S2 normal. No murmur heard.  Pulmonary:      Effort: Pulmonary effort is normal. No accessory muscle usage, prolonged expiration or respiratory distress.      Breath sounds: Normal breath sounds.   Chest:      Chest wall: No tenderness.   Abdominal:      General: Bowel sounds are normal. There is no distension.      Palpations: Abdomen is soft. There is no hepatomegaly, splenomegaly or mass.      Tenderness: There is no abdominal tenderness.   Musculoskeletal:         General: Tenderness present.      Cervical back: Normal range of motion and neck supple.      Thoracic back: Tenderness present.      Lumbar back: Tenderness present. Decreased range of motion.      Right upper leg: Tenderness present.      Left upper leg: Tenderness present.      Right lower leg: Tenderness present. No edema.      Left lower leg: Tenderness present. No edema.      Comments: No muscular atrophy or flaccidity.   Lymphadenopathy:      Head:      Right side of head: No submental or submandibular adenopathy.      Left side of head: No submental or submandibular adenopathy.      Cervical: No cervical adenopathy.      Right cervical: No superficial cervical adenopathy.     Left cervical: No superficial cervical adenopathy.   Skin:     General: Skin is warm and dry.      Capillary Refill: Capillary refill takes less than 2 seconds.      Coloration: Skin is not jaundiced or pale.      Findings: No erythema.      Nails: There is no clubbing.      Comments: Multiple varicose veins to BLE.   Neurological:      Mental Status: She is alert and oriented to person, place, and time.      Cranial Nerves: No cranial nerve deficit or facial asymmetry.      Sensory: No sensory deficit.      Motor: No weakness, tremor, atrophy or abnormal muscle tone.      Coordination: Coordination normal.      Gait: Gait abnormal (mild antalgia).      Deep Tendon Reflexes: Reflexes are normal and symmetric.   Psychiatric:          Attention and Perception: She is attentive.         Mood and Affect: Mood normal. Mood is not anxious or depressed.         Speech: Speech normal.         Behavior: Behavior normal. Behavior is cooperative.         Thought Content: Thought content normal.         Cognition and Memory: Cognition normal.         Judgment: Judgment normal.           Diagnoses and all orders for this visit:    1. Small vessel disease, cerebrovascular (Primary)  -     clopidogrel (Plavix) 75 MG tablet; Take 1 tablet by mouth Daily.  Dispense: 30 tablet; Refill: 5  -     rosuvastatin (Crestor) 20 MG tablet; Take 1 tablet by mouth Daily.  Dispense: 30 tablet; Refill: 5    2. Multiple joint pain  Comments:  Avoid overuse activity.  Continue Norco and Celebrex as directed  Orders:  -     HYDROcodone-acetaminophen (Norco)  MG per tablet; Take 1 tablet by mouth Every 6 (Six) Hours As Needed for Moderate Pain.  Dispense: 90 tablet; Refill: 0    3. Seasonal allergic rhinitis due to pollen  -     cetirizine (zyrTEC) 10 MG tablet; Take 1 tablet by mouth Daily As Needed for Allergies.  Dispense: 30 tablet; Refill: 5    4. Long-term use of high-risk medication  -     Urine Drug Screen - Urine, Clean Catch; Future    5. Weight loss advised  -     phentermine (Adipex-P) 37.5 MG tablet; Take 1 tablet by mouth Every Morning Before Breakfast.  Dispense: 30 tablet; Refill: 0         BMI is within normal parameters. No other follow-up for BMI required.     Understands disease processes and need for medications.  Understands reasons for urgent and emergent care.  Patient (& family) verbalized agreement for treatment plan.   Emotional support and active listening provided.  Patient provided time to verbalize feelings.    MICHELLE reviewed today and consistent.  Will refill prescribed controlled medication today.  Patient is aware they cannot receive narcotics from any other provider except if under care of pain management or speciality clinic.  Risk and  benefits of medication use has been reviewed.  History and physical exam exhibit continued safe and appropriate use of controlled substances.  The patient is aware of the potential for addiction and dependence.  This patient has been made aware of the appropriate use of such medications, including potential risk of somnolence, limited ability to drive and / or work safely, and potential for overdose.  Patient understands not to take medication and drive until they know how medication may affect their cognition/decision making.   It has also been made clear that these medications are for use by this patient only, without concomitant use of alcohol or other substances unless prescribed/advised by medical provider.  Patient understands they may be subject to UDS and pill counts at random.    Patient considered to be low risk for addiction due to use of single controlled medications.  Patient understands and accepts these risks.  Patient need for medication will be reassessed at each visit.  Doses will be adjusted according to patient need and findings.    Goal of TX: Patient will not have any adverse reactions of medication.  Patient have reduction in pain symptoms with use of PRN Norco as directed.  Patient will not have any adverse side effects from medication.  Patient will be able to remain active in an outside of home without interference from pain symptoms.    Medication Dispense Information    Hydrocodone Bitartrate/Ac   Dispensed: 2023 12:00 AM   Written:  2023   Unit strength: 325MG/10MG   Days supply: 30   Quantity: 90 each   Refills remainin   Pharmacy: [facility]   Authorizing provider: JOHN HOLLEY   Received from: MICHELLE PDMP (Fill History)   Brand or Generic:       Medication Dispense Information    Phentermine Hcl   Dispensed: 2023 12:00 AM   Written:  2023   Unit strength: 37.5MG   Days supply: 30   Quantity: 30 each   Refills remainin   Pharmacy: [facility]    Authorizing provider: JOHN HOLLEY   Received from: MICHELLE PDMP (Fill History)   Brand or Generic:       RTC 1 month, sooner if needed.           This document has been electronically signed by:  STEVE Gonsales FNP-C Dragon disclaimer:  Part of this note may be an electronic transcription/translation of spoken language to printed text using the Dragon Dictation System.

## 2023-08-16 ENCOUNTER — OFFICE VISIT (OUTPATIENT)
Dept: FAMILY MEDICINE CLINIC | Facility: CLINIC | Age: 63
End: 2023-08-16
Payer: COMMERCIAL

## 2023-08-16 VITALS
SYSTOLIC BLOOD PRESSURE: 122 MMHG | TEMPERATURE: 98.2 F | OXYGEN SATURATION: 94 % | HEIGHT: 65 IN | BODY MASS INDEX: 22.82 KG/M2 | DIASTOLIC BLOOD PRESSURE: 80 MMHG | HEART RATE: 72 BPM | RESPIRATION RATE: 14 BRPM | WEIGHT: 137 LBS

## 2023-08-16 DIAGNOSIS — I83.813 VARICOSE VEINS OF BOTH LOWER EXTREMITIES WITH PAIN: ICD-10-CM

## 2023-08-16 DIAGNOSIS — I67.9 SMALL VESSEL DISEASE, CEREBROVASCULAR: Primary | ICD-10-CM

## 2023-08-16 DIAGNOSIS — J30.1 SEASONAL ALLERGIC RHINITIS DUE TO POLLEN: ICD-10-CM

## 2023-08-16 DIAGNOSIS — Z87.898 H/O MOTION SICKNESS: ICD-10-CM

## 2023-08-16 DIAGNOSIS — Z12.31 ENCOUNTER FOR SCREENING MAMMOGRAM FOR MALIGNANT NEOPLASM OF BREAST: ICD-10-CM

## 2023-08-16 DIAGNOSIS — Z78.9 WEIGHT LOSS ADVISED: ICD-10-CM

## 2023-08-16 DIAGNOSIS — M25.50 MULTIPLE JOINT PAIN: ICD-10-CM

## 2023-08-16 RX ORDER — HYDROCODONE BITARTRATE AND ACETAMINOPHEN 10; 325 MG/1; MG/1
1 TABLET ORAL EVERY 6 HOURS PRN
Qty: 90 TABLET | Refills: 0 | Status: SHIPPED | OUTPATIENT
Start: 2023-08-16

## 2023-08-16 RX ORDER — OLOPATADINE HYDROCHLORIDE 1 MG/ML
1 SOLUTION/ DROPS OPHTHALMIC 2 TIMES DAILY
Qty: 5 ML | Refills: 5 | Status: SHIPPED | OUTPATIENT
Start: 2023-08-16

## 2023-08-16 RX ORDER — CELECOXIB 100 MG/1
100 CAPSULE ORAL 2 TIMES DAILY PRN
Qty: 60 CAPSULE | Refills: 2 | Status: SHIPPED | OUTPATIENT
Start: 2023-08-16

## 2023-08-16 RX ORDER — ONDANSETRON 4 MG/1
4 TABLET, FILM COATED ORAL EVERY 8 HOURS PRN
Qty: 30 TABLET | Refills: 0 | Status: SHIPPED | OUTPATIENT
Start: 2023-08-16

## 2023-08-16 RX ORDER — PHENTERMINE HYDROCHLORIDE 37.5 MG/1
37.5 TABLET ORAL
Qty: 30 TABLET | Refills: 0 | Status: SHIPPED | OUTPATIENT
Start: 2023-08-16

## 2023-08-16 NOTE — PROGRESS NOTES
"Subjective   Rohini Burch is a 63 y.o. female.     Chief Complaint   Patient presents with    Med Refill       History of Present Illness     Med refill-here for medication refills.   Allergies-chronic and ongoing.  Patient is currently taking Zyrtec 10 mg and using [place]away eyedrops.  No negative side effects.  Reports that she needs her allergy eye drops.  She has some itching.  No concerns with congestion or rhinorrhea today.    Joint pain-ongoing.  Patient is currently on Celebrex 100 mg twice daily as needed and hydrocodone 10 mg every 6 hours as needed.  No negative side effects.  She reports legs \"are about to kill me this morning\".  Aching in quality.    Headache-patient has been ordered weekly to 60 mg as needed.  She reports that she has had some sinus HA but nothing like she had when she needed the occipital block.  She has PRN meds for BP elevations.    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  Reports she is not taking daily but at least QOD.  Patient denies any bleeding of gums, melena, hematochezia, hematuria, or epistaxis.    Travel plans-would like some meds for nausea as she will be traveling and at times gets motion sickness.       The following portions of the patient's history were reviewed and updated as appropriate: CC, ROS, allergies, current medications, past family history, past medical history, past social history, past surgical history and problem list.      Review of Systems   Constitutional:  Positive for fatigue. Negative for appetite change and fever.   HENT:  Negative for congestion, ear pain, nosebleeds, postnasal drip, rhinorrhea, sore throat, tinnitus, trouble swallowing and voice change.    Eyes:  Negative for blurred vision, photophobia and visual disturbance.   Respiratory:  Negative for cough, chest tightness, shortness of breath and wheezing.    Cardiovascular:  Negative for chest pain and palpitations.   Gastrointestinal:  Negative for abdominal pain, blood in " "stool, constipation, diarrhea, nausea and GERD.   Endocrine: Negative for cold intolerance, heat intolerance and polydipsia.   Genitourinary:  Negative for decreased urine volume, difficulty urinating, dysuria and hematuria.   Musculoskeletal:  Positive for arthralgias, back pain, gait problem and myalgias.   Skin:  Negative for color change, pallor and wound.   Allergic/Immunologic: Negative.    Neurological:  Negative for dizziness, syncope, numbness and headache.   Hematological: Negative.    Psychiatric/Behavioral:  Negative for decreased concentration, sleep disturbance, suicidal ideas and depressed mood. The patient is not nervous/anxious.    All other systems reviewed and are negative.    Objective     /80   Pulse 72   Temp 98.2 øF (36.8 øC)   Resp 14   Ht 165.1 cm (65\")   Wt 62.1 kg (137 lb)   SpO2 94%   BMI 22.80 kg/mý     Physical Exam  Vitals reviewed.   Constitutional:       General: She is not in acute distress.     Appearance: She is well-developed. She is not diaphoretic.   HENT:      Head: Normocephalic and atraumatic.      Jaw: No tenderness.      Right Ear: Hearing, tympanic membrane, ear canal and external ear normal.      Left Ear: Hearing, tympanic membrane, ear canal and external ear normal.      Nose: Nose normal. No nasal tenderness, congestion or rhinorrhea.      Right Sinus: No maxillary sinus tenderness or frontal sinus tenderness.      Left Sinus: No maxillary sinus tenderness or frontal sinus tenderness.      Mouth/Throat:      Lips: Pink.      Mouth: Mucous membranes are moist.      Pharynx: Oropharynx is clear. Uvula midline.   Eyes:      General: Lids are normal. No scleral icterus.     Extraocular Movements:      Right eye: Normal extraocular motion and no nystagmus.      Left eye: Normal extraocular motion and no nystagmus.      Conjunctiva/sclera: Conjunctivae normal.      Pupils: Pupils are equal, round, and reactive to light.   Neck:      Thyroid: No thyromegaly or " thyroid tenderness.      Vascular: No carotid bruit or JVD.      Trachea: No tracheal tenderness.   Cardiovascular:      Rate and Rhythm: Normal rate and regular rhythm.      Pulses:           Dorsalis pedis pulses are 2+ on the right side and 2+ on the left side.        Posterior tibial pulses are 2+ on the right side and 2+ on the left side.      Heart sounds: Normal heart sounds, S1 normal and S2 normal. No murmur heard.  Pulmonary:      Effort: Pulmonary effort is normal. No accessory muscle usage, prolonged expiration or respiratory distress.      Breath sounds: Normal breath sounds.   Chest:      Chest wall: No tenderness.   Abdominal:      General: Bowel sounds are normal. There is no distension.      Palpations: Abdomen is soft. There is no hepatomegaly, splenomegaly or mass.      Tenderness: There is no abdominal tenderness.   Musculoskeletal:         General: Tenderness present.      Cervical back: Normal range of motion and neck supple.      Right upper leg: Tenderness present.      Left upper leg: Tenderness present.      Right lower leg: Tenderness present. No edema.      Left lower leg: Tenderness present. No edema.      Comments: No muscular atrophy or flaccidity.   Lymphadenopathy:      Head:      Right side of head: No submental or submandibular adenopathy.      Left side of head: No submental or submandibular adenopathy.      Cervical: No cervical adenopathy.      Right cervical: No superficial cervical adenopathy.     Left cervical: No superficial cervical adenopathy.   Skin:     General: Skin is warm and dry.      Capillary Refill: Capillary refill takes less than 2 seconds.      Coloration: Skin is not jaundiced or pale.      Findings: No erythema.      Nails: There is no clubbing.   Neurological:      Mental Status: She is alert and oriented to person, place, and time.      Cranial Nerves: No cranial nerve deficit or facial asymmetry.      Sensory: No sensory deficit.      Motor: No weakness,  tremor, atrophy or abnormal muscle tone.      Coordination: Coordination normal.      Gait: Gait abnormal (moderate antalgia).      Deep Tendon Reflexes: Reflexes are normal and symmetric.   Psychiatric:         Attention and Perception: She is attentive.         Mood and Affect: Mood normal. Mood is not anxious or depressed.         Speech: Speech normal.         Behavior: Behavior normal. Behavior is cooperative.         Thought Content: Thought content normal.         Cognition and Memory: Cognition normal.         Judgment: Judgment normal.       Diagnoses and all orders for this visit:    1. Small vessel disease, cerebrovascular (Primary)  Assessment & Plan:  Continue plavix 75 mg and Crestor 20 mg  Report any negative side effects.   Encouraged to pursue a low-cholesterol diet including limited fried foods and low-fat foods.  Be active as physically able.      2. Multiple joint pain  Comments:  Avoid overuse activity.  Continue Norco and Celebrex as directed  Orders:  -     celecoxib (CeleBREX) 100 MG capsule; Take 1 capsule by mouth 2 (Two) Times a Day As Needed for Mild Pain.  Dispense: 60 capsule; Refill: 2  -     HYDROcodone-acetaminophen (Norco)  MG per tablet; Take 1 tablet by mouth Every 6 (Six) Hours As Needed for Moderate Pain.  Dispense: 90 tablet; Refill: 0    3. Weight loss advised  -     phentermine (Adipex-P) 37.5 MG tablet; Take 1 tablet by mouth Every Morning Before Breakfast.  Dispense: 30 tablet; Refill: 0    4. Varicose veins of both lower extremities with pain  Assessment & Plan:  Continue to be active.  Avoid overuse activities  Continue PRN Norco and celebrex as directed      5. Encounter for screening mammogram for malignant neoplasm of breast  -     Mammo Screening Digital Tomosynthesis Bilateral With CAD; Future    6. Seasonal allergic rhinitis due to pollen  Comments:  Continue Zyrtec 10 mg.  Continue allergy eyedrops.  Avoid allergy triggers as much as possible  Orders:  -      olopatadine (PATANOL) 0.1 % ophthalmic solution; Administer 1 drop to both eyes 2 (Two) Times a Day.  Dispense: 5 mL; Refill: 5    7. H/O motion sickness  -     ondansetron (Zofran) 4 MG tablet; Take 1 tablet by mouth Every 8 (Eight) Hours As Needed for Nausea or Vomiting.  Dispense: 30 tablet; Refill: 0       BMI is within normal parameters. No other follow-up for BMI required.    Understands disease processes and need for medications.  Understands reasons for urgent and emergent care.  Patient (& family) verbalized agreement for treatment plan.   Emotional support and active listening provided.  Patient provided time to verbalize feelings.    MICHELLE/PMDP reviewed today and consistent.  Will refill prescribed controlled medication today.  Patient is aware they cannot receive narcotics from any other provider except if under care of pain management or speciality clinic.  Risk and benefits of medication use has been reviewed.  History and physical exam exhibit continued safe and appropriate use of controlled substances.  The patient is aware of the potential for addiction and dependence.  This patient has been made aware of the appropriate use of such medications, including potential risk of somnolence, limited ability to drive and / or work safely, and potential for overdose.    It has also been made clear that these medications are for use by this patient only, without concomitant use of alcohol or other substances unless prescribed/advised by medical provider.  Patient understands they may be subject to UDS and pill counts at random.    Patient considered to be moderate risk for addiction due to use of multiple controlled medications.  Patient understands and accepts these risks.  Patient need for medication will be reassessed at each visit.  Doses will be adjusted according to patient need and findings.    Goal of TX: Patient will not have any adverse reactions of medication.  Patient have reduction in pain  symptoms with use of PRN Norco as directed.  Patient will not have any adverse side effects from medication.  Patient will be able to remain active in an outside of home without interference from pain symptoms.  Patient will have reduction in weight with use of Adipex as directed with simultaneous diet and lifestyle changes.    Medication Dispense Information    Hydrocodone Bitartrate/Ac   Dispensed: 2023 12:00 AM   Written: 2023   Unit strength: 325MG/10MG   Days supply: 22   Quantity: 90 each   Refills remainin   Pharmacy: [facility]   Authorizing provider: JOHN HOLLEY   Received from: [facility] (Fill History)   Brand or Generic: Generic   Medication Dispense Information    Phentermine Hcl   Dispensed: 2023 12:00 AM   Written: 2023   Unit strength: 37.5MG   Days supply: 30   Quantity: 30 each   Refills remainin   Pharmacy: [facility]   Authorizing provider: JOHN HOLLEY   Received from: [facility] (Fill History)   Brand or Generic: Generic       RTC 1 month, sooner if needed.             This document has been electronically signed by:  STEVE Gonsales, WILLIAMPYuryC    Dragon disclaimer:  Part of this note may be an electronic transcription/translation of spoken language to printed text using the Dragon Dictation System.

## 2023-09-19 ENCOUNTER — OFFICE VISIT (OUTPATIENT)
Dept: FAMILY MEDICINE CLINIC | Facility: CLINIC | Age: 63
End: 2023-09-19
Payer: COMMERCIAL

## 2023-09-19 VITALS
RESPIRATION RATE: 18 BRPM | HEIGHT: 65 IN | WEIGHT: 138 LBS | BODY MASS INDEX: 22.99 KG/M2 | SYSTOLIC BLOOD PRESSURE: 126 MMHG | HEART RATE: 94 BPM | OXYGEN SATURATION: 98 % | TEMPERATURE: 98.4 F | DIASTOLIC BLOOD PRESSURE: 80 MMHG

## 2023-09-19 DIAGNOSIS — J30.1 SEASONAL ALLERGIC RHINITIS DUE TO POLLEN: ICD-10-CM

## 2023-09-19 DIAGNOSIS — I67.9 SMALL VESSEL DISEASE, CEREBROVASCULAR: ICD-10-CM

## 2023-09-19 DIAGNOSIS — Z78.9 WEIGHT LOSS ADVISED: ICD-10-CM

## 2023-09-19 DIAGNOSIS — M25.50 MULTIPLE JOINT PAIN: ICD-10-CM

## 2023-09-19 DIAGNOSIS — Z00.00 VISIT FOR ANNUAL HEALTH EXAMINATION: Primary | ICD-10-CM

## 2023-09-19 DIAGNOSIS — I83.813 VARICOSE VEINS OF BOTH LOWER EXTREMITIES WITH PAIN: ICD-10-CM

## 2023-09-19 RX ORDER — PHENTERMINE HYDROCHLORIDE 37.5 MG/1
37.5 TABLET ORAL
Qty: 30 TABLET | Refills: 0 | Status: SHIPPED | OUTPATIENT
Start: 2023-09-19

## 2023-09-19 RX ORDER — CLOPIDOGREL BISULFATE 75 MG/1
75 TABLET ORAL DAILY
Qty: 30 TABLET | Refills: 5 | Status: SHIPPED | OUTPATIENT
Start: 2023-09-19

## 2023-09-19 RX ORDER — CETIRIZINE HYDROCHLORIDE 10 MG/1
10 TABLET ORAL DAILY PRN
Qty: 30 TABLET | Refills: 5 | Status: SHIPPED | OUTPATIENT
Start: 2023-09-19

## 2023-09-19 RX ORDER — ROSUVASTATIN CALCIUM 20 MG/1
20 TABLET, COATED ORAL DAILY
Qty: 30 TABLET | Refills: 5 | Status: SHIPPED | OUTPATIENT
Start: 2023-09-19

## 2023-09-19 RX ORDER — GUAIFENESIN 600 MG/1
1200 TABLET, EXTENDED RELEASE ORAL 2 TIMES DAILY
Qty: 60 TABLET | Refills: 2 | Status: SHIPPED | OUTPATIENT
Start: 2023-09-19

## 2023-09-19 RX ORDER — HYDROCODONE BITARTRATE AND ACETAMINOPHEN 10; 325 MG/1; MG/1
1 TABLET ORAL EVERY 6 HOURS PRN
Qty: 90 TABLET | Refills: 0 | Status: SHIPPED | OUTPATIENT
Start: 2023-09-19

## 2023-09-19 NOTE — PATIENT INSTRUCTIONS
It is important for your health to eat a healthy balanced diet, to exercise as tolerated, obtain dental and vision checkups routinely, work on stress reduction and be active about taking care of your mental health.  Routine age related immunizations(pneumonia, flu, shingles if applicable) are recommended.  Be active in obtaining age and gender routine maintenance exams (such as paps, breast exams, colonscopy, prostate exams, etc).    You are encouraged to have safe sex practices for STD prevention.    Be alert/educated on gun and water safety, seek help for any domestic violence concerns, and seatbelt use is strongly encouraged.     Budget-Friendly Healthy Eating  There are many ways to save money at the grocery store and continue to eat healthy. You can be successful if you:  Plan meals according to your budget.  Make a grocery list and only purchase food according to your grocery list.  Prepare food yourself at home.  What are tips for following this plan?  Reading food labels  Compare food labels between brand name foods and the store brand. Often the nutritional value is the same, but the store brand is lower cost.  Look for products that do not have added sugar, fat, or salt (sodium). These often cost the same but are healthier for you. Products may be labeled as:  Sugar-free.  Nonfat.  Low-fat.  Sodium-free.  Low-sodium.  Look for lean ground beef labeled as at least 92% lean and 8% fat.  Shopping    Buy only the items on your grocery list and go only to the areas of the store that have the items on your list.  Use coupons only for foods and brands you normally buy. Avoid buying items you wouldn't normally buy simply because they are on sale.  Check online and in newspapers for weekly deals.  Buy healthy items from the bulk bins when available, such as herbs, spices, flour, pasta, nuts, and dried fruit.  Buy fruits and vegetables that are in season. Prices are usually lower on in-season produce.  Look at the  "unit price on the price tag. Use it to compare different brands and sizes to find out which item is the best deal.  Choose healthy items that are often low-cost, such as carrots, potatoes, apples, bananas, and oranges. Dried or canned beans are a low-cost protein source.  Buy in bulk and freeze extra food. Items you can buy in bulk include meats, fish, poultry, frozen fruits, and frozen vegetables.  Avoid buying \"ready-to-eat\" foods, such as pre-cut fruits and vegetables and pre-made salads.  If possible, shop around to discover where you can find the best prices. Consider other retailers such as dollar stores, larger wholesale stores, local fruit and vegetable [employer], and [employer] markets.  Do not shop when you are hungry. If you shop while hungry, it may be hard to stick to your list and budget.  Resist impulse buying. Use your grocery list as your official plan for the week.  Buy a variety of vegetables and fruits by purchasing fresh, frozen, and canned items.  Look at the top and bottom shelves for deals. Foods at eye level (eye level of an adult or child) are usually more expensive.  Be efficient with your time when shopping. The more time you spend at the store, the more money you are likely to spend.  To save money when choosing more expensive foods like meats and dairy:  Choose cheaper cuts of meat, such as bone-in chicken thighs and drumsticks instead of skinless and boneless chicken. When you are ready to prepare the chicken, you can remove the skin yourself to make it healthier.  Choose lean meats like chicken or turkey instead of beef.  Choose canned seafood, such as tuna, salmon, or sardines.  Buy eggs as a low-cost source of protein.  Buy dried beans and peas, such as lentils, split peas, or kidney beans instead of meats. Dried beans and peas are a good alternative source of protein.  Buy the larger tubs of yogurt instead of individual-sized containers.  Choose water instead of sodas and other sweetened " "beverages.  Avoid buying chips, cookies, and other \"junk food.\" These items are usually expensive and not healthy.  Cooking  Make extra food and freeze the extras in meal-sized containers or in individual portions for fast meals and snacks.  Pre-cook on days when you have extra time to prepare meals in advance. You can keep these meals in the fridge or freezer and reheat for a quick meal.  When you come home from the grocery store, wash, peel, and cut fruits and vegetables so they are ready to use and eat. This will help reduce food waste.  Meal planning  Do not eat out or get fast food. Prepare food at home.  Make a grocery list and make sure to bring it with you to the store. If you have a smart phone, you could use your phone to create your shopping list.  Plan meals and snacks according to a grocery list and budget you create.  Use leftovers in your meal plan for the week.  Look for recipes where you can cook once and make enough food for two meals.  Prepare budget-friendly types of meals like stews, casseroles, and stir-cespedes dishes.  Try some meatless meals or try \"no cook\" meals like salads.  Make sure that half your plate is filled with fruits or vegetables. Choose from fresh, frozen, or canned fruits and vegetables. If eating canned, remember to rinse them before eating. This will remove any excess salt added for packaging.  Summary  Eating healthy on a budget is possible if you plan your meals according to your budget, purchase according to your budget and grocery list, and prepare food yourself.  Tips for buying more food on a limited budget include buying generic brands, using coupons only for foods you normally buy, and buying healthy items from the bulk bins when available.  Tips for buying cheaper food to replace expensive food include choosing cheaper, lean cuts of meat, and buying dried beans and peas.  This information is not intended to replace advice given to you by your health care provider. Make " sure you discuss any questions you have with your health care provider.  Document Revised: 09/30/2021 Document Reviewed: 09/30/2021  Elsevier Patient Education © 2022 Elsevier Inc.      Health Risks of Smoking  Smoking tobacco is very bad for your health. Tobacco smoke contains many toxic chemicals that can damage every part of your body. Secondhand smoke can be harmful to those around you. Tobacco or nicotine use can cause many long-term (chronic) diseases.  Smoking is difficult to quit because a chemical in tobacco, called nicotine, causes addiction or dependence. When you smoke and inhale, nicotine is absorbed quickly into the bloodstream through your lungs. Both inhaled and non-inhaled nicotine may be addictive.  How can quitting affect me?  There are health benefits of quitting smoking. Some benefits happen right away and others take time. Benefits may include:  Blood flow, blood pressure, heart rate, and lung capacity may begin to improve. However, any lung damage that has already occurred cannot be repaired.  Temporary respiratory symptoms, such as nasal congestion and cough, may improve over time.  Your risk of heart disease, stroke, and cancer is reduced.  The overall quality of your health may improve.  You may save money, as you will not spend money on tobacco products and may spend less money on smoking-related health issues.  What can increase my risk?  Smoking harms nearly every organ in the body. People who smoke tobacco have a shorter life expectancy and an increased risk of many serious medical problems. These include:  More respiratory infections, such as colds and pneumonia.  Cancer.  Heart disease.  Stroke.  Chronic respiratory diseases.  Delayed wound healing and increased risk of complications during surgery.  Problems with reproduction, pregnancy, and childbirth, such as infertility, early (premature) births, stillbirths, and birth defects.  Secondhand smoke exposure to children increases the  risk of:  Sudden infant death syndrome (SIDS).  Infections in the nose, throat, or airways (respiratory infections).  Chronic respiratory symptoms.  What actions can I take to quit?  Smoking is an addiction that affects both your body and your mind, and long-time habits can be hard to change. Your health care provider can recommend:  Nicotine replacement products, such as patches, gum, and nasal sprays. Use these products only as directed. Do not replace cigarette smoking with electronic cigarettes, which are commonly called e-cigarettes. The safety of e-cigarettes is not known, and some may contain harmful chemicals.  Programs and community resources, which may include group support, education, or talk therapy.  Prescription medicines to help reduce cravings.  A combination of two or more quit methods, which will increase the success of quitting.  Where to find support  Follow the recommendations from your health care provider about support groups and other assistance. You can also visit:  [place] [employer] Quitline Consortium: www.Korbitline.org or call 2-030-QUIT-NOW.  [place].S. Department of Health and Human Services: www.smokefree.gov  American Lung Association: www.freedomfromsmoking.org  American Heart Association: www.heart.org  Where to find more information  Centers for Disease Control and Prevention: www.cdc.gov  World Health Organization: www.who.int  Summary  Smoking tobacco is very bad for your health. Tobacco smoke contains many toxic chemicals that can damage every part of the body.  Smoking is difficult to quit because a chemical in tobacco, called nicotine, causes addiction or dependence.  There are immediate and long-term health benefits of quitting smoking.  A combination of two or more quit methods increases the success of quitting.  This information is not intended to replace advice given to you by your health care provider. Make sure you discuss any questions you have with your health care  provider.  Document Revised: 08/22/2022 Document Reviewed: 02/01/2021  Elsevier Patient Education © 2022 Elsevier Inc.

## 2023-09-19 NOTE — ASSESSMENT & PLAN NOTE
Continue Zyrtec.  Continue eyedrops as needed.  Avoid known allergy and respiratory triggers.  May use Saline spray PRN as desired  Will add Sudafed

## 2023-09-19 NOTE — PROGRESS NOTES
Subjective   Rohini Burch is a 63 y.o. female.     Chief Complaint   Patient presents with    Annual Exam       History of Present Illness     Physical-here for an updated physical  Allergies-chronic and ongoing.  Patient is currently taking Zyrtec 10 mg and using Alaway eyedrops.  No negative side effects. She has some PND.  She reports that she has been on a trip out of town and feels she has been more exacerbated since that time.    Joint pain-ongoing.  Patient is currently on Celebrex 100 mg twice daily as needed and hydrocodone 10 mg every 6 hours as needed.  No negative side effects.  She reports some increased leg pain due to weather being cool.    Headache-patient has been ordered weekly to 60 mg as needed.  She reports that she has had some sinus HA but nothing like she had when she needed the occipital block.  She has PRN meds for BP elevations.    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  Reports she is not taking daily but at least QOD.  Patient denies any bleeding of gums, melena, hematochezia, hematuria, or epistaxis.    Weight-patient continues to try to loose weight.  She is taking Adipex mostly PRN.  No negative side effects.  Weight is stable.      The following portions of the patient's history were reviewed and updated as appropriate: CC, ROS, allergies, current medications, past family history, past medical history, past social history, past surgical history and problem list.      Review of Systems   Constitutional:  Positive for fatigue. Negative for appetite change and fever.   HENT:  Negative for congestion, ear pain, nosebleeds, postnasal drip, rhinorrhea, sore throat, tinnitus, trouble swallowing and voice change.    Eyes:  Negative for blurred vision, photophobia and visual disturbance.   Respiratory:  Negative for cough, chest tightness, shortness of breath and wheezing.    Cardiovascular:  Negative for chest pain and palpitations.   Gastrointestinal:  Negative for  "abdominal pain, blood in stool, constipation, diarrhea, nausea and GERD.   Endocrine: Negative for cold intolerance, heat intolerance and polydipsia.   Genitourinary:  Negative for decreased urine volume, difficulty urinating, dysuria and hematuria.   Musculoskeletal:  Positive for arthralgias, back pain, gait problem and myalgias.   Skin:  Negative for color change, pallor and wound.   Allergic/Immunologic: Negative.    Neurological:  Negative for dizziness, syncope, numbness and headache.   Hematological: Negative.    Psychiatric/Behavioral:  Negative for decreased concentration, sleep disturbance, suicidal ideas and depressed mood. The patient is not nervous/anxious.    All other systems reviewed and are negative.    Objective     /80   Pulse 94   Temp 98.4 °F (36.9 °C)   Resp 18   Ht 165.1 cm (65\")   Wt 62.6 kg (138 lb)   SpO2 98%   BMI 22.96 kg/m²     Physical Exam  Vitals reviewed.   Constitutional:       General: She is not in acute distress.     Appearance: She is well-developed. She is not diaphoretic.   HENT:      Head: Normocephalic and atraumatic.      Jaw: No tenderness.      Right Ear: Hearing, tympanic membrane, ear canal and external ear normal.      Left Ear: Hearing, tympanic membrane, ear canal and external ear normal.      Nose: Nose normal. No nasal tenderness, congestion or rhinorrhea.      Right Sinus: No maxillary sinus tenderness or frontal sinus tenderness.      Left Sinus: No maxillary sinus tenderness or frontal sinus tenderness.      Mouth/Throat:      Lips: Pink.      Mouth: Mucous membranes are moist.      Pharynx: Oropharynx is clear. Uvula midline.   Eyes:      General: Lids are normal. No scleral icterus.     Extraocular Movements:      Right eye: Normal extraocular motion and no nystagmus.      Left eye: Normal extraocular motion and no nystagmus.      Conjunctiva/sclera: Conjunctivae normal.      Pupils: Pupils are equal, round, and reactive to light.   Neck:      " Thyroid: No thyromegaly or thyroid tenderness.      Vascular: No carotid bruit or JVD.      Trachea: No tracheal tenderness.   Cardiovascular:      Rate and Rhythm: Normal rate and regular rhythm.      Pulses:           Dorsalis pedis pulses are 2+ on the right side and 2+ on the left side.        Posterior tibial pulses are 2+ on the right side and 2+ on the left side.      Heart sounds: Normal heart sounds, S1 normal and S2 normal. No murmur heard.  Pulmonary:      Effort: Pulmonary effort is normal. No accessory muscle usage, prolonged expiration or respiratory distress.      Breath sounds: Normal breath sounds.   Chest:      Chest wall: No tenderness.   Abdominal:      General: Bowel sounds are normal. There is no distension.      Palpations: Abdomen is soft. There is no hepatomegaly, splenomegaly or mass.      Tenderness: There is no abdominal tenderness.   Musculoskeletal:         General: Tenderness present.      Cervical back: Normal range of motion and neck supple.      Right upper leg: Tenderness present.      Left upper leg: Tenderness present.      Right lower leg: Tenderness present. No edema.      Left lower leg: Tenderness present. No edema.      Comments: No muscular atrophy or flaccidity.   Lymphadenopathy:      Head:      Right side of head: No submental or submandibular adenopathy.      Left side of head: No submental or submandibular adenopathy.      Cervical: No cervical adenopathy.      Right cervical: No superficial cervical adenopathy.     Left cervical: No superficial cervical adenopathy.   Skin:     General: Skin is warm and dry.      Capillary Refill: Capillary refill takes less than 2 seconds.      Coloration: Skin is not jaundiced or pale.      Findings: No erythema.      Nails: There is no clubbing.   Neurological:      Mental Status: She is alert and oriented to person, place, and time.      Cranial Nerves: No cranial nerve deficit or facial asymmetry.      Sensory: No sensory deficit.       Motor: No weakness, tremor, atrophy or abnormal muscle tone.      Coordination: Coordination normal.      Gait: Gait abnormal (moderate antalgia).      Deep Tendon Reflexes: Reflexes are normal and symmetric.   Psychiatric:         Attention and Perception: She is attentive.         Mood and Affect: Mood normal. Mood is not anxious or depressed.         Speech: Speech normal.         Behavior: Behavior normal. Behavior is cooperative.         Thought Content: Thought content normal.         Cognition and Memory: Cognition normal.         Judgment: Judgment normal.       Assessment & Plan     Vision Screening    Right eye Left eye Both eyes   Without correction 20/25 20/30 20/30   With correction              9/19/2023     9:00 AM   Functional & Cognitive Status   Do you have difficulty preparing food and eating? No   Do you have difficulty bathing yourself, getting dressed or grooming yourself? No   Do you have difficulty using the toilet? No   Do you have difficulty moving around from place to place? No   Do you have trouble with steps or getting out of a bed or a chair? No   Current Diet Well Balanced Diet   Dental Exam Not up to date   Eye Exam Not up to date   Exercise (times per week) 3 times per week   Current Exercises Include Walking   Do you need help using the phone?  No   Are you deaf or do you have serious difficulty hearing?  No   Do you need help to go to places out of walking distance? No   Do you need help shopping? No   Do you need help preparing meals?  No   Do you need help with housework?  No   Do you need help with laundry? No   Do you need help taking your medications? No   Do you need help managing money? No   Do you ever drive or ride in a car without wearing a seat belt? No   Have you felt unusual stress, anger or loneliness in the last month? No   Who do you live with? Spouse   If you need help, do you have trouble finding someone available to you? No   Have you been bothered in the  last four weeks by sexual problems? No   Do you have difficulty concentrating, remembering or making decisions? No       PHQ-2 Depression Screening  Little interest or pleasure in doing things? 0-->not at all   Feeling down, depressed, or hopeless? 0-->not at all   PHQ-2 Total Score 0         Diagnoses and all orders for this visit:    1. Visit for annual health examination (Primary)    2. Seasonal allergic rhinitis due to pollen  Assessment & Plan:  Continue Zyrtec.  Continue eyedrops as needed.  Avoid known allergy and respiratory triggers.  May use Saline spray PRN as desired  Will add Sudafed    Orders:  -     guaiFENesin (Mucinex) 600 MG 12 hr tablet; Take 2 tablets by mouth 2 (Two) Times a Day.  Dispense: 60 tablet; Refill: 2  -     cetirizine (zyrTEC) 10 MG tablet; Take 1 tablet by mouth Daily As Needed for Allergies.  Dispense: 30 tablet; Refill: 5    3. Multiple joint pain  Comments:  Avoid overuse activity.  Continue Norco and Celebrex as directed  Assessment & Plan:  Continue to be active.  Avoid overuse activities  Continue PRN Norco and celebrex as directed    Orders:  -     HYDROcodone-acetaminophen (Norco)  MG per tablet; Take 1 tablet by mouth Every 6 (Six) Hours As Needed for Moderate Pain.  Dispense: 90 tablet; Refill: 0    4. Small vessel disease, cerebrovascular  Assessment & Plan:  Continue plavix 75 mg and Crestor 20 mg  Report any negative side effects.   Encouraged to pursue a low-cholesterol diet including limited fried foods and low-fat foods.  Be active as physically able.    Orders:  -     clopidogrel (Plavix) 75 MG tablet; Take 1 tablet by mouth Daily.  Dispense: 30 tablet; Refill: 5  -     rosuvastatin (Crestor) 20 MG tablet; Take 1 tablet by mouth Daily.  Dispense: 30 tablet; Refill: 5    5. Weight loss advised  -     phentermine (Adipex-P) 37.5 MG tablet; Take 1 tablet by mouth Every Morning Before Breakfast.  Dispense: 30 tablet; Refill: 0    6. Varicose veins of both lower  extremities with pain  Assessment & Plan:  Continue to be active.  Avoid overuse activities  Continue PRN Norco and celebrex as directed          BMI is within normal parameters. No other follow-up for BMI required.     Rohini Burch  reports that she has never smoked. She has never used smokeless tobacco.. I have educated her on the risk of diseases from using tobacco products such as cancer, COPD, and heart disease.     Understands disease processes and need for medications.  Understands reasons for urgent and emergent care.  Patient (& family) verbalized agreement for treatment plan.   Emotional support and active listening provided.  Patient provided time to verbalize feelings.  Counseling provided today including importance of good nutrition, exercise as tolerated, dental health, stress reduction and mental health. Importance of immunizations discussed.   Appropriate screenings based on gender (paps, breast exam, mammogram, PSA, colon screens, etc).     Counseled on safe sex practices and STD prevention.   Counseling regarding gun and water safety, domestic violence, and seatbelt use.      MICHELLE/PMDP reviewed today and consistent.  Will refill prescribed controlled medication today.  Patient is aware they cannot receive narcotics from any other provider except if under care of pain management or speciality clinic.  Risk and benefits of medication use has been reviewed.  History and physical exam exhibit continued safe and appropriate use of controlled substances.  The patient is aware of the potential for addiction and dependence.  This patient has been made aware of the appropriate use of such medications, including potential risk of somnolence, limited ability to drive and / or work safely, and potential for overdose.    It has also been made clear that these medications are for use by this patient only, without concomitant use of alcohol or other substances unless prescribed/advised by medical  provider.  Patient understands they may be subject to UDS and pill counts at random.      Patient considered to be moderate risk for addiction due to use of multiple controlled medications.  Patient understands and accepts these risks.  Patient need for medication will be reassessed at each visit.  Doses will be adjusted according to patient need and findings.    Goal of TX: Patient will not have any adverse reactions of medication.  Patient have reduction in pain symptoms with use of PRN Norco as directed.  Patient will not have any adverse side effects from medication.  Patient will be able to remain active in an outside of home without interference from pain symptoms.    MICHELLE Patient Controlled Substance Report (from 9/19/2022 to 9/19/2023)    Dispensed  Strength Quantity Days Supply Provider Pharmacy   08/16/2023 Hydrocodone Bitartrate/Ac 325MG/10MG 90 each 23 [facility] [place]   08/16/2023 Phentermine Hcl 37.5MG 30 each 30 [facility] [place]   07/20/2023 Hydrocodone Bitartrate/Ac 325MG/10MG 90 each 22 [facility] [place]        RTC 1 month, sooner if needed.             This document has been electronically signed by:  STEVE Gonsales, FNP-C    Dragon disclaimer:  Part of this note may be an electronic transcription/translation of spoken language to printed text using the Dragon Dictation System.

## 2023-10-18 ENCOUNTER — OFFICE VISIT (OUTPATIENT)
Dept: FAMILY MEDICINE CLINIC | Facility: CLINIC | Age: 63
End: 2023-10-18
Payer: COMMERCIAL

## 2023-10-18 VITALS
TEMPERATURE: 98.2 F | DIASTOLIC BLOOD PRESSURE: 78 MMHG | OXYGEN SATURATION: 95 % | HEART RATE: 90 BPM | RESPIRATION RATE: 18 BRPM | HEIGHT: 65 IN | WEIGHT: 139 LBS | BODY MASS INDEX: 23.16 KG/M2 | SYSTOLIC BLOOD PRESSURE: 124 MMHG

## 2023-10-18 DIAGNOSIS — M25.50 MULTIPLE JOINT PAIN: ICD-10-CM

## 2023-10-18 DIAGNOSIS — Z78.9 WEIGHT LOSS ADVISED: ICD-10-CM

## 2023-10-18 DIAGNOSIS — J06.9 ACUTE URI: Primary | ICD-10-CM

## 2023-10-18 DIAGNOSIS — I83.813 VARICOSE VEINS OF BOTH LOWER EXTREMITIES WITH PAIN: ICD-10-CM

## 2023-10-18 DIAGNOSIS — I67.9 SMALL VESSEL DISEASE, CEREBROVASCULAR: ICD-10-CM

## 2023-10-18 RX ORDER — CELECOXIB 100 MG/1
100 CAPSULE ORAL 2 TIMES DAILY PRN
Qty: 60 CAPSULE | Refills: 2 | Status: SHIPPED | OUTPATIENT
Start: 2023-10-18

## 2023-10-18 RX ORDER — CEFDINIR 300 MG/1
300 CAPSULE ORAL 2 TIMES DAILY
Qty: 20 CAPSULE | Refills: 0 | Status: SHIPPED | OUTPATIENT
Start: 2023-10-18

## 2023-10-18 RX ORDER — HYDROCODONE BITARTRATE AND ACETAMINOPHEN 10; 325 MG/1; MG/1
1 TABLET ORAL EVERY 6 HOURS PRN
Qty: 90 TABLET | Refills: 0 | Status: SHIPPED | OUTPATIENT
Start: 2023-10-18

## 2023-10-18 RX ORDER — PHENTERMINE HYDROCHLORIDE 37.5 MG/1
37.5 TABLET ORAL
Qty: 30 TABLET | Refills: 0 | Status: SHIPPED | OUTPATIENT
Start: 2023-10-18

## 2023-10-18 NOTE — PROGRESS NOTES
Subjective   Rohini Burch is a 63 y.o. female.     Chief Complaint   Patient presents with    Allergies       History of Present Illness     Allergies-chronic and ongoing.  Patient is currently taking Zyrtec 10 mg and using Alaway eyedrops.  No negative side effects.  She reports some increased sneezing and rhinorrhea.  She reports that she has had some increase in discolored secretions primarily on the left side of her face she has pressure as well as hand discomfort.  Some generalized sore throat.  Joint pain-ongoing.  Patient is currently on Celebrex 100 mg twice daily as needed and hydrocodone 10 mg every 6 hours as needed.  No negative side effects.  Has been more increased since fall/colder weather has began.  She is wearing compression socks more and laying clothing on her feet and legs.  She has some minimal right knee swelling this morning.    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  Reports she is not taking daily but at least QOD.  Patient denies any bleeding of gums, melena, hematochezia, hematuria, or epistaxis.        The following portions of the patient's history were reviewed and updated as appropriate: CC, ROS, allergies, current medications, past family history, past medical history, past social history, past surgical history and problem list.      Review of Systems   Constitutional:  Positive for fatigue. Negative for appetite change and fever.   HENT:  Positive for congestion, postnasal drip, rhinorrhea, sinus pressure and sneezing. Negative for ear pain, nosebleeds, sore throat, tinnitus, trouble swallowing and voice change.    Eyes:  Negative for blurred vision, double vision, photophobia and visual disturbance.   Respiratory:  Negative for cough, chest tightness, shortness of breath and wheezing.    Cardiovascular:  Negative for chest pain and palpitations.   Gastrointestinal:  Negative for abdominal pain, blood in stool, constipation, diarrhea, nausea and GERD.   Endocrine:  "Negative for cold intolerance, heat intolerance and polydipsia.   Genitourinary:  Negative for decreased urine volume, difficulty urinating, dysuria and hematuria.   Musculoskeletal:  Positive for arthralgias, back pain, gait problem and myalgias.   Skin:  Negative for color change, pallor and wound.   Allergic/Immunologic: Negative.    Neurological:  Positive for headache. Negative for dizziness, syncope and numbness.   Hematological: Negative.    Psychiatric/Behavioral:  Negative for decreased concentration, sleep disturbance, suicidal ideas and depressed mood. The patient is not nervous/anxious.    All other systems reviewed and are negative.      Objective     /78   Pulse 90   Temp 98.2 °F (36.8 °C)   Resp 18   Ht 165.1 cm (65\")   Wt 63 kg (139 lb)   SpO2 95%   BMI 23.13 kg/m²     Physical Exam  Vitals reviewed.   Constitutional:       General: She is not in acute distress.     Appearance: She is well-developed. She is not diaphoretic.   HENT:      Head: Normocephalic and atraumatic.      Jaw: No tenderness.      Right Ear: Hearing, tympanic membrane, ear canal and external ear normal.      Left Ear: Hearing, tympanic membrane, ear canal and external ear normal. A middle ear effusion is present.      Nose: Nose normal. No nasal tenderness or congestion.      Right Sinus: No maxillary sinus tenderness or frontal sinus tenderness.      Left Sinus: Maxillary sinus tenderness and frontal sinus tenderness present.      Mouth/Throat:      Lips: Pink.      Mouth: Mucous membranes are moist.      Pharynx: Oropharynx is clear. Uvula midline. Posterior oropharyngeal erythema (moderate with thick PND noted) present.   Eyes:      General: Lids are normal. No scleral icterus.     Extraocular Movements:      Right eye: Normal extraocular motion and no nystagmus.      Left eye: Normal extraocular motion and no nystagmus.      Conjunctiva/sclera: Conjunctivae normal.      Pupils: Pupils are equal, round, and " reactive to light.   Neck:      Thyroid: No thyromegaly or thyroid tenderness.      Vascular: No carotid bruit or JVD.      Trachea: No tracheal tenderness.   Cardiovascular:      Rate and Rhythm: Normal rate and regular rhythm.      Pulses:           Dorsalis pedis pulses are 2+ on the right side and 2+ on the left side.        Posterior tibial pulses are 2+ on the right side and 2+ on the left side.      Heart sounds: Normal heart sounds, S1 normal and S2 normal. No murmur heard.  Pulmonary:      Effort: Pulmonary effort is normal. No accessory muscle usage, prolonged expiration or respiratory distress.      Breath sounds: Normal breath sounds.   Chest:      Chest wall: No tenderness.   Abdominal:      General: Bowel sounds are normal. There is no distension.      Palpations: Abdomen is soft. There is no hepatomegaly, splenomegaly or mass.      Tenderness: There is no abdominal tenderness.   Musculoskeletal:         General: Tenderness present.      Cervical back: Normal range of motion and neck supple.      Right lower leg: No edema.      Left lower leg: No edema.      Comments: No muscular atrophy or flaccidity.   Lymphadenopathy:      Head:      Right side of head: No submental or submandibular adenopathy.      Left side of head: No submental or submandibular adenopathy.      Cervical: No cervical adenopathy.      Right cervical: No superficial cervical adenopathy.     Left cervical: No superficial cervical adenopathy.   Skin:     General: Skin is warm and dry.      Capillary Refill: Capillary refill takes less than 2 seconds.      Coloration: Skin is not jaundiced or pale.      Findings: No erythema.      Nails: There is no clubbing.   Neurological:      Mental Status: She is alert and oriented to person, place, and time.      Cranial Nerves: No cranial nerve deficit or facial asymmetry.      Sensory: No sensory deficit.      Motor: No weakness, tremor, atrophy or abnormal muscle tone.      Coordination:  Coordination normal.      Deep Tendon Reflexes: Reflexes are normal and symmetric.   Psychiatric:         Attention and Perception: She is attentive.         Mood and Affect: Mood normal. Mood is not anxious or depressed.         Speech: Speech normal.         Behavior: Behavior normal. Behavior is cooperative.         Thought Content: Thought content normal.         Cognition and Memory: Cognition normal.         Judgment: Judgment normal.         Diagnoses and all orders for this visit:    1. Acute URI (Primary)  Comments:  Finish all of antibiotics  Orders:  -     cefdinir (OMNICEF) 300 MG capsule; Take 1 capsule by mouth 2 (Two) Times a Day.  Dispense: 20 capsule; Refill: 0    2. Multiple joint pain  Comments:  Avoid overuse activity.  Continue Norco and Celebrex as directed  Orders:  -     celecoxib (CeleBREX) 100 MG capsule; Take 1 capsule by mouth 2 (Two) Times a Day As Needed for Mild Pain.  Dispense: 60 capsule; Refill: 2  -     HYDROcodone-acetaminophen (Norco)  MG per tablet; Take 1 tablet by mouth Every 6 (Six) Hours As Needed for Moderate Pain.  Dispense: 90 tablet; Refill: 0    3. Small vessel disease, cerebrovascular  Assessment & Plan:  Continue plavix 75 mg and Crestor 20 mg  Report any negative side effects.   Encouraged to pursue a low-cholesterol diet including limited fried foods and low-fat foods.  Be active as physically able.      4. Varicose veins of both lower extremities with pain  Assessment & Plan:  Continue to be active.  Avoid overuse activities  Continue PRN Norco and celebrex as directed         BMI is within normal parameters. No other follow-up for BMI required.  Understands disease processes and need for medications.  Understands reasons for urgent and emergent care.  Patient (& family) verbalized agreement for treatment plan.   Emotional support and active listening provided.  Patient provided time to verbalize feelings.    MICHELLE/PMMILAN reviewed today and consistent.  Will  refill prescribed controlled medication today.  Patient is aware they cannot receive narcotics from any other provider except if under care of pain management or speciality clinic.  Risk and benefits of medication use has been reviewed.  History and physical exam exhibit continued safe and appropriate use of controlled substances.  The patient is aware of the potential for addiction and dependence.  This patient has been made aware of the appropriate use of such medications, including potential risk of somnolence, limited ability to drive and / or work safely, and potential for overdose.    It has also been made clear that these medications are for use by this patient only, without concomitant use of alcohol or other substances unless prescribed/advised by medical provider.  Patient understands they may be subject to UDS and pill counts at random.    Patient considered to be low risk for addiction due to use of single controlled medications.  Patient understands and accepts these risks.  Patient need for medication will be reassessed at each visit.  Doses will be adjusted according to patient need and findings.    Goal of TX: Patient will not have any adverse reactions of medication.  Patient have reduction in pain symptoms with use of PRN Norco as directed.  Patient will not have any adverse side effects from medication.  Patient will be able to remain active in an outside of home without interference from pain symptoms.    MICHELLE Patient Controlled Substance Report (from 10/18/2022 to 10/18/2023)    Dispensed  Strength Quantity Days Supply Provider Pharmacy   09/19/2023 Hydrocodone Bitartrate/Ac 325MG/10MG 90 each 30 [facility] [place]   09/19/2023 Phentermine Hcl 37.5MG 30 each 30 [facility] [place]   08/16/2023 Hydrocodone Bitartrate/Ac 325MG/10MG 90 each 23 [facility] [place]   08/16/2023 Phentermine Hcl 37.5MG 30 each 30 [facility] [place]        RTC 1 month,  sooner if needed.             This document has been electronically signed by:  STEVE Gonsales, FNP-C    Dragon disclaimer:  Part of this note may be an electronic transcription/translation of spoken language to printed text using the Dragon Dictation System.

## 2023-10-31 DIAGNOSIS — M25.50 MULTIPLE JOINT PAIN: Primary | ICD-10-CM

## 2023-11-15 ENCOUNTER — OFFICE VISIT (OUTPATIENT)
Dept: FAMILY MEDICINE CLINIC | Facility: CLINIC | Age: 63
End: 2023-11-15
Payer: COMMERCIAL

## 2023-11-15 VITALS
DIASTOLIC BLOOD PRESSURE: 82 MMHG | HEART RATE: 80 BPM | BODY MASS INDEX: 23.99 KG/M2 | RESPIRATION RATE: 16 BRPM | TEMPERATURE: 98.2 F | WEIGHT: 144 LBS | HEIGHT: 65 IN | OXYGEN SATURATION: 97 % | SYSTOLIC BLOOD PRESSURE: 130 MMHG

## 2023-11-15 DIAGNOSIS — R53.83 OTHER FATIGUE: ICD-10-CM

## 2023-11-15 DIAGNOSIS — J30.1 SEASONAL ALLERGIC RHINITIS DUE TO POLLEN: ICD-10-CM

## 2023-11-15 DIAGNOSIS — M25.50 MULTIPLE JOINT PAIN: ICD-10-CM

## 2023-11-15 DIAGNOSIS — Z78.9 WEIGHT LOSS ADVISED: Primary | ICD-10-CM

## 2023-11-15 DIAGNOSIS — R73.09 ABNORMAL GLUCOSE: ICD-10-CM

## 2023-11-15 DIAGNOSIS — Z13.29 THYROID DISORDER SCREENING: ICD-10-CM

## 2023-11-15 DIAGNOSIS — I67.9 SMALL VESSEL DISEASE, CEREBROVASCULAR: ICD-10-CM

## 2023-11-15 DIAGNOSIS — Z13.220 SCREENING CHOLESTEROL LEVEL: ICD-10-CM

## 2023-11-15 DIAGNOSIS — E55.9 VITAMIN D DEFICIENCY: ICD-10-CM

## 2023-11-15 RX ORDER — PSEUDOEPHEDRINE HCL 30 MG
30 TABLET ORAL EVERY 6 HOURS PRN
Qty: 30 TABLET | Refills: 0 | Status: SHIPPED | OUTPATIENT
Start: 2023-11-15

## 2023-11-15 RX ORDER — OLOPATADINE HYDROCHLORIDE 1 MG/ML
1 SOLUTION/ DROPS OPHTHALMIC 2 TIMES DAILY
Qty: 5 ML | Refills: 5 | Status: SHIPPED | OUTPATIENT
Start: 2023-11-15

## 2023-11-15 RX ORDER — HYDROCODONE BITARTRATE AND ACETAMINOPHEN 10; 325 MG/1; MG/1
1 TABLET ORAL EVERY 6 HOURS PRN
Qty: 90 TABLET | Refills: 0 | Status: SHIPPED | OUTPATIENT
Start: 2023-11-15

## 2023-11-15 RX ORDER — PHENTERMINE HYDROCHLORIDE 37.5 MG/1
37.5 TABLET ORAL
Qty: 30 TABLET | Refills: 0 | Status: SHIPPED | OUTPATIENT
Start: 2023-11-15

## 2023-11-15 NOTE — PROGRESS NOTES
Subjective   Rohini Burch is a 63 y.o. female.     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  Reports she is not taking daily but at least QOD.  Patient denies any bleeding of gums, melena, hematochezia, hematuria, or epistaxis.  No episodes of confusion or poor muscle control.    Allergies-chronic and ongoing.  Patient is currently taking Zyrtec 10 mg and using Alaway eyedrops.  No negative side effects.  She reports some congestion and some itching of eyes.  She reports that she did not get her eye drops last month.    Joint pain-ongoing.  Patient is currently on Celebrex 100 mg twice daily as needed and hydrocodone 10 mg every 6 hours as needed.  No negative side effects.  She continues to be active.  She has multiple areas of joint discomfort but the worse is low back and leg pain.  She reports that winter makes her hurt more and the varicose veins in her legs are painful.  She often wears double layers of leggings or socks to keep her feet and legs warm.    Desires weight loss-on Adipex but she has had gain this month.  She reports that she has been more stressed so she has been eating more.  No negative side effects of medications.      The following portions of the patient's history were reviewed and updated as appropriate: CC, ROS, allergies, current medications, past family history, past medical history, past social history, past surgical history and problem list.      Review of Systems   Constitutional:  Positive for fatigue. Negative for appetite change and fever.   HENT:  Positive for congestion, rhinorrhea and sneezing. Negative for ear pain, nosebleeds, postnasal drip, sore throat, tinnitus, trouble swallowing and voice change.    Eyes:  Positive for itching. Negative for blurred vision, double vision, photophobia and visual disturbance.   Respiratory:  Negative for cough, chest tightness,  "shortness of breath and wheezing.    Cardiovascular:  Negative for chest pain, palpitations and leg swelling.   Gastrointestinal:  Negative for abdominal pain, blood in stool, constipation, diarrhea, nausea, vomiting and GERD.   Endocrine: Negative for cold intolerance, heat intolerance and polydipsia.   Genitourinary:  Negative for decreased urine volume, difficulty urinating, dysuria and hematuria.   Musculoskeletal:  Positive for arthralgias, back pain, gait problem and myalgias.   Skin:  Negative for color change, pallor and wound.   Allergic/Immunologic: Negative.    Neurological:  Negative for dizziness, syncope, numbness and headache.   Hematological: Negative.    Psychiatric/Behavioral:  Negative for decreased concentration, sleep disturbance, suicidal ideas and depressed mood. The patient is not nervous/anxious.    All other systems reviewed and are negative.      Objective     /82   Pulse 80   Temp 98.2 °F (36.8 °C)   Resp 16   Ht 165.1 cm (65\")   Wt 65.3 kg (144 lb)   SpO2 97%   BMI 23.96 kg/m²     Physical Exam  Vitals reviewed.   Constitutional:       General: She is not in acute distress.     Appearance: She is well-developed. She is not diaphoretic.   HENT:      Head: Normocephalic and atraumatic.      Jaw: No tenderness.      Right Ear: Hearing, tympanic membrane, ear canal and external ear normal.      Left Ear: Hearing, tympanic membrane, ear canal and external ear normal.      Nose: Nose normal. No nasal tenderness, congestion or rhinorrhea.      Right Sinus: No maxillary sinus tenderness or frontal sinus tenderness.      Left Sinus: No maxillary sinus tenderness or frontal sinus tenderness.      Mouth/Throat:      Lips: Pink.      Mouth: Mucous membranes are moist.      Pharynx: Oropharynx is clear. Uvula midline.   Eyes:      General: Lids are normal. No scleral icterus.     Extraocular Movements:      Right eye: Normal extraocular motion and no nystagmus.      Left eye: Normal " extraocular motion and no nystagmus.      Conjunctiva/sclera: Conjunctivae normal.      Pupils: Pupils are equal, round, and reactive to light.   Neck:      Thyroid: No thyromegaly or thyroid tenderness.      Vascular: No carotid bruit or JVD.      Trachea: No tracheal tenderness.   Cardiovascular:      Rate and Rhythm: Normal rate and regular rhythm.      Pulses:           Dorsalis pedis pulses are 2+ on the right side and 2+ on the left side.        Posterior tibial pulses are 2+ on the right side and 2+ on the left side.      Heart sounds: Normal heart sounds, S1 normal and S2 normal. No murmur heard.  Pulmonary:      Effort: Pulmonary effort is normal. No accessory muscle usage, prolonged expiration or respiratory distress.      Breath sounds: Normal breath sounds.   Chest:      Chest wall: No tenderness.   Abdominal:      General: Bowel sounds are normal. There is no distension.      Palpations: Abdomen is soft. There is no hepatomegaly, splenomegaly or mass.      Tenderness: There is no abdominal tenderness.   Musculoskeletal:         General: Tenderness present.      Cervical back: Normal range of motion and neck supple.      Right upper leg: Tenderness present.      Left upper leg: Tenderness present.      Right lower leg: Tenderness present. No edema.      Left lower leg: Tenderness present. No edema.      Comments: No muscular atrophy or flaccidity.   Lymphadenopathy:      Head:      Right side of head: No submental or submandibular adenopathy.      Left side of head: No submental or submandibular adenopathy.      Cervical: No cervical adenopathy.      Right cervical: No superficial cervical adenopathy.     Left cervical: No superficial cervical adenopathy.   Skin:     General: Skin is warm and dry.      Capillary Refill: Capillary refill takes less than 2 seconds.      Coloration: Skin is not jaundiced or pale.      Findings: No erythema.      Nails: There is no clubbing.   Neurological:      Mental  Status: She is alert and oriented to person, place, and time.      Cranial Nerves: No cranial nerve deficit or facial asymmetry.      Sensory: No sensory deficit.      Motor: No weakness, tremor, atrophy or abnormal muscle tone.      Coordination: Coordination normal.      Gait: Gait abnormal (moderate antalgia).      Deep Tendon Reflexes: Reflexes are normal and symmetric.   Psychiatric:         Attention and Perception: She is attentive.         Mood and Affect: Mood normal. Mood is not anxious or depressed.         Speech: Speech normal.         Behavior: Behavior normal. Behavior is cooperative.         Thought Content: Thought content normal.         Cognition and Memory: Cognition normal.         Judgment: Judgment normal.         Diagnoses and all orders for this visit:    1. Weight loss advised (Primary)  Comments:  Continue Adipex.   Work on low CHO diet  Orders:  -     phentermine (ADIPEX-P) 37.5 MG tablet; Take 1 tablet by mouth Every Morning Before Breakfast.  Dispense: 30 tablet; Refill: 0    2. Small vessel disease, cerebrovascular  Assessment & Plan:  Continue plavix 75 mg and Crestor 20 mg  Report any negative side effects.   Encouraged to pursue a low-cholesterol diet including limited fried foods and low-fat foods.  Be active as physically able.    Orders:  -     CBC & Differential  -     Comprehensive Metabolic Panel    3. Screening cholesterol level  -     Lipid Panel    4. Vitamin D deficiency  -     Vitamin D,25-Hydroxy    5. Thyroid disorder screening  -     TSH  -     T4, Free    6. Abnormal glucose  -     Hemoglobin A1c    7. Other fatigue  -     TSH  -     T4, Free  -     Vitamin B12    8. Multiple joint pain  Comments:  Avoid overuse activity.  Continue Norco and Celebrex as directed  Assessment & Plan:  Continue to be active.  Avoid overuse activities  Continue PRN Norco and celebrex as directed    Orders:  -     HYDROcodone-acetaminophen (Norco)  MG per tablet; Take 1 tablet by  mouth Every 6 (Six) Hours As Needed for Moderate Pain.  Dispense: 90 tablet; Refill: 0    9. Seasonal allergic rhinitis due to pollen  Comments:  Continue Zyrtec 10 mg.  Continue allergy eyedrops.  Avoid allergy triggers as much as possible  Assessment & Plan:  Continue Zyrtec.  Continue eyedrops as needed.  Avoid known allergy and respiratory triggers.  May use Saline spray PRN as desired  Will add Sudafed    Orders:  -     olopatadine (PATANOL) 0.1 % ophthalmic solution; Administer 1 drop to both eyes 2 (Two) Times a Day.  Dispense: 5 mL; Refill: 5  -     pseudoephedrine (Sudafed) 30 MG tablet; Take 1 tablet by mouth Every 6 (Six) Hours As Needed for Congestion.  Dispense: 30 tablet; Refill: 0       BMI is within normal parameters. No other follow-up for BMI required.  Understands disease processes and need for medications.  Understands reasons for urgent and emergent care.  Patient (& family) verbalized agreement for treatment plan.   Emotional support and active listening provided.  Patient provided time to verbalize feelings.  MICHELLE/PMDP reviewed today and consistent.  Will refill prescribed controlled medication today.  Patient is aware they cannot receive narcotics from any other provider except if under care of pain management or speciality clinic.  Risk and benefits of medication use has been reviewed.  History and physical exam exhibit continued safe and appropriate use of controlled substances.  The patient is aware of the potential for addiction and dependence.  This patient has been made aware of the appropriate use of such medications, including potential risk of somnolence, limited ability to drive and / or work safely, and potential for overdose.    It has also been made clear that these medications are for use by this patient only, without concomitant use of alcohol or other substances unless prescribed/advised by medical provider.  Patient understands they may be subject to UDS and pill counts at  random.    Patient considered to be moderate risk for addiction due to use of multiple controlled medications.  Patient understands and accepts these risks.  Patient need for medication will be reassessed at each visit.  Doses will be adjusted according to patient need and findings.    Goal of TX: Patient will not have any adverse reactions of medication.  Patient have reduction in pain symptoms with use of PRN Norco as directed.  Patient will not have any adverse side effects from medication.  Patient will be able to remain active in an outside of home without interference from pain symptoms.  Patient will have reduction in weight with use of Adipex as directed with simultaneous diet and lifestyle changes.    [facility] Patient Controlled Substance Report (from 11/15/2022 to 11/15/2023)    Dispensed  Strength Quantity Days Supply Provider Pharmacy   10/19/2023 Phentermine Hcl 37.5MG 30 each 30 [facility]   10/18/2023 Hydrocodone Bitartrate/Ac 325MG/10MG 90 each 30 [facility]   09/19/2023 Hydrocodone Bitartrate/Ac 325MG/10MG 90 each 30 [facility]   09/19/2023 Phentermine Hcl 37.5MG 30 each 30 [facility]        Fasting labs ordered.  Patient will obtain at later time.    RTC 1 month, sooner if needed.             This document has been electronically signed by:  STEVE Gonsales FNP-C Dragon disclaimer:  Part of this note may be an electronic transcription/translation of spoken language to printed text using the Dragon Dictation System.

## 2023-12-13 ENCOUNTER — TELEPHONE (OUTPATIENT)
Dept: FAMILY MEDICINE CLINIC | Facility: CLINIC | Age: 63
End: 2023-12-13

## 2023-12-13 ENCOUNTER — OFFICE VISIT (OUTPATIENT)
Dept: FAMILY MEDICINE CLINIC | Facility: CLINIC | Age: 63
End: 2023-12-13
Payer: COMMERCIAL

## 2023-12-13 VITALS
DIASTOLIC BLOOD PRESSURE: 86 MMHG | TEMPERATURE: 98.4 F | HEIGHT: 65 IN | OXYGEN SATURATION: 97 % | HEART RATE: 88 BPM | RESPIRATION RATE: 16 BRPM | WEIGHT: 146 LBS | BODY MASS INDEX: 24.32 KG/M2 | SYSTOLIC BLOOD PRESSURE: 134 MMHG

## 2023-12-13 DIAGNOSIS — Z23 ENCOUNTER FOR IMMUNIZATION: ICD-10-CM

## 2023-12-13 DIAGNOSIS — M25.50 MULTIPLE JOINT PAIN: ICD-10-CM

## 2023-12-13 DIAGNOSIS — J30.1 SEASONAL ALLERGIC RHINITIS DUE TO POLLEN: ICD-10-CM

## 2023-12-13 DIAGNOSIS — Z78.9 WEIGHT LOSS ADVISED: ICD-10-CM

## 2023-12-13 DIAGNOSIS — R63.5 GAIN OF WEIGHT: Primary | ICD-10-CM

## 2023-12-13 DIAGNOSIS — I67.9 SMALL VESSEL DISEASE, CEREBROVASCULAR: ICD-10-CM

## 2023-12-13 RX ORDER — PHENTERMINE HYDROCHLORIDE 37.5 MG/1
37.5 TABLET ORAL
Qty: 30 TABLET | Refills: 0 | Status: SHIPPED | OUTPATIENT
Start: 2023-12-13

## 2023-12-13 RX ORDER — HYDROCODONE BITARTRATE AND ACETAMINOPHEN 10; 325 MG/1; MG/1
1 TABLET ORAL EVERY 6 HOURS PRN
Qty: 90 TABLET | Refills: 0 | Status: SHIPPED | OUTPATIENT
Start: 2023-12-13

## 2023-12-13 RX ORDER — CELECOXIB 100 MG/1
100 CAPSULE ORAL 2 TIMES DAILY PRN
Qty: 60 CAPSULE | Refills: 2 | Status: SHIPPED | OUTPATIENT
Start: 2023-12-13

## 2023-12-13 RX ORDER — GUAIFENESIN 600 MG/1
1200 TABLET, EXTENDED RELEASE ORAL 2 TIMES DAILY
Qty: 60 TABLET | Refills: 2 | Status: SHIPPED | OUTPATIENT
Start: 2023-12-13

## 2023-12-13 RX ORDER — ROSUVASTATIN CALCIUM 20 MG/1
20 TABLET, COATED ORAL DAILY
Qty: 30 TABLET | Refills: 5 | Status: SHIPPED | OUTPATIENT
Start: 2023-12-13

## 2023-12-13 RX ORDER — CLOPIDOGREL BISULFATE 75 MG/1
75 TABLET ORAL DAILY
Qty: 30 TABLET | Refills: 5 | Status: SHIPPED | OUTPATIENT
Start: 2023-12-13

## 2023-12-13 NOTE — PROGRESS NOTES
Subjective   Rohini Burch is a 63 y.o. female.     Chief Complaint   Patient presents with    Back Pain       Back Pain  Pertinent negatives include no abdominal pain, chest pain, dysuria, fever or numbness.      Back and leg pain-ongoing.  Worse in winter.  Patient is currently on Celebrex 100 mg twice daily as needed and hydrocodone 10 mg every 6 hours as needed.  No negative side effects.  She reports that she has had a decent few days.    Allergies-chronic and ongoing.  Patient is currently taking Zyrtec 10 mg and using Alaway eyedrops.  No negative side effects.  Headache-patient has been ordered weekly to 60 mg as needed.  She reports that she has had some sinus HA but nothing like she had when she needed the occipital block.  She has PRN meds for BP elevations.    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  Reports she is not taking daily but at least QOD.  Patient denies any bleeding of gums, melena, hematochezia, hematuria, or epistaxis.       The following portions of the patient's history were reviewed and updated as appropriate: CC, ROS, allergies, current medications, past family history, past medical history, past social history, past surgical history and problem list.    Review of Systems   Constitutional:  Positive for fatigue. Negative for appetite change and fever.   HENT:  Negative for congestion, ear pain, nosebleeds, postnasal drip, rhinorrhea, sore throat, tinnitus, trouble swallowing and voice change.    Eyes:  Negative for blurred vision, photophobia and visual disturbance.   Respiratory:  Negative for cough, chest tightness, shortness of breath and wheezing.    Cardiovascular:  Negative for chest pain and palpitations.   Gastrointestinal:  Negative for abdominal pain, blood in stool, constipation, diarrhea, nausea and GERD.   Endocrine: Negative for cold intolerance, heat intolerance and polydipsia.   Genitourinary:  Negative for decreased urine volume, difficulty urinating,  "dysuria and hematuria.   Musculoskeletal:  Positive for arthralgias, back pain and myalgias. Negative for gait problem.   Skin:  Negative for color change, pallor and wound.   Allergic/Immunologic: Negative.    Neurological:  Negative for dizziness, syncope, numbness and headache.   Hematological: Negative.    Psychiatric/Behavioral:  Positive for stress. Negative for decreased concentration, sleep disturbance, suicidal ideas and depressed mood. The patient is not nervous/anxious.    All other systems reviewed and are negative.      Objective     /86   Pulse 88   Temp 98.4 °F (36.9 °C)   Resp 16   Ht 165.1 cm (65\")   Wt 66.2 kg (146 lb)   SpO2 97%   BMI 24.30 kg/m²     Physical Exam  Vitals reviewed.   Constitutional:       General: She is not in acute distress.     Appearance: She is well-developed. She is not diaphoretic.   HENT:      Head: Normocephalic and atraumatic.      Jaw: No tenderness.      Right Ear: Hearing, tympanic membrane, ear canal and external ear normal.      Left Ear: Hearing, tympanic membrane, ear canal and external ear normal.      Nose: Nose normal. No nasal tenderness, congestion or rhinorrhea.      Right Sinus: No maxillary sinus tenderness or frontal sinus tenderness.      Left Sinus: No maxillary sinus tenderness or frontal sinus tenderness.      Mouth/Throat:      Lips: Pink.      Mouth: Mucous membranes are moist.      Pharynx: Oropharynx is clear. Uvula midline.   Eyes:      General: Lids are normal. No scleral icterus.     Extraocular Movements:      Right eye: Normal extraocular motion and no nystagmus.      Left eye: Normal extraocular motion and no nystagmus.      Conjunctiva/sclera: Conjunctivae normal.      Pupils: Pupils are equal, round, and reactive to light.   Neck:      Thyroid: No thyromegaly or thyroid tenderness.      Vascular: No carotid bruit or JVD.      Trachea: No tracheal tenderness.   Cardiovascular:      Rate and Rhythm: Normal rate and regular " rhythm.      Pulses:           Dorsalis pedis pulses are 2+ on the right side and 2+ on the left side.        Posterior tibial pulses are 2+ on the right side and 2+ on the left side.      Heart sounds: Normal heart sounds, S1 normal and S2 normal. No murmur heard.  Pulmonary:      Effort: Pulmonary effort is normal. No accessory muscle usage, prolonged expiration or respiratory distress.      Breath sounds: Normal breath sounds.   Chest:      Chest wall: No tenderness.   Abdominal:      General: Bowel sounds are normal. There is no distension.      Palpations: Abdomen is soft. There is no hepatomegaly, splenomegaly or mass.      Tenderness: There is no abdominal tenderness.   Musculoskeletal:         General: Tenderness present.      Cervical back: Normal range of motion and neck supple.      Right upper leg: Tenderness present.      Left upper leg: Tenderness present.      Right lower leg: Tenderness present. No edema.      Left lower leg: Tenderness present. No edema.      Comments: No muscular atrophy or flaccidity.   Lymphadenopathy:      Head:      Right side of head: No submental or submandibular adenopathy.      Left side of head: No submental or submandibular adenopathy.      Cervical: No cervical adenopathy.      Right cervical: No superficial cervical adenopathy.     Left cervical: No superficial cervical adenopathy.   Skin:     General: Skin is warm and dry.      Capillary Refill: Capillary refill takes less than 2 seconds.      Coloration: Skin is not jaundiced or pale.      Findings: No erythema.      Nails: There is no clubbing.   Neurological:      Mental Status: She is alert and oriented to person, place, and time.      Cranial Nerves: No cranial nerve deficit or facial asymmetry.      Sensory: No sensory deficit.      Motor: No weakness, tremor, atrophy or abnormal muscle tone.      Coordination: Coordination normal.      Gait: Gait abnormal (moderate antalgia).      Deep Tendon Reflexes: Reflexes  are normal and symmetric.   Psychiatric:         Attention and Perception: She is attentive.         Mood and Affect: Mood normal. Mood is not anxious or depressed.         Speech: Speech normal.         Behavior: Behavior normal. Behavior is cooperative.         Thought Content: Thought content normal.         Cognition and Memory: Cognition normal.         Judgment: Judgment normal.         Diagnoses and all orders for this visit:    1. Gain of weight (Primary)  Assessment & Plan:  May continue Adipex.  Continue to work on diet changes      2. Multiple joint pain  Comments:  Avoid overuse activity.  Continue Norco and Celebrex as directed  Orders:  -     celecoxib (CeleBREX) 100 MG capsule; Take 1 capsule by mouth 2 (Two) Times a Day As Needed for Mild Pain.  Dispense: 60 capsule; Refill: 2  -     HYDROcodone-acetaminophen (Norco)  MG per tablet; Take 1 tablet by mouth Every 6 (Six) Hours As Needed for Moderate Pain.  Dispense: 90 tablet; Refill: 0    3. Seasonal allergic rhinitis due to pollen  Assessment & Plan:  Continue Zyrtec.  Continue eyedrops as needed.  Avoid known allergy and respiratory triggers.  May use Saline spray PRN as desired  Will add Sudafed    Orders:  -     guaiFENesin (Mucinex) 600 MG 12 hr tablet; Take 2 tablets by mouth 2 (Two) Times a Day.  Dispense: 60 tablet; Refill: 2    4. Small vessel disease, cerebrovascular  Assessment & Plan:  Continue plavix 75 mg and Crestor 20 mg  Report any negative side effects.   Encouraged to pursue a low-cholesterol diet including limited fried foods and low-fat foods.  Be active as physically able.    Orders:  -     clopidogrel (Plavix) 75 MG tablet; Take 1 tablet by mouth Daily.  Dispense: 30 tablet; Refill: 5  -     rosuvastatin (Crestor) 20 MG tablet; Take 1 tablet by mouth Daily.  Dispense: 30 tablet; Refill: 5    5. Weight loss advised  Comments:  Continue Adipex.   Work on low CHO diet  Orders:  -     phentermine (ADIPEX-P) 37.5 MG tablet;  Take 1 tablet by mouth Every Morning Before Breakfast.  Dispense: 30 tablet; Refill: 0    6. Encounter for immunization  -     Pneumococcal Conjugate Vaccine 20-Valent (PCV20)       BMI is within normal parameters. No other follow-up for BMI required.      Understands disease processes and need for medications.  Understands reasons for urgent and emergent care.  Patient (& family) verbalized agreement for treatment plan.   Emotional support and active listening provided.  Patient provided time to verbalize feelings.    MICHELLE/PMDP reviewed today and consistent.  Will refill prescribed controlled medication today.  Patient is aware they cannot receive narcotics from any other provider except if under care of pain management or speciality clinic.  Risk and benefits of medication use has been reviewed.  History and physical exam exhibit continued safe and appropriate use of controlled substances.  The patient is aware of the potential for addiction and dependence.  This patient has been made aware of the appropriate use of such medications, including potential risk of somnolence, limited ability to drive and / or work safely, and potential for overdose.    It has also been made clear that these medications are for use by this patient only, without concomitant use of alcohol or other substances unless prescribed/advised by medical provider.  Patient understands they may be subject to UDS and pill counts at random.      Patient considered to be moderate risk for addiction due to use of multiple controlled medications.  Patient understands and accepts these risks.  Patient need for medication will be reassessed at each visit.  Doses will be adjusted according to patient need and findings.    Goal of TX: Patient will not have any adverse reactions of medication.  Patient have reduction in pain symptoms with use of PRN Norco as directed.  Patient will not have any adverse side effects from medication.  Patient will be able  to remain active in an outside of home without interference from pain symptoms.  Patient will have reduction in weight with use of Adipex as directed with simultaneous diet and lifestyle changes.      [facility] Patient Controlled Substance Report (from 12/13/2022 to 12/13/2023)    Dispensed  Strength Quantity Days Supply Provider Pharmacy   11/15/2023 Hydrocodone Bitartrate/Ac 325MG/10MG 90 each 30 [facility] [place]   11/15/2023 Phentermine Hcl 37.5MG 30 each 30 [facility]AN,[facility] [place]   10/19/2023 Phentermine Hcl 37.5MG 30 each 30 [facility] [place]   10/18/2023 Hydrocodone Bitartrate/Ac 325MG/10MG 90 each 30 [facility] [place]        RTC 1 month, sooner if needed.             This document has been electronically signed by:  STEVE Gonsales, REYNA-C    Dragon disclaimer:  Part of this note may be an electronic transcription/translation of spoken language to printed text using the Dragon Dictation System.

## 2023-12-27 ENCOUNTER — TELEPHONE (OUTPATIENT)
Dept: FAMILY MEDICINE CLINIC | Facility: CLINIC | Age: 63
End: 2023-12-27
Payer: COMMERCIAL

## 2023-12-27 RX ORDER — CEPHALEXIN 500 MG/1
500 CAPSULE ORAL 3 TIMES DAILY
Qty: 30 CAPSULE | Refills: 0 | Status: SHIPPED | OUTPATIENT
Start: 2023-12-27

## 2023-12-27 NOTE — TELEPHONE ENCOUNTER
Caller: Kiersten Kaylah    Relationship: Self    Best call back number: [phone]     What medication are you requesting: ANTIBIOTIC    What are your current symptoms: SINUS INFECTION, RUNNY NOSE, SINUS PRESSURE    How long have you been experiencing symptoms: [id number]    Have you had these symptoms before:    [x] Yes  [] No    Have you been treated for these symptoms before:   [x] Yes  [] No    If a prescription is needed, what is your preferred pharmacy and phone number: [facility] [place], [place] - [address] - [phone] [facility] [phone] FX     Additional notes:CALL PATIENT WHEN SENT

## 2024-01-15 DIAGNOSIS — M25.50 MULTIPLE JOINT PAIN: ICD-10-CM

## 2024-01-15 RX ORDER — HYDROCODONE BITARTRATE AND ACETAMINOPHEN 10; 325 MG/1; MG/1
1 TABLET ORAL EVERY 6 HOURS PRN
Qty: 90 TABLET | Refills: 0 | Status: SHIPPED | OUTPATIENT
Start: 2024-01-15

## 2024-01-17 DIAGNOSIS — Z78.9 WEIGHT LOSS ADVISED: ICD-10-CM

## 2024-01-17 RX ORDER — PHENTERMINE HYDROCHLORIDE 37.5 MG/1
37.5 TABLET ORAL
Qty: 30 TABLET | Refills: 0 | Status: SHIPPED | OUTPATIENT
Start: 2024-01-17

## 2024-02-12 ENCOUNTER — OFFICE VISIT (OUTPATIENT)
Dept: FAMILY MEDICINE CLINIC | Facility: CLINIC | Age: 64
End: 2024-02-12
Payer: COMMERCIAL

## 2024-02-12 VITALS
WEIGHT: 136 LBS | BODY MASS INDEX: 22.66 KG/M2 | SYSTOLIC BLOOD PRESSURE: 132 MMHG | OXYGEN SATURATION: 96 % | HEIGHT: 65 IN | TEMPERATURE: 98.4 F | RESPIRATION RATE: 18 BRPM | DIASTOLIC BLOOD PRESSURE: 80 MMHG | HEART RATE: 98 BPM

## 2024-02-12 DIAGNOSIS — I83.813 VARICOSE VEINS OF BOTH LOWER EXTREMITIES WITH PAIN: ICD-10-CM

## 2024-02-12 DIAGNOSIS — M25.50 MULTIPLE JOINT PAIN: ICD-10-CM

## 2024-02-12 DIAGNOSIS — Z79.899 LONG-TERM USE OF HIGH-RISK MEDICATION: ICD-10-CM

## 2024-02-12 DIAGNOSIS — R30.0 DYSURIA: ICD-10-CM

## 2024-02-12 DIAGNOSIS — I67.9 SMALL VESSEL DISEASE, CEREBROVASCULAR: ICD-10-CM

## 2024-02-12 DIAGNOSIS — M25.50 MULTIPLE JOINT PAIN: Primary | ICD-10-CM

## 2024-02-12 DIAGNOSIS — J30.1 SEASONAL ALLERGIC RHINITIS DUE TO POLLEN: ICD-10-CM

## 2024-02-12 LAB
BILIRUB BLD-MCNC: ABNORMAL MG/DL
CLARITY, POC: CLEAR
COLOR UR: YELLOW
GLUCOSE UR STRIP-MCNC: NEGATIVE MG/DL
KETONES UR QL: ABNORMAL
LEUKOCYTE EST, POC: ABNORMAL
NITRITE UR-MCNC: NEGATIVE MG/ML
PH UR: 6 [PH] (ref 5–8)
PROT UR STRIP-MCNC: NEGATIVE MG/DL
RBC # UR STRIP: NEGATIVE /UL
SP GR UR: 1.02 (ref 1–1.03)
UROBILINOGEN UR QL: NORMAL

## 2024-02-12 PROCEDURE — 99214 OFFICE O/P EST MOD 30 MIN: CPT | Performed by: NURSE PRACTITIONER

## 2024-02-12 PROCEDURE — 1160F RVW MEDS BY RX/DR IN RCRD: CPT | Performed by: NURSE PRACTITIONER

## 2024-02-12 PROCEDURE — 1159F MED LIST DOCD IN RCRD: CPT | Performed by: NURSE PRACTITIONER

## 2024-02-12 RX ORDER — GUAIFENESIN 600 MG/1
1200 TABLET, EXTENDED RELEASE ORAL 2 TIMES DAILY
Qty: 60 TABLET | Refills: 2 | Status: SHIPPED | OUTPATIENT
Start: 2024-02-12

## 2024-02-12 RX ORDER — CETIRIZINE HYDROCHLORIDE 10 MG/1
10 TABLET ORAL DAILY PRN
Qty: 30 TABLET | Refills: 5 | Status: SHIPPED | OUTPATIENT
Start: 2024-02-12

## 2024-02-12 RX ORDER — PSEUDOEPHEDRINE HCL 30 MG
30 TABLET ORAL EVERY 6 HOURS PRN
Qty: 30 TABLET | Refills: 0 | Status: SHIPPED | OUTPATIENT
Start: 2024-02-12

## 2024-02-12 RX ORDER — CELECOXIB 100 MG/1
100 CAPSULE ORAL 2 TIMES DAILY PRN
Qty: 60 CAPSULE | Refills: 5 | Status: SHIPPED | OUTPATIENT
Start: 2024-02-12

## 2024-02-12 RX ORDER — HYDROCODONE BITARTRATE AND ACETAMINOPHEN 10; 325 MG/1; MG/1
1 TABLET ORAL EVERY 6 HOURS PRN
Qty: 90 TABLET | Refills: 0 | Status: SHIPPED | OUTPATIENT
Start: 2024-02-12

## 2024-02-28 ENCOUNTER — TELEPHONE (OUTPATIENT)
Dept: FAMILY MEDICINE CLINIC | Facility: CLINIC | Age: 64
End: 2024-02-28
Payer: COMMERCIAL

## 2024-02-28 NOTE — TELEPHONE ENCOUNTER
Attempted to contact patient for repeat UDS per Lucía King.   Phone number in chart does not work.     Provider notified unable to contact patient.

## 2024-03-07 ENCOUNTER — OFFICE VISIT (OUTPATIENT)
Dept: FAMILY MEDICINE CLINIC | Facility: CLINIC | Age: 64
End: 2024-03-07
Payer: COMMERCIAL

## 2024-03-07 VITALS
WEIGHT: 138 LBS | BODY MASS INDEX: 22.99 KG/M2 | HEIGHT: 65 IN | SYSTOLIC BLOOD PRESSURE: 134 MMHG | DIASTOLIC BLOOD PRESSURE: 80 MMHG

## 2024-03-07 DIAGNOSIS — J30.1 SEASONAL ALLERGIC RHINITIS DUE TO POLLEN: ICD-10-CM

## 2024-03-07 DIAGNOSIS — I67.9 SMALL VESSEL DISEASE, CEREBROVASCULAR: ICD-10-CM

## 2024-03-07 DIAGNOSIS — M65.4 DE QUERVAIN'S DISEASE (TENOSYNOVITIS): Primary | ICD-10-CM

## 2024-03-07 DIAGNOSIS — Z78.9 WEIGHT LOSS ADVISED: ICD-10-CM

## 2024-03-07 DIAGNOSIS — M25.50 MULTIPLE JOINT PAIN: ICD-10-CM

## 2024-03-07 RX ORDER — TRIAMCINOLONE ACETONIDE 40 MG/ML
20 INJECTION, SUSPENSION INTRA-ARTICULAR; INTRAMUSCULAR ONCE
Status: COMPLETED | OUTPATIENT
Start: 2024-03-07 | End: 2024-03-07

## 2024-03-07 RX ORDER — LIDOCAINE HYDROCHLORIDE 10 MG/ML
1 INJECTION, SOLUTION INFILTRATION; PERINEURAL ONCE
Status: COMPLETED | OUTPATIENT
Start: 2024-03-07 | End: 2024-03-07

## 2024-03-07 RX ORDER — TRIAMCINOLONE ACETONIDE 40 MG/ML
40 INJECTION, SUSPENSION INTRA-ARTICULAR; INTRAMUSCULAR ONCE
Status: DISCONTINUED | OUTPATIENT
Start: 2024-03-07 | End: 2024-03-07

## 2024-03-07 RX ADMIN — TRIAMCINOLONE ACETONIDE 20 MG: 40 INJECTION, SUSPENSION INTRA-ARTICULAR; INTRAMUSCULAR at 09:38

## 2024-03-07 RX ADMIN — LIDOCAINE HYDROCHLORIDE 1 ML: 10 INJECTION, SOLUTION INFILTRATION; PERINEURAL at 09:37

## 2024-03-07 NOTE — PROGRESS NOTES
Subjective   Rohini Burch is a 63 y.o. female.     Chief Complaint   Patient presents with    WRIST PAIN       History of Present Illness     Wrist Pain-ongoing.  On the left.  She reports that her thumb is very painful.  She is not able to tolerate certain ROM positions.  She has had some swelling.  She reports that she has not had any injury.  She has used a compression glove but it did seem to increase the swelling.    Allergies-chronic and ongoing.  Patient is currently taking Zyrtec 10 mg and using Alaway eyedrops.  No negative side effects.  She has some congestion this morning but has not taken her meds.    Joint pain-ongoing.  Patient is currently on Celebrex 100 mg twice daily as needed and hydrocodone 10 mg every 6 hours as needed.  No negative side effects.  She reports she had some increased leg pain yesterday.  Worse behind her knees.  She reports that the weather was damp yesterday and that increases her symptoms.    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  Reports she is not taking daily but at least QOD.  Patient denies any bleeding of gums, melena, hematochezia, hematuria, or epistaxis.       The following portions of the patient's history were reviewed and updated as appropriate: CC, ROS, allergies, current medications, past family history, past medical history, past social history, past surgical history and problem list.      Review of Systems   Constitutional:  Positive for fatigue. Negative for appetite change and fever.   HENT:  Negative for congestion, ear pain, nosebleeds, postnasal drip, rhinorrhea, sore throat, tinnitus, trouble swallowing and voice change.    Eyes:  Negative for blurred vision, photophobia and visual disturbance.   Respiratory:  Negative for cough, chest tightness, shortness of breath and wheezing.    Cardiovascular:  Negative for chest pain and palpitations.   Gastrointestinal:  Negative for abdominal pain, blood in stool, constipation, diarrhea, nausea  "and GERD.   Endocrine: Negative for cold intolerance, heat intolerance and polydipsia.   Genitourinary:  Negative for decreased urine volume, difficulty urinating, dysuria and hematuria.   Musculoskeletal:  Positive for arthralgias, back pain and myalgias. Negative for gait problem.   Skin:  Negative for color change, pallor and wound.   Allergic/Immunologic: Negative.    Neurological:  Negative for dizziness, syncope, numbness and headache.   Hematological: Negative.    Psychiatric/Behavioral:  Positive for stress. Negative for decreased concentration, sleep disturbance, suicidal ideas and depressed mood. The patient is not nervous/anxious.    All other systems reviewed and are negative.      Objective     /80   Ht 165.1 cm (65\")   Wt 62.6 kg (138 lb)   BMI 22.96 kg/m²     Physical Exam  Vitals reviewed.   Constitutional:       General: She is not in acute distress.     Appearance: She is well-developed. She is not diaphoretic.   HENT:      Head: Normocephalic and atraumatic.      Jaw: No tenderness.      Right Ear: Hearing, tympanic membrane, ear canal and external ear normal.      Left Ear: Hearing, tympanic membrane, ear canal and external ear normal.      Nose: Nose normal. No nasal tenderness or congestion.      Right Sinus: No maxillary sinus tenderness or frontal sinus tenderness.      Left Sinus: No maxillary sinus tenderness or frontal sinus tenderness.      Mouth/Throat:      Lips: Pink.      Mouth: Mucous membranes are moist.      Pharynx: Oropharynx is clear. Uvula midline.   Eyes:      General: Lids are normal. No scleral icterus.     Extraocular Movements:      Right eye: Normal extraocular motion and no nystagmus.      Left eye: Normal extraocular motion and no nystagmus.      Conjunctiva/sclera: Conjunctivae normal.      Pupils: Pupils are equal, round, and reactive to light.   Neck:      Thyroid: No thyromegaly or thyroid tenderness.      Vascular: No carotid bruit or JVD.      Trachea: No " tracheal tenderness.   Cardiovascular:      Rate and Rhythm: Normal rate and regular rhythm.      Pulses:           Dorsalis pedis pulses are 2+ on the right side and 2+ on the left side.        Posterior tibial pulses are 2+ on the right side and 2+ on the left side.      Heart sounds: Normal heart sounds, S1 normal and S2 normal. No murmur heard.  Pulmonary:      Effort: Pulmonary effort is normal. No accessory muscle usage, prolonged expiration or respiratory distress.      Breath sounds: Normal breath sounds.   Chest:      Chest wall: No tenderness.   Abdominal:      General: Bowel sounds are normal. There is no distension.      Palpations: Abdomen is soft. There is no hepatomegaly, splenomegaly or mass.      Tenderness: There is no abdominal tenderness.   Musculoskeletal:         General: Tenderness present.      Left hand: Tenderness present. Decreased range of motion (thumb). Decreased strength (of the thumb). Decreased sensation of the radial distribution. Normal capillary refill. Normal pulse.      Cervical back: Normal range of motion and neck supple.      Lumbar back: Tenderness present. Decreased range of motion.      Right lower leg: Tenderness present. No edema.      Left lower leg: Tenderness present. No edema.      Comments: No muscular atrophy or flaccidity.   Lymphadenopathy:      Head:      Right side of head: No submental or submandibular adenopathy.      Left side of head: No submental or submandibular adenopathy.      Cervical: No cervical adenopathy.      Right cervical: No superficial cervical adenopathy.     Left cervical: No superficial cervical adenopathy.   Skin:     General: Skin is warm and dry.      Capillary Refill: Capillary refill takes less than 2 seconds.      Coloration: Skin is not jaundiced or pale.      Findings: No erythema.      Nails: There is no clubbing.      Comments: Multiple varicosities in BLE   Neurological:      Mental Status: She is alert and oriented to person,  place, and time.      Cranial Nerves: No cranial nerve deficit or facial asymmetry.      Sensory: No sensory deficit.      Motor: No weakness, tremor, atrophy or abnormal muscle tone.      Coordination: Coordination normal.      Gait: Gait abnormal (mild antalgia).      Deep Tendon Reflexes: Reflexes are normal and symmetric.   Psychiatric:         Attention and Perception: She is attentive.         Mood and Affect: Mood normal. Mood is not anxious or depressed.         Speech: Speech normal.         Behavior: Behavior normal. Behavior is cooperative.         Thought Content: Thought content normal.         Cognition and Memory: Cognition normal.         Judgment: Judgment normal.         Diagnoses and all orders for this visit:    1. De Quervain's disease (tenosynovitis) (Primary)  Comments:  left wrist  Injection given today.   order for thumb splint/brace  Orders:  -     lidocaine (XYLOCAINE) 1 % injection 1 mL  -     Discontinue: triamcinolone acetonide (KENALOG-40) injection 40 mg  -     triamcinolone acetonide (KENALOG-40) injection 20 mg    2. Weight loss advised  Comments:  Continue Adipex.   Work on low CHO diet  Orders:  -     phentermine (ADIPEX-P) 37.5 MG tablet; Take 1 tablet by mouth Every Morning Before Breakfast.  Dispense: 30 tablet; Refill: 0    3. Multiple joint pain  Comments:  Avoid overuse activity.  Continue Norco and Celebrex as directed  Orders:  -     HYDROcodone-acetaminophen (Norco)  MG per tablet; Take 1 tablet by mouth Every 6 (Six) Hours As Needed for Moderate Pain.  Dispense: 90 tablet; Refill: 0    4. Seasonal allergic rhinitis due to pollen  Assessment & Plan:  Continue Zyrtec.  Continue eyedrops as needed.  Avoid known allergy and respiratory triggers.  May use Saline spray PRN as desired  PRN Sudafed      5. Small vessel disease, cerebrovascular  Assessment & Plan:  Continue plavix 75 mg and Crestor 20 mg  Report any negative side effects.   Encouraged to pursue a  low-cholesterol diet including limited fried foods and low-fat foods.  Be active as physically able.         BMI is within normal parameters. No other follow-up for BMI required.    Understands disease processes and need for medications.  Understands reasons for urgent and emergent care.  Patient (& family) verbalized agreement for treatment plan.   Emotional support and active listening provided.  Patient provided time to verbalize feelings.    MICHELLE/PMDP reviewed today and consistent.  Will refill prescribed controlled medication today.  Patient is aware they cannot receive narcotics from any other provider except if under care of pain management or speciality clinic.  Risk and benefits of medication use has been reviewed.  History and physical exam exhibit continued safe and appropriate use of controlled substances.  The patient is aware of the potential for addiction and dependence.  This patient has been made aware of the appropriate use of such medications, including potential risk of somnolence, limited ability to drive and / or work safely, and potential for overdose.    It has also been made clear that these medications are for use by this patient only, without concomitant use of alcohol or other substances unless prescribed/advised by medical provider.  Patient understands they may be subject to UDS and pill counts at random.    Patient considered to be low risk for addiction due to use of single controlled medications.  Patient understands and accepts these risks.  Patient need for medication will be reassessed at each visit.  Doses will be adjusted according to patient need and findings.    Goal of [place]: Patient will not have any adverse reactions of medication.  Patient have reduction in pain symptoms with use of PRN Norco as directed.  Patient will not have any adverse side effects from medication.  Patient will be able to remain active in an outside of home without interference from pain  symptoms.    MICHELLE Patient Controlled Substance Report (from 3/8/2023 to 3/7/2024)    Dispensed  Strength Quantity Days Supply Provider Pharmacy   02/12/2024 Hydrocodone Bitartrate/Ac 325MG/10MG 90 each 30 [facility]   01/17/2024 Phentermine Hcl 37.5MG 30 each 30 [facility]   01/15/2024 Hydrocodone Bitartrate/Ac 325MG/10MG 90 each 23 [facility]   12/13/2023 Hydrocodone Bitartrate/Ac 325MG/10MG 90 each 30 [facility]        RTC 1 month, sooner if needed.         PROCEDURE NOTE:    Procedure: small intra-articular injection of the left wrist  The procedure risks and benefits were explained to patient and the patient gave verbal consent to have the procedure performed.  Indication:Left De Quervain's syndrome  Provider: STEVE Vásquez FNP-C  Description: The left inner wrist was prepped and draped in sterile fashion.  An injection was given using 1 cc of 1% lidocaine 1/2 cc of Triamcinolone Acetate.  Pressure was held and a sterile dressing was placed.  No consultations  Estimated blood loss: Minimal  Patient tolerated the procedure well.     1% lidocaine 200 mg per 20 ml NDC#23935-493-77 LOT: [id number] EXP: 03/2027     Kenalog 400 mg per 10 mL NDC #5016-9072-11 LOT: [id number] EXP: 01/2026            This document has been electronically signed by:  STEVE Gonsales FNP-C Dragon disclaimer:  Part of this note may be an electronic transcription/translation of spoken language to printed text using the Dragon Dictation System.

## 2024-03-08 ENCOUNTER — TELEPHONE (OUTPATIENT)
Dept: FAMILY MEDICINE CLINIC | Facility: CLINIC | Age: 64
End: 2024-03-08

## 2024-03-08 DIAGNOSIS — M25.50 MULTIPLE JOINT PAIN: ICD-10-CM

## 2024-03-08 RX ORDER — PHENTERMINE HYDROCHLORIDE 37.5 MG/1
37.5 TABLET ORAL
Qty: 30 TABLET | Refills: 0 | Status: SHIPPED | OUTPATIENT
Start: 2024-03-08

## 2024-03-08 RX ORDER — HYDROCODONE BITARTRATE AND ACETAMINOPHEN 10; 325 MG/1; MG/1
1 TABLET ORAL EVERY 6 HOURS PRN
Qty: 90 TABLET | Refills: 0 | Status: CANCELLED | OUTPATIENT
Start: 2024-03-08

## 2024-03-08 RX ORDER — HYDROCODONE BITARTRATE AND ACETAMINOPHEN 10; 325 MG/1; MG/1
1 TABLET ORAL EVERY 6 HOURS PRN
Qty: 90 TABLET | Refills: 0 | Status: SHIPPED | OUTPATIENT
Start: 2024-03-08

## 2024-03-08 NOTE — TELEPHONE ENCOUNTER
[facility] - [place], [place] - [address] - [phone] [facility] [phone]      Pharmacy representative name: ASHLEY    Pharmacy representative phone number: [phone]     What medication are you calling in regards to:     HYDROcodone-acetaminophen (Norco)  MG per tablet       What question does the pharmacy have: HAS TODAYS DATE- MUST HAVE A VERBAL OK FOR EARLY REFILL. PLEASE CALL    Who is the provider that prescribed the medication: JOHN HOLLEY    Additional notes: MUST HAVE A VERBAL OK FOR EARLY REFILL. PLEASE CALL

## 2024-03-18 NOTE — ASSESSMENT & PLAN NOTE
Continue Zyrtec.  Continue eyedrops as needed.  Avoid known allergy and respiratory triggers.  May use Saline spray PRN as desired  PRN Sudafed

## 2024-03-19 ENCOUNTER — TELEPHONE (OUTPATIENT)
Dept: FAMILY MEDICINE CLINIC | Facility: CLINIC | Age: 64
End: 2024-03-19
Payer: COMMERCIAL

## 2024-03-19 DIAGNOSIS — M79.642 LEFT HAND PAIN: Primary | ICD-10-CM

## 2024-03-19 NOTE — TELEPHONE ENCOUNTER
----- Message from STEVE Gonsales sent at 3/19/2024  9:41 AM EDT -----  It's ordered.  I'll leave it with Nia  ----- Message -----  From: Haydee New RegSched Rep  Sent: 3/19/2024   8:47 AM EDT  To: STEVE Gonsales    Requesting a order for XR of left hand/thumb, patient is fine with [facility]     She is unable to wear the brace it causes to much discomfort

## 2024-03-28 ENCOUNTER — TELEPHONE (OUTPATIENT)
Dept: FAMILY MEDICINE CLINIC | Facility: CLINIC | Age: 64
End: 2024-03-28
Payer: COMMERCIAL

## 2024-03-28 DIAGNOSIS — L72.9 CYST OF BUTTOCKS: Primary | ICD-10-CM

## 2024-03-28 NOTE — TELEPHONE ENCOUNTER
----- Message from STEVE Gonsales sent at 3/28/2024 10:48 AM EDT -----  I placed an urgent referral  ----- Message -----  From: Haydee New RegSched Rep  Sent: 3/28/2024   9:47 AM EDT  To: STEVE Gonsales    Patient called with concerns of a knot/cyst between buttock cheek. Painful, blood, open causing difficulty with daily activities sitting, walking etc     Would like a referral to a female at general surgery

## 2024-04-05 ENCOUNTER — OFFICE VISIT (OUTPATIENT)
Age: 64
End: 2024-04-05
Payer: COMMERCIAL

## 2024-04-05 VITALS — HEIGHT: 65 IN | BODY MASS INDEX: 23.16 KG/M2 | WEIGHT: 139 LBS

## 2024-04-05 DIAGNOSIS — K64.4 SKIN TAG OF PERIANAL REGION: Primary | ICD-10-CM

## 2024-04-05 NOTE — PROGRESS NOTES
"Date of Service: 4/5/2024    Subjective   Rohini Burch is a 63 y.o. female is being in consultation today at the request of Lucía King APRN    Chief Complaint: anal skin tag    Rohini Burch is a 63 y.o. female who presented to clinic with a right gluteal cheek skin tag.  She first noticed a small area and noticed that it significantly increased in size over the past month.  She denies any prior history of hemorrhoids.  She has a history of Cologuard that was negative.  She denies any family history of colon cancer.  She denies any unintended weight loss.  Denies any rectal bleeding    History reviewed. No pertinent past medical history.    Past Surgical History:   Procedure Laterality Date    HYSTERECTOMY      complete    KNEE SURGERY           Family History   Problem Relation Age of Onset    Hypertension Mother          Social History     Socioeconomic History    Marital status:      Spouse name: dolores    Number of children: 1    Years of education: 12   Tobacco Use    Smoking status: Never    Smokeless tobacco: Never   Vaping Use    Vaping status: Never Used   Substance and Sexual Activity    Alcohol use: No    Drug use: Never    Sexual activity: Defer        Review of Systems   Pertinent items are noted in HPI     Constitutional: No fevers, chills or malaise. No unintentional weight loss   Eyes: Denies visual changes    Cardiovascular: Denies chest pain, palpitations   Respiratory: Denies cough or shortness of breath   Abdominal/Gastrointestinal: No abdominal pain, no nausea/vomiting   Genitourinary: Denies dysuria or hematuria   Musculoskeletal: Denies any chronic joint aches, pains or deformities              Skin: No lesions or rashes   Psychiatric: No recent mood changes   Neurologic: No paresthesias or loss of function        Objective       Physical Exam:      04/05/24  0922   Weight: 63 kg (139 lb)    Body mass index is 23.13 kg/m².  Constitution: Ht 165.1 cm (65\")   Wt 63 kg (139 lb)  "  BMI 23.13 kg/m²  . No acute distress  Head: Normocephalic, atraumatic.   Eyes: Aligned without strabismus. Conjunctivae noninjected   Ears, Nose, Mouth:no lesions noted  CV: Regular rate and rhythm   Respiratory: Symmetric chest expansion. No respiratory distress.   Gastrointestinal:  Soft, nontender, nondistended   Skin: Large approximately 3 cm skin tag held onto skin of right gluteal cheek by 1 cm base.  Ulcerated and necrotic areas noted on the skin tag.  Neurologic: No gross deficits.  Alert and oriented x3  Psychiatric: Normal mood      Assessment   Diagnoses and all orders for this visit:    1. Skin tag of perianal region (Primary)      Rohini Burch is a 63 y.o. female with a large perianal skin tag. There are ulcerated components and a mass like consistency. I have removed the mass and will send it to pathology. Plan for follow up next week for pathology review.        Rosalva Wynn MD  [facility]    IN-OFFICE PROCEDURE NOTE    Patient Name:  Rohini Burch  YOB: 1960  [id number]    4/5/2024      PREOPERATIVE DIAGNOSIS: perianal skin tag (within 5 cm of anus)     POSTOPERATIVE DIAGNOSIS: same      PROCEDURE PERFORMED: Excision of anal skin tag     SURGEON: Rosalva Mitchell MD     SPECIMENS: Skin tag      ANESTHESIA: Local     DESCRIPTION OF PROCEDURE:     Prep of the area. Injection of local anesthetic. Lesion was large and pedunculated in appearance on the right gluteal cheek. Sharp excision of the lesion. Use of electrocautery for removal. Placement of singular 3-0 chromic stitch in figure of eight fashion for hemostasis.      Specimen sent for pathologic evaluation.     Rosalva Mitchell MD  4/5/2024  10:32 EDT

## 2024-04-08 ENCOUNTER — OFFICE VISIT (OUTPATIENT)
Dept: FAMILY MEDICINE CLINIC | Facility: CLINIC | Age: 64
End: 2024-04-08
Payer: COMMERCIAL

## 2024-04-08 VITALS
HEART RATE: 86 BPM | OXYGEN SATURATION: 99 % | HEIGHT: 65 IN | DIASTOLIC BLOOD PRESSURE: 80 MMHG | TEMPERATURE: 98.7 F | WEIGHT: 136 LBS | BODY MASS INDEX: 22.66 KG/M2 | SYSTOLIC BLOOD PRESSURE: 120 MMHG

## 2024-04-08 DIAGNOSIS — M65.4 DE QUERVAIN'S DISEASE (TENOSYNOVITIS): Primary | ICD-10-CM

## 2024-04-08 DIAGNOSIS — Z78.9 WEIGHT LOSS ADVISED: ICD-10-CM

## 2024-04-08 DIAGNOSIS — J30.1 SEASONAL ALLERGIC RHINITIS DUE TO POLLEN: ICD-10-CM

## 2024-04-08 DIAGNOSIS — M77.8 LEFT WRIST TENDONITIS: ICD-10-CM

## 2024-04-08 DIAGNOSIS — I67.9 SMALL VESSEL DISEASE, CEREBROVASCULAR: ICD-10-CM

## 2024-04-08 DIAGNOSIS — M25.50 MULTIPLE JOINT PAIN: ICD-10-CM

## 2024-04-08 PROCEDURE — 1160F RVW MEDS BY RX/DR IN RCRD: CPT | Performed by: NURSE PRACTITIONER

## 2024-04-08 PROCEDURE — 1159F MED LIST DOCD IN RCRD: CPT | Performed by: NURSE PRACTITIONER

## 2024-04-08 PROCEDURE — 99214 OFFICE O/P EST MOD 30 MIN: CPT | Performed by: NURSE PRACTITIONER

## 2024-04-08 RX ORDER — PHENTERMINE HYDROCHLORIDE 37.5 MG/1
37.5 TABLET ORAL
Qty: 30 TABLET | Refills: 0 | Status: SHIPPED | OUTPATIENT
Start: 2024-04-08

## 2024-04-08 RX ORDER — HYDROCODONE BITARTRATE AND ACETAMINOPHEN 10; 325 MG/1; MG/1
1 TABLET ORAL EVERY 6 HOURS PRN
Qty: 90 TABLET | Refills: 0 | Status: SHIPPED | OUTPATIENT
Start: 2024-04-08

## 2024-04-08 NOTE — PROGRESS NOTES
Subjective   Rohini Burch is a 63 y.o. female.     Chief Complaint   Patient presents with    Joint Pain    Allergies       Joint Pain  Associated symptoms include arthralgias and fatigue. Pertinent negatives include no abdominal pain, chest pain, congestion, coughing, fever, myalgias, nausea, numbness or sore throat.   Allergies  Associated symptoms include arthralgias and fatigue. Pertinent negatives include no abdominal pain, chest pain, congestion, coughing, fever, myalgias, nausea, numbness or sore throat.     Allergies-chronic and ongoing.  Patient is currently taking Zyrtec 10 mg and using Alaway eyedrops.  No negative side effects.  Some Rhinorrhea today.   Joint pain-ongoing.  Patient is currently on Celebrex 100 mg twice daily as needed and hydrocodone 10 mg every 6 hours as needed.  No negative side effects.  She has Norco 10 mg to take.    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  Reports she is not taking daily but at least QOD.  Patient denies any bleeding of gums, melena, hematochezia, hematuria, or epistaxis.    Left hand pain-ongoing.  She has been wearing a brace.  She reports that her thumb pain has gradually improved but on Easter (approx 8 days ago) she noted a tearing sensation along her medial wrist. She reports that she had immediate pain and then a moderate amount of bruising.  She is not able to flex her hand forward toward her elbow and her ROM is limited.  She has modified her activity but it still has not resolved.      The following portions of the patient's history were reviewed and updated as appropriate: CC, ROS, allergies, current medications, past family history, past medical history, past social history, past surgical history and problem list.      Review of Systems   Constitutional:  Positive for fatigue. Negative for appetite change and fever.   HENT:  Negative for congestion, ear pain, nosebleeds, postnasal drip, rhinorrhea, sore throat, tinnitus, trouble  "swallowing and voice change.    Eyes:  Negative for blurred vision, photophobia and visual disturbance.   Respiratory:  Negative for cough, chest tightness, shortness of breath and wheezing.    Cardiovascular:  Negative for chest pain and palpitations.   Gastrointestinal:  Negative for abdominal pain, blood in stool, constipation, diarrhea, nausea and GERD.   Endocrine: Negative for cold intolerance, heat intolerance and polydipsia.   Genitourinary:  Negative for decreased urine volume, difficulty urinating, dysuria and hematuria.   Musculoskeletal:  Positive for arthralgias. Negative for back pain, gait problem and myalgias.   Skin:  Negative for color change, pallor and wound.   Allergic/Immunologic: Negative.    Neurological:  Negative for dizziness, syncope, numbness and headache.   Hematological: Negative.    Psychiatric/Behavioral:  Negative for decreased concentration, sleep disturbance, suicidal ideas and depressed mood. The patient is not nervous/anxious.    All other systems reviewed and are negative.      Objective     /80   Pulse 86   Temp 98.7 °F (37.1 °C) (Temporal)   Ht 165.1 cm (65\")   Wt 61.7 kg (136 lb)   SpO2 99%   BMI 22.63 kg/m²     Physical Exam  Vitals reviewed.   Constitutional:       General: She is not in acute distress.     Appearance: She is well-developed. She is not diaphoretic.   HENT:      Head: Normocephalic and atraumatic.      Jaw: No tenderness.      Right Ear: Hearing, tympanic membrane, ear canal and external ear normal.      Left Ear: Hearing, tympanic membrane, ear canal and external ear normal.      Nose: Nose normal. No nasal tenderness or congestion.      Right Sinus: No maxillary sinus tenderness or frontal sinus tenderness.      Left Sinus: No maxillary sinus tenderness or frontal sinus tenderness.      Mouth/Throat:      Lips: Pink.      Mouth: Mucous membranes are moist.      Pharynx: Oropharynx is clear. Uvula midline.   Eyes:      General: Lids are normal. " No scleral icterus.     Extraocular Movements:      Right eye: Normal extraocular motion and no nystagmus.      Left eye: Normal extraocular motion and no nystagmus.      Conjunctiva/sclera: Conjunctivae normal.      Pupils: Pupils are equal, round, and reactive to light.   Neck:      Thyroid: No thyromegaly or thyroid tenderness.      Vascular: No carotid bruit or JVD.      Trachea: No tracheal tenderness.   Cardiovascular:      Rate and Rhythm: Normal rate and regular rhythm.      Pulses:           Dorsalis pedis pulses are 2+ on the right side and 2+ on the left side.        Posterior tibial pulses are 2+ on the right side and 2+ on the left side.      Heart sounds: Normal heart sounds, S1 normal and S2 normal. No murmur heard.  Pulmonary:      Effort: Pulmonary effort is normal. No accessory muscle usage, prolonged expiration or respiratory distress.      Breath sounds: Normal breath sounds.   Chest:      Chest wall: No tenderness.   Abdominal:      General: Bowel sounds are normal. There is no distension.      Palpations: Abdomen is soft. There is no hepatomegaly, splenomegaly or mass.      Tenderness: There is no abdominal tenderness.   Musculoskeletal:         General: Tenderness present.      Left wrist: Tenderness present. Decreased range of motion.      Left hand: Tenderness (over wrist and thumb region) present. Decreased strength.      Cervical back: Normal range of motion and neck supple.      Lumbar back: Tenderness present. Decreased range of motion.      Right lower leg: Tenderness present. No edema.      Left lower leg: Tenderness present. No edema.      Comments: No muscular atrophy or flaccidity.   Lymphadenopathy:      Head:      Right side of head: No submental or submandibular adenopathy.      Left side of head: No submental or submandibular adenopathy.      Cervical: No cervical adenopathy.      Right cervical: No superficial cervical adenopathy.     Left cervical: No superficial cervical  adenopathy.   Skin:     General: Skin is warm and dry.      Capillary Refill: Capillary refill takes less than 2 seconds.      Coloration: Skin is not jaundiced or pale.      Findings: No erythema.      Nails: There is no clubbing.      Comments: Multiple varicosities in BLE   Neurological:      Mental Status: She is alert and oriented to person, place, and time.      Cranial Nerves: No cranial nerve deficit or facial asymmetry.      Sensory: No sensory deficit.      Motor: No weakness, tremor, atrophy or abnormal muscle tone.      Coordination: Coordination normal.      Gait: Gait abnormal (mild antalgia).      Deep Tendon Reflexes: Reflexes are normal and symmetric.   Psychiatric:         Attention and Perception: She is attentive.         Mood and Affect: Mood normal. Mood is not anxious or depressed.         Speech: Speech normal.         Behavior: Behavior normal. Behavior is cooperative.         Thought Content: Thought content normal.         Cognition and Memory: Cognition normal.         Judgment: Judgment normal.         Diagnoses and all orders for this visit:    1. De Quervain's disease (tenosynovitis) (Primary)  -     CT upper extremity left wo contrast; Future    2. Left wrist tendonitis  -     CT upper extremity left wo contrast; Future    3. Weight loss advised  Comments:  Continue Adipex.   Work on low CHO diet  Orders:  -     phentermine (ADIPEX-P) 37.5 MG tablet; Take 1 tablet by mouth Every Morning Before Breakfast.  Dispense: 30 tablet; Refill: 0    4. Multiple joint pain  Comments:  Avoid overuse activity.  Continue Norco and Celebrex as directed  Orders:  -     HYDROcodone-acetaminophen (Norco)  MG per tablet; Take 1 tablet by mouth Every 6 (Six) Hours As Needed for Moderate Pain.  Dispense: 90 tablet; Refill: 0    5. Seasonal allergic rhinitis due to pollen  Assessment & Plan:  Continue Zyrtec.  Continue eyedrops as needed.  Avoid known allergy and respiratory triggers.  May use Saline  spray PRN as desired  PRN Sudafed      6. Small vessel disease, cerebrovascular  Assessment & Plan:  Continue plavix 75 mg and Crestor 20 mg  Report any negative side effects.   Encouraged to pursue a low-cholesterol diet including limited fried foods and low-fat foods.  Be active as physically able.         BMI is within normal parameters. No other follow-up for BMI required.    Understands disease processes and need for medications.  Understands reasons for urgent and emergent care.  Patient (& family) verbalized agreement for treatment plan.   Emotional support and active listening provided.  Patient provided time to verbalize feelings.    MICHELLE/PMDP reviewed today and consistent.  Will refill prescribed controlled medication today.  Patient is aware they cannot receive narcotics from any other provider except if under care of pain management or speciality clinic.  Risk and benefits of medication use has been reviewed.  History and physical exam exhibit continued safe and appropriate use of controlled substances.  The patient is aware of the potential for addiction and dependence.  This patient has been made aware of the appropriate use of such medications, including potential risk of somnolence, limited ability to drive and / or work safely, and potential for overdose.    It has also been made clear that these medications are for use by this patient only, without concomitant use of alcohol or other substances unless prescribed/advised by medical provider.  Patient understands they may be subject to UDS and pill counts at random.    Patient considered to be low risk for addiction due to use of single controlled medications.  Patient understands and accepts these risks.  Patient need for medication will be reassessed at each visit.  Doses will be adjusted according to patient need and findings.    Goal of TX: Patient will not have any adverse reactions of medication.  Patient have reduction in pain symptoms with  use of PRN Norco as directed.  Patient will not have any adverse side effects from medication.  Patient will be able to remain active in an outside of home without interference from pain symptoms.    [facility], [place] Patient Controlled Substance Report (from 4/9/2023 to 4/8/2024)    Dispensed  Strength Quantity Days Supply Provider Pharmacy   03/08/2024 Hydrocodone Bitartrate/Ac 325MG/10MG 90 each 30 [facility]   03/08/2024 Phentermine Hcl 37.5MG 30 each 30 [facility]   02/12/2024 Hydrocodone Bitartrate/Ac 325MG/10MG 90 each 30 [facility]   01/17/2024 Phentermine Hcl 37.5MG 30 each 30 [facility]   01/15/2024 Hydrocodone Bitartrate/Ac 325MG/10MG 90 each 23 [facility]        RTC 1 month, sooner if needed.           This document has been electronically signed by:  STEVE Gonsales FNP-C Dragon disclaimer:  Part of this note may be an electronic transcription/translation of spoken language to printed text using the Dragon Dictation System.

## 2024-04-09 LAB — REF LAB TEST METHOD: NORMAL

## 2024-04-16 ENCOUNTER — OFFICE VISIT (OUTPATIENT)
Age: 64
End: 2024-04-16
Payer: COMMERCIAL

## 2024-04-16 VITALS — WEIGHT: 136 LBS | BODY MASS INDEX: 22.66 KG/M2 | HEIGHT: 65 IN

## 2024-04-16 DIAGNOSIS — K64.4 SKIN TAG OF PERIANAL REGION: Primary | ICD-10-CM

## 2024-04-16 PROCEDURE — 1160F RVW MEDS BY RX/DR IN RCRD: CPT | Performed by: SURGERY

## 2024-04-16 PROCEDURE — 1159F MED LIST DOCD IN RCRD: CPT | Performed by: SURGERY

## 2024-04-16 PROCEDURE — 99212 OFFICE O/P EST SF 10 MIN: CPT | Performed by: SURGERY

## 2024-04-16 NOTE — PROGRESS NOTES
"Patient Name:  Rohini Burch  YOB: 1960  [id number]    Follow Up Note    Date of visit: 4/16/2024    Subjective   Subjective: Presents for pathologic evaluation. Doing well and well healing.          Objective     Objective:     Ht 165.1 cm (65\")   Wt 61.7 kg (136 lb)   BMI 22.63 kg/m²       CV:  Regular rate and rhythm  L:  Bilateral lung rise and fall, no use of accessory muscles   Abd:  Soft, nontender, nondistended  Ext:  No cyanosis, clubbing, edema      Assessment/ Plan:  Assessment   Diagnoses and all orders for this visit:    1. Skin tag of perianal region (Primary)    Presents for pathologic follow up. Benign. Well healing. Follow up PRN.     Rosalva Wynn MD       General Surgeon  [facility]       "

## 2024-05-06 ENCOUNTER — OFFICE VISIT (OUTPATIENT)
Dept: FAMILY MEDICINE CLINIC | Facility: CLINIC | Age: 64
End: 2024-05-06
Payer: COMMERCIAL

## 2024-05-06 VITALS
OXYGEN SATURATION: 99 % | HEART RATE: 90 BPM | WEIGHT: 131 LBS | BODY MASS INDEX: 21.83 KG/M2 | SYSTOLIC BLOOD PRESSURE: 136 MMHG | HEIGHT: 65 IN | DIASTOLIC BLOOD PRESSURE: 88 MMHG

## 2024-05-06 DIAGNOSIS — Z79.899 LONG-TERM USE OF HIGH-RISK MEDICATION: ICD-10-CM

## 2024-05-06 DIAGNOSIS — M25.50 MULTIPLE JOINT PAIN: ICD-10-CM

## 2024-05-06 DIAGNOSIS — J06.9 ACUTE URI: ICD-10-CM

## 2024-05-06 DIAGNOSIS — Z78.9 WEIGHT LOSS ADVISED: ICD-10-CM

## 2024-05-06 DIAGNOSIS — I67.9 SMALL VESSEL DISEASE, CEREBROVASCULAR: Primary | ICD-10-CM

## 2024-05-06 DIAGNOSIS — J30.1 SEASONAL ALLERGIC RHINITIS DUE TO POLLEN: ICD-10-CM

## 2024-05-06 PROCEDURE — 1159F MED LIST DOCD IN RCRD: CPT | Performed by: NURSE PRACTITIONER

## 2024-05-06 PROCEDURE — 99214 OFFICE O/P EST MOD 30 MIN: CPT | Performed by: NURSE PRACTITIONER

## 2024-05-06 PROCEDURE — 1160F RVW MEDS BY RX/DR IN RCRD: CPT | Performed by: NURSE PRACTITIONER

## 2024-05-06 RX ORDER — POLYMYXIN B SULFATE AND TRIMETHOPRIM 1; 10000 MG/ML; [USP'U]/ML
1 SOLUTION OPHTHALMIC EVERY 4 HOURS
Qty: 10 ML | Refills: 0 | Status: SHIPPED | OUTPATIENT
Start: 2024-05-06

## 2024-05-06 RX ORDER — PSEUDOEPHEDRINE HCL 30 MG
30 TABLET ORAL EVERY 6 HOURS PRN
Qty: 30 TABLET | Refills: 0 | Status: SHIPPED | OUTPATIENT
Start: 2024-05-06

## 2024-05-06 RX ORDER — CLOPIDOGREL BISULFATE 75 MG/1
75 TABLET ORAL DAILY
Qty: 30 TABLET | Refills: 5 | Status: SHIPPED | OUTPATIENT
Start: 2024-05-06

## 2024-05-06 RX ORDER — PHENTERMINE HYDROCHLORIDE 37.5 MG/1
37.5 TABLET ORAL
Qty: 30 TABLET | Refills: 0 | Status: SHIPPED | OUTPATIENT
Start: 2024-05-06

## 2024-05-06 RX ORDER — CEPHALEXIN 500 MG/1
500 CAPSULE ORAL 3 TIMES DAILY
Qty: 30 CAPSULE | Refills: 0 | Status: SHIPPED | OUTPATIENT
Start: 2024-05-06

## 2024-05-06 RX ORDER — HYDROCODONE BITARTRATE AND ACETAMINOPHEN 10; 325 MG/1; MG/1
1 TABLET ORAL EVERY 6 HOURS PRN
Qty: 90 TABLET | Refills: 0 | Status: SHIPPED | OUTPATIENT
Start: 2024-05-06

## 2024-05-06 RX ORDER — ROSUVASTATIN CALCIUM 20 MG/1
20 TABLET, COATED ORAL DAILY
Qty: 30 TABLET | Refills: 5 | Status: SHIPPED | OUTPATIENT
Start: 2024-05-06

## 2024-06-03 ENCOUNTER — OFFICE VISIT (OUTPATIENT)
Dept: FAMILY MEDICINE CLINIC | Facility: CLINIC | Age: 64
End: 2024-06-03
Payer: COMMERCIAL

## 2024-06-03 VITALS
HEART RATE: 60 BPM | OXYGEN SATURATION: 100 % | BODY MASS INDEX: 22.66 KG/M2 | HEIGHT: 65 IN | DIASTOLIC BLOOD PRESSURE: 74 MMHG | SYSTOLIC BLOOD PRESSURE: 110 MMHG | RESPIRATION RATE: 12 BRPM | WEIGHT: 136 LBS

## 2024-06-03 DIAGNOSIS — M25.50 MULTIPLE JOINT PAIN: ICD-10-CM

## 2024-06-03 DIAGNOSIS — I67.9 SMALL VESSEL DISEASE, CEREBROVASCULAR: ICD-10-CM

## 2024-06-03 DIAGNOSIS — J30.1 SEASONAL ALLERGIC RHINITIS DUE TO POLLEN: ICD-10-CM

## 2024-06-03 LAB
25(OH)D3 SERPL-MCNC: 28.6 NG/ML (ref 30–100)
ALBUMIN SERPL-MCNC: 4.4 G/DL (ref 3.5–5.2)
ALBUMIN/GLOB SERPL: 1.6 G/DL
ALP SERPL-CCNC: 50 U/L (ref 39–117)
ALT SERPL W P-5'-P-CCNC: 11 U/L (ref 1–33)
ANION GAP SERPL CALCULATED.3IONS-SCNC: 9 MMOL/L (ref 5–15)
AST SERPL-CCNC: 12 U/L (ref 1–32)
BASOPHILS # BLD AUTO: 0.03 10*3/MM3 (ref 0–0.2)
BASOPHILS NFR BLD AUTO: 0.5 % (ref 0–1.5)
BILIRUB SERPL-MCNC: 0.5 MG/DL (ref 0–1.2)
BUN SERPL-MCNC: 16 MG/DL (ref 8–23)
BUN/CREAT SERPL: 16.3 (ref 7–25)
CALCIUM SPEC-SCNC: 9.1 MG/DL (ref 8.6–10.5)
CHLORIDE SERPL-SCNC: 106 MMOL/L (ref 98–107)
CHOLEST SERPL-MCNC: 195 MG/DL (ref 0–200)
CO2 SERPL-SCNC: 26 MMOL/L (ref 22–29)
CREAT SERPL-MCNC: 0.98 MG/DL (ref 0.57–1)
DEPRECATED RDW RBC AUTO: 44.7 FL (ref 37–54)
EGFRCR SERPLBLD CKD-EPI 2021: 65 ML/MIN/1.73
EOSINOPHIL # BLD AUTO: 0.08 10*3/MM3 (ref 0–0.4)
EOSINOPHIL NFR BLD AUTO: 1.2 % (ref 0.3–6.2)
ERYTHROCYTE [DISTWIDTH] IN BLOOD BY AUTOMATED COUNT: 11.9 % (ref 12.3–15.4)
GLOBULIN UR ELPH-MCNC: 2.7 GM/DL
GLUCOSE SERPL-MCNC: 90 MG/DL (ref 65–99)
HBA1C MFR BLD: 5.2 % (ref 4.8–5.6)
HCT VFR BLD AUTO: 40.8 % (ref 34–46.6)
HDLC SERPL-MCNC: 76 MG/DL (ref 40–60)
HGB BLD-MCNC: 13.9 G/DL (ref 12–15.9)
IMM GRANULOCYTES # BLD AUTO: 0.02 10*3/MM3 (ref 0–0.05)
IMM GRANULOCYTES NFR BLD AUTO: 0.3 % (ref 0–0.5)
LDLC SERPL CALC-MCNC: 104 MG/DL (ref 0–100)
LDLC/HDLC SERPL: 1.35 {RATIO}
LYMPHOCYTES # BLD AUTO: 2.42 10*3/MM3 (ref 0.7–3.1)
LYMPHOCYTES NFR BLD AUTO: 37.3 % (ref 19.6–45.3)
MCH RBC QN AUTO: 34.6 PG (ref 26.6–33)
MCHC RBC AUTO-ENTMCNC: 34.1 G/DL (ref 31.5–35.7)
MCV RBC AUTO: 101.5 FL (ref 79–97)
MONOCYTES # BLD AUTO: 0.64 10*3/MM3 (ref 0.1–0.9)
MONOCYTES NFR BLD AUTO: 9.9 % (ref 5–12)
NEUTROPHILS NFR BLD AUTO: 3.3 10*3/MM3 (ref 1.7–7)
NEUTROPHILS NFR BLD AUTO: 50.8 % (ref 42.7–76)
NRBC BLD AUTO-RTO: 0 /100 WBC (ref 0–0.2)
PLATELET # BLD AUTO: 320 10*3/MM3 (ref 140–450)
PMV BLD AUTO: 9.6 FL (ref 6–12)
POTASSIUM SERPL-SCNC: 4.1 MMOL/L (ref 3.5–5.2)
PROT SERPL-MCNC: 7.1 G/DL (ref 6–8.5)
RBC # BLD AUTO: 4.02 10*6/MM3 (ref 3.77–5.28)
SODIUM SERPL-SCNC: 141 MMOL/L (ref 136–145)
T4 FREE SERPL-MCNC: 1.06 NG/DL (ref 0.92–1.68)
TRIGL SERPL-MCNC: 83 MG/DL (ref 0–150)
TSH SERPL DL<=0.05 MIU/L-ACNC: 1.67 UIU/ML (ref 0.27–4.2)
VIT B12 BLD-MCNC: 252 PG/ML (ref 211–946)
VLDLC SERPL-MCNC: 15 MG/DL (ref 5–40)
WBC NRBC COR # BLD AUTO: 6.49 10*3/MM3 (ref 3.4–10.8)

## 2024-06-03 PROCEDURE — 80061 LIPID PANEL: CPT | Performed by: NURSE PRACTITIONER

## 2024-06-03 PROCEDURE — 82607 VITAMIN B-12: CPT | Performed by: NURSE PRACTITIONER

## 2024-06-03 PROCEDURE — 83036 HEMOGLOBIN GLYCOSYLATED A1C: CPT | Performed by: NURSE PRACTITIONER

## 2024-06-03 PROCEDURE — 99214 OFFICE O/P EST MOD 30 MIN: CPT | Performed by: NURSE PRACTITIONER

## 2024-06-03 PROCEDURE — 1160F RVW MEDS BY RX/DR IN RCRD: CPT | Performed by: NURSE PRACTITIONER

## 2024-06-03 PROCEDURE — 80050 GENERAL HEALTH PANEL: CPT | Performed by: NURSE PRACTITIONER

## 2024-06-03 PROCEDURE — 84439 ASSAY OF FREE THYROXINE: CPT | Performed by: NURSE PRACTITIONER

## 2024-06-03 PROCEDURE — 82306 VITAMIN D 25 HYDROXY: CPT | Performed by: NURSE PRACTITIONER

## 2024-06-03 PROCEDURE — 1159F MED LIST DOCD IN RCRD: CPT | Performed by: NURSE PRACTITIONER

## 2024-06-03 RX ORDER — HYDROCODONE BITARTRATE AND ACETAMINOPHEN 10; 325 MG/1; MG/1
1 TABLET ORAL EVERY 6 HOURS PRN
Qty: 90 TABLET | Refills: 0 | Status: SHIPPED | OUTPATIENT
Start: 2024-06-03

## 2024-06-03 RX ORDER — OLOPATADINE HYDROCHLORIDE 1 MG/ML
1 SOLUTION/ DROPS OPHTHALMIC 2 TIMES DAILY
Qty: 5 ML | Refills: 5 | Status: SHIPPED | OUTPATIENT
Start: 2024-06-03

## 2024-06-03 RX ORDER — PSEUDOEPHEDRINE HCL 30 MG
30 TABLET ORAL EVERY 6 HOURS PRN
Qty: 30 TABLET | Refills: 0 | Status: SHIPPED | OUTPATIENT
Start: 2024-06-03

## 2024-06-03 RX ORDER — CETIRIZINE HYDROCHLORIDE 10 MG/1
10 TABLET ORAL DAILY PRN
Qty: 30 TABLET | Refills: 5 | Status: SHIPPED | OUTPATIENT
Start: 2024-06-03

## 2024-06-03 RX ORDER — CELECOXIB 100 MG/1
100 CAPSULE ORAL 2 TIMES DAILY PRN
Qty: 60 CAPSULE | Refills: 5 | Status: SHIPPED | OUTPATIENT
Start: 2024-06-03

## 2024-06-03 RX ORDER — GUAIFENESIN 600 MG/1
1200 TABLET, EXTENDED RELEASE ORAL 2 TIMES DAILY
Qty: 60 TABLET | Refills: 2 | Status: SHIPPED | OUTPATIENT
Start: 2024-06-03

## 2024-06-03 NOTE — PROGRESS NOTES
Subjective   Rohini Burch is a 64 y.o. female.     Chief Complaint   Patient presents with    Back Pain    Allergies       History of Present Illness     Allergies-chronic and ongoing.  Patient is currently taking Zyrtec 10 mg and using Alaway eyedrops.  No negative side effects.  She reports that she is struggling with symptoms.  She uses PRN sudafed.    Joint pain-ongoing.  Patient is currently on Celebrex 100 mg twice daily as needed and hydrocodone 10 mg every 6 hours as needed.  No negative side effects.  He also has chronic leg pain and multiple varicosities in her BLE.  Prolonged standing exacerbates her leg pain.  She also reports that cold sensation or temperature is also increased her leg pain.  She does try to keep her legs warm.  She wears compression socks intermittently for her leg pain as well.  She reports that her legs have been more irritable for the last 3-4 days.    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  Reports she is not taking daily but at least QOD.  Patient denies any bleeding of gums, melena, hematochezia, hematuria, or epistaxis.       The following portions of the patient's history were reviewed and updated as appropriate: CC, ROS, allergies, current medications, past family history, past medical history, past social history, past surgical history and problem list.      Review of Systems   Constitutional:  Positive for fatigue. Negative for appetite change and fever.   HENT:  Positive for rhinorrhea. Negative for congestion, ear pain, nosebleeds, postnasal drip, sore throat, tinnitus, trouble swallowing and voice change.    Eyes:  Positive for itching. Negative for blurred vision, photophobia and visual disturbance.   Respiratory:  Negative for cough, chest tightness, shortness of breath and wheezing.    Cardiovascular:  Negative for chest pain, palpitations and leg swelling.   Gastrointestinal:  Negative for abdominal pain, blood in stool, constipation, diarrhea,  "nausea, vomiting and GERD.   Endocrine: Negative for cold intolerance, heat intolerance and polydipsia.   Genitourinary:  Negative for decreased urine volume, difficulty urinating, dysuria and hematuria.   Musculoskeletal:  Positive for arthralgias, back pain and myalgias. Negative for gait problem.   Skin:  Negative for color change, pallor and wound.   Allergic/Immunologic: Negative.    Neurological:  Negative for dizziness, syncope, numbness and headache.   Hematological: Negative.    Psychiatric/Behavioral:  Negative for decreased concentration, sleep disturbance, suicidal ideas and depressed mood. The patient is not nervous/anxious.    All other systems reviewed and are negative.      Objective     /74   Pulse 60   Resp 12   Ht 165 cm (64.96\")   Wt 61.7 kg (136 lb)   SpO2 100%   BMI 22.66 kg/m²     Physical Exam  Vitals reviewed.   Constitutional:       General: She is not in acute distress.     Appearance: She is well-developed. She is not diaphoretic.   HENT:      Head: Normocephalic and atraumatic.      Jaw: No tenderness.      Right Ear: Hearing, tympanic membrane, ear canal and external ear normal.      Left Ear: Hearing, tympanic membrane, ear canal and external ear normal.      Nose: Nose normal. Rhinorrhea present. No nasal tenderness or congestion.      Right Sinus: No maxillary sinus tenderness or frontal sinus tenderness.      Left Sinus: No maxillary sinus tenderness or frontal sinus tenderness.      Mouth/Throat:      Lips: Pink.      Mouth: Mucous membranes are moist.      Pharynx: Oropharynx is clear. Uvula midline.   Eyes:      General: Lids are normal. No scleral icterus.     Extraocular Movements:      Right eye: Normal extraocular motion and no nystagmus.      Left eye: Normal extraocular motion and no nystagmus.      Conjunctiva/sclera: Conjunctivae normal.      Pupils: Pupils are equal, round, and reactive to light.   Neck:      Thyroid: No thyromegaly or thyroid tenderness.     "  Vascular: No carotid bruit or JVD.      Trachea: No tracheal tenderness.   Cardiovascular:      Rate and Rhythm: Normal rate and regular rhythm.      Pulses:           Dorsalis pedis pulses are 2+ on the right side and 2+ on the left side.        Posterior tibial pulses are 2+ on the right side and 2+ on the left side.      Heart sounds: Normal heart sounds, S1 normal and S2 normal. No murmur heard.  Pulmonary:      Effort: Pulmonary effort is normal. No accessory muscle usage, prolonged expiration or respiratory distress.      Breath sounds: Normal breath sounds.   Chest:      Chest wall: No tenderness.   Abdominal:      General: Bowel sounds are normal. There is no distension.      Palpations: Abdomen is soft. There is no hepatomegaly, splenomegaly or mass.      Tenderness: There is no abdominal tenderness.   Musculoskeletal:         General: Tenderness present.      Cervical back: Normal range of motion and neck supple.      Lumbar back: Tenderness present.      Right lower leg: No edema.      Left lower leg: No edema.      Comments: No muscular atrophy or flaccidity.  Generalized bilateral low leg pain   Lymphadenopathy:      Head:      Right side of head: No submental or submandibular adenopathy.      Left side of head: No submental or submandibular adenopathy.      Cervical: No cervical adenopathy.      Right cervical: No superficial cervical adenopathy.     Left cervical: No superficial cervical adenopathy.   Skin:     General: Skin is warm and dry.      Capillary Refill: Capillary refill takes less than 2 seconds.      Coloration: Skin is not jaundiced or pale.      Findings: No erythema.      Nails: There is no clubbing.   Neurological:      Mental Status: She is alert and oriented to person, place, and time.      Cranial Nerves: No cranial nerve deficit or facial asymmetry.      Sensory: No sensory deficit.      Motor: No weakness, tremor, atrophy or abnormal muscle tone.      Coordination: Coordination  normal.      Gait: Gait abnormal (mild antalgia).      Deep Tendon Reflexes: Reflexes are normal and symmetric.   Psychiatric:         Attention and Perception: She is attentive.         Mood and Affect: Mood normal. Mood is not anxious or depressed.         Speech: Speech normal.         Behavior: Behavior normal. Behavior is cooperative.         Thought Content: Thought content normal.         Cognition and Memory: Cognition normal.         Judgment: Judgment normal.         Diagnoses and all orders for this visit:    1. Multiple joint pain  Comments:  Avoid overuse activity.  Continue Norco and Celebrex as directed  Assessment & Plan:  Continue to be active.  Avoid overuse activities  Continue PRN Norco and celebrex as directed    Orders:  -     HYDROcodone-acetaminophen (Norco)  MG per tablet; Take 1 tablet by mouth Every 6 (Six) Hours As Needed for Moderate Pain.  Dispense: 90 tablet; Refill: 0  -     celecoxib (CeleBREX) 100 MG capsule; Take 1 capsule by mouth 2 (Two) Times a Day As Needed for Mild Pain.  Dispense: 60 capsule; Refill: 5    2. Seasonal allergic rhinitis due to pollen  Assessment & Plan:  Continue Zyrtec.  Continue eyedrops as needed.  Avoid known allergy and respiratory triggers.  May use Saline spray PRN as desired  PRN Sudafed    Orders:  -     cetirizine (zyrTEC) 10 MG tablet; Take 1 tablet by mouth Daily As Needed for Allergies.  Dispense: 30 tablet; Refill: 5  -     guaiFENesin (Mucinex) 600 MG 12 hr tablet; Take 2 tablets by mouth 2 (Two) Times a Day.  Dispense: 60 tablet; Refill: 2  -     pseudoephedrine (Sudafed) 30 MG tablet; Take 1 tablet by mouth Every 6 (Six) Hours As Needed for Congestion.  Dispense: 30 tablet; Refill: 0  -     olopatadine (PATANOL) 0.1 % ophthalmic solution; Administer 1 drop to both eyes 2 (Two) Times a Day.  Dispense: 5 mL; Refill: 5    3. Small vessel disease, cerebrovascular  Assessment & Plan:  Continue plavix 75 mg and Crestor 20 mg  Report any  negative side effects.   Encouraged to pursue a low-cholesterol diet including limited fried foods and low-fat foods.  Be active as physically able.      4. Seasonal allergic rhinitis due to pollen  Comments:  Continue Zyrtec 10 mg.  Continue allergy eyedrops.  Avoid allergy triggers as much as possible  Assessment & Plan:  Continue Zyrtec.  Continue eyedrops as needed.  Avoid known allergy and respiratory triggers.  May use Saline spray PRN as desired  PRN Sudafed    Orders:  -     cetirizine (zyrTEC) 10 MG tablet; Take 1 tablet by mouth Daily As Needed for Allergies.  Dispense: 30 tablet; Refill: 5  -     guaiFENesin (Mucinex) 600 MG 12 hr tablet; Take 2 tablets by mouth 2 (Two) Times a Day.  Dispense: 60 tablet; Refill: 2  -     pseudoephedrine (Sudafed) 30 MG tablet; Take 1 tablet by mouth Every 6 (Six) Hours As Needed for Congestion.  Dispense: 30 tablet; Refill: 0  -     olopatadine (PATANOL) 0.1 % ophthalmic solution; Administer 1 drop to both eyes 2 (Two) Times a Day.  Dispense: 5 mL; Refill: 5       BMI is within normal parameters. No other follow-up for BMI required.    Understands disease processes and need for medications.  Understands reasons for urgent and emergent care.  Patient (& family) verbalized agreement for treatment plan.   Emotional support and active listening provided.  Patient provided time to verbalize feelings.    MICHELLE reviewed today and consistent.  Will refill prescribed controlled medication today.  Patient is aware they cannot receive narcotics from any other provider except if under care of pain management or speciality clinic.  Risk and benefits of medication use has been reviewed.  History and physical exam exhibit continued safe and appropriate use of controlled substances.  The patient is aware of the potential for addiction and dependence.  This patient has been made aware of the appropriate use of such medications, including potential risk of somnolence, limited ability to  drive and / or work safely, and potential for overdose.  Patient understands not to take medication and drive until they know how medication may affect their cognition/decision making.   It has also been made clear that these medications are for use by this patient only, without concomitant use of alcohol or other substances unless prescribed/advised by medical provider.  Patient understands they may be subject to UDS and pill counts at random.    Patient considered to be low risk for addiction due to use of single controlled medications.  Patient understands and accepts these risks.  Patient need for medication will be reassessed at each visit.  Doses will be adjusted according to patient need and findings.    Goal of TX: Patient will not have any adverse reactions of medication.  Patient have reduction in pain symptoms with use of PRN Norco as directed.  Patient will not have any adverse side effects from medication.  Patient will be able to remain active in an outside of home without interference from pain symptoms.    [facility] Patient Controlled Substance Report (from 6/4/2023 to 6/3/2024)    Dispensed  Strength Quantity Days Supply Provider Pharmacy   05/06/2024 Hydrocodone Bitartrate/Ac 325MG/10MG 90 each 30 KISHAN,[facility]OHNCORDELL [facility] [place]   05/06/2024 Phentermine Hcl 37.5MG 30 each 30 [facility]AN,[facility] [place]   04/08/2024 Hydrocodone Bitartrate/Ac 325MG/10MG 90 each 30 [facility] [place]   04/08/2024 Phentermine Hcl 37.5MG 30 each 30 [facility] [place]   03/08/2024 Hydrocodone Bitartrate/Ac 325MG/10MG 90 each 30 [facility] [place]   03/08/2024 Phentermine Hcl 37.5MG 30 each 30 [facility] [place]   02/12/2024 Hydrocodone Bitartrate/Ac 325MG/10MG 90 each 30 [facility] [place]   01/17/2024 Phentermine Hcl 37.5MG 30 each 30 [facility] [place]   01/15/2024 Hydrocodone Bitartrate/Ac 325MG/10MG 90 each 23 K[facility]AN,JOHN [facility] [place]    12/13/2023 Hydrocodone Bitartrate/Ac 325MG/10MG 90 each 30 JOHN [facility] [place]   12/13/2023 Phentermine Hcl 37.5MG 30 each 30 JOHN [facility] [place]        Will take break from Adipex at this time.     Labs today.  Will notify of results.      RTC 1 month, sooner if needed.           This document has been electronically signed by:  STEVE Gonsales FNP-C Dragon disclaimer:  Part of this note may be an electronic transcription/translation of spoken language to printed text using the Dragon Dictation System.

## 2024-07-01 ENCOUNTER — OFFICE VISIT (OUTPATIENT)
Dept: FAMILY MEDICINE CLINIC | Facility: CLINIC | Age: 64
End: 2024-07-01
Payer: COMMERCIAL

## 2024-07-01 VITALS
SYSTOLIC BLOOD PRESSURE: 120 MMHG | DIASTOLIC BLOOD PRESSURE: 90 MMHG | HEART RATE: 96 BPM | RESPIRATION RATE: 18 BRPM | HEIGHT: 65 IN | WEIGHT: 136.6 LBS | OXYGEN SATURATION: 96 % | BODY MASS INDEX: 22.76 KG/M2

## 2024-07-01 DIAGNOSIS — S70.362A TICK BITE OF LEFT THIGH, INITIAL ENCOUNTER: Primary | ICD-10-CM
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
## 2024-07-01 DIAGNOSIS — J30.1 SEASONAL ALLERGIC RHINITIS DUE TO POLLEN: ICD-10-CM

## 2024-07-01 DIAGNOSIS — M25.50 MULTIPLE JOINT PAIN: ICD-10-CM

## 2024-07-01 DIAGNOSIS — D17.1 LIPOMA OF SKIN OF ABDOMEN: ICD-10-CM

## 2024-07-01 PROCEDURE — 86618 LYME DISEASE ANTIBODY: CPT | Performed by: NURSE PRACTITIONER

## 2024-07-01 PROCEDURE — 99214 OFFICE O/P EST MOD 30 MIN: CPT | Performed by: NURSE PRACTITIONER

## 2024-07-01 PROCEDURE — 1160F RVW MEDS BY RX/DR IN RCRD: CPT | Performed by: NURSE PRACTITIONER

## 2024-07-01 PROCEDURE — 87468 ANAPLSMA PHGCYTOPHLM AMP PRB: CPT | Performed by: NURSE PRACTITIONER

## 2024-07-01 PROCEDURE — 1159F MED LIST DOCD IN RCRD: CPT | Performed by: NURSE PRACTITIONER

## 2024-07-01 PROCEDURE — 87798 DETECT AGENT NOS DNA AMP: CPT | Performed by: NURSE PRACTITIONER

## 2024-07-01 PROCEDURE — 87484 EHRLICHA CHAFFEENSIS AMP PRB: CPT | Performed by: NURSE PRACTITIONER

## 2024-07-01 RX ORDER — HYDROCODONE BITARTRATE AND ACETAMINOPHEN 10; 325 MG/1; MG/1
1 TABLET ORAL EVERY 6 HOURS PRN
Qty: 90 TABLET | Refills: 0 | Status: SHIPPED | OUTPATIENT
Start: 2024-07-01

## 2024-07-01 RX ORDER — DOXYCYCLINE 100 MG/1
100 CAPSULE ORAL EVERY 12 HOURS SCHEDULED
Qty: 20 CAPSULE | Refills: 0 | Status: SHIPPED | OUTPATIENT
Start: 2024-07-01

## 2024-07-01 NOTE — PROGRESS NOTES
"Subjective   Rohini Burch is a 64 y.o. female.     Chief Complaint   Patient presents with    Tick Removal       History of Present Illness     Tick concern-reports about a week ago she got some very small ticks from herself.  She was outside doing chores.  She reports that she removed all the tick but a few days after the removal she had chills, sweating, nausea, and some increased myalgia of her legs.  She last had chills last night.    Pelvic concern-she has noted a \"knot\" on her left lower pelvis.  She reports about the size of a golf ball.  It is not sore.  Is mobile.  No known injury.  She reports no unusual lifting or pulling.  Noted the area approx 1 month ago. She is not having any dysuria but does feel that she is urinating more at times.    Allergies-\"same\". Continued Rhinorrhea.  Intermittent congestion.  No cough.  She continues her zyrtec and PRN eye drops.    Back and leg pain-Ongoing.  Using PRN celebrex.  She continues PRN Norco.  No changes in her pain symptoms.  No negative side effects of meds.      The following portions of the patient's history were reviewed and updated as appropriate: CC, ROS, allergies, current medications, past family history, past medical history, past social history, past surgical history and problem list.      Review of Systems   Constitutional:  Positive for chills and fatigue. Negative for appetite change and fever.   HENT:  Positive for congestion, rhinorrhea and sneezing. Negative for ear pain, nosebleeds, postnasal drip, sore throat, tinnitus, trouble swallowing and voice change.    Eyes:  Positive for itching. Negative for blurred vision, photophobia and visual disturbance.   Respiratory:  Negative for cough, chest tightness, shortness of breath and wheezing.    Cardiovascular:  Negative for chest pain and palpitations.   Gastrointestinal:  Negative for abdominal pain, blood in stool, constipation, diarrhea, nausea, vomiting and GERD.   Endocrine: Negative for " "cold intolerance, heat intolerance and polydipsia.   Genitourinary:  Negative for decreased urine volume, difficulty urinating, dysuria and hematuria.   Musculoskeletal:  Positive for arthralgias, back pain, gait problem and myalgias.   Skin:  Negative for color change, pallor and wound.   Allergic/Immunologic: Negative.    Neurological:  Negative for dizziness, syncope, numbness and headache.   Hematological: Negative.    Psychiatric/Behavioral:  Negative for decreased concentration, sleep disturbance, suicidal ideas and depressed mood. The patient is not nervous/anxious.    All other systems reviewed and are negative.      Objective     /90   Pulse 96   Resp 18   Ht 165 cm (64.96\")   Wt 62 kg (136 lb 9.6 oz)   SpO2 96%   BMI 22.76 kg/m²     Physical Exam  Vitals reviewed.   Constitutional:       General: She is not in acute distress.     Appearance: She is well-developed. She is not diaphoretic.   HENT:      Head: Normocephalic and atraumatic.      Jaw: No tenderness.      Right Ear: Hearing, tympanic membrane, ear canal and external ear normal.      Left Ear: Hearing, tympanic membrane, ear canal and external ear normal.      Nose: Congestion and rhinorrhea present. No nasal tenderness.      Right Sinus: No maxillary sinus tenderness or frontal sinus tenderness.      Left Sinus: No maxillary sinus tenderness or frontal sinus tenderness.      Mouth/Throat:      Lips: Pink.      Mouth: Mucous membranes are moist.      Pharynx: Oropharynx is clear. Uvula midline.   Eyes:      General: Lids are normal. No scleral icterus.     Extraocular Movements:      Right eye: Normal extraocular motion and no nystagmus.      Left eye: Normal extraocular motion and no nystagmus.      Conjunctiva/sclera: Conjunctivae normal.      Pupils: Pupils are equal, round, and reactive to light.   Neck:      Thyroid: No thyromegaly or thyroid tenderness.      Vascular: No carotid bruit or JVD.      Trachea: No tracheal tenderness. "   Cardiovascular:      Rate and Rhythm: Normal rate and regular rhythm.      Pulses:           Dorsalis pedis pulses are 2+ on the right side and 2+ on the left side.        Posterior tibial pulses are 2+ on the right side and 2+ on the left side.      Heart sounds: Normal heart sounds, S1 normal and S2 normal. No murmur heard.  Pulmonary:      Effort: Pulmonary effort is normal. No accessory muscle usage, prolonged expiration or respiratory distress.      Breath sounds: Normal breath sounds.   Chest:      Chest wall: No tenderness.   Abdominal:      General: Bowel sounds are normal. There is no distension.      Palpations: Abdomen is soft. There is no hepatomegaly, splenomegaly or mass.      Tenderness: There is no abdominal tenderness.       Musculoskeletal:         General: Tenderness present.      Cervical back: Normal range of motion and neck supple. Tenderness present.      Thoracic back: Tenderness present.      Lumbar back: Tenderness present.      Right lower leg: Tenderness present. No edema.      Left lower leg: Tenderness present. No edema.        Legs:       Comments: No muscular atrophy or flaccidity.   Lymphadenopathy:      Head:      Right side of head: No submental or submandibular adenopathy.      Left side of head: No submental or submandibular adenopathy.      Cervical: No cervical adenopathy.      Right cervical: No superficial cervical adenopathy.     Left cervical: No superficial cervical adenopathy.   Skin:     General: Skin is warm and dry.      Capillary Refill: Capillary refill takes less than 2 seconds.      Coloration: Skin is not jaundiced or pale.      Findings: No erythema.      Nails: There is no clubbing.   Neurological:      Mental Status: She is alert and oriented to person, place, and time.      Cranial Nerves: No cranial nerve deficit or facial asymmetry.      Sensory: No sensory deficit.      Motor: No weakness, tremor, atrophy or abnormal muscle tone.      Coordination:  Coordination normal.      Gait: Gait abnormal (milid antalgia).      Deep Tendon Reflexes: Reflexes are normal and symmetric.   Psychiatric:         Attention and Perception: She is attentive.         Mood and Affect: Mood normal. Mood is not anxious or depressed.         Speech: Speech normal.         Behavior: Behavior normal. Behavior is cooperative.         Thought Content: Thought content normal.         Cognition and Memory: Cognition normal.         Judgment: Judgment normal.         Diagnoses and all orders for this visit:    1. Tick bite of left thigh, initial encounter (Primary)  -     Tick Panel; Future  -     doxycycline (MONODOX) 100 MG capsule; Take 1 capsule by mouth Every 12 (Twelve) Hours.  Dispense: 20 capsule; Refill: 0  -     Tick Panel    2. Lipoma of skin of abdomen  -     US soft tissue; Future    3. Multiple joint pain  Comments:  Avoid overuse activity.  Continue Norco and Celebrex as directed  Assessment & Plan:  Continue to be active.  Avoid overuse activities  Continue PRN Norco and celebrex as directed    Orders:  -     HYDROcodone-acetaminophen (Norco)  MG per tablet; Take 1 tablet by mouth Every 6 (Six) Hours As Needed for Moderate Pain.  Dispense: 90 tablet; Refill: 0    4. Seasonal allergic rhinitis due to pollen  Assessment & Plan:  Continue Zyrtec.  Continue eyedrops as needed.  Avoid known allergy and respiratory triggers.  May use Saline spray PRN as desired  PRN Sudafed         BMI is within normal parameters. No other follow-up for BMI required.    Understands disease processes and need for medications.  Understands reasons for urgent and emergent care.  Patient (& family) verbalized agreement for treatment plan.   Emotional support and active listening provided.  Patient provided time to verbalize feelings.    Manual MICHELLE request #[id number] reviewed today and consistent.  Will refill prescribed controlled medication today.  Patient is aware they cannot receive  narcotics from any other provider except if under care of pain management or speciality clinic.  Risk and benefits of medication use has been reviewed.  History and physical exam exhibit continued safe and appropriate use of controlled substances.  The patient is aware of the potential for addiction and dependence.  This patient has been made aware of the appropriate use of such medications, including potential risk of somnolence, limited ability to drive and / or work safely, and potential for overdose.    It has also been made clear that these medications are for use by this patient only, without concomitant use of alcohol or other substances unless prescribed/advised by medical provider.  Patient understands they may be subject to UDS and pill counts at random.    Patient considered to be low risk for addiction due to use of single controlled medications.  Patient understands and accepts these risks.  Patient need for medication will be reassessed at each visit.  Doses will be adjusted according to patient need and findings.    Goal of TX: Patient will not have any adverse reactions of medication.  Patient have reduction in pain symptoms with use of PRN Norco as directed.  Patient will not have any adverse side effects from medication.  Patient will be able to remain active in an outside of home without interference from pain symptoms.    RTC 1 month, sooner if needed.               This document has been electronically signed by:  STEVE Gonsales FNP-C Dragon disclaimer:  Part of this note may be an electronic transcription/translation of spoken language to printed text using the Dragon Dictation System.

## 2024-07-02 LAB — B BURGDOR IGG+IGM SER QL IA: NEGATIVE

## 2024-07-03 ENCOUNTER — PRIOR AUTHORIZATION (OUTPATIENT)
Dept: FAMILY MEDICINE CLINIC | Facility: CLINIC | Age: 64
End: 2024-07-03
Payer: COMMERCIAL

## 2024-07-04 LAB
A PHAGOCYTOPH DNA BLD QL NAA+PROBE: NEGATIVE
E CHAFFEENSIS DNA BLD QL NAA+PROBE: NEGATIVE

## 2024-07-05 LAB — RICKETTSIA RICKETTSII DNA, RT: NOT DETECTED

## 2024-07-10 ENCOUNTER — PATIENT ROUNDING (BHMG ONLY) (OUTPATIENT)
Dept: FAMILY MEDICINE CLINIC | Facility: CLINIC | Age: 64
End: 2024-07-10
Payer: COMMERCIAL

## 2024-07-10 NOTE — PROGRESS NOTES
July 10, 2024    Hello, may I speak with Rohini Burch?    My name is Haydee       I am  with [facility]  [address] [place] [id number] [phone].    Before we get started may I verify your date of birth? 1960    I am calling to officially welcome you to our practice and ask about your recent visit. Is this a good time to talk? yes    Tell me about your visit with us. What things went well?  [place] is a great provider always willing to listen and cares        We're always looking for ways to make our patients' experiences even better. Do you have recommendations on ways we may improve?  no    Overall were you satisfied with your first visit to our practice? yes       I appreciate you taking the time to speak with me today. Is there anything else I can do for you? no      Thank you, and have a great day.

## 2024-07-17 ENCOUNTER — HOSPITAL ENCOUNTER (OUTPATIENT)
Facility: HOSPITAL | Age: 64
Discharge: HOME | End: 2024-07-17
Admitting: NURSE PRACTITIONER
Payer: COMMERCIAL

## 2024-07-17 DIAGNOSIS — D17.1 LIPOMA OF SKIN OF ABDOMEN: ICD-10-CM

## 2024-07-17 PROCEDURE — 76999 ECHO EXAMINATION PROCEDURE: CPT

## 2024-07-23 DIAGNOSIS — K43.9 VENTRAL HERNIA WITHOUT OBSTRUCTION OR GANGRENE: Primary | ICD-10-CM

## 2024-07-30 ENCOUNTER — OFFICE VISIT (OUTPATIENT)
Dept: FAMILY MEDICINE CLINIC | Facility: CLINIC | Age: 64
End: 2024-07-30
Payer: COMMERCIAL

## 2024-07-30 VITALS
WEIGHT: 136.8 LBS | BODY MASS INDEX: 22.79 KG/M2 | OXYGEN SATURATION: 98 % | HEART RATE: 89 BPM | HEIGHT: 65 IN | SYSTOLIC BLOOD PRESSURE: 136 MMHG | RESPIRATION RATE: 16 BRPM | DIASTOLIC BLOOD PRESSURE: 88 MMHG

## 2024-07-30 DIAGNOSIS — K43.9 VENTRAL HERNIA WITHOUT OBSTRUCTION OR GANGRENE: ICD-10-CM

## 2024-07-30 DIAGNOSIS — J30.1 SEASONAL ALLERGIC RHINITIS DUE TO POLLEN: ICD-10-CM

## 2024-07-30 DIAGNOSIS — I67.9 SMALL VESSEL DISEASE, CEREBROVASCULAR: ICD-10-CM

## 2024-07-30 DIAGNOSIS — M25.50 MULTIPLE JOINT PAIN: Primary | ICD-10-CM

## 2024-07-30 DIAGNOSIS — Z78.9 WEIGHT LOSS ADVISED: ICD-10-CM

## 2024-07-30 PROCEDURE — 99214 OFFICE O/P EST MOD 30 MIN: CPT | Performed by: NURSE PRACTITIONER

## 2024-07-30 PROCEDURE — 1160F RVW MEDS BY RX/DR IN RCRD: CPT | Performed by: NURSE PRACTITIONER

## 2024-07-30 PROCEDURE — 1159F MED LIST DOCD IN RCRD: CPT | Performed by: NURSE PRACTITIONER

## 2024-07-30 RX ORDER — PHENTERMINE HYDROCHLORIDE 37.5 MG/1
37.5 TABLET ORAL
Qty: 30 TABLET | Refills: 0 | Status: SHIPPED | OUTPATIENT
Start: 2024-07-30

## 2024-07-30 RX ORDER — HYDROCODONE BITARTRATE AND ACETAMINOPHEN 10; 325 MG/1; MG/1
1 TABLET ORAL EVERY 6 HOURS PRN
Qty: 90 TABLET | Refills: 0 | Status: SHIPPED | OUTPATIENT
Start: 2024-07-30

## 2024-07-30 NOTE — PROGRESS NOTES
"Subjective   Rohini Burch is a 64 y.o. female.     Chief Complaint   Patient presents with    Multiple joint pain       History of Present Illness     Joint pain-ongoing.  She is presently on Celebrex 100 mg and PRN Norco.  She reports that she has generalized stiffness and pain.  No BLE edema.   Weight concern-would like to have adipex again.  She would like to have some additional weight reduction.  She has tolerated medication well in the past.    Allergies-some exacerbation.  She continues her zyrtec and eye drops.  She reports she has not yet had the meds this morning.  She reports that her eyes are itching and she is having some congestion.   Hernia-noted on US of abdomen.  She has a general surgery appt on \"the 8th\".   She reports that she does not have anything to use for support.  She is lifting at her grandson.    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  Reports she is not taking daily but at least QOD.  Patient denies any bleeding of gums, melena, hematochezia, hematuria, or epistaxis.       The following portions of the patient's history were reviewed and updated as appropriate: CC, ROS, allergies, current medications, past family history, past medical history, past social history, past surgical history and problem list.      Review of Systems   Constitutional:  Positive for fatigue. Negative for appetite change and fever.   HENT:  Positive for congestion, postnasal drip and rhinorrhea. Negative for ear pain, nosebleeds, sore throat, tinnitus, trouble swallowing and voice change.    Eyes:  Positive for itching. Negative for blurred vision, photophobia and visual disturbance.   Respiratory:  Negative for cough, chest tightness, shortness of breath and wheezing.    Cardiovascular:  Negative for chest pain and palpitations.   Gastrointestinal:  Negative for abdominal pain, blood in stool, constipation, diarrhea, nausea, vomiting and GERD.   Endocrine: Negative for cold intolerance, heat " "intolerance and polydipsia.   Genitourinary:  Negative for decreased urine volume, difficulty urinating, dysuria and hematuria.   Musculoskeletal:  Positive for arthralgias, back pain and myalgias. Negative for gait problem.   Skin:  Negative for color change, pallor and wound.   Allergic/Immunologic: Negative.    Neurological:  Negative for dizziness, syncope, numbness and headache.   Hematological: Negative.    Psychiatric/Behavioral:  Negative for decreased concentration, sleep disturbance, suicidal ideas and depressed mood. The patient is not nervous/anxious.    All other systems reviewed and are negative.      Objective     /88   Pulse 89   Resp 16   Ht 165 cm (64.96\")   Wt 62.1 kg (136 lb 12.8 oz)   SpO2 98%   Breastfeeding No   BMI 22.79 kg/m²     Physical Exam  Vitals reviewed.   Constitutional:       General: She is not in acute distress.     Appearance: She is well-developed. She is not diaphoretic.   HENT:      Head: Normocephalic and atraumatic.      Jaw: No tenderness.      Right Ear: Hearing, tympanic membrane, ear canal and external ear normal.      Left Ear: Hearing, tympanic membrane, ear canal and external ear normal.      Nose: Congestion and rhinorrhea present. No nasal tenderness.      Right Sinus: No maxillary sinus tenderness or frontal sinus tenderness.      Left Sinus: No maxillary sinus tenderness or frontal sinus tenderness.      Mouth/Throat:      Lips: Pink.      Mouth: Mucous membranes are moist.      Pharynx: Oropharynx is clear. Uvula midline.   Eyes:      General: Lids are normal. No scleral icterus.     Extraocular Movements:      Right eye: Normal extraocular motion and no nystagmus.      Left eye: Normal extraocular motion and no nystagmus.      Conjunctiva/sclera: Conjunctivae normal.      Pupils: Pupils are equal, round, and reactive to light.   Neck:      Thyroid: No thyromegaly or thyroid tenderness.      Vascular: No carotid bruit or JVD.      Trachea: No " tracheal tenderness.   Cardiovascular:      Rate and Rhythm: Normal rate and regular rhythm.      Pulses:           Dorsalis pedis pulses are 2+ on the right side and 2+ on the left side.        Posterior tibial pulses are 2+ on the right side and 2+ on the left side.      Heart sounds: Normal heart sounds, S1 normal and S2 normal. No murmur heard.  Pulmonary:      Effort: Pulmonary effort is normal. No accessory muscle usage, prolonged expiration or respiratory distress.      Breath sounds: Normal breath sounds.   Chest:      Chest wall: No tenderness.   Abdominal:      General: Bowel sounds are normal. There is no distension.      Palpations: Abdomen is soft. There is no hepatomegaly, splenomegaly or mass.      Tenderness: There is no abdominal tenderness.       Musculoskeletal:         General: Tenderness present.      Cervical back: Normal range of motion and neck supple. Tenderness present.      Thoracic back: Tenderness present.      Lumbar back: Tenderness present.      Right lower leg: Tenderness present. No edema.      Left lower leg: Tenderness present. No edema.      Comments: No muscular atrophy or flaccidity.   Lymphadenopathy:      Head:      Right side of head: No submental or submandibular adenopathy.      Left side of head: No submental or submandibular adenopathy.      Cervical: No cervical adenopathy.      Right cervical: No superficial cervical adenopathy.     Left cervical: No superficial cervical adenopathy.   Skin:     General: Skin is warm and dry.      Capillary Refill: Capillary refill takes less than 2 seconds.      Coloration: Skin is not jaundiced or pale.      Findings: No erythema.      Nails: There is no clubbing.   Neurological:      Mental Status: She is alert and oriented to person, place, and time.      Cranial Nerves: No cranial nerve deficit or facial asymmetry.      Sensory: No sensory deficit.      Motor: No weakness, tremor, atrophy or abnormal muscle tone.      Coordination:  Coordination normal.      Gait: Gait abnormal (milid antalgia).      Deep Tendon Reflexes: Reflexes are normal and symmetric.   Psychiatric:         Attention and Perception: She is attentive.         Mood and Affect: Mood normal. Mood is not anxious or depressed.         Speech: Speech normal.         Behavior: Behavior normal. Behavior is cooperative.         Thought Content: Thought content normal.         Cognition and Memory: Cognition normal.         Judgment: Judgment normal.         Diagnoses and all orders for this visit:    1. Multiple joint pain (Primary)  Comments:  Avoid overuse activity.  Continue Norco and Celebrex as directed  Assessment & Plan:  Continue to be active.  Avoid overuse activities  Continue PRN Norco and celebrex as directed    Orders:  -     HYDROcodone-acetaminophen (Norco)  MG per tablet; Take 1 tablet by mouth Every 6 (Six) Hours As Needed for Moderate Pain.  Dispense: 90 tablet; Refill: 0    2. Weight loss advised  -     phentermine (Adipex-P) 37.5 MG tablet; Take 1 tablet by mouth Every Morning Before Breakfast.  Dispense: 30 tablet; Refill: 0    3. Ventral hernia without obstruction or gangrene  Comments:  order for abdomen binder.    4. Seasonal allergic rhinitis due to pollen  Assessment & Plan:  Continue Zyrtec.  Continue eyedrops as needed.  Avoid known allergy and respiratory triggers.  May use Saline spray PRN as desired  PRN Sudafed      5. Small vessel disease, cerebrovascular  Assessment & Plan:  Continue plavix 75 mg and Crestor 20 mg  Report any negative side effects.   Encouraged to pursue a low-cholesterol diet including limited fried foods and low-fat foods.  Be active as physically able.         BMI is within normal parameters. No other follow-up for BMI required.      Understands disease processes and need for medications.  Understands reasons for urgent and emergent care.  Patient (& family) verbalized agreement for treatment plan.   Emotional support and  active listening provided.  Patient provided time to verbalize feelings.    MICHELLE/BELLE reviewed today and consistent.  Will refill prescribed controlled medication today.  Patient is aware they cannot receive narcotics from any other provider except if under care of pain management or speciality clinic.  Risk and benefits of medication use has been reviewed.  History and physical exam exhibit continued safe and appropriate use of controlled substances.  The patient is aware of the potential for addiction and dependence.  This patient has been made aware of the appropriate use of such medications, including potential risk of somnolence, limited ability to drive and / or work safely, and potential for overdose.    It has also been made clear that these medications are for use by this patient only, without concomitant use of alcohol or other substances unless prescribed/advised by medical provider.  Patient understands they may be subject to UDS and pill counts at random.    Patient considered to be low risk for addiction due to use of single controlled medications.  Patient understands and accepts these risks.  Patient need for medication will be reassessed at each visit.  Doses will be adjusted according to patient need and findings.    Goal of TX: Patient will not have any adverse reactions of medication.  Patient have reduction in pain symptoms with use of PRN Norco as directed.  Patient will not have any adverse side effects from medication.  Patient will be able to remain active in an outside of home without interference from pain symptoms.    MICHELLE Patient Controlled Substance Report (from 7/31/2023 to 7/30/2024)    Dispensed  Strength Quantity Days Supply Provider Pharmacy   07/01/2024 Hydrocodone Bitartrate/Ac 325MG/10MG 90 each 23 [facility] [place]   06/03/2024 Hydrocodone Bitartrate/Ac 325MG/10MG 90 each 30 [facility]OL[facility] [place]   05/06/2024 Hydrocodone Bitartrate/Ac 325MG/10MG 90  each 30 [facility]   05/06/2024 Phentermine Hcl 37.5MG 30 each 30 [facility]   04/08/2024 Hydrocodone Bitartrate/Ac 325MG/10MG 90 each 30 [facility]   04/08/2024 Phentermine Hcl 37.5MG 30 each 30 [facility]   03/08/2024 Hydrocodone Bitartrate/Ac 325MG/10MG 90 each 30 [facility]   03/08/2024 Phentermine Hcl 37.5MG 30 each 30 [facility],J[facility]        RTC 1 month, sooner if needed.             This document has been electronically signed by:  STEVE Gonsales FNP-C Dragon disclaimer:  Part of this note may be an electronic transcription/translation of spoken language to printed text using the Dragon Dictation System.

## 2024-08-08 ENCOUNTER — OFFICE VISIT (OUTPATIENT)
Age: 64
End: 2024-08-08
Payer: COMMERCIAL

## 2024-08-08 VITALS — HEIGHT: 65 IN | BODY MASS INDEX: 23.32 KG/M2 | WEIGHT: 140 LBS

## 2024-08-08 DIAGNOSIS — K40.90 LEFT INGUINAL HERNIA: Primary | ICD-10-CM

## 2024-08-08 RX ORDER — SODIUM CHLORIDE 9 MG/ML
40 INJECTION, SOLUTION INTRAVENOUS AS NEEDED
OUTPATIENT
Start: 2024-08-08

## 2024-08-08 RX ORDER — SODIUM CHLORIDE 0.9 % (FLUSH) 0.9 %
3 SYRINGE (ML) INJECTION EVERY 12 HOURS SCHEDULED
OUTPATIENT
Start: 2024-08-08

## 2024-08-08 RX ORDER — SODIUM CHLORIDE 0.9 % (FLUSH) 0.9 %
10 SYRINGE (ML) INJECTION AS NEEDED
OUTPATIENT
Start: 2024-08-08

## 2024-08-08 NOTE — H&P (VIEW-ONLY)
"Date of Service: 8/8/2024    Subjective   Rohini Burch is a 64 y.o. female is being in consultation today at the request of Lucía King APRN    Chief Complaint: left groin bulge    Rohini Burch is a 64 y.o. female who presents with a left groin bulge. She was evaluated by her PCP and an ultrasound was performed concerning for an inguinal hernia. She reports it is bothersome to her. It has noticed it over the past several months.     History reviewed. No pertinent past medical history.    Past Surgical History:   Procedure Laterality Date    HYSTERECTOMY      complete    KNEE SURGERY           Family History   Problem Relation Age of Onset    Hypertension Mother          Social History     Socioeconomic History    Marital status:      Spouse name: dolores    Number of children: Donavan    Years of education: 12   Tobacco Use    Smoking status: Never    Smokeless tobacco: Never   Vaping Use    Vaping status: Never Used   Substance and Sexual Activity    Alcohol use: No    Drug use: Never    Sexual activity: Defer        Review of Systems   Pertinent items are noted in HPI     Constitutional: No fevers, chills or malaise. No unintentional weight loss   Eyes: Denies visual changes    Cardiovascular: Denies chest pain, palpitations   Respiratory: Denies cough or shortness of breath   Abdominal/Gastrointestinal: No abdominal pain, no nausea/vomiting   Genitourinary: Denies dysuria or hematuria   Musculoskeletal: Denies any chronic joint aches, pains or deformities              Skin: No lesions or rashes   Psychiatric: No recent mood changes   Neurologic: No paresthesias or loss of function      Objective       Physical Exam:      08/08/24  0938   Weight: 63.5 kg (140 lb)    Body mass index is 23.33 kg/m².  Constitution: Ht 165 cm (64.96\")   Wt 63.5 kg (140 lb)   BMI 23.33 kg/m²  . No acute distress  Head: Normocephalic, atraumatic.   Eyes: Aligned without strabismus. Conjunctivae noninjected   Ears, Nose, " Mouth:no lesions noted  CV: Regular rate and rhythm   Respiratory: Symmetric chest expansion. No respiratory distress.   Gastrointestinal:  Soft, nontender, nondistended   Skin:  No cyanosis, clubbing or edema bilaterally  Neurologic: No gross deficits.  Alert and oriented x3  Psychiatric: Normal mood  Groin: left inguinal hernia      Assessment   Diagnoses and all orders for this visit:    1. Left inguinal hernia (Primary)  -     Case Request; Standing  -     Follow Anesthesia Guidelines / Protocol; Standing  -     Verify NPO Status; Standing  -     Obtain Informed Consent; Standing  -     Verify / Perform Chlorhexidine Skin Prep; Standing  -     Notify Physician - Standard; Standing  -     Instructions on coughing, deep breathing, and incentive spirometry.; Standing  -     MRSA Screen, PCR (Inpatient) - Swab, Nares; Standing  -     Insert Peripheral IV; Standing  -     Saline Lock & Maintain IV Access; Standing  -     sodium chloride 0.9 % flush 3 mL  -     sodium chloride 0.9 % flush 10 mL  -     sodium chloride 0.9 % infusion 40 mL  -     Place Sequential Compression Device; Standing  -     Maintain Sequential Compression Device; Standing  -     ceFAZolin 2000 mg IVPB in 100 mL NS (VTB)  -     Case Request      Rohini Burch is a 64 y.o. female with a symptomatic left inguinal hernia. We will plan for operative repair on 8/21/24.          Rosalva Wynn MD  [facility]- General Surgery

## 2024-08-08 NOTE — PROGRESS NOTES
"Date of Service: 8/8/2024    Subjective   Rohini Burch is a 64 y.o. female is being in consultation today at the request of Lucía King APRN    Chief Complaint: left groin bulge    Rohini Burch is a 64 y.o. female who presents with a left groin bulge. She was evaluated by her PCP and an ultrasound was performed concerning for an inguinal hernia. She reports it is bothersome to her. It has noticed it over the past several months.     History reviewed. No pertinent past medical history.    Past Surgical History:   Procedure Laterality Date    HYSTERECTOMY      complete    KNEE SURGERY           Family History   Problem Relation Age of Onset    Hypertension Mother          Social History     Socioeconomic History    Marital status:      Spouse name: dolores    Number of children: Donavan    Years of education: 12   Tobacco Use    Smoking status: Never    Smokeless tobacco: Never   Vaping Use    Vaping status: Never Used   Substance and Sexual Activity    Alcohol use: No    Drug use: Never    Sexual activity: Defer        Review of Systems   Pertinent items are noted in HPI     Constitutional: No fevers, chills or malaise. No unintentional weight loss   Eyes: Denies visual changes    Cardiovascular: Denies chest pain, palpitations   Respiratory: Denies cough or shortness of breath   Abdominal/Gastrointestinal: No abdominal pain, no nausea/vomiting   Genitourinary: Denies dysuria or hematuria   Musculoskeletal: Denies any chronic joint aches, pains or deformities              Skin: No lesions or rashes   Psychiatric: No recent mood changes   Neurologic: No paresthesias or loss of function      Objective       Physical Exam:      08/08/24  0938   Weight: 63.5 kg (140 lb)    Body mass index is 23.33 kg/m².  Constitution: Ht 165 cm (64.96\")   Wt 63.5 kg (140 lb)   BMI 23.33 kg/m²  . No acute distress  Head: Normocephalic, atraumatic.   Eyes: Aligned without strabismus. Conjunctivae noninjected   Ears, Nose, " Mouth:no lesions noted  CV: Regular rate and rhythm   Respiratory: Symmetric chest expansion. No respiratory distress.   Gastrointestinal:  Soft, nontender, nondistended   Skin:  No cyanosis, clubbing or edema bilaterally  Neurologic: No gross deficits.  Alert and oriented x3  Psychiatric: Normal mood  Groin: left inguinal hernia      Assessment   Diagnoses and all orders for this visit:    1. Left inguinal hernia (Primary)  -     Case Request; Standing  -     Follow Anesthesia Guidelines / Protocol; Standing  -     Verify NPO Status; Standing  -     Obtain Informed Consent; Standing  -     Verify / Perform Chlorhexidine Skin Prep; Standing  -     Notify Physician - Standard; Standing  -     Instructions on coughing, deep breathing, and incentive spirometry.; Standing  -     MRSA Screen, PCR (Inpatient) - Swab, Nares; Standing  -     Insert Peripheral IV; Standing  -     Saline Lock & Maintain IV Access; Standing  -     sodium chloride 0.9 % flush 3 mL  -     sodium chloride 0.9 % flush 10 mL  -     sodium chloride 0.9 % infusion 40 mL  -     Place Sequential Compression Device; Standing  -     Maintain Sequential Compression Device; Standing  -     ceFAZolin 2000 mg IVPB in 100 mL NS (VTB)  -     Case Request      Rohini Burch is a 64 y.o. female with a symptomatic left inguinal hernia. We will plan for operative repair on 8/21/24.          Rosalva Wynn MD  [facility]- General Surgery

## 2024-08-16 NOTE — DISCHARGE INSTRUCTIONS
Please discontinue all blood thinners and anticoagulants (except aspirin) prior to surgery as per your surgeon and cardiologist instructions.  Aspirin may be continued up to the day prior to surgery.    HOLD all diabetic medications the morning of surgery as order by physician.    Please follow instructions on use of prep cloths provided by nurse. Return instruction sheet to pre-op nurse on day of surgery.    Arrival time for surgery on 8/21/24  will be given to you by Dr. Mitchell's  Office.(0630 AM)    A RESPONSIBLE PERSON MUST REMAIN IN THE WAITING ROOM DURING YOUR PROCEDURE AND A RESPONSIBLE  MUST BE AVAILABLE UPON YOUR DISCHARGE.    General Instructions:  Do NOT eat or drink after midnight 8/20/24 which includes water, mints, or gum.  You may brush your teeth. Dental appliances that are removable must be taken out day of surgery.  Do NOT smoke, chew tobacco, or drink alcohol within 24 hours prior to surgery.  Bring medications in original bottles, any inhalers and if applicable your C-PAP/BI-PAP machine  Bring any papers given to you in the doctor’s office  Wear clean, comfortable clothes and socks  Do NOT wear contact lenses or make-up or dark nail polish.  Bring a case for your glasses if applicable.  Bring crutches or walker if applicable  Leave all other valuables and jewelry at home  If you were given a blood bank armband, continue to wear it until discharged.    Preventing a Surgical Site Infection:  Shower the night before surgery (unless instructed otherwise) using a fresh bar of anti-bacterial soap (such as Dial) and clean washcloth.  Dry with a clean towel and dress in clean clothing.  For 2 to 3 days before surgery, avoid shaving with a razor near where you will have surgery because the razor can irritate skin and make it easier to develop an infection.  Ask your surgeon if you will be receiving antibiotics prior to surgery.  Make sure you, your family, and all healthcare providers clean  their hands with soap and water or an alcohol-based hand  before caring for you or your wound.  If at all possible, quit smoking as many days before surgery as you can.    Day of Surgery:  Upon arrival, a pre-op nurse and anesthesiologist will review your health history, obtain vital signs, and answer questions you may have.  The only belongings needed at this time will be your home medications and if applicable you C-PAP/BI-PAP machine.  If you are staying overnight, your family can leave the rest of your belongings in the car and bring them to your room later.  A pre-op nurse will start an IV and you may receive medication in preparation for surgery.  Due to patient privacy and limited space, only one member of your family will be able to accompany you in the pre-op area.  While you are in surgery your family should notify the waiting room  if they leave the waiting room area and provide a contact number.  Please be aware that surgery does come with discomfort.  We want to make every effort to control your discomfort so please discuss any uncontrolled symptoms with your nurse.  Your doctor will most likely have prescribed pain medications.  If you are going home after surgery you will receive individualized written care instructions before being discharged.  A responsible adult must drive you to and from the hospital on the day of surgery and stay with you for 24 hours.  If you are staying overnight following surgery, you will be transported to your hospital room following the recovery period.

## 2024-08-19 ENCOUNTER — PRE-ADMISSION TESTING (OUTPATIENT)
Dept: PREADMISSION TESTING | Facility: HOSPITAL | Age: 64
End: 2024-08-19
Payer: COMMERCIAL

## 2024-08-19 DIAGNOSIS — K40.90 LEFT INGUINAL HERNIA: ICD-10-CM

## 2024-08-19 LAB
ANION GAP SERPL CALCULATED.3IONS-SCNC: 10.1 MMOL/L (ref 5–15)
BUN SERPL-MCNC: 10 MG/DL (ref 8–23)
BUN/CREAT SERPL: 13.2 (ref 7–25)
CALCIUM SPEC-SCNC: 9.3 MG/DL (ref 8.6–10.5)
CHLORIDE SERPL-SCNC: 103 MMOL/L (ref 98–107)
CO2 SERPL-SCNC: 25.9 MMOL/L (ref 22–29)
CREAT SERPL-MCNC: 0.76 MG/DL (ref 0.57–1)
DEPRECATED RDW RBC AUTO: 46.1 FL (ref 37–54)
EGFRCR SERPLBLD CKD-EPI 2021: 88.2 ML/MIN/1.73
ERYTHROCYTE [DISTWIDTH] IN BLOOD BY AUTOMATED COUNT: 11.8 % (ref 12.3–15.4)
GLUCOSE SERPL-MCNC: 87 MG/DL (ref 65–99)
HCT VFR BLD AUTO: 41.2 % (ref 34–46.6)
HGB BLD-MCNC: 13.5 G/DL (ref 12–15.9)
MCH RBC QN AUTO: 34.4 PG (ref 26.6–33)
MCHC RBC AUTO-ENTMCNC: 32.8 G/DL (ref 31.5–35.7)
MCV RBC AUTO: 104.8 FL (ref 79–97)
MRSA DNA SPEC QL NAA+PROBE: NORMAL
PLATELET # BLD AUTO: 298 10*3/MM3 (ref 140–450)
PMV BLD AUTO: 9.2 FL (ref 6–12)
POTASSIUM SERPL-SCNC: 4.1 MMOL/L (ref 3.5–5.2)
RBC # BLD AUTO: 3.93 10*6/MM3 (ref 3.77–5.28)
SODIUM SERPL-SCNC: 139 MMOL/L (ref 136–145)
WBC NRBC COR # BLD AUTO: 6.32 10*3/MM3 (ref 3.4–10.8)

## 2024-08-19 PROCEDURE — 80048 BASIC METABOLIC PNL TOTAL CA: CPT

## 2024-08-19 PROCEDURE — 87641 MR-STAPH DNA AMP PROBE: CPT | Performed by: SURGERY

## 2024-08-19 PROCEDURE — 85027 COMPLETE CBC AUTOMATED: CPT

## 2024-08-19 PROCEDURE — 36415 COLL VENOUS BLD VENIPUNCTURE: CPT

## 2024-08-21 ENCOUNTER — ANESTHESIA (OUTPATIENT)
Dept: PERIOP | Facility: HOSPITAL | Age: 64
End: 2024-08-21
Payer: COMMERCIAL

## 2024-08-21 ENCOUNTER — HOSPITAL ENCOUNTER (OUTPATIENT)
Facility: HOSPITAL | Age: 64
Setting detail: HOSPITAL OUTPATIENT SURGERY
Discharge: HOME | End: 2024-08-21
Attending: SURGERY | Admitting: SURGERY
Payer: COMMERCIAL

## 2024-08-21 ENCOUNTER — ANESTHESIA EVENT (OUTPATIENT)
Dept: PERIOP | Facility: HOSPITAL | Age: 64
End: 2024-08-21
Payer: COMMERCIAL

## 2024-08-21 ENCOUNTER — APPOINTMENT (OUTPATIENT)
Dept: GENERAL RADIOLOGY | Facility: HOSPITAL | Age: 64
End: 2024-08-21
Payer: COMMERCIAL

## 2024-08-21 VITALS
OXYGEN SATURATION: 94 % | HEART RATE: 102 BPM | SYSTOLIC BLOOD PRESSURE: 129 MMHG | HEIGHT: 65 IN | RESPIRATION RATE: 18 BRPM | BODY MASS INDEX: 22.66 KG/M2 | TEMPERATURE: 97.4 F | WEIGHT: 136 LBS | DIASTOLIC BLOOD PRESSURE: 73 MMHG

## 2024-08-21 DIAGNOSIS — K40.90 LEFT INGUINAL HERNIA: ICD-10-CM

## 2024-08-21 PROCEDURE — 25010000002 PROPOFOL 200 MG/20ML EMULSION: Performed by: NURSE ANESTHETIST, CERTIFIED REGISTERED

## 2024-08-21 PROCEDURE — 25010000002 FENTANYL CITRATE (PF) 50 MCG/ML SOLUTION: Performed by: NURSE ANESTHETIST, CERTIFIED REGISTERED

## 2024-08-21 PROCEDURE — 25010000002 HYDRALAZINE PER 20 MG: Performed by: ANESTHESIOLOGY

## 2024-08-21 PROCEDURE — 25010000002 ONDANSETRON PER 1 MG: Performed by: NURSE ANESTHETIST, CERTIFIED REGISTERED

## 2024-08-21 PROCEDURE — 25010000002 MEPERIDINE PER 100 MG: Performed by: NURSE ANESTHETIST, CERTIFIED REGISTERED

## 2024-08-21 PROCEDURE — 25010000002 MIDAZOLAM PER 1 MG: Performed by: NURSE ANESTHETIST, CERTIFIED REGISTERED

## 2024-08-21 PROCEDURE — 25010000002 DEXAMETHASONE PER 1 MG: Performed by: ANESTHESIOLOGY

## 2024-08-21 PROCEDURE — 25010000002 NEOSTIGMINE 10 MG/10ML SOLUTION: Performed by: NURSE ANESTHETIST, CERTIFIED REGISTERED

## 2024-08-21 PROCEDURE — 25010000002 KETOROLAC TROMETHAMINE PER 15 MG: Performed by: NURSE ANESTHETIST, CERTIFIED REGISTERED

## 2024-08-21 PROCEDURE — C1729 CATH, DRAINAGE: HCPCS | Performed by: SURGERY

## 2024-08-21 PROCEDURE — 25810000003 LACTATED RINGERS PER 1000 ML: Performed by: ANESTHESIOLOGY

## 2024-08-21 PROCEDURE — 25810000003 LACTATED RINGERS PER 1000 ML: Performed by: NURSE ANESTHETIST, CERTIFIED REGISTERED

## 2024-08-21 PROCEDURE — C1781 MESH (IMPLANTABLE): HCPCS | Performed by: SURGERY

## 2024-08-21 PROCEDURE — 25010000002 ROPIVACAINE PER 1 MG: Performed by: ANESTHESIOLOGY

## 2024-08-21 PROCEDURE — 25010000002 GLYCOPYRROLATE 0.4 MG/2ML SOLUTION: Performed by: NURSE ANESTHETIST, CERTIFIED REGISTERED

## 2024-08-21 PROCEDURE — 25010000002 CEFAZOLIN PER 500 MG: Performed by: SURGERY

## 2024-08-21 RX ORDER — ROCURONIUM BROMIDE 10 MG/ML
INJECTION, SOLUTION INTRAVENOUS AS NEEDED
Status: DISCONTINUED | OUTPATIENT
Start: 2024-08-21 | End: 2024-08-21 | Stop reason: SURG

## 2024-08-21 RX ORDER — KETOROLAC TROMETHAMINE 30 MG/ML
30 INJECTION, SOLUTION INTRAMUSCULAR; INTRAVENOUS EVERY 6 HOURS PRN
Status: COMPLETED | OUTPATIENT
Start: 2024-08-21 | End: 2024-08-21

## 2024-08-21 RX ORDER — SODIUM CHLORIDE 0.9 % (FLUSH) 0.9 %
10 SYRINGE (ML) INJECTION AS NEEDED
Status: DISCONTINUED | OUTPATIENT
Start: 2024-08-21 | End: 2024-08-21 | Stop reason: HOSPADM

## 2024-08-21 RX ORDER — ONDANSETRON 2 MG/ML
4 INJECTION INTRAMUSCULAR; INTRAVENOUS AS NEEDED
Status: DISCONTINUED | OUTPATIENT
Start: 2024-08-21 | End: 2024-08-21 | Stop reason: HOSPADM

## 2024-08-21 RX ORDER — ONDANSETRON 2 MG/ML
INJECTION INTRAMUSCULAR; INTRAVENOUS AS NEEDED
Status: DISCONTINUED | OUTPATIENT
Start: 2024-08-21 | End: 2024-08-21 | Stop reason: SURG

## 2024-08-21 RX ORDER — IPRATROPIUM BROMIDE AND ALBUTEROL SULFATE 2.5; .5 MG/3ML; MG/3ML
3 SOLUTION RESPIRATORY (INHALATION) ONCE AS NEEDED
Status: DISCONTINUED | OUTPATIENT
Start: 2024-08-21 | End: 2024-08-21 | Stop reason: HOSPADM

## 2024-08-21 RX ORDER — FENTANYL CITRATE 50 UG/ML
50 INJECTION, SOLUTION INTRAMUSCULAR; INTRAVENOUS
Status: DISCONTINUED | OUTPATIENT
Start: 2024-08-21 | End: 2024-08-21 | Stop reason: HOSPADM

## 2024-08-21 RX ORDER — HYDRALAZINE HYDROCHLORIDE 20 MG/ML
10 INJECTION INTRAMUSCULAR; INTRAVENOUS ONCE
Status: COMPLETED | OUTPATIENT
Start: 2024-08-21 | End: 2024-08-21

## 2024-08-21 RX ORDER — LIDOCAINE HYDROCHLORIDE 20 MG/ML
INJECTION, SOLUTION EPIDURAL; INFILTRATION; INTRACAUDAL; PERINEURAL AS NEEDED
Status: DISCONTINUED | OUTPATIENT
Start: 2024-08-21 | End: 2024-08-21 | Stop reason: SURG

## 2024-08-21 RX ORDER — SODIUM CHLORIDE, SODIUM LACTATE, POTASSIUM CHLORIDE, CALCIUM CHLORIDE 600; 310; 30; 20 MG/100ML; MG/100ML; MG/100ML; MG/100ML
100 INJECTION, SOLUTION INTRAVENOUS ONCE AS NEEDED
Status: DISCONTINUED | OUTPATIENT
Start: 2024-08-21 | End: 2024-08-21 | Stop reason: HOSPADM

## 2024-08-21 RX ORDER — OXYCODONE HYDROCHLORIDE AND ACETAMINOPHEN 5; 325 MG/1; MG/1
1 TABLET ORAL ONCE AS NEEDED
Status: COMPLETED | OUTPATIENT
Start: 2024-08-21 | End: 2024-08-21

## 2024-08-21 RX ORDER — SODIUM CHLORIDE 0.9 % (FLUSH) 0.9 %
3 SYRINGE (ML) INJECTION EVERY 12 HOURS SCHEDULED
Status: DISCONTINUED | OUTPATIENT
Start: 2024-08-21 | End: 2024-08-21 | Stop reason: HOSPADM

## 2024-08-21 RX ORDER — DEXAMETHASONE SODIUM PHOSPHATE 4 MG/ML
INJECTION, SOLUTION INTRA-ARTICULAR; INTRALESIONAL; INTRAMUSCULAR; INTRAVENOUS; SOFT TISSUE
Status: DISCONTINUED | OUTPATIENT
Start: 2024-08-21 | End: 2024-08-21 | Stop reason: SURG

## 2024-08-21 RX ORDER — ROPIVACAINE HYDROCHLORIDE 5 MG/ML
INJECTION, SOLUTION EPIDURAL; INFILTRATION; PERINEURAL
Status: DISCONTINUED | OUTPATIENT
Start: 2024-08-21 | End: 2024-08-21 | Stop reason: SURG

## 2024-08-21 RX ORDER — SODIUM CHLORIDE 9 MG/ML
40 INJECTION, SOLUTION INTRAVENOUS AS NEEDED
Status: DISCONTINUED | OUTPATIENT
Start: 2024-08-21 | End: 2024-08-21 | Stop reason: HOSPADM

## 2024-08-21 RX ORDER — FENTANYL CITRATE 50 UG/ML
INJECTION, SOLUTION INTRAMUSCULAR; INTRAVENOUS AS NEEDED
Status: DISCONTINUED | OUTPATIENT
Start: 2024-08-21 | End: 2024-08-21 | Stop reason: SURG

## 2024-08-21 RX ORDER — PROPOFOL 10 MG/ML
INJECTION, EMULSION INTRAVENOUS AS NEEDED
Status: DISCONTINUED | OUTPATIENT
Start: 2024-08-21 | End: 2024-08-21 | Stop reason: SURG

## 2024-08-21 RX ORDER — NEOSTIGMINE METHYLSULFATE 1 MG/ML
INJECTION INTRAVENOUS AS NEEDED
Status: DISCONTINUED | OUTPATIENT
Start: 2024-08-21 | End: 2024-08-21 | Stop reason: SURG

## 2024-08-21 RX ORDER — SODIUM CHLORIDE 0.9 % (FLUSH) 0.9 %
10 SYRINGE (ML) INJECTION EVERY 12 HOURS SCHEDULED
Status: DISCONTINUED | OUTPATIENT
Start: 2024-08-21 | End: 2024-08-21 | Stop reason: HOSPADM

## 2024-08-21 RX ORDER — GLYCOPYRROLATE 0.2 MG/ML
INJECTION INTRAMUSCULAR; INTRAVENOUS AS NEEDED
Status: DISCONTINUED | OUTPATIENT
Start: 2024-08-21 | End: 2024-08-21 | Stop reason: SURG

## 2024-08-21 RX ORDER — EPHEDRINE SULFATE 5 MG/ML
INJECTION INTRAVENOUS AS NEEDED
Status: DISCONTINUED | OUTPATIENT
Start: 2024-08-21 | End: 2024-08-21 | Stop reason: SURG

## 2024-08-21 RX ORDER — FAMOTIDINE 10 MG/ML
INJECTION, SOLUTION INTRAVENOUS AS NEEDED
Status: DISCONTINUED | OUTPATIENT
Start: 2024-08-21 | End: 2024-08-21 | Stop reason: SURG

## 2024-08-21 RX ORDER — SODIUM CHLORIDE, SODIUM LACTATE, POTASSIUM CHLORIDE, CALCIUM CHLORIDE 600; 310; 30; 20 MG/100ML; MG/100ML; MG/100ML; MG/100ML
INJECTION, SOLUTION INTRAVENOUS CONTINUOUS PRN
Status: DISCONTINUED | OUTPATIENT
Start: 2024-08-21 | End: 2024-08-21 | Stop reason: SURG

## 2024-08-21 RX ORDER — OXYCODONE HYDROCHLORIDE 5 MG/1
5 TABLET ORAL EVERY 4 HOURS PRN
Qty: 20 TABLET | Refills: 0 | Status: SHIPPED | OUTPATIENT
Start: 2024-08-21 | End: 2025-08-21

## 2024-08-21 RX ORDER — MEPERIDINE HYDROCHLORIDE 25 MG/ML
12.5 INJECTION INTRAMUSCULAR; INTRAVENOUS; SUBCUTANEOUS
Status: COMPLETED | OUTPATIENT
Start: 2024-08-21 | End: 2024-08-21

## 2024-08-21 RX ORDER — MIDAZOLAM HYDROCHLORIDE 1 MG/ML
INJECTION INTRAMUSCULAR; INTRAVENOUS AS NEEDED
Status: DISCONTINUED | OUTPATIENT
Start: 2024-08-21 | End: 2024-08-21 | Stop reason: SURG

## 2024-08-21 RX ORDER — MIDAZOLAM HYDROCHLORIDE 1 MG/ML
1 INJECTION INTRAMUSCULAR; INTRAVENOUS
Status: DISCONTINUED | OUTPATIENT
Start: 2024-08-21 | End: 2024-08-21 | Stop reason: HOSPADM

## 2024-08-21 RX ORDER — SODIUM CHLORIDE, SODIUM LACTATE, POTASSIUM CHLORIDE, CALCIUM CHLORIDE 600; 310; 30; 20 MG/100ML; MG/100ML; MG/100ML; MG/100ML
125 INJECTION, SOLUTION INTRAVENOUS ONCE
Status: COMPLETED | OUTPATIENT
Start: 2024-08-21 | End: 2024-08-21

## 2024-08-21 RX ADMIN — FENTANYL CITRATE 50 MCG: 50 INJECTION INTRAMUSCULAR; INTRAVENOUS at 07:40

## 2024-08-21 RX ADMIN — ONDANSETRON 4 MG: 2 INJECTION INTRAMUSCULAR; INTRAVENOUS at 07:38

## 2024-08-21 RX ADMIN — SODIUM CHLORIDE, POTASSIUM CHLORIDE, SODIUM LACTATE AND CALCIUM CHLORIDE: 600; 310; 30; 20 INJECTION, SOLUTION INTRAVENOUS at 08:39

## 2024-08-21 RX ADMIN — DEXAMETHASONE SODIUM PHOSPHATE 8 MG: 4 INJECTION, SOLUTION INTRA-ARTICULAR; INTRALESIONAL; INTRAMUSCULAR; INTRAVENOUS; SOFT TISSUE at 07:41

## 2024-08-21 RX ADMIN — FENTANYL CITRATE 50 MCG: 50 INJECTION INTRAMUSCULAR; INTRAVENOUS at 07:33

## 2024-08-21 RX ADMIN — PROPOFOL 150 MG: 10 INJECTION, EMULSION INTRAVENOUS at 07:33

## 2024-08-21 RX ADMIN — LIDOCAINE HYDROCHLORIDE 40 MG: 20 INJECTION, SOLUTION EPIDURAL; INFILTRATION; INTRACAUDAL; PERINEURAL at 07:33

## 2024-08-21 RX ADMIN — GLYCOPYRROLATE 0.4 MG: 0.4 INJECTION INTRAMUSCULAR; INTRAVENOUS at 08:36

## 2024-08-21 RX ADMIN — MIDAZOLAM HYDROCHLORIDE 2 MG: 1 INJECTION, SOLUTION INTRAMUSCULAR; INTRAVENOUS at 07:28

## 2024-08-21 RX ADMIN — SODIUM CHLORIDE, POTASSIUM CHLORIDE, SODIUM LACTATE AND CALCIUM CHLORIDE: 600; 310; 30; 20 INJECTION, SOLUTION INTRAVENOUS at 07:28

## 2024-08-21 RX ADMIN — OXYCODONE HYDROCHLORIDE AND ACETAMINOPHEN 1 TABLET: 5; 325 TABLET ORAL at 09:30

## 2024-08-21 RX ADMIN — HYDRALAZINE HYDROCHLORIDE 10 MG: 20 INJECTION INTRAMUSCULAR; INTRAVENOUS at 09:21

## 2024-08-21 RX ADMIN — FENTANYL CITRATE 50 MCG: 50 INJECTION, SOLUTION INTRAMUSCULAR; INTRAVENOUS at 08:56

## 2024-08-21 RX ADMIN — FAMOTIDINE 20 MG: 10 INJECTION, SOLUTION INTRAVENOUS at 07:28

## 2024-08-21 RX ADMIN — MEPERIDINE HYDROCHLORIDE 12.5 MG: 25 INJECTION INTRAMUSCULAR; INTRAVENOUS; SUBCUTANEOUS at 08:55

## 2024-08-21 RX ADMIN — DESOGESTREL AND ETHINYL ESTRADIOL 3 MG: KIT at 08:36

## 2024-08-21 RX ADMIN — ONDANSETRON HYDROCHLORIDE 4 MG: 2 SOLUTION INTRAMUSCULAR; INTRAVENOUS at 08:58

## 2024-08-21 RX ADMIN — MEPERIDINE HYDROCHLORIDE 12.5 MG: 25 INJECTION INTRAMUSCULAR; INTRAVENOUS; SUBCUTANEOUS at 09:00

## 2024-08-21 RX ADMIN — CEFAZOLIN 2000 MG: 2 INJECTION, POWDER, FOR SOLUTION INTRAMUSCULAR; INTRAVENOUS at 07:38

## 2024-08-21 RX ADMIN — SODIUM CHLORIDE, POTASSIUM CHLORIDE, SODIUM LACTATE AND CALCIUM CHLORIDE 125 ML/HR: 600; 310; 30; 20 INJECTION, SOLUTION INTRAVENOUS at 07:11

## 2024-08-21 RX ADMIN — FENTANYL CITRATE 50 MCG: 50 INJECTION, SOLUTION INTRAMUSCULAR; INTRAVENOUS at 09:06

## 2024-08-21 RX ADMIN — ROCURONIUM BROMIDE 30 MG: 10 SOLUTION INTRAVENOUS at 07:33

## 2024-08-21 RX ADMIN — EPHEDRINE SULFATE 10 MG: 5 INJECTION INTRAVENOUS at 08:32

## 2024-08-21 RX ADMIN — KETOROLAC TROMETHAMINE 30 MG: 30 INJECTION, SOLUTION INTRAMUSCULAR; INTRAVENOUS at 09:14

## 2024-08-21 RX ADMIN — ROPIVACAINE HYDROCHLORIDE 150 MG: 5 INJECTION, SOLUTION EPIDURAL; INFILTRATION; PERINEURAL at 07:41

## 2024-08-21 NOTE — ANESTHESIA POSTPROCEDURE EVALUATION
Patient: Rohini Burch    Procedure Summary       Date: 08/21/24 Room / Location: [facility] OR  /  [facility] OR    Anesthesia Start: 0728 Anesthesia Stop: 0850    Procedures:       INGUINAL HERNIA REPAIR LAPAROSCOPIC WITH Queerfeed Media[employer]CI ROBOT: left (Left: Abdomen)      EXCISION LESION (Left: Thigh) Diagnosis:       Left inguinal hernia      (Left inguinal hernia [K40.90])    Surgeons: Rosalva Mitchell MD Provider: Ryan Hdz MD    Anesthesia Type: general with block ASA Status: 3            Anesthesia Type: general with block    Vitals  Vitals Value Taken Time   /88 08/21/24 0925   Temp 97.2 °F (36.2 °C) 08/21/24 0850   Pulse 78 08/21/24 0925   Resp 18 08/21/24 0925   SpO2 98 % 08/21/24 0925           Post Anesthesia Care and Evaluation    Patient location during evaluation: PACU  Patient participation: complete - patient participated  Level of consciousness: awake  Pain score: 0  Pain management: satisfactory to patient    Airway patency: patent  Anesthetic complications: No anesthetic complications  PONV Status: none  Cardiovascular status: hemodynamically stable  Respiratory status: nasal cannula  Hydration status: acceptable

## 2024-08-21 NOTE — ANESTHESIA PROCEDURE NOTES
Airway  Urgency: elective    Date/Time: 8/21/2024 7:36 AM  Airway not difficult    General Information and Staff    Patient location during procedure: OR    Indications and Patient Condition  Indications for airway management: airway protection    Preoxygenated: yes  Mask difficulty assessment: 1 - vent by mask    Final Airway Details  Final airway type: endotracheal airway      Successful airway: ETT  Cuffed: yes   Successful intubation technique: direct laryngoscopy  Facilitating devices/methods: intubating stylet and cricoid pressure  Endotracheal tube insertion site: oral  Blade: Julieta  Blade size: 3  ETT size (mm): 7.0  Cormack-Lehane Classification: grade I - full view of glottis  Placement verified by: chest auscultation, capnometry and palpation of cuff   Measured from: lips  ETT/EBT  to lips (cm): 21  Number of attempts at approach: 1  Assessment: lips, teeth, and gum same as pre-op and atraumatic intubation

## 2024-08-21 NOTE — ANESTHESIA PREPROCEDURE EVALUATION
Anesthesia Evaluation     Patient summary reviewed and Nursing notes reviewed   no history of anesthetic complications:   NPO Solid Status: > 8 hours  NPO Liquid Status: > 8 hours           Airway   Mallampati: II  TM distance: >3 FB  Neck ROM: full  Dental - normal exam     Pulmonary     breath sounds clear to auscultation  (-) not a smoker  Cardiovascular   Exercise tolerance: good (4-7 METS)    PT is on anticoagulation therapy  Rhythm: regular  Rate: normal    (+) hyperlipidemia      Neuro/Psych  (+) headaches  GI/Hepatic/Renal/Endo - negative ROS     Musculoskeletal     Abdominal     Abdomen: soft.   Substance History - negative use     OB/GYN negative ob/gyn ROS         Other   arthritis,     ROS/Med Hx Other: Plavix for varicose veins in legs.                Anesthesia Plan    ASA 3     general with block     intravenous induction     Anesthetic plan, risks, benefits, and alternatives have been provided, discussed and informed consent has been obtained with: patient.  Pre-procedure education provided  Use of blood products discussed with  Consented to blood products.    Plan discussed with CRNA.    CODE STATUS:

## 2024-08-21 NOTE — ANESTHESIA PROCEDURE NOTES
"Peripheral Block      Patient reassessed immediately prior to procedure    Patient location during procedure: OR  Start time: 8/21/2024 7:41 AM  Stop time: 8/21/2024 7:42 AM  Reason for block: at surgeon's request and post-op pain management  Performed by  CRNA/CAA: Nancy Monteiro CRNA  Preanesthetic Checklist  Completed: patient identified, IV checked, site marked, risks and benefits discussed, surgical consent, monitors and equipment checked, pre-op evaluation and timeout performed  Prep:  Pt Position: supine  Sterile barriers:cap, gloves, sterile barriers and mask  Prep: ChloraPrep  Patient monitoring: blood pressure monitoring, continuous pulse oximetry and EKG  Procedure    Nursing cardiac assessment comments yes: Sedation, GA, Spinal,Epidural   Performed under: general  Guidance:ultrasound guided    ULTRASOUND INTERPRETATION.  Using ultrasound guidance a 20 G (20g 4\" Stimuplex) gauge needle was placed in close proximity to the nerve, at which point, under ultrasound guidance anesthetic was injected in the area of the nerve and spread of the anesthesia was seen on ultrasound in close proximity thereto.  There were no abnormalities seen on ultrasound; a digital image was taken; and the patient tolerated the procedure with no complications. Images:still images obtained    Laterality:Bilateral  Block Type:TAP  Injection Technique:single-shot  Needle Type:short-bevel  Needle Gauge:20 G  Resistance on Injection: none    Medications Used: ropivacaine (NAROPIN) injection 0.5 % - Peripheral Nerve   150 mg - 8/21/2024 7:41:00 AM  dexamethasone (DECADRON) injection - Injection   8 mg - 8/21/2024 7:41:00 AM      Medications  Preservative Free Saline:20ml  Comment:Block Injection:  Total volume divided equally between all injection sites  Bilateral subcostal and left lateral tap block       Post Assessment  Injection Assessment: negative aspiration for heme, incremental injection and no paresthesia on " injection  Patient Tolerance:comfortable throughout block  Complications:no  Additional Notes  The pt was in the supine position under general anesthesia    Under Ultrasound guidance, a BBraun 4inch 360 degree needle was advanced with Normal Saline hydro dissection of tissue.  The Internal Oblique and Transversus Abdominus muscles where visualized.  At or before the aponeurosis of Internal Oblique, local anesthetic spread was visualized in the Transversus Abdominus Plane. Injection was made incrementally with aspiration every 5 mls.  There was no  intravascular injection,  injection pressure was normal, there was no neural injection, and the procedure was completed without difficulty. The same procedure was completed for left  sided lateral tap blocks.    Under Ultrasound guidance, a Alexander 4inch 360 degree needle was advanced with Normal Saline hydro dissection of tissue.  The Rectus and Transversus Abdominus muscles where visualized.  The needle tip was placed between the Transversus Abdominus and rectus abdominus, local anesthetic spread was visualized in the Transversus Abdominus Plane. Injection was made incrementally with aspiration every 5 mls.  There was no  intravascular injection,  injection pressure was normal, there was no neural injection, and the procedure was completed without difficulty. The same procedure was completed for left and right sided subcostal tap blocks. Thank You.      Performed by: Omaira Martinez CRNA

## 2024-08-21 NOTE — DISCHARGE INSTRUCTIONS
DISCHARGE RECOMMENDATIONS    You may shower in 24 hours. It is important that you let soap and water run over your wound to keep it clean. Pat dry with clean towel. Wound does not need to be covered (you have stitches that will dissolve under your skin. You have surgical glue that will fall off on its own).  Do not soak in a tub or go in a pool/swim in water for 2 weeks.  Take over the counter acetaminophen (tylenol) or ibuprofen (advil/motrin) as needed for pain control. These medications may be alternated, follow the recommendations on the medication bottle. Take your prescribed narcotic pain medications as needed for additional pain control. (DO NOT DRIVE WHILE TAKING NARCOTIC PAIN MEDICATION)  Please notify the general surgery clinic should you develop redness, worsening drainage, fever, or increasing pain at your incision site.   Do not lift more than 15 pounds for 6 weeks.     Clinic contact information:  [facility]  [facility] Location- [phone]

## 2024-08-22 LAB — REF LAB TEST METHOD: NORMAL

## 2024-08-22 NOTE — OP NOTE
OPERATIVE NOTE    Patient Name:  Rohini Burch  YOB: 1960  [id number]    Date of Surgery:  8/21/24    PREOPERATIVE DIAGNOSIS: Reducible left inguinal hernia    POSTOPERATIVE DIAGNOSIS: Same      PROCEDURE PERFORMED:   Lysis of adhesions  Robotic left inguinal hernia repair using da Ivonne robot  Removal of skin tag from left anterior thigh    SURGEON: Rosalva Mitchell MD       Circulator: Che Stoll RN  Scrub Person: Laz Maurer  Vendor Representative: Margarita Ly  Assistant: Tana Platt     Assistant: Tana Platt    SPECIMENS: None     EBL: 10     ANESTHESIA: General.      FINDINGS:   1. Left direct inguinal hernia   2.      INDICATIONS: The patient is a 64 y.o. F who presented to clinic with a symptomatic left inguinal hernia.  They were interested in operative repair.  We discussed proceeding with a robotic inguinal hernia repair with the benefit of being able to evaluate both sides intraoperatively.  They consented for a left, possible bilateral, inguinal hernia repair.    DESCRIPTION OF PROCEDURE:    After obtaining informed consent, the patient was taken to the operating room and placed in supine position. After appropriate DVT and antibiotic prophylaxis, general anesthesia was induced. TAP blocks were placed by the anesthesia staff bilaterally to aid in post-op pain control.  A Lopez catheter was placed.  The abdomen was prepped and draped in standard sterile fashion and timeout was performed. During the prepping of the abdomen a large skin tag was found on her anterior left thigh.  I left the operating room at this time and discussed with the patient's daughter about removal of the skin tag during the surgery.  The patient's daughter agreed to let me remove the skin tag as it seemed to be in a quite symptomatic area on her body.  I began with Veress needle entry at Segovia's point.  Opening pressures were appropriate.  Once the abdomen was adequately insufflated, I  placed an 8 mm robotic port 2 cm above the umbilicus and 2 cm laterally on the right using Optiview entry.  Upon entry into the abdomen, adhesions were noted inferior to my trocar placement.  I then placed the remaining two 8 mm working trocars in a linear fashion, 5 cm medially and 5 cm laterally to my camera port.  I then proceeded to the robotic console.  I performed a lysis of adhesions of the adhesions in her pelvis allowing me to visualize the bilateral inguinal canals, there was only a defect present on the left.   I then proceeded with marking my peritoneum, laterally from the ASIS and medially to the median umbilical ligament.  I then proceeded with taking down the peritoneum within this flap, ensuring to protect of the inferior epigastric artery.   I then proceeded with my dissection to reduce the peritoneum from the direct hernia defect noted.  I was able to complete this dissection without any holes in the peritoneum.  Once I had created an adequate area of dissection, I was able to visualize all of the potential hernia spaces. There was only the left direct hernia I identified. A large Bard 3DMax mesh was placed into the abdomen as well as a 3-0  Vicryl sutures and a 2-0 V-Loc suture.  I then placed a 3-0 Vicryl suture medially to anchor the mesh into place as well as an additional one laterally, ensuring to take small superficial bites to not have any nerve entrapment.  The mesh was adequately secured with no evidence of peritoneum entering under the mesh.  I was pleased with the way the mesh laid at this point.  I then closed the peritoneal flap using the 2-0 V-Loc suture.  The sutures were removed from the abdomen under direct visualization.  I then returned to the bedside and closed the supraumbilical port with a 0 V-Loc using the Adán-Kendall.  The remaining port was removed under direct visualization.  The abdomen was desufflated and the incisions were closed with a 4-0 Monocryl and covered  with surgical glue.     I then removed the skin tag from her left anterior thigh using hook electrocautery.  A 4-0 Monocryl was used to close the skin.  Surgical glue was applied on top.    The Lopez catheter was removed at the end of the case.       This concluded the operation. At the end of the case, the instrument count was correct. The patient was awoken from anesthesia and transported to PACU for further monitoring.     COMPLICATIONS: None

## 2024-08-27 DIAGNOSIS — M25.50 MULTIPLE JOINT PAIN: ICD-10-CM

## 2024-08-27 DIAGNOSIS — Z78.9 WEIGHT LOSS ADVISED: ICD-10-CM

## 2024-08-27 RX ORDER — HYDROCODONE BITARTRATE AND ACETAMINOPHEN 10; 325 MG/1; MG/1
1 TABLET ORAL EVERY 6 HOURS PRN
Qty: 90 TABLET | Refills: 0 | Status: SHIPPED | OUTPATIENT
Start: 2024-08-27

## 2024-08-27 RX ORDER — PHENTERMINE HYDROCHLORIDE 37.5 MG/1
37.5 TABLET ORAL
Qty: 30 TABLET | Refills: 0 | Status: SHIPPED | OUTPATIENT
Start: 2024-08-27

## 2024-08-27 RX ORDER — FLUCONAZOLE 150 MG/1
150 TABLET ORAL DAILY
Qty: 3 TABLET | Refills: 0 | Status: SHIPPED | OUTPATIENT
Start: 2024-08-27 | End: 2024-08-30

## 2024-09-03 ENCOUNTER — OFFICE VISIT (OUTPATIENT)
Age: 64
End: 2024-09-03
Payer: COMMERCIAL

## 2024-09-03 VITALS — BODY MASS INDEX: 22.66 KG/M2 | HEIGHT: 65 IN | WEIGHT: 136 LBS

## 2024-09-03 DIAGNOSIS — K40.90 LEFT INGUINAL HERNIA: Primary | ICD-10-CM

## 2024-09-03 PROCEDURE — 1160F RVW MEDS BY RX/DR IN RCRD: CPT | Performed by: SURGERY

## 2024-09-03 PROCEDURE — 1159F MED LIST DOCD IN RCRD: CPT | Performed by: SURGERY

## 2024-09-03 PROCEDURE — 99024 POSTOP FOLLOW-UP VISIT: CPT | Performed by: SURGERY

## 2024-09-03 NOTE — PROGRESS NOTES
"Patient Name:  Rohini Burch  YOB: 1960  [id number]    Follow Up Note- Post Op    Date of visit: 9/3/2024    Subjective   Subjective:   Presents today for post operative evaluation.  She underwent a robotic left inguinal hernia repair and removal of left thigh skin tag on August 21.  She had been healing well from this operation until last Friday when she took a tumble while camping.  She noticed bruising on her left abdomen and left groin after this happened.  It has been slightly improving since the incident.       Objective     Objective:     Ht 165.1 cm (65\")   Wt 61.7 kg (136 lb)   BMI 22.63 kg/m²       CV:  Regular rate and rhythm  L:  Bilateral lung rise and fall, no use of accessory muscles   Abd:  Soft,  nondistended. Incisions clean and well healing.  Bruising present over left lower abdomen and left groin.  Ext:  No cyanosis, clubbing, edema      Assessment/ Plan:  Assessment   Diagnoses and all orders for this visit:    1. Left inguinal hernia (Primary)      Presents for a post operative evaluation today. They are healing well from their surgery. Pathology reviewed.   Discussed to take a trial of anti-inflammatories for 1 month.  Should she continue to have pain after 1 month, encouraged her to notify our clinic for reevaluation and possible CT scan.    Rosalva Wynn MD       General Surgeon  [facility]       "

## 2024-09-26 ENCOUNTER — OFFICE VISIT (OUTPATIENT)
Dept: FAMILY MEDICINE CLINIC | Facility: CLINIC | Age: 64
End: 2024-09-26
Payer: COMMERCIAL

## 2024-09-26 VITALS
RESPIRATION RATE: 16 BRPM | WEIGHT: 141.6 LBS | BODY MASS INDEX: 23.59 KG/M2 | OXYGEN SATURATION: 95 % | HEART RATE: 84 BPM | HEIGHT: 65 IN

## 2024-09-26 DIAGNOSIS — I67.9 SMALL VESSEL DISEASE, CEREBROVASCULAR: ICD-10-CM

## 2024-09-26 DIAGNOSIS — M25.50 MULTIPLE JOINT PAIN: ICD-10-CM

## 2024-09-26 DIAGNOSIS — J30.1 SEASONAL ALLERGIC RHINITIS DUE TO POLLEN: Primary | ICD-10-CM

## 2024-09-26 DIAGNOSIS — Z78.9 WEIGHT LOSS ADVISED: ICD-10-CM

## 2024-09-26 PROCEDURE — 99214 OFFICE O/P EST MOD 30 MIN: CPT | Performed by: NURSE PRACTITIONER

## 2024-09-26 PROCEDURE — 1159F MED LIST DOCD IN RCRD: CPT | Performed by: NURSE PRACTITIONER

## 2024-09-26 PROCEDURE — 1160F RVW MEDS BY RX/DR IN RCRD: CPT | Performed by: NURSE PRACTITIONER

## 2024-09-26 RX ORDER — CLOPIDOGREL BISULFATE 75 MG/1
75 TABLET ORAL DAILY
Qty: 30 TABLET | Refills: 5 | Status: SHIPPED | OUTPATIENT
Start: 2024-09-26

## 2024-09-26 RX ORDER — TOBRAMYCIN 3 MG/ML
2 SOLUTION/ DROPS OPHTHALMIC
Qty: 5 ML | Refills: 0 | Status: SHIPPED | OUTPATIENT
Start: 2024-09-26

## 2024-09-26 RX ORDER — PHENTERMINE HYDROCHLORIDE 37.5 MG/1
37.5 TABLET ORAL
Qty: 30 TABLET | Refills: 0 | Status: SHIPPED | OUTPATIENT
Start: 2024-09-26

## 2024-09-26 RX ORDER — MONTELUKAST SODIUM 10 MG/1
10 TABLET ORAL NIGHTLY
Qty: 30 TABLET | Refills: 5 | Status: SHIPPED | OUTPATIENT
Start: 2024-09-26

## 2024-09-26 RX ORDER — ROSUVASTATIN CALCIUM 20 MG/1
20 TABLET, COATED ORAL DAILY
Qty: 30 TABLET | Refills: 5 | Status: SHIPPED | OUTPATIENT
Start: 2024-09-26

## 2024-09-26 RX ORDER — PSEUDOEPHEDRINE HCL 30 MG
30 TABLET ORAL EVERY 6 HOURS PRN
Qty: 30 TABLET | Refills: 0 | Status: SHIPPED | OUTPATIENT
Start: 2024-09-26

## 2024-09-26 RX ORDER — HYDROCODONE BITARTRATE AND ACETAMINOPHEN 10; 325 MG/1; MG/1
1 TABLET ORAL EVERY 6 HOURS PRN
Qty: 90 TABLET | Refills: 0 | Status: SHIPPED | OUTPATIENT
Start: 2024-09-26

## 2024-10-02 RX ORDER — AZITHROMYCIN 250 MG/1
TABLET, FILM COATED ORAL
Qty: 6 TABLET | Refills: 0 | Status: SHIPPED | OUTPATIENT
Start: 2024-10-02

## 2024-10-21 ENCOUNTER — OFFICE VISIT (OUTPATIENT)
Dept: FAMILY MEDICINE CLINIC | Facility: CLINIC | Age: 64
End: 2024-10-21
Payer: COMMERCIAL

## 2024-10-21 VITALS
HEART RATE: 107 BPM | BODY MASS INDEX: 22.42 KG/M2 | HEIGHT: 65 IN | RESPIRATION RATE: 18 BRPM | WEIGHT: 134.6 LBS | OXYGEN SATURATION: 100 %

## 2024-10-21 DIAGNOSIS — J30.1 SEASONAL ALLERGIC RHINITIS DUE TO POLLEN: ICD-10-CM

## 2024-10-21 DIAGNOSIS — I67.9 SMALL VESSEL DISEASE, CEREBROVASCULAR: ICD-10-CM

## 2024-10-21 DIAGNOSIS — J06.9 ACUTE URI: ICD-10-CM

## 2024-10-21 DIAGNOSIS — M25.50 MULTIPLE JOINT PAIN: Primary | ICD-10-CM

## 2024-10-21 PROCEDURE — 99214 OFFICE O/P EST MOD 30 MIN: CPT | Performed by: NURSE PRACTITIONER

## 2024-10-21 PROCEDURE — 1160F RVW MEDS BY RX/DR IN RCRD: CPT | Performed by: NURSE PRACTITIONER

## 2024-10-21 PROCEDURE — 1159F MED LIST DOCD IN RCRD: CPT | Performed by: NURSE PRACTITIONER

## 2024-10-21 RX ORDER — HYDROCODONE BITARTRATE AND ACETAMINOPHEN 10; 325 MG/1; MG/1
1 TABLET ORAL EVERY 6 HOURS PRN
Qty: 90 TABLET | Refills: 0 | Status: SHIPPED | OUTPATIENT
Start: 2024-10-21

## 2024-10-21 RX ORDER — CEFDINIR 300 MG/1
300 CAPSULE ORAL 2 TIMES DAILY
Qty: 20 CAPSULE | Refills: 0 | Status: SHIPPED | OUTPATIENT
Start: 2024-10-21

## 2024-10-21 RX ORDER — PSEUDOEPHEDRINE HCL 30 MG
30 TABLET ORAL EVERY 6 HOURS PRN
Qty: 30 TABLET | Refills: 0 | Status: SHIPPED | OUTPATIENT
Start: 2024-10-21

## 2024-10-21 NOTE — PROGRESS NOTES
"Subjective   Rohini Burch is a 64 y.o. female.     Chief Complaint   Patient presents with    Seasonal allergic rhinitis due to pollen       History of Present Illness     Allergies-chronic and ongoing.  Patient is currently taking Zyrtec 10 mg, Singulair 10 mg, and using Alaway eyedrops.  No negative side effects.  She has congestion and rhinorrhea both.  Sinus pressure is worse in the AM.  No sore throat or ear pain.  She reports sneezing but no concerns with cough.  No SOA.  Some colored of secretions.    Joint pain/leg pain/varicose veins-ongoing.  Patient is currently on Celebrex 100 mg twice daily as needed and hydrocodone 10 mg every 6 hours as needed.  No negative side effects.  Some increased generalized joint pain with cooler weather.  She reports \"legs are killing me\". She reports no concerns with hand and wrist pain.    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  Reports she is not taking daily but at least QOD.  Patient denies any bleeding of gums, melena, hematochezia, hematuria, or epistaxis.       The following portions of the patient's history were reviewed and updated as appropriate: CC, ROS, allergies, current medications, past family history, past medical history, past social history, past surgical history and problem list.    Review of Systems   Constitutional:  Positive for fatigue. Negative for appetite change and fever.   HENT:  Positive for congestion, postnasal drip, rhinorrhea, sinus pressure and sneezing. Negative for ear pain, nosebleeds, sore throat, tinnitus, trouble swallowing and voice change.    Eyes:  Negative for blurred vision, photophobia and visual disturbance.   Respiratory:  Negative for cough, chest tightness, shortness of breath and wheezing.    Cardiovascular:  Negative for chest pain and palpitations.   Gastrointestinal:  Negative for abdominal pain, blood in stool, constipation, diarrhea, nausea and GERD.   Endocrine: Negative for cold intolerance, heat " "intolerance and polydipsia.   Genitourinary:  Negative for decreased urine volume, difficulty urinating, dysuria and hematuria.   Musculoskeletal:  Positive for arthralgias, back pain and myalgias. Negative for gait problem.   Skin:  Negative for color change, pallor and wound.   Allergic/Immunologic: Negative.    Neurological:  Negative for dizziness, syncope, numbness and headache.   Hematological: Negative.    Psychiatric/Behavioral:  Negative for decreased concentration, sleep disturbance, suicidal ideas and depressed mood. The patient is not nervous/anxious.    All other systems reviewed and are negative.      Objective     Pulse 107   Resp 18   Ht 165.1 cm (65\")   Wt 61.1 kg (134 lb 9.6 oz)   SpO2 100%   BMI 22.40 kg/m²     Physical Exam  Vitals reviewed.   Constitutional:       General: She is not in acute distress.     Appearance: She is well-developed. She is not diaphoretic.   HENT:      Head: Normocephalic and atraumatic.      Jaw: No tenderness.      Right Ear: Hearing, tympanic membrane, ear canal and external ear normal.      Left Ear: Hearing, tympanic membrane, ear canal and external ear normal.      Nose: Congestion and rhinorrhea present. No nasal tenderness.      Right Sinus: No maxillary sinus tenderness or frontal sinus tenderness.      Left Sinus: No maxillary sinus tenderness or frontal sinus tenderness.      Mouth/Throat:      Lips: Pink.      Mouth: Mucous membranes are moist.      Pharynx: Oropharynx is clear. Uvula midline.   Eyes:      General: Lids are normal. No scleral icterus.     Extraocular Movements:      Right eye: Normal extraocular motion and no nystagmus.      Left eye: Normal extraocular motion and no nystagmus.      Conjunctiva/sclera: Conjunctivae normal.      Pupils: Pupils are equal, round, and reactive to light.   Neck:      Thyroid: No thyromegaly or thyroid tenderness.      Vascular: No carotid bruit or JVD.      Trachea: No tracheal tenderness.   Cardiovascular: "      Rate and Rhythm: Normal rate and regular rhythm.      Pulses:           Dorsalis pedis pulses are 2+ on the right side and 2+ on the left side.        Posterior tibial pulses are 2+ on the right side and 2+ on the left side.      Heart sounds: Normal heart sounds, S1 normal and S2 normal. No murmur heard.  Pulmonary:      Effort: Pulmonary effort is normal. No accessory muscle usage, prolonged expiration or respiratory distress.      Breath sounds: Normal breath sounds.   Chest:      Chest wall: No tenderness.   Abdominal:      General: Bowel sounds are normal. There is no distension.      Palpations: Abdomen is soft. There is no hepatomegaly, splenomegaly or mass.      Tenderness: There is no abdominal tenderness.   Musculoskeletal:         General: Tenderness present.      Cervical back: Normal range of motion and neck supple. Tenderness present.      Thoracic back: Tenderness present.      Lumbar back: Tenderness present.      Right lower leg: Tenderness present. No edema.      Left lower leg: Tenderness present. No edema.      Comments: No muscular atrophy or flaccidity.   Lymphadenopathy:      Head:      Right side of head: No submental or submandibular adenopathy.      Left side of head: No submental or submandibular adenopathy.      Cervical: No cervical adenopathy.      Right cervical: No superficial cervical adenopathy.     Left cervical: No superficial cervical adenopathy.   Skin:     General: Skin is warm and dry.      Capillary Refill: Capillary refill takes less than 2 seconds.      Coloration: Skin is not jaundiced or pale.      Findings: No erythema.      Nails: There is no clubbing.   Neurological:      Mental Status: She is alert and oriented to person, place, and time.      Cranial Nerves: No cranial nerve deficit or facial asymmetry.      Sensory: No sensory deficit.      Motor: No weakness, tremor, atrophy or abnormal muscle tone.      Coordination: Coordination normal.      Gait: Gait  abnormal (milid antalgia).      Deep Tendon Reflexes: Reflexes are normal and symmetric.   Psychiatric:         Attention and Perception: She is attentive.         Mood and Affect: Mood normal. Mood is not anxious or depressed.         Speech: Speech normal.         Behavior: Behavior normal. Behavior is cooperative.         Thought Content: Thought content normal.         Cognition and Memory: Cognition normal.         Judgment: Judgment normal.         Diagnoses and all orders for this visit:    1. Multiple joint pain (Primary)  Comments:  Avoid overuse activity.  Continue Norco and Celebrex as directed  Assessment & Plan:  Continue to be active.  Avoid overuse activities  Continue PRN Norco and celebrex as directed    Orders:  -     HYDROcodone-acetaminophen (Norco)  MG per tablet; Take 1 tablet by mouth Every 6 (Six) Hours As Needed for Moderate Pain.  Dispense: 90 tablet; Refill: 0  -     pseudoephedrine (Sudafed) 30 MG tablet; Take 1 tablet by mouth Every 6 (Six) Hours As Needed for Congestion.  Dispense: 30 tablet; Refill: 0    2. Seasonal allergic rhinitis due to pollen  Assessment & Plan:  Continue Zyrtec 10 mg and singulair 10 mg.  Continue eyedrops as needed.  Avoid known allergy and respiratory triggers.  May use Saline spray PRN as desired  PRN Sudafed      3. Small vessel disease, cerebrovascular  Assessment & Plan:  Continue plavix 75 mg and Crestor 20 mg  Report any negative side effects.   Encouraged to pursue a low-cholesterol diet including limited fried foods and low-fat foods.  Be active as physically able.      4. Acute URI  Comments:  finish all antibiotics  Orders:  -     cefdinir (OMNICEF) 300 MG capsule; Take 1 capsule by mouth 2 (Two) Times a Day.  Dispense: 20 capsule; Refill: 0       BMI is within normal parameters. No other follow-up for BMI required.      Understands disease processes and need for medications.  Understands reasons for urgent and emergent care.  Patient (& family)  verbalized agreement for treatment plan.   Emotional support and active listening provided.  Patient provided time to verbalize feelings.    MICHELLE/PMDP reviewed today and consistent.  Will refill prescribed controlled medication today.  Patient is aware they cannot receive narcotics from any other provider except if under care of pain management or speciality clinic.  Risk and benefits of medication use has been reviewed.  History and physical exam exhibit continued safe and appropriate use of controlled substances.  The patient is aware of the potential for addiction and dependence.  This patient has been made aware of the appropriate use of such medications, including potential risk of somnolence, limited ability to drive and / or work safely, and potential for overdose.    It has also been made clear that these medications are for use by this patient only, without concomitant use of alcohol or other substances unless prescribed/advised by medical provider.  Patient understands they may be subject to UDS and pill counts at random.    Patient considered to be low risk for addiction due to use of single controlled medications.  Patient understands and accepts these risks.  Patient need for medication will be reassessed at each visit.  Doses will be adjusted according to patient need and findings.    Goal of TX: Patient will not have any adverse reactions of medication.  Patient have reduction in pain symptoms with use of PRN Norco as directed.  Patient will not have any adverse side effects from medication.  Patient will be able to remain active in an outside of home without interference from pain symptoms.      [facility] Patient Controlled Substance Report (from 10/22/2023 to 10/21/2024)    Dispensed  Strength Quantity Days Supply Provider Pharmacy   09/26/2024 Hydrocodone Bitartrate/Ac 325MG/10MG 90 each 22 [facility] [place]   09/26/2024 Phentermine Hcl 37.5MG 30 each 30 [facility] [place]    08/28/2024 Hydrocodone Bitartrate/Ac 325MG/10MG 90 each 30 [facility] [place]   08/28/2024 Phentermine Hcl 37.5MG 30 each 30 [facility] [place]   08/21/2024 Oxycodone Hydrochloride 5MG 20 each 4 [facility]...   07/30/2024 Hydrocodone Bitartrate/Ac 325MG/10MG 90 each 30 [facility] [place]   07/30/2024 Phentermine Hcl 37.5MG 30 each 30 [facility] [place]        RTC 1 month, sooner if needed.             This document has been electronically signed by:  STEVE Gonsales FNP-C Dragon disclaimer:  Part of this note may be an electronic transcription/translation of spoken language to printed text using the Dragon Dictation System.

## 2024-10-21 NOTE — ASSESSMENT & PLAN NOTE
Continue Zyrtec 10 mg and singulair 10 mg.  Continue eyedrops as needed.  Avoid known allergy and respiratory triggers.  May use Saline spray PRN as desired  PRN Sudafed

## 2024-11-19 ENCOUNTER — OFFICE VISIT (OUTPATIENT)
Dept: FAMILY MEDICINE CLINIC | Facility: CLINIC | Age: 64
End: 2024-11-19
Payer: COMMERCIAL

## 2024-11-19 VITALS
DIASTOLIC BLOOD PRESSURE: 84 MMHG | RESPIRATION RATE: 20 BRPM | SYSTOLIC BLOOD PRESSURE: 138 MMHG | WEIGHT: 137.6 LBS | HEIGHT: 65 IN | BODY MASS INDEX: 22.92 KG/M2 | HEART RATE: 105 BPM | OXYGEN SATURATION: 95 %

## 2024-11-19 DIAGNOSIS — M25.50 MULTIPLE JOINT PAIN: ICD-10-CM

## 2024-11-19 DIAGNOSIS — I67.9 SMALL VESSEL DISEASE, CEREBROVASCULAR: Primary | ICD-10-CM

## 2024-11-19 DIAGNOSIS — Z79.899 ENCOUNTER FOR LONG-TERM (CURRENT) USE OF HIGH-RISK MEDICATION: ICD-10-CM

## 2024-11-19 DIAGNOSIS — Z51.81 ENCOUNTER FOR THERAPEUTIC DRUG MONITORING: ICD-10-CM

## 2024-11-19 DIAGNOSIS — R63.5 GAIN OF WEIGHT: ICD-10-CM

## 2024-11-19 DIAGNOSIS — I83.813 VARICOSE VEINS OF BOTH LOWER EXTREMITIES WITH PAIN: ICD-10-CM

## 2024-11-19 DIAGNOSIS — Z87.898 H/O MOTION SICKNESS: ICD-10-CM

## 2024-11-19 DIAGNOSIS — J30.1 SEASONAL ALLERGIC RHINITIS DUE TO POLLEN: ICD-10-CM

## 2024-11-19 PROCEDURE — 1160F RVW MEDS BY RX/DR IN RCRD: CPT | Performed by: NURSE PRACTITIONER

## 2024-11-19 PROCEDURE — 1159F MED LIST DOCD IN RCRD: CPT | Performed by: NURSE PRACTITIONER

## 2024-11-19 PROCEDURE — 99214 OFFICE O/P EST MOD 30 MIN: CPT | Performed by: NURSE PRACTITIONER

## 2024-11-19 RX ORDER — PHENTERMINE HYDROCHLORIDE 37.5 MG/1
37.5 TABLET ORAL
Qty: 30 TABLET | Refills: 0 | Status: SHIPPED | OUTPATIENT
Start: 2024-11-19

## 2024-11-19 RX ORDER — HYDROCODONE BITARTRATE AND ACETAMINOPHEN 10; 325 MG/1; MG/1
1 TABLET ORAL EVERY 6 HOURS PRN
Qty: 90 TABLET | Refills: 0 | Status: SHIPPED | OUTPATIENT
Start: 2024-11-19

## 2024-11-19 RX ORDER — CETIRIZINE HYDROCHLORIDE 10 MG/1
10 TABLET ORAL DAILY PRN
Qty: 30 TABLET | Refills: 5 | Status: SHIPPED | OUTPATIENT
Start: 2024-11-19

## 2024-11-19 RX ORDER — OLOPATADINE HYDROCHLORIDE 1 MG/ML
1 SOLUTION/ DROPS OPHTHALMIC 2 TIMES DAILY
Qty: 5 ML | Refills: 5 | Status: SHIPPED | OUTPATIENT
Start: 2024-11-19

## 2024-11-19 RX ORDER — MONTELUKAST SODIUM 10 MG/1
10 TABLET ORAL NIGHTLY
Qty: 30 TABLET | Refills: 5 | Status: SHIPPED | OUTPATIENT
Start: 2024-11-19

## 2024-11-19 RX ORDER — ONDANSETRON 4 MG/1
4 TABLET, FILM COATED ORAL EVERY 8 HOURS PRN
Qty: 30 TABLET | Refills: 2 | Status: SHIPPED | OUTPATIENT
Start: 2024-11-19

## 2024-11-19 NOTE — PROGRESS NOTES
Subjective   Rohini Burch is a 64 y.o. female.     Chief Complaint   Patient presents with    Multiple joint pain       History of Present Illness     Allergies-chronic and ongoing.  Patient is currently taking Zyrtec 10 mg and using Alaway eyedrops.  No negative side effects.  Joint pain-ongoing.  Patient is currently on Celebrex 100 mg twice daily as needed and hydrocodone 10 mg every 6 hours as needed.  No negative side effects.    She reports that her hip has been exacerbated and she did take a Oxycodone she had left over from surgery From August.  She reports that she was down on the couch and could hardly move for at least 2 days.  She had been lifting and pulling more.  No increased hand pain.  Generalized leg pain.     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  Reports she is not taking daily but at least QOD.  Patient denies any bleeding of gums, melena, hematochezia, hematuria, or epistaxis.    Weight Concern-patient would like to have adipex again for another month to loose some weight.  She reports that her appetite has been increased and she has tolerated medication well in the past.      The following portions of the patient's history were reviewed and updated as appropriate: CC, ROS, allergies, current medications, past family history, past medical history, past social history, past surgical history and problem list.      Review of Systems   Constitutional:  Positive for fatigue. Negative for appetite change and fever.   HENT:  Positive for rhinorrhea. Negative for congestion, ear pain, nosebleeds, postnasal drip, sore throat, tinnitus, trouble swallowing and voice change.    Eyes:  Positive for itching. Negative for blurred vision, photophobia and visual disturbance.   Respiratory:  Negative for cough, chest tightness, shortness of breath and wheezing.    Cardiovascular:  Negative for chest pain and palpitations.   Gastrointestinal:  Negative for abdominal pain, blood in stool,  "constipation, diarrhea, nausea and GERD.   Endocrine: Negative for cold intolerance, heat intolerance and polydipsia.   Genitourinary:  Negative for decreased urine volume, difficulty urinating, dysuria and hematuria.   Musculoskeletal:  Positive for arthralgias, back pain and myalgias. Negative for gait problem.   Skin:  Negative for color change, pallor and wound.   Allergic/Immunologic: Negative.    Neurological:  Negative for dizziness, syncope, numbness and headache.   Hematological: Negative.    Psychiatric/Behavioral:  Negative for decreased concentration, sleep disturbance, suicidal ideas and depressed mood. The patient is not nervous/anxious.    All other systems reviewed and are negative.      Objective     /84   Pulse 105   Resp 20   Ht 165.1 cm (65\")   Wt 62.4 kg (137 lb 9.6 oz)   SpO2 95%   BMI 22.90 kg/m²     Physical Exam  Vitals reviewed.   Constitutional:       General: She is not in acute distress.     Appearance: She is well-developed. She is not diaphoretic.   HENT:      Head: Normocephalic and atraumatic.      Jaw: No tenderness.      Right Ear: Hearing, tympanic membrane, ear canal and external ear normal.      Left Ear: Hearing, tympanic membrane, ear canal and external ear normal.      Nose: Congestion and rhinorrhea present. No nasal tenderness.      Right Sinus: No maxillary sinus tenderness or frontal sinus tenderness.      Left Sinus: No maxillary sinus tenderness or frontal sinus tenderness.      Mouth/Throat:      Lips: Pink.      Mouth: Mucous membranes are moist.      Pharynx: Oropharynx is clear. Uvula midline.   Eyes:      General: Lids are normal. No scleral icterus.     Extraocular Movements:      Right eye: Normal extraocular motion and no nystagmus.      Left eye: Normal extraocular motion and no nystagmus.      Conjunctiva/sclera: Conjunctivae normal.      Pupils: Pupils are equal, round, and reactive to light.   Neck:      Thyroid: No thyromegaly or thyroid " tenderness.      Vascular: No carotid bruit or JVD.      Trachea: No tracheal tenderness.   Cardiovascular:      Rate and Rhythm: Normal rate and regular rhythm.      Pulses:           Dorsalis pedis pulses are 2+ on the right side and 2+ on the left side.        Posterior tibial pulses are 2+ on the right side and 2+ on the left side.      Heart sounds: Normal heart sounds, S1 normal and S2 normal. No murmur heard.  Pulmonary:      Effort: Pulmonary effort is normal. No accessory muscle usage, prolonged expiration or respiratory distress.      Breath sounds: Normal breath sounds.   Chest:      Chest wall: No tenderness.   Abdominal:      General: Bowel sounds are normal. There is no distension.      Palpations: Abdomen is soft. There is no hepatomegaly, splenomegaly or mass.      Tenderness: There is no abdominal tenderness.   Musculoskeletal:         General: Tenderness present.      Cervical back: Normal range of motion and neck supple. Tenderness present.      Thoracic back: Tenderness present.      Lumbar back: Tenderness present.      Right lower leg: Tenderness present. No edema.      Left lower leg: Tenderness present. No edema.      Comments: No muscular atrophy or flaccidity.   Lymphadenopathy:      Head:      Right side of head: No submental or submandibular adenopathy.      Left side of head: No submental or submandibular adenopathy.      Cervical: No cervical adenopathy.      Right cervical: No superficial cervical adenopathy.     Left cervical: No superficial cervical adenopathy.   Skin:     General: Skin is warm and dry.      Capillary Refill: Capillary refill takes less than 2 seconds.      Coloration: Skin is not jaundiced or pale.      Findings: No erythema.      Nails: There is no clubbing.   Neurological:      Mental Status: She is alert and oriented to person, place, and time.      Cranial Nerves: No cranial nerve deficit or facial asymmetry.      Sensory: No sensory deficit.      Motor: No  weakness, tremor, atrophy or abnormal muscle tone.      Coordination: Coordination normal.      Gait: Gait abnormal (milid antalgia).      Deep Tendon Reflexes: Reflexes are normal and symmetric.   Psychiatric:         Attention and Perception: She is attentive.         Mood and Affect: Mood normal. Mood is not anxious or depressed.         Speech: Speech normal.         Behavior: Behavior normal. Behavior is cooperative.         Thought Content: Thought content normal.         Cognition and Memory: Cognition normal.         Judgment: Judgment normal.         Diagnoses and all orders for this visit:    1. Small vessel disease, cerebrovascular (Primary)  Assessment & Plan:  Continue plavix 75 mg and Crestor 20 mg  Report any negative side effects.   Encouraged to pursue a low-cholesterol diet including limited fried foods and low-fat foods.  Be active as physically able.      2. Varicose veins of both lower extremities with pain  Assessment & Plan:  Continue to be active.  Avoid overuse activities  Continue PRN Norco and celebrex as directed      3. Multiple joint pain  Comments:  Avoid overuse activity.  Continue Norco and Celebrex as directed  Assessment & Plan:  Continue to be active.  Avoid overuse activities  Continue PRN Norco and celebrex as directed    Orders:  -     HYDROcodone-acetaminophen (Norco)  MG per tablet; Take 1 tablet by mouth Every 6 (Six) Hours As Needed for Moderate Pain.  Dispense: 90 tablet; Refill: 0    4. Seasonal allergic rhinitis due to pollen  Comments:  Continue singulair.  continue zyrtec  avoid known allergy and respiratory triggers  Orders:  -     montelukast (Singulair) 10 MG tablet; Take 1 tablet by mouth Every Night.  Dispense: 30 tablet; Refill: 5  -     cetirizine (zyrTEC) 10 MG tablet; Take 1 tablet by mouth Daily As Needed for Allergies.  Dispense: 30 tablet; Refill: 5  -     olopatadine (PATANOL) 0.1 % ophthalmic solution; Administer 1 drop to both eyes 2 (Two) Times a  Day.  Dispense: 5 mL; Refill: 5    5. Gain of weight  -     phentermine (Adipex-P) 37.5 MG tablet; Take 1 tablet by mouth Every Morning Before Breakfast.  Dispense: 30 tablet; Refill: 0    6. H/O motion sickness  -     ondansetron (Zofran) 4 MG tablet; Take 1 tablet by mouth Every 8 (Eight) Hours As Needed for Nausea or Vomiting.  Dispense: 30 tablet; Refill: 2    7. Encounter for therapeutic drug monitoring  -     Urine Drug Screen - Urine, Clean Catch; Future    8. Encounter for long-term (current) use of high-risk medication  -     Urine Drug Screen - Urine, Clean Catch; Future       BMI is within normal parameters. No other follow-up for BMI required.      Understands disease processes and need for medications.  Understands reasons for urgent and emergent care.  Patient (& family) verbalized agreement for treatment plan.   Emotional support and active listening provided.  Patient provided time to verbalize feelings.    MICHELLE/PMDP reviewed today and consistent.  Will refill prescribed controlled medication today.  Patient is aware they cannot receive narcotics from any other provider except if under care of pain management or speciality clinic.  Risk and benefits of medication use has been reviewed.  History and physical exam exhibit continued safe and appropriate use of controlled substances.  The patient is aware of the potential for addiction and dependence.  This patient has been made aware of the appropriate use of such medications, including potential risk of somnolence, limited ability to drive and / or work safely, and potential for overdose.    It has also been made clear that these medications are for use by this patient only, without concomitant use of alcohol or other substances unless prescribed/advised by medical provider.  Patient understands they may be subject to UDS and pill counts at random.    Patient considered to be low risk for addiction due to use of single controlled medications.   Patient understands and accepts these risks.  Patient need for medication will be reassessed at each visit.  Doses will be adjusted according to patient need and findings.    Goal of TX: Patient will not have any adverse reactions of medication.  Patient have reduction in pain symptoms with use of PRN Norco as directed.  Patient will not have any adverse side effects from medication.  Patient will be able to remain active in an outside of home without interference from pain symptoms.    [facility] Patient Controlled Substance Report (from 11/20/2023 to 11/19/2024)    Dispensed  Strength Quantity Days Supply Provider Pharmacy   10/21/2024 Hydrocodone Bitartrate/Ac 325MG/10MG 90 each 30 [facility]AN,J[facility] [place]   09/26/2024 Hydrocodone Bitartrate/Ac 325MG/10MG 90 each 22 [facility]   09/26/2024 Phentermine Hcl 37.5MG 30 each 30 [facility]   08/28/2024 Hydrocodone Bitartrate/Ac 325MG/10MG 90 each 30 [facility]   08/28/2024 Phentermine Hcl 37.5MG 30 each 30 [facility]   08/21/2024 Oxycodone Hydrochloride 5MG 20 each 4 [facility]...   07/30/2024 Hydrocodone Bitartrate/Ac 325MG/10MG 90 each 30 [facility],J[facility]        RTC 2 months, sooner if needed.             This document has been electronically signed by:  STEVE Gonsales FNP-C Dragon disclaimer:  Part of this note may be an electronic transcription/translation of spoken language to printed text using the Dragon Dictation System.

## 2024-12-17 DIAGNOSIS — M25.50 MULTIPLE JOINT PAIN: ICD-10-CM

## 2024-12-17 DIAGNOSIS — R63.5 GAIN OF WEIGHT: ICD-10-CM

## 2024-12-17 RX ORDER — HYDROCODONE BITARTRATE AND ACETAMINOPHEN 10; 325 MG/1; MG/1
1 TABLET ORAL EVERY 6 HOURS PRN
Qty: 90 TABLET | Refills: 0 | Status: SHIPPED | OUTPATIENT
Start: 2024-12-17 | End: 2024-12-19 | Stop reason: SDUPTHER

## 2024-12-17 RX ORDER — PHENTERMINE HYDROCHLORIDE 37.5 MG/1
37.5 TABLET ORAL
Qty: 30 TABLET | Refills: 0 | Status: SHIPPED | OUTPATIENT
Start: 2024-12-17

## 2024-12-19 DIAGNOSIS — M25.50 MULTIPLE JOINT PAIN: ICD-10-CM

## 2024-12-19 RX ORDER — HYDROCODONE BITARTRATE AND ACETAMINOPHEN 10; 325 MG/1; MG/1
1 TABLET ORAL EVERY 6 HOURS PRN
Qty: 90 TABLET | Refills: 0 | Status: SHIPPED | OUTPATIENT
Start: 2024-12-19

## 2025-01-15 ENCOUNTER — OFFICE VISIT (OUTPATIENT)
Dept: FAMILY MEDICINE CLINIC | Facility: CLINIC | Age: 65
End: 2025-01-15
Payer: COMMERCIAL

## 2025-01-15 VITALS
RESPIRATION RATE: 16 BRPM | HEART RATE: 85 BPM | WEIGHT: 137.6 LBS | SYSTOLIC BLOOD PRESSURE: 138 MMHG | OXYGEN SATURATION: 97 % | DIASTOLIC BLOOD PRESSURE: 86 MMHG | HEIGHT: 65 IN | BODY MASS INDEX: 22.92 KG/M2

## 2025-01-15 DIAGNOSIS — R73.09 ABNORMAL GLUCOSE: ICD-10-CM

## 2025-01-15 DIAGNOSIS — I67.9 SMALL VESSEL DISEASE, CEREBROVASCULAR: ICD-10-CM

## 2025-01-15 DIAGNOSIS — M25.50 MULTIPLE JOINT PAIN: Primary | ICD-10-CM

## 2025-01-15 DIAGNOSIS — R53.83 OTHER FATIGUE: ICD-10-CM

## 2025-01-15 DIAGNOSIS — J30.1 SEASONAL ALLERGIC RHINITIS DUE TO POLLEN: ICD-10-CM

## 2025-01-15 DIAGNOSIS — Z79.891 ENCOUNTER FOR LONG-TERM USE OF OPIATE ANALGESIC: ICD-10-CM

## 2025-01-15 DIAGNOSIS — E55.9 VITAMIN D DEFICIENCY: ICD-10-CM

## 2025-01-15 DIAGNOSIS — Z51.81 ENCOUNTER FOR THERAPEUTIC DRUG LEVEL MONITORING: ICD-10-CM

## 2025-01-15 DIAGNOSIS — Z13.29 THYROID DISORDER SCREENING: ICD-10-CM

## 2025-01-15 PROCEDURE — 1159F MED LIST DOCD IN RCRD: CPT | Performed by: NURSE PRACTITIONER

## 2025-01-15 PROCEDURE — 99214 OFFICE O/P EST MOD 30 MIN: CPT | Performed by: NURSE PRACTITIONER

## 2025-01-15 PROCEDURE — 1160F RVW MEDS BY RX/DR IN RCRD: CPT | Performed by: NURSE PRACTITIONER

## 2025-01-15 RX ORDER — CLOPIDOGREL BISULFATE 75 MG/1
75 TABLET ORAL DAILY
Qty: 30 TABLET | Refills: 5 | Status: SHIPPED | OUTPATIENT
Start: 2025-01-15

## 2025-01-15 RX ORDER — ROSUVASTATIN CALCIUM 20 MG/1
20 TABLET, COATED ORAL DAILY
Qty: 30 TABLET | Refills: 5 | Status: SHIPPED | OUTPATIENT
Start: 2025-01-15

## 2025-01-15 RX ORDER — HYDROCODONE BITARTRATE AND ACETAMINOPHEN 10; 325 MG/1; MG/1
1 TABLET ORAL EVERY 6 HOURS PRN
Qty: 90 TABLET | Refills: 0 | Status: SHIPPED | OUTPATIENT
Start: 2025-01-15

## 2025-01-15 NOTE — PROGRESS NOTES
"Subjective   Rohini Burch is a 64 y.o. female.     Chief Complaint   Patient presents with    Small vessel disease, cerebrovascular       History of Present Illness     -on Plavix 75 mg and Crestor 20 mg.  No negative side effects.  Vascular disease was noted on CT scan.  Patient denies any bleeding of gums, melena, hematochezia, hematuria, or epistaxis.  Patient denies any negative side effects of cholesterol medication.  No reported myalgia or myopathies.  Back and joint pain-chronic and ongoing.  Patient currently taking Norco 10 mg every 6 hours as needed.  She is also on Celebrex 100 mg twice daily as needed.  She has chronic leg pain and back pain. She reports that legs have been very painful since it has been cold.  She reports they are very achy and restless.  She reports during the day she has to keep moving.    Allergies-ongoing.  Some PND today but no significant congestion.  She reports that the patanol eye drops have increased in cost and she is not going to pick them up right now.  She does not need any sudafed she reports she has some.   Weight concern-will finish adipex she has at home but will not continue after current RX.  Her weight is more stable.  BI WNL.    The following portions of the patient's history were reviewed and updated as appropriate: CC, ROS, allergies, current medications, past family history, past medical history, past social history, past surgical history and problem list.      Review of Systems    Objective     /86   Pulse 85   Resp 16   Ht 165.1 cm (65\")   Wt 62.4 kg (137 lb 9.6 oz)   SpO2 97%   BMI 22.90 kg/m²     Physical Exam  Vitals reviewed.   Constitutional:       General: She is not in acute distress.     Appearance: She is well-developed. She is not diaphoretic.   HENT:      Head: Normocephalic and atraumatic.      Jaw: No tenderness.      Right Ear: Hearing, tympanic membrane, ear canal and external ear normal.      Left Ear: Hearing, tympanic " membrane, ear canal and external ear normal.      Nose: Nose normal. Rhinorrhea present. No nasal tenderness or congestion.      Right Sinus: No maxillary sinus tenderness or frontal sinus tenderness.      Left Sinus: No maxillary sinus tenderness or frontal sinus tenderness.      Mouth/Throat:      Lips: Pink.      Mouth: Mucous membranes are moist.      Pharynx: Oropharynx is clear. Uvula midline.   Eyes:      General: Lids are normal. No scleral icterus.     Extraocular Movements:      Right eye: Normal extraocular motion and no nystagmus.      Left eye: Normal extraocular motion and no nystagmus.      Conjunctiva/sclera: Conjunctivae normal.      Pupils: Pupils are equal, round, and reactive to light.   Neck:      Thyroid: No thyromegaly or thyroid tenderness.      Vascular: No carotid bruit or JVD.      Trachea: No tracheal tenderness.   Cardiovascular:      Rate and Rhythm: Normal rate and regular rhythm.      Pulses:           Dorsalis pedis pulses are 2+ on the right side and 2+ on the left side.        Posterior tibial pulses are 2+ on the right side and 2+ on the left side.      Heart sounds: Normal heart sounds, S1 normal and S2 normal. No murmur heard.  Pulmonary:      Effort: Pulmonary effort is normal. No accessory muscle usage, prolonged expiration or respiratory distress.      Breath sounds: Normal breath sounds.   Chest:      Chest wall: No tenderness.   Abdominal:      General: Bowel sounds are normal. There is no distension.      Palpations: Abdomen is soft. There is no hepatomegaly, splenomegaly or mass.      Tenderness: There is no abdominal tenderness.   Musculoskeletal:         General: Tenderness present.      Cervical back: Normal range of motion and neck supple.      Lumbar back: Tenderness present. Decreased range of motion.      Right upper leg: Tenderness present.      Left upper leg: Tenderness present.      Right knee: Tenderness present.      Left knee: Tenderness present.      Right  lower leg: Tenderness present. No edema.      Left lower leg: Tenderness present. No edema.      Comments: No muscular atrophy or flaccidity.   Lymphadenopathy:      Head:      Right side of head: No submental or submandibular adenopathy.      Left side of head: No submental or submandibular adenopathy.      Cervical: No cervical adenopathy.      Right cervical: No superficial cervical adenopathy.     Left cervical: No superficial cervical adenopathy.   Skin:     General: Skin is warm and dry.      Capillary Refill: Capillary refill takes less than 2 seconds.      Coloration: Skin is not jaundiced or pale.      Findings: No erythema.      Nails: There is no clubbing.   Neurological:      Mental Status: She is alert and oriented to person, place, and time.      Cranial Nerves: No cranial nerve deficit or facial asymmetry.      Sensory: No sensory deficit.      Motor: No weakness, tremor, atrophy or abnormal muscle tone.      Coordination: Coordination normal.      Deep Tendon Reflexes: Reflexes are normal and symmetric.   Psychiatric:         Attention and Perception: She is attentive.         Mood and Affect: Mood normal. Mood is not anxious or depressed.         Speech: Speech normal.         Behavior: Behavior normal. Behavior is cooperative.         Thought Content: Thought content normal.         Cognition and Memory: Cognition normal.         Judgment: Judgment normal.         Diagnoses and all orders for this visit:    1. Multiple joint pain (Primary)  Comments:  Avoid overuse activity.  Continue Norco and Celebrex as directed  Orders:  -     HYDROcodone-acetaminophen (Norco)  MG per tablet; Take 1 tablet by mouth Every 6 (Six) Hours As Needed for Moderate Pain.  Dispense: 90 tablet; Refill: 0    2. Small vessel disease, cerebrovascular  Comments:  continue plavix and Crestor  Orders:  -     rosuvastatin (Crestor) 20 MG tablet; Take 1 tablet by mouth Daily.  Dispense: 30 tablet; Refill: 5  -      clopidogrel (Plavix) 75 MG tablet; Take 1 tablet by mouth Daily.  Dispense: 30 tablet; Refill: 5  -     CBC & Differential  -     Comprehensive Metabolic Panel  -     Lipid Panel    3. Seasonal allergic rhinitis due to pollen    4. Vitamin D deficiency  -     Vitamin D,25-Hydroxy    5. Thyroid disorder screening  -     TSH  -     T4, Free    6. Abnormal glucose  -     Hemoglobin A1c    7. Other fatigue  -     Vitamin B12    8. Encounter for therapeutic drug level monitoring  -     Urine Drug Screen - Urine, Clean Catch; Future    9. Encounter for long-term use of opiate analgesic  -     Urine Drug Screen - Urine, Clean Catch; Future       BMI is within normal parameters. No other follow-up for BMI required.      Understands disease processes and need for medications.  Understands reasons for urgent and emergent care.  Patient (& family) verbalized agreement for treatment plan.   Emotional support and active listening provided.  Patient provided time to verbalize feelings.    MICHELLE reviewed today and consistent.  Will refill prescribed controlled medication today.  Patient is aware they cannot receive narcotics from any other provider except if under care of pain management or speciality clinic.  Risk and benefits of medication use has been reviewed.  History and physical exam exhibit continued safe and appropriate use of controlled substances.  The patient is aware of the potential for addiction and dependence.  This patient has been made aware of the appropriate use of such medications, including potential risk of somnolence, limited ability to drive and / or work safely, and potential for overdose.  Patient understands not to take medication and drive until they know how medication may affect their cognition/decision making.   It has also been made clear that these medications are for use by this patient only, without concomitant use of alcohol or other substances unless prescribed/advised by medical  provider.  Patient understands they may be subject to UDS and pill counts at random.    Patient considered to be low risk for addiction due to use of single controlled medications.  Patient understands and accepts these risks.  Patient need for medication will be reassessed at each visit.  Doses will be adjusted according to patient need and findings.    Goal of TX: Patient will not have any adverse reactions of medication.  Patient have reduction in pain symptoms with use of PRN Norco as directed.  Patient will not have any adverse side effects from medication.  Patient will be able to remain active in an outside of home without interference from pain symptoms.    MICHELLE Patient Controlled Substance Report (from 1/16/2024 to 1/15/2025)    Dispensed  Strength Quantity Days Supply Provider Pharmacy   12/18/2024 Phentermine Hcl 37.5MG 30 each 30 [facility] [place]   12/17/2024 Hydrocodone Bitartrate/Ac 325MG/10MG 90 each 22 [facility] [place]   11/19/2024 Hydrocodone Bitartrate/Ac 325MG/10MG 90 each 22 [facility] [place]   11/19/2024 Phentermine Hcl 37.5MG 30 each 30 [facility] [place]   10/21/2024 Hydrocodone Bitartrate/Ac 325MG/10MG 90 each 30 [facility] [place]   09/26/2024 Hydrocodone Bitartrate/Ac 325MG/10MG 90 each 22 [facility]          UDS requested per external lab while patient in office today.  Buccal swab or blood sample will be obtained if patient is unable to provide specimen.     Updated labs ordered.  Patient will obtain at a later time she reports.    RTC 1 month, sooner if needed.           This document has been electronically signed by:  STEVE Gonsales FNP-C Dragon disclaimer:  Part of this note may be an electronic transcription/translation of spoken language to printed text using the Dragon Dictation System.

## 2025-02-18 ENCOUNTER — OFFICE VISIT (OUTPATIENT)
Dept: FAMILY MEDICINE CLINIC | Facility: CLINIC | Age: 65
End: 2025-02-18
Payer: COMMERCIAL

## 2025-02-18 VITALS
SYSTOLIC BLOOD PRESSURE: 142 MMHG | HEART RATE: 87 BPM | DIASTOLIC BLOOD PRESSURE: 90 MMHG | WEIGHT: 133 LBS | TEMPERATURE: 97.5 F | OXYGEN SATURATION: 99 % | HEIGHT: 65 IN | BODY MASS INDEX: 22.16 KG/M2

## 2025-02-18 DIAGNOSIS — I67.9 SMALL VESSEL DISEASE, CEREBROVASCULAR: ICD-10-CM

## 2025-02-18 DIAGNOSIS — M25.50 MULTIPLE JOINT PAIN: ICD-10-CM

## 2025-02-18 DIAGNOSIS — I83.813 VARICOSE VEINS OF BOTH LOWER EXTREMITIES WITH PAIN: ICD-10-CM

## 2025-02-18 DIAGNOSIS — J30.1 SEASONAL ALLERGIC RHINITIS DUE TO POLLEN: Primary | ICD-10-CM

## 2025-02-18 PROCEDURE — 99214 OFFICE O/P EST MOD 30 MIN: CPT | Performed by: NURSE PRACTITIONER

## 2025-02-18 PROCEDURE — 1159F MED LIST DOCD IN RCRD: CPT | Performed by: NURSE PRACTITIONER

## 2025-02-18 PROCEDURE — 1160F RVW MEDS BY RX/DR IN RCRD: CPT | Performed by: NURSE PRACTITIONER

## 2025-02-18 RX ORDER — HYDROCODONE BITARTRATE AND ACETAMINOPHEN 10; 325 MG/1; MG/1
1 TABLET ORAL EVERY 6 HOURS PRN
Qty: 90 TABLET | Refills: 0 | Status: SHIPPED | OUTPATIENT
Start: 2025-02-18

## 2025-02-18 NOTE — PROGRESS NOTES
Subjective   Rohini Burch is a 64 y.o. female.     Chief Complaint   Patient presents with    Allergies       History of Present Illness     Allergies-chronic and ongoing.  Patient is currently taking Zyrtec 10 mg and as needed Sudafed.  She also has Patanol eyedrops to use for eye irritation and itching.  She is on Singulair 10 mg.  No negative side effects.  She does take Mucinex when secretions are thick and difficult to expectorate.  She has some increased Rhinorrhea currently.    Small vessel cerebrovascular disease-patient is currently on Plavix 75 mg and Crestor 20 mg.  No negative side effects.  Patient denies any bleeding of gums, melena, hematochezia, hematuria, or epistaxis.  Patient denies any negative side effects of cholesterol medication.  No reported myalgia or myopathies.  She denies any concerns with headache.  Back pain and leg pain-ongoing.  Patient with history of multiple varicosities.  She is currently on Celebrex 100 mg.  She is taking as directed.  Some increase as weather has been cold and damp.      The following portions of the patient's history were reviewed and updated as appropriate: CC, ROS, allergies, current medications, past family history, past medical history, past social history, past surgical history and problem list.      Review of Systems   Constitutional:  Positive for fatigue. Negative for appetite change and fever.   HENT:  Positive for rhinorrhea. Negative for congestion, ear pain, nosebleeds, postnasal drip, sore throat, tinnitus, trouble swallowing and voice change.    Eyes:  Negative for blurred vision, photophobia and visual disturbance.   Respiratory:  Negative for cough, chest tightness, shortness of breath and wheezing.    Cardiovascular:  Negative for chest pain and palpitations.   Gastrointestinal:  Negative for abdominal pain, blood in stool, constipation, diarrhea, nausea and GERD.   Endocrine: Negative for cold intolerance, heat intolerance and polydipsia.  "  Genitourinary:  Negative for decreased urine volume, difficulty urinating, dysuria and hematuria.   Musculoskeletal:  Positive for arthralgias, back pain, gait problem and myalgias.   Skin:  Negative for color change, pallor and wound.   Allergic/Immunologic: Positive for environmental allergies.   Neurological:  Negative for dizziness, syncope, numbness and headache.   Hematological: Negative.    Psychiatric/Behavioral:  Negative for decreased concentration, sleep disturbance, suicidal ideas and depressed mood. The patient is not nervous/anxious.    All other systems reviewed and are negative.      Objective     /90   Pulse 87   Temp 97.5 °F (36.4 °C) (Temporal)   Ht 165.1 cm (65\")   Wt 60.3 kg (133 lb)   SpO2 99%   BMI 22.13 kg/m²     Physical Exam  Vitals reviewed.   Constitutional:       General: She is not in acute distress.     Appearance: She is well-developed. She is not diaphoretic.   HENT:      Head: Normocephalic and atraumatic.      Jaw: No tenderness.      Right Ear: Hearing, tympanic membrane, ear canal and external ear normal.      Left Ear: Hearing, tympanic membrane, ear canal and external ear normal.      Nose: Nose normal. Rhinorrhea present. No nasal tenderness or congestion.      Right Sinus: No maxillary sinus tenderness or frontal sinus tenderness.      Left Sinus: No maxillary sinus tenderness or frontal sinus tenderness.      Mouth/Throat:      Lips: Pink.      Mouth: Mucous membranes are moist.      Pharynx: Oropharynx is clear. Uvula midline.   Eyes:      General: Lids are normal. No scleral icterus.     Extraocular Movements:      Right eye: Normal extraocular motion and no nystagmus.      Left eye: Normal extraocular motion and no nystagmus.      Conjunctiva/sclera: Conjunctivae normal.      Pupils: Pupils are equal, round, and reactive to light.   Neck:      Thyroid: No thyromegaly or thyroid tenderness.      Vascular: No carotid bruit or JVD.      Trachea: No tracheal " tenderness.   Cardiovascular:      Rate and Rhythm: Normal rate and regular rhythm.      Pulses:           Dorsalis pedis pulses are 2+ on the right side and 2+ on the left side.        Posterior tibial pulses are 2+ on the right side and 2+ on the left side.      Heart sounds: Normal heart sounds, S1 normal and S2 normal. No murmur heard.  Pulmonary:      Effort: Pulmonary effort is normal. No accessory muscle usage, prolonged expiration or respiratory distress.      Breath sounds: Normal breath sounds.   Chest:      Chest wall: No tenderness.   Abdominal:      General: Bowel sounds are normal. There is no distension.      Palpations: Abdomen is soft. There is no hepatomegaly, splenomegaly or mass.      Tenderness: There is no abdominal tenderness.   Musculoskeletal:         General: Tenderness present.      Cervical back: Normal range of motion and neck supple.      Lumbar back: Tenderness present. Decreased range of motion.      Right upper leg: Tenderness present.      Left upper leg: Tenderness present.      Right knee: Tenderness present.      Left knee: Tenderness present.      Right lower leg: Tenderness present. No edema.      Left lower leg: Tenderness present. No edema.      Comments: No muscular atrophy or flaccidity.   Lymphadenopathy:      Head:      Right side of head: No submental or submandibular adenopathy.      Left side of head: No submental or submandibular adenopathy.      Cervical: No cervical adenopathy.      Right cervical: No superficial cervical adenopathy.     Left cervical: No superficial cervical adenopathy.   Skin:     General: Skin is warm and dry.      Capillary Refill: Capillary refill takes less than 2 seconds.      Coloration: Skin is not jaundiced or pale.      Findings: No erythema.      Nails: There is no clubbing.   Neurological:      Mental Status: She is alert and oriented to person, place, and time.      Cranial Nerves: No cranial nerve deficit or facial asymmetry.       Sensory: No sensory deficit.      Motor: No weakness, tremor, atrophy or abnormal muscle tone.      Coordination: Coordination normal.      Gait: Gait abnormal.      Deep Tendon Reflexes: Reflexes are normal and symmetric.   Psychiatric:         Attention and Perception: She is attentive.         Mood and Affect: Mood normal. Mood is not anxious or depressed.         Speech: Speech normal.         Behavior: Behavior normal. Behavior is cooperative.         Thought Content: Thought content normal.         Cognition and Memory: Cognition normal.         Judgment: Judgment normal.         Diagnoses and all orders for this visit:    1. Seasonal allergic rhinitis due to pollen (Primary)  Assessment & Plan:  Continue Zyrtec 10 mg and singulair 10 mg.  Continue eyedrops as needed.  Avoid known allergy and respiratory triggers.  May use Saline spray PRN as desired  PRN Sudafed      2. Multiple joint pain  Assessment & Plan:  Continue to be active.  Avoid overuse activities  Continue PRN Norco and celebrex as directed    Orders:  -     HYDROcodone-acetaminophen (Norco)  MG per tablet; Take 1 tablet by mouth Every 6 (Six) Hours As Needed for Moderate Pain.  Dispense: 90 tablet; Refill: 0    3. Small vessel disease, cerebrovascular  Assessment & Plan:  Continue plavix 75 mg and Crestor 20 mg  Report any negative side effects.   Encouraged to pursue a low-cholesterol diet including limited fried foods and low-fat foods.  Be active as physically able.      4. Varicose veins of both lower extremities with pain  Assessment & Plan:  Continue to be active.  Avoid overuse activities  Continue PRN Norco and celebrex as directed      5. Multiple joint pain  Comments:  Avoid overuse activity.  Continue Norco and Celebrex as directed  Assessment & Plan:  Continue to be active.  Avoid overuse activities  Continue PRN Norco and celebrex as directed    Orders:  -     HYDROcodone-acetaminophen (Norco)  MG per tablet; Take 1  tablet by mouth Every 6 (Six) Hours As Needed for Moderate Pain.  Dispense: 90 tablet; Refill: 0       BMI is within normal parameters. No other follow-up for BMI required.      Understands disease processes and need for medications.  Understands reasons for urgent and emergent care.  Patient (& family) verbalized agreement for treatment plan.   Emotional support and active listening provided.  Patient provided time to verbalize feelings.  MICHELLE reviewed today and consistent.  Will refill prescribed controlled medication today.  Patient is aware they cannot receive narcotics from any other provider except if under care of pain management or speciality clinic.  Risk and benefits of medication use has been reviewed.  History and physical exam exhibit continued safe and appropriate use of controlled substances.  The patient is aware of the potential for addiction and dependence.  This patient has been made aware of the appropriate use of such medications, including potential risk of somnolence, limited ability to drive and / or work safely, and potential for overdose.  Patient understands not to take medication and drive until they know how medication may affect their cognition/decision making.   It has also been made clear that these medications are for use by this patient only, without concomitant use of alcohol or other substances unless prescribed/advised by medical provider.  Patient understands they may be subject to UDS and pill counts at random.    Patient considered to be low risk for addiction due to use of single controlled medications.  Patient understands and accepts these risks.  Patient need for medication will be reassessed at each visit.  Doses will be adjusted according to patient need and findings.    Goal of TX: Patient will not have any adverse reactions of medication.  Patient have reduction in pain symptoms with use of PRN Norco as directed.  Patient will not have any adverse side effects from  medication.  Patient will be able to remain active in an outside of home without interference from pain symptoms.    MICHELLE Patient Controlled Substance Report (from 2/19/2024 to 2/18/2025)    Dispensed  Strength Quantity Days Supply Provider Pharmacy   01/15/2025 Hydrocodone Bitartrate/Ac 325MG/10MG 90 each 30 [facility] [employer]   12/18/2024 Phentermine Hcl 37.5MG 30 each 30 [facility] [employer] [facility] [employer]   12/17/2024 Hydrocodone Bitartrate/Ac 325MG/10MG 90 each 22 [facility] [employer] [facility] [employer]   11/19/2024 Hydrocodone Bitartrate/Ac 325MG/10MG 90 each 22 [facility] [employer] [facility]   11/19/2024 Phentermine Hcl 37.5MG 30 each 30 [facility] [employer] [facility]   10/21/2024 Hydrocodone Bitartrate/Ac 325MG/10MG 90 each 30 [facility] [employer] [facility]   09/26/2024 Hydrocodone Bitartrate/Ac 325MG/10MG 90 each 22 [facility] [employer] [facility]   09/26/2024 Phentermine Hcl 37.5MG 30 each 30 [facility] [employer] [facility]        RTC 1 month, sooner if needed.           This document has been electronically signed by:  STEVE Gonsales, WILLIAMP-C    Dragon disclaimer:  Part of this note may be an electronic transcription/translation of spoken language to printed text using the Dragon Dictation System.

## 2025-03-18 ENCOUNTER — OFFICE VISIT (OUTPATIENT)
Dept: FAMILY MEDICINE CLINIC | Facility: CLINIC | Age: 65
End: 2025-03-18
Payer: COMMERCIAL

## 2025-03-18 VITALS
DIASTOLIC BLOOD PRESSURE: 80 MMHG | HEIGHT: 65 IN | RESPIRATION RATE: 16 BRPM | HEART RATE: 81 BPM | OXYGEN SATURATION: 99 % | WEIGHT: 132 LBS | SYSTOLIC BLOOD PRESSURE: 130 MMHG | BODY MASS INDEX: 21.99 KG/M2

## 2025-03-18 DIAGNOSIS — J30.1 SEASONAL ALLERGIC RHINITIS DUE TO POLLEN: ICD-10-CM

## 2025-03-18 DIAGNOSIS — M25.50 MULTIPLE JOINT PAIN: ICD-10-CM

## 2025-03-18 DIAGNOSIS — I67.9 SMALL VESSEL DISEASE, CEREBROVASCULAR: Primary | ICD-10-CM

## 2025-03-18 RX ORDER — HYDROCODONE BITARTRATE AND ACETAMINOPHEN 10; 325 MG/1; MG/1
1 TABLET ORAL EVERY 6 HOURS PRN
Qty: 90 TABLET | Refills: 0 | Status: SHIPPED | OUTPATIENT
Start: 2025-03-18

## 2025-03-18 NOTE — PROGRESS NOTES
Subjective   Rohini Burch is a 64 y.o. female.     Chief Complaint   Patient presents with    Seasonal allergic rhinitis due to pollen       History of Present Illness     Seasonal allergic rhinitis-ongoing.  Patient currently on Zyrtec.  She has chronic rhinorrhea and eyes watering.  She does at times use over-the-counter eyedrops for her eyes.  She has had some increased pressure in her face after   Joint/Back and leg pain-with history of painful varicose veins-ongoing.  Patient ordered Celebrex 100 mg twice daily as needed, and hydrocodone 10 mg every 6 hours as needed.  She does have lidocaine to use topically.  Lidocaine is not effective often she reports.  Pain has been up and down.  She has noted some generalized hand discomfort and pain.  She has noted some occasional discoloration and then stinging.  She reports that she has to warm them often.    Hyperlipidemia/small vessel disease of the brain-ongoing.  Patient currently ordered Plavix 75 mg and Crestor 20 mg.  Patient denies any bleeding of gums, melena, hematochezia, hematuria, or epistaxis.  Patient denies any negative side effects of cholesterol medication.  No reported myalgia or myopathies.    The following portions of the patient's history were reviewed and updated as appropriate: CC, ROS, allergies, current medications, past family history, past medical history, past social history, past surgical history and problem list.    Review of Systems   Constitutional:  Positive for fatigue. Negative for appetite change and fever.   HENT:  Positive for congestion, postnasal drip and rhinorrhea. Negative for ear pain, nosebleeds, sore throat, tinnitus, trouble swallowing and voice change.         Throat irritation   Eyes:  Negative for blurred vision, photophobia and visual disturbance.   Respiratory:  Negative for cough, chest tightness, shortness of breath and wheezing.    Cardiovascular:  Negative for chest pain and palpitations.   Gastrointestinal:   "Negative for abdominal pain, blood in stool, constipation, diarrhea, nausea, vomiting and GERD.   Endocrine: Negative for cold intolerance, heat intolerance and polydipsia.   Genitourinary:  Negative for decreased urine volume, difficulty urinating, dysuria and hematuria.   Musculoskeletal:  Positive for arthralgias, back pain and gait problem. Negative for myalgias.   Skin:  Negative for color change, pallor and wound.   Allergic/Immunologic: Negative.  Positive for environmental allergies.   Neurological:  Positive for headache (mild). Negative for dizziness, syncope and numbness.   Hematological: Negative.    Psychiatric/Behavioral:  Negative for decreased concentration, sleep disturbance, suicidal ideas and depressed mood. The patient is not nervous/anxious.    All other systems reviewed and are negative.      Objective     /80   Pulse 81   Resp 16   Ht 165.1 cm (65\")   Wt 59.9 kg (132 lb)   SpO2 99%   BMI 21.97 kg/m²     Physical Exam  Vitals reviewed.   Constitutional:       General: She is not in acute distress.     Appearance: She is well-developed. She is not diaphoretic.   HENT:      Head: Normocephalic and atraumatic.      Jaw: No tenderness.      Right Ear: Hearing, tympanic membrane, ear canal and external ear normal.      Left Ear: Hearing, tympanic membrane, ear canal and external ear normal.      Nose: Rhinorrhea present. No nasal tenderness or congestion.      Right Sinus: No maxillary sinus tenderness or frontal sinus tenderness.      Left Sinus: No maxillary sinus tenderness or frontal sinus tenderness.      Mouth/Throat:      Lips: Pink.      Mouth: Mucous membranes are moist.      Pharynx: Oropharynx is clear. Uvula midline.   Eyes:      General: Lids are normal. No scleral icterus.     Extraocular Movements:      Right eye: Normal extraocular motion and no nystagmus.      Left eye: Normal extraocular motion and no nystagmus.      Conjunctiva/sclera: Conjunctivae normal.      Pupils: " Pupils are equal, round, and reactive to light.   Neck:      Thyroid: No thyromegaly or thyroid tenderness.      Vascular: No carotid bruit or JVD.      Trachea: No tracheal tenderness.   Cardiovascular:      Rate and Rhythm: Normal rate and regular rhythm.      Pulses:           Dorsalis pedis pulses are 2+ on the right side and 2+ on the left side.        Posterior tibial pulses are 2+ on the right side and 2+ on the left side.      Heart sounds: Normal heart sounds, S1 normal and S2 normal. No murmur heard.  Pulmonary:      Effort: Pulmonary effort is normal. No accessory muscle usage, prolonged expiration or respiratory distress.      Breath sounds: Normal breath sounds.   Chest:      Chest wall: No tenderness.   Abdominal:      General: Bowel sounds are normal. There is no distension.      Palpations: Abdomen is soft. There is no hepatomegaly, splenomegaly or mass.      Tenderness: There is no abdominal tenderness.   Musculoskeletal:         General: Tenderness present.      Cervical back: Normal range of motion and neck supple.      Lumbar back: Tenderness present. Decreased range of motion.      Right upper leg: Tenderness present.      Left upper leg: Tenderness present.      Right knee: Tenderness present.      Left knee: Tenderness present.      Right lower leg: Tenderness present. No edema.      Left lower leg: Tenderness present. No edema.      Comments: No muscular atrophy or flaccidity.   Lymphadenopathy:      Head:      Right side of head: No submental or submandibular adenopathy.      Left side of head: No submental or submandibular adenopathy.      Cervical: No cervical adenopathy.      Right cervical: No superficial cervical adenopathy.     Left cervical: No superficial cervical adenopathy.   Skin:     General: Skin is warm and dry.      Capillary Refill: Capillary refill takes less than 2 seconds.      Coloration: Skin is not jaundiced or pale.      Findings: No erythema.      Nails: There is no  clubbing.   Neurological:      Mental Status: She is alert and oriented to person, place, and time.      Cranial Nerves: No cranial nerve deficit or facial asymmetry.      Sensory: No sensory deficit.      Motor: No weakness, tremor, atrophy or abnormal muscle tone.      Coordination: Coordination normal.      Gait: Gait abnormal.      Deep Tendon Reflexes: Reflexes are normal and symmetric.   Psychiatric:         Attention and Perception: She is attentive.         Mood and Affect: Mood normal. Mood is not anxious or depressed.         Speech: Speech normal.         Behavior: Behavior normal. Behavior is cooperative.         Thought Content: Thought content normal.         Cognition and Memory: Cognition normal.         Judgment: Judgment normal.         Diagnoses and all orders for this visit:    1. Small vessel disease, cerebrovascular (Primary)  Assessment & Plan:  Continue plavix 75 mg and Crestor 20 mg  Report any negative side effects.   Encouraged to pursue a low-cholesterol diet including limited fried foods and low-fat foods.  Be active as physically able.      2. Seasonal allergic rhinitis due to pollen  Assessment & Plan:  Continue Zyrtec 10 mg and singulair 10 mg.  Continue eyedrops as needed.  Avoid known allergy and respiratory triggers.  May use Saline spray PRN as desired  PRN Sudafed      3. Multiple joint pain  Comments:  Avoid overuse activity.  Continue Norco and Celebrex as directed  Orders:  -     HYDROcodone-acetaminophen (Norco)  MG per tablet; Take 1 tablet by mouth Every 6 (Six) Hours As Needed for Moderate Pain.  Dispense: 90 tablet; Refill: 0       BMI is within normal parameters. No other follow-up for BMI required.      Understands disease processes and need for medications.  Understands reasons for urgent and emergent care.  Patient (& family) verbalized agreement for treatment plan.   Emotional support and active listening provided.  Patient provided time to verbalize  shashi MARTINEZ reviewed today and consistent.  Will refill prescribed controlled medication today.  Patient is aware they cannot receive narcotics from any other provider except if under care of pain management or speciality clinic.  Risk and benefits of medication use has been reviewed.  History and physical exam exhibit continued safe and appropriate use of controlled substances.  The patient is aware of the potential for addiction and dependence.  This patient has been made aware of the appropriate use of such medications, including potential risk of somnolence, limited ability to drive and / or work safely, and potential for overdose.  Patient understands not to take medication and drive until they know how medication may affect their cognition/decision making.   It has also been made clear that these medications are for use by this patient only, without concomitant use of alcohol or other substances unless prescribed/advised by medical provider.  Patient understands they may be subject to UDS and pill counts at random.    Patient considered to be low risk for addiction due to use of single controlled medications.  Patient understands and accepts these risks.  Patient need for medication will be reassessed at each visit.  Doses will be adjusted according to patient need and findings.    Goal of TX: Patient will not have any adverse reactions of medication.  Patient have reduction in pain symptoms with use of PRN Norco as directed.  Patient will not have any adverse side effects from medication.  Patient will be able to remain active in an outside of home without interference from pain symptoms.    MICHELLE Patient Controlled Substance Report (from 3/18/2024 to 3/18/2025)    Dispensed  Strength Quantity Days Supply Provider Pharmacy   02/18/2025 Hydrocodone Bitartrate/Ac 325MG/10MG 90 each 30 [facility]   01/15/2025 Hydrocodone Bitartrate/Ac 325MG/10MG 90 each 30 JOHN HOLLEY  [facility]   12/18/2024 Phentermine Hcl 37.5MG 30 each 30 [facility]   12/17/2024 Hydrocodone Bitartrate/Ac 325MG/10MG 90 each 22 [facility]   11/19/2024 Hydrocodone Bitartrate/Ac 325MG/10MG 90 each 22 [facility]   11/19/2024 Phentermine Hcl 37.5MG 30 each 30 [facility]        RTC 1 month, sooner if needed.             This document has been electronically signed by:  STEVE Gonsales, WILLIAMP-C    Dragon disclaimer:  Part of this note may be an electronic transcription/translation of spoken language to printed text using the Dragon Dictation System.

## 2025-04-15 ENCOUNTER — OFFICE VISIT (OUTPATIENT)
Dept: FAMILY MEDICINE CLINIC | Facility: CLINIC | Age: 65
End: 2025-04-15
Payer: COMMERCIAL

## 2025-04-15 VITALS
SYSTOLIC BLOOD PRESSURE: 120 MMHG | DIASTOLIC BLOOD PRESSURE: 80 MMHG | OXYGEN SATURATION: 100 % | BODY MASS INDEX: 21.79 KG/M2 | RESPIRATION RATE: 14 BRPM | HEART RATE: 79 BPM | WEIGHT: 130.8 LBS | HEIGHT: 65 IN

## 2025-04-15 DIAGNOSIS — Z51.81 ENCOUNTER FOR THERAPEUTIC DRUG MONITORING: ICD-10-CM

## 2025-04-15 DIAGNOSIS — J30.1 SEASONAL ALLERGIC RHINITIS DUE TO POLLEN: ICD-10-CM

## 2025-04-15 DIAGNOSIS — Z79.899 ENCOUNTER FOR LONG-TERM (CURRENT) USE OF MEDICATIONS: ICD-10-CM

## 2025-04-15 DIAGNOSIS — M25.50 MULTIPLE JOINT PAIN: Primary | ICD-10-CM

## 2025-04-15 DIAGNOSIS — I67.9 SMALL VESSEL DISEASE, CEREBROVASCULAR: ICD-10-CM

## 2025-04-15 RX ORDER — HYDROCODONE BITARTRATE AND ACETAMINOPHEN 10; 325 MG/1; MG/1
1 TABLET ORAL EVERY 6 HOURS PRN
Qty: 90 TABLET | Refills: 0 | Status: SHIPPED | OUTPATIENT
Start: 2025-04-15

## 2025-04-15 NOTE — PROGRESS NOTES
Subjective   Rohini Burch is a 64 y.o. female.     Chief Complaint   Patient presents with    Allergies       Allergies  Symptoms include congestion and fatigue.    Pertinent negative symptoms include no abdominal pain, no chest pain, no cough, no fever, no myalgias, no nausea, no numbness, no sore throat and no dysuria.      Allergies-ongoing.  On zyrtec and allergy eye drops.  Some rhinorrhea and congestion.  She has taken sudafed.  She reports that her eyes feel like they are itching.  She reports that she has been flared the most since Friday but was having symptoms for several days prior.    Back and joint pain-ongoing.  On Celebrex 100 mg BID PRN.  Continue Norco 10 mg Q 6 hours PRN. Currently doing ok.  Warmer weather has been helpful to decrease her symptoms. She continues to try and be active.  Small vessel disease-on Plavix 75 mcg and crestor 20 mg.  No negative side effects.  Patient denies any negative side effects of cholesterol medication.  No reported myalgia or myopathies. Patient denies any bleeding of gums, melena, hematochezia, hematuria, or epistaxis.      The following portions of the patient's history were reviewed and updated as appropriate: CC, ROS, allergies, current medications, past family history, past medical history, past social history, past surgical history and problem list.      Review of Systems   Constitutional:  Positive for fatigue. Negative for appetite change and fever.   HENT:  Positive for congestion, postnasal drip, rhinorrhea, sinus pressure and sneezing. Negative for ear pain, nosebleeds, sore throat, tinnitus, trouble swallowing and voice change.         Generalized throat irritation   Eyes:  Positive for itching. Negative for blurred vision, photophobia and visual disturbance.   Respiratory:  Negative for cough, chest tightness, shortness of breath and wheezing.    Cardiovascular:  Negative for chest pain and palpitations.   Gastrointestinal:  Negative for abdominal  "pain, blood in stool, constipation, diarrhea, nausea and GERD.   Endocrine: Negative for cold intolerance, heat intolerance and polydipsia.   Genitourinary:  Negative for decreased urine volume, difficulty urinating, dysuria and hematuria.   Musculoskeletal:  Negative for arthralgias, back pain, gait problem and myalgias.   Skin:  Negative for color change, pallor and wound.   Allergic/Immunologic: Negative.    Neurological:  Negative for dizziness, syncope, numbness and headache (not acute but has had some over the last few days).   Hematological: Negative.    Psychiatric/Behavioral:  Negative for decreased concentration, sleep disturbance, suicidal ideas and depressed mood. The patient is not nervous/anxious.    All other systems reviewed and are negative.      Objective     /80   Pulse 79   Resp 14   Ht 165.1 cm (65\")   Wt 59.3 kg (130 lb 12.8 oz)   SpO2 100%   BMI 21.77 kg/m²     Physical Exam  Vitals reviewed.   Constitutional:       General: She is not in acute distress.     Appearance: She is well-developed. She is not diaphoretic.   HENT:      Head: Normocephalic and atraumatic.      Jaw: No tenderness.      Right Ear: Hearing, ear canal and external ear normal. A middle ear effusion is present.      Left Ear: Hearing, ear canal and external ear normal. A middle ear effusion is present.      Nose: Nasal tenderness, congestion and rhinorrhea present.      Right Sinus: No maxillary sinus tenderness or frontal sinus tenderness.      Left Sinus: No maxillary sinus tenderness or frontal sinus tenderness.      Mouth/Throat:      Lips: Pink.      Mouth: Mucous membranes are moist.      Pharynx: Uvula midline. Postnasal drip present.   Eyes:      General: Lids are normal. Allergic shiner present. No scleral icterus.     Extraocular Movements:      Right eye: Normal extraocular motion and no nystagmus.      Left eye: Normal extraocular motion and no nystagmus.      Conjunctiva/sclera: Conjunctivae normal. "      Pupils: Pupils are equal, round, and reactive to light.      Comments: Clear, tearing from the left eye   Neck:      Thyroid: No thyromegaly or thyroid tenderness.      Vascular: No carotid bruit or JVD.      Trachea: No tracheal tenderness.   Cardiovascular:      Rate and Rhythm: Normal rate and regular rhythm.      Pulses:           Dorsalis pedis pulses are 2+ on the right side and 2+ on the left side.        Posterior tibial pulses are 2+ on the right side and 2+ on the left side.      Heart sounds: Normal heart sounds, S1 normal and S2 normal. No murmur heard.  Pulmonary:      Effort: Pulmonary effort is normal. No accessory muscle usage, prolonged expiration or respiratory distress.      Breath sounds: Normal breath sounds.   Chest:      Chest wall: No tenderness.   Abdominal:      General: Bowel sounds are normal. There is no distension.      Palpations: Abdomen is soft. There is no hepatomegaly, splenomegaly or mass.      Tenderness: There is no abdominal tenderness.   Musculoskeletal:         General: Tenderness present.      Cervical back: Normal range of motion and neck supple.      Lumbar back: Tenderness present. Decreased range of motion.      Right upper leg: Tenderness present.      Left upper leg: Tenderness present.      Right knee: Tenderness present.      Left knee: Tenderness present.      Right lower leg: Tenderness present. No edema.      Left lower leg: Tenderness present. No edema.      Comments: No muscular atrophy or flaccidity.   Lymphadenopathy:      Head:      Right side of head: No submental or submandibular adenopathy.      Left side of head: No submental or submandibular adenopathy.      Cervical: No cervical adenopathy.      Right cervical: No superficial cervical adenopathy.     Left cervical: No superficial cervical adenopathy.   Skin:     General: Skin is warm and dry.      Capillary Refill: Capillary refill takes less than 2 seconds.      Coloration: Skin is not jaundiced  or pale.      Findings: No erythema.      Nails: There is no clubbing.   Neurological:      Mental Status: She is alert and oriented to person, place, and time.      Cranial Nerves: No cranial nerve deficit or facial asymmetry.      Sensory: No sensory deficit.      Motor: No weakness, tremor, atrophy or abnormal muscle tone.      Coordination: Coordination normal.      Gait: Gait abnormal.      Deep Tendon Reflexes: Reflexes are normal and symmetric.   Psychiatric:         Attention and Perception: She is attentive.         Mood and Affect: Mood normal. Mood is not anxious or depressed.         Speech: Speech normal.         Behavior: Behavior normal. Behavior is cooperative.         Thought Content: Thought content normal.         Cognition and Memory: Cognition normal.         Judgment: Judgment normal.         Diagnoses and all orders for this visit:    1. Multiple joint pain (Primary)  Comments:  Avoid overuse activity.  Continue Norco and Celebrex as directed  Assessment & Plan:  Continue to be active.  Avoid overuse activities  Continue PRN Norco and celebrex as directed    Orders:  -     HYDROcodone-acetaminophen (Norco)  MG per tablet; Take 1 tablet by mouth Every 6 (Six) Hours As Needed for Moderate Pain.  Dispense: 90 tablet; Refill: 0    2. Seasonal allergic rhinitis due to pollen  Assessment & Plan:  Continue Zyrtec 10 mg and singulair 10 mg.  Continue eyedrops as needed.  Avoid known allergy and respiratory triggers.  May use Saline spray PRN as desired  PRN Sudafed      3. Small vessel disease, cerebrovascular  Assessment & Plan:  Continue plavix 75 mg and Crestor 20 mg  Report any negative side effects.   Encouraged to pursue a low-cholesterol diet including limited fried foods and low-fat foods.  Be active as physically able.      4. Encounter for long-term (current) use of medications  -     Urine Drug Screen - Urine, Clean Catch; Future    5. Encounter for therapeutic drug monitoring  -      Urine Drug Screen - Urine, Clean Catch; Future       BMI is within normal parameters. No other follow-up for BMI required.    Understands disease processes and need for medications.  Understands reasons for urgent and emergent care.  Patient (& family) verbalized agreement for treatment plan.   Emotional support and active listening provided.  Patient provided time to verbalize feelings.    MICHELLE/BELLE reviewed today and consistent.  Will refill prescribed controlled medication today.  Patient is aware they cannot receive narcotics from any other provider except if under care of pain management or speciality clinic.  Risk and benefits of medication use has been reviewed.  History and physical exam exhibit continued safe and appropriate use of controlled substances.  The patient is aware of the potential for addiction and dependence.  This patient has been made aware of the appropriate use of such medications, including potential risk of somnolence, limited ability to drive and / or work safely, and potential for overdose.    It has also been made clear that these medications are for use by this patient only, without concomitant use of alcohol or other substances unless prescribed/advised by medical provider.  Patient understands they may be subject to UDS and pill counts at random.    Patient considered to be low risk for addiction due to use of single controlled medications.  Patient understands and accepts these risks.  Patient need for medication will be reassessed at each visit.  Doses will be adjusted according to patient need and findings.    Goal of TX: Patient will not have any adverse reactions of medication.  Patient have reduction in pain symptoms with use of PRN Norco as directed.  Patient will not have any adverse side effects from medication.  Patient will be able to remain active in an outside of home without interference from pain symptoms.    MICHELLE Patient Controlled Substance Report (from  4/15/2024 to 4/15/2025)    Dispensed  Strength Quantity Days Supply Provider Pharmacy   03/18/2025 Hydrocodone Bitartrate/Ac 325MG/10MG 90 each 30 [facility]   02/18/2025 Hydrocodone Bitartrate/Ac 325MG/10MG 90 each 30 [facility]   01/15/2025 Hydrocodone Bitartrate/Ac 325MG/10MG 90 each 30 [facility]   12/18/2024 Phentermine Hcl 37.5MG 30 each 30 [facility]   12/17/2024 Hydrocodone Bitartrate/Ac 325MG/10MG 90 each 22 [facility]   11/19/2024 Hydrocodone Bitartrate/Ac 325MG/10MG 90 each 22 [facility]        RTC 1 month, sooner if needed.           This document has been electronically signed by:  STEVE Gonsales, FNP-C    Dragon disclaimer:  Part of this note may be an electronic transcription/translation of spoken language to printed text using the Dragon Dictation System.

## 2025-05-13 ENCOUNTER — PROCEDURE VISIT (OUTPATIENT)
Dept: FAMILY MEDICINE CLINIC | Facility: CLINIC | Age: 65
End: 2025-05-13
Payer: COMMERCIAL

## 2025-05-13 VITALS
RESPIRATION RATE: 18 BRPM | WEIGHT: 129.6 LBS | OXYGEN SATURATION: 96 % | BODY MASS INDEX: 21.59 KG/M2 | SYSTOLIC BLOOD PRESSURE: 138 MMHG | HEIGHT: 65 IN | HEART RATE: 97 BPM | DIASTOLIC BLOOD PRESSURE: 80 MMHG

## 2025-05-13 DIAGNOSIS — J06.9 ACUTE URI: ICD-10-CM

## 2025-05-13 DIAGNOSIS — M54.50 PAIN IN LEFT LUMBAR REGION OF BACK: Primary | ICD-10-CM

## 2025-05-13 DIAGNOSIS — J30.1 SEASONAL ALLERGIC RHINITIS DUE TO POLLEN: ICD-10-CM

## 2025-05-13 DIAGNOSIS — I67.9 SMALL VESSEL DISEASE, CEREBROVASCULAR: ICD-10-CM

## 2025-05-13 DIAGNOSIS — M25.50 MULTIPLE JOINT PAIN: ICD-10-CM

## 2025-05-13 RX ORDER — CEFDINIR 300 MG/1
300 CAPSULE ORAL 2 TIMES DAILY
Qty: 20 CAPSULE | Refills: 0 | Status: SHIPPED | OUTPATIENT
Start: 2025-05-13

## 2025-05-13 RX ORDER — ROSUVASTATIN CALCIUM 20 MG/1
20 TABLET, COATED ORAL DAILY
Qty: 30 TABLET | Refills: 5 | Status: SHIPPED | OUTPATIENT
Start: 2025-05-13

## 2025-05-13 RX ORDER — KETOROLAC TROMETHAMINE 30 MG/ML
60 INJECTION, SOLUTION INTRAMUSCULAR; INTRAVENOUS ONCE
Status: COMPLETED | OUTPATIENT
Start: 2025-05-13 | End: 2025-05-13

## 2025-05-13 RX ORDER — HYDROCODONE BITARTRATE AND ACETAMINOPHEN 10; 325 MG/1; MG/1
1 TABLET ORAL EVERY 6 HOURS PRN
Qty: 90 TABLET | Refills: 0 | Status: SHIPPED | OUTPATIENT
Start: 2025-05-13

## 2025-05-13 RX ORDER — DEXAMETHASONE SODIUM PHOSPHATE 4 MG/ML
8 INJECTION, SOLUTION INTRA-ARTICULAR; INTRALESIONAL; INTRAMUSCULAR; INTRAVENOUS; SOFT TISSUE ONCE
Status: COMPLETED | OUTPATIENT
Start: 2025-05-13 | End: 2025-05-13

## 2025-05-13 RX ORDER — CLOPIDOGREL BISULFATE 75 MG/1
75 TABLET ORAL DAILY
Qty: 30 TABLET | Refills: 5 | Status: SHIPPED | OUTPATIENT
Start: 2025-05-13

## 2025-05-13 RX ADMIN — DEXAMETHASONE SODIUM PHOSPHATE 8 MG: 4 INJECTION, SOLUTION INTRA-ARTICULAR; INTRALESIONAL; INTRAMUSCULAR; INTRAVENOUS; SOFT TISSUE at 12:08

## 2025-05-13 RX ADMIN — KETOROLAC TROMETHAMINE 60 MG: 30 INJECTION, SOLUTION INTRAMUSCULAR; INTRAVENOUS at 12:08

## 2025-05-13 NOTE — PROGRESS NOTES
Subjective   Rohini Burch is a 64 y.o. female.     Chief Complaint   Patient presents with    Multiple joint pain       History of Present Illness     Joint pain-chronic and ongoing.  She is on PRN Norco.   She has Celebrex 100 mg to take for inflammation.  She is also on Hydrocodone 10 mg PRN.    Allergies-chronic and ongoing.  She is on zyrtec 10 mg and has eye drops to use. She reports that she feels the most congested on her left.  Rhinorrhea and sneezing.  Some generalized left ear pain.  She reports she feels that her jaw is sore and throbbing.  No sore throat.   She has been using some warm compresses.  No facial numbness.    Left hip pain-reports that it began on Thursday.  She is not having any numbness in her leg.  Pain is sharp and sticking.  She is up and down trying to get comfortable.  No specific injury.  Difficulty with position changes.  She reports that she is uncomfortable when she tries to lay on that side.  She reports that it wakes her up hurting.    Small vessel disease-on plavix 75 mg and crestor 20 mg.  No negative side effects.  Patient denies any negative side effects of cholesterol medication.  No reported myalgia or myopathies. Patient denies any bleeding of gums, melena, hematochezia, hematuria, or epistaxis.      The following portions of the patient's history were reviewed and updated as appropriate: CC, ROS, allergies, current medications, past family history, past medical history, past social history, past surgical history and problem list.      Review of Systems   Constitutional:  Positive for fatigue. Negative for appetite change and fever.   HENT:  Positive for congestion, ear pain, postnasal drip and rhinorrhea. Negative for nosebleeds, sore throat, tinnitus, trouble swallowing and voice change.    Eyes:  Negative for blurred vision, double vision, photophobia and visual disturbance.   Respiratory:  Positive for cough. Negative for chest tightness, shortness of breath and  "wheezing.    Cardiovascular:  Negative for chest pain and palpitations.   Gastrointestinal:  Negative for abdominal pain, blood in stool, constipation, diarrhea, nausea, vomiting and GERD.   Endocrine: Negative for cold intolerance, heat intolerance and polydipsia.   Genitourinary:  Negative for decreased urine volume, difficulty urinating, dysuria and hematuria.   Musculoskeletal:  Positive for arthralgias, back pain, gait problem and myalgias.   Skin:  Negative for color change, pallor and wound.   Allergic/Immunologic: Negative.    Neurological:  Negative for dizziness, syncope, numbness and headache.   Hematological: Negative.    Psychiatric/Behavioral:  Negative for decreased concentration, sleep disturbance, suicidal ideas and depressed mood. The patient is not nervous/anxious.    All other systems reviewed and are negative.      Objective     /80   Pulse 97   Resp 18   Ht 165.1 cm (65\")   Wt 58.8 kg (129 lb 9.6 oz)   SpO2 96%   BMI 21.57 kg/m²     Physical Exam  Vitals reviewed.   Constitutional:       General: She is not in acute distress.     Appearance: She is well-developed. She is not diaphoretic.   HENT:      Head: Normocephalic and atraumatic.      Jaw: No tenderness.      Right Ear: Hearing, tympanic membrane, ear canal and external ear normal.      Left Ear: Hearing, tympanic membrane, ear canal and external ear normal.      Nose: Nose normal. Rhinorrhea present. No nasal tenderness or congestion.      Right Sinus: No maxillary sinus tenderness or frontal sinus tenderness.      Left Sinus: No maxillary sinus tenderness or frontal sinus tenderness.      Mouth/Throat:      Lips: Pink.      Mouth: Mucous membranes are moist.      Pharynx: Oropharynx is clear. Uvula midline.   Eyes:      General: Lids are normal. No scleral icterus.     Extraocular Movements:      Right eye: Normal extraocular motion and no nystagmus.      Left eye: Normal extraocular motion and no nystagmus.      " Conjunctiva/sclera: Conjunctivae normal.      Pupils: Pupils are equal, round, and reactive to light.   Neck:      Thyroid: No thyromegaly or thyroid tenderness.      Vascular: No carotid bruit or JVD.      Trachea: No tracheal tenderness.   Cardiovascular:      Rate and Rhythm: Normal rate and regular rhythm.      Pulses:           Dorsalis pedis pulses are 2+ on the right side and 2+ on the left side.        Posterior tibial pulses are 2+ on the right side and 2+ on the left side.      Heart sounds: Normal heart sounds, S1 normal and S2 normal. No murmur heard.  Pulmonary:      Effort: Pulmonary effort is normal. No accessory muscle usage, prolonged expiration or respiratory distress.      Breath sounds: Normal breath sounds.   Chest:      Chest wall: No tenderness.   Abdominal:      General: Bowel sounds are normal. There is no distension.      Palpations: Abdomen is soft. There is no hepatomegaly, splenomegaly or mass.      Tenderness: There is no abdominal tenderness.   Musculoskeletal:         General: Tenderness present.      Cervical back: Normal range of motion and neck supple.      Lumbar back: Tenderness present. Decreased range of motion.      Left hip: Tenderness and bony tenderness present. Decreased range of motion.      Right upper leg: Tenderness present.      Left upper leg: Tenderness present.      Right knee: Tenderness present.      Left knee: Tenderness present.      Right lower leg: Tenderness present. No edema.      Left lower leg: Tenderness present. No edema.      Comments: No muscular atrophy or flaccidity.   Lymphadenopathy:      Head:      Right side of head: No submental or submandibular adenopathy.      Left side of head: No submental or submandibular adenopathy.      Cervical: No cervical adenopathy.      Right cervical: No superficial cervical adenopathy.     Left cervical: No superficial cervical adenopathy.   Skin:     General: Skin is warm and dry.      Capillary Refill: Capillary  refill takes less than 2 seconds.      Coloration: Skin is not jaundiced or pale.      Findings: No erythema.      Nails: There is no clubbing.   Neurological:      Mental Status: She is alert and oriented to person, place, and time.      Cranial Nerves: No cranial nerve deficit or facial asymmetry.      Sensory: No sensory deficit.      Motor: No weakness, tremor, atrophy or abnormal muscle tone.      Coordination: Coordination normal.      Gait: Gait abnormal.      Deep Tendon Reflexes: Reflexes are normal and symmetric.   Psychiatric:         Attention and Perception: She is attentive.         Mood and Affect: Mood normal. Mood is not anxious or depressed.         Speech: Speech normal.         Behavior: Behavior normal. Behavior is cooperative.         Thought Content: Thought content normal.         Cognition and Memory: Cognition normal.         Judgment: Judgment normal.         Diagnoses and all orders for this visit:    1. Pain in left lumbar region of back (Primary)  Comments:  injections today for acute on chronic pain relief  Orders:  -     dexAMETHasone (DECADRON) injection 8 mg  -     ketorolac (TORADOL) injection 60 mg    2. Multiple joint pain  Comments:  Avoid overuse activity.  Continue Norco and Celebrex as directed  Orders:  -     dexAMETHasone (DECADRON) injection 8 mg  -     ketorolac (TORADOL) injection 60 mg  -     HYDROcodone-acetaminophen (Norco)  MG per tablet; Take 1 tablet by mouth Every 6 (Six) Hours As Needed for Moderate Pain.  Dispense: 90 tablet; Refill: 0    3. Small vessel disease, cerebrovascular  Comments:  continue plavix and Crestor  Orders:  -     rosuvastatin (Crestor) 20 MG tablet; Take 1 tablet by mouth Daily.  Dispense: 30 tablet; Refill: 5  -     clopidogrel (Plavix) 75 MG tablet; Take 1 tablet by mouth Daily.  Dispense: 30 tablet; Refill: 5    4. Seasonal allergic rhinitis due to pollen  Assessment & Plan:  Continue Zyrtec 10 mg and singulair 10 mg.  Continue  eyedrops as needed.  Avoid known allergy and respiratory triggers.  May use Saline spray PRN as desired  PRN Sudafed      5. Acute URI  Comments:  finish all of antibiotics  Orders:  -     cefdinir (OMNICEF) 300 MG capsule; Take 1 capsule by mouth 2 (Two) Times a Day.  Dispense: 20 capsule; Refill: 0       BMI is within normal parameters. No other follow-up for BMI required.      Understands disease processes and need for medications.  Understands reasons for urgent and emergent care.  Patient (& family) verbalized agreement for treatment plan.   Emotional support and active listening provided.  Patient provided time to verbalize feelings.    MICHELLE/PMDP reviewed today and consistent.  Will refill prescribed controlled medication today.  Patient is aware they cannot receive narcotics from any other provider except if under care of pain management or speciality clinic.  Risk and benefits of medication use has been reviewed.  History and physical exam exhibit continued safe and appropriate use of controlled substances.  The patient is aware of the potential for addiction and dependence.  This patient has been made aware of the appropriate use of such medications, including potential risk of somnolence, limited ability to drive and / or work safely, and potential for overdose.    It has also been made clear that these medications are for use by this patient only, without concomitant use of alcohol or other substances unless prescribed/advised by medical provider.  Patient understands they may be subject to UDS and pill counts at random.      Patient considered to be moderate risk for addiction due to use of multiple controlled medications.  Patient understands and accepts these risks.  Patient need for medication will be reassessed at each visit.  Doses will be adjusted according to patient need and findings.    Goal of TX: Patient will not have any adverse reactions of medication.  Patient have reduction in pain  symptoms with use of PRN Norco as directed.  Patient will not have any adverse side effects from medication.  Patient will be able to remain active in an outside of home without interference from pain symptoms.      MICHELLE Patient Controlled Substance Report (from 5/13/2024 to 5/13/2025)    Dispensed  Strength Quantity Days Supply Provider Pharmacy   04/15/2025 Hydrocodone Bitartrate/Ac 325MG/10MG 90 each 30 [facility] [place]   03/18/2025 Hydrocodone Bitartrate/Ac 325MG/10MG 90 each 30 [facility] [place]   02/18/2025 Hydrocodone Bitartrate/Ac 325MG/10MG 90 each 30 [facility] [place]   01/15/2025 Hydrocodone Bitartrate/Ac 325MG/10MG 90 each 30 [facility] [place]        RTC 1 month, sooner if needed.           This document has been electronically signed by:  STEVE Gonsales FNP-C Dragon disclaimer:  Part of this note may be an electronic transcription/translation of spoken language to printed text using the Dragon Dictation System.

## 2025-06-10 ENCOUNTER — OFFICE VISIT (OUTPATIENT)
Dept: FAMILY MEDICINE CLINIC | Facility: CLINIC | Age: 65
End: 2025-06-10
Payer: COMMERCIAL

## 2025-06-10 VITALS
RESPIRATION RATE: 14 BRPM | HEART RATE: 79 BPM | DIASTOLIC BLOOD PRESSURE: 80 MMHG | BODY MASS INDEX: 21.29 KG/M2 | OXYGEN SATURATION: 96 % | HEIGHT: 65 IN | WEIGHT: 127.8 LBS | SYSTOLIC BLOOD PRESSURE: 154 MMHG

## 2025-06-10 DIAGNOSIS — J30.1 SEASONAL ALLERGIC RHINITIS DUE TO POLLEN: ICD-10-CM

## 2025-06-10 DIAGNOSIS — J06.9 ACUTE URI: ICD-10-CM

## 2025-06-10 DIAGNOSIS — M25.50 MULTIPLE JOINT PAIN: ICD-10-CM

## 2025-06-10 DIAGNOSIS — R03.0 ELEVATED BLOOD PRESSURE READING: Primary | ICD-10-CM

## 2025-06-10 DIAGNOSIS — I67.9 SMALL VESSEL DISEASE, CEREBROVASCULAR: ICD-10-CM

## 2025-06-10 DIAGNOSIS — G44.89 OTHER HEADACHE SYNDROME: ICD-10-CM

## 2025-06-10 PROCEDURE — 1159F MED LIST DOCD IN RCRD: CPT | Performed by: NURSE PRACTITIONER

## 2025-06-10 PROCEDURE — 1160F RVW MEDS BY RX/DR IN RCRD: CPT | Performed by: NURSE PRACTITIONER

## 2025-06-10 PROCEDURE — 99214 OFFICE O/P EST MOD 30 MIN: CPT | Performed by: NURSE PRACTITIONER

## 2025-06-10 RX ORDER — CETIRIZINE HYDROCHLORIDE 10 MG/1
10 TABLET ORAL DAILY PRN
Qty: 30 TABLET | Refills: 5 | Status: SHIPPED | OUTPATIENT
Start: 2025-06-10

## 2025-06-10 RX ORDER — SULFAMETHOXAZOLE AND TRIMETHOPRIM 800; 160 MG/1; MG/1
1 TABLET ORAL EVERY 12 HOURS
Qty: 20 TABLET | Refills: 0 | Status: SHIPPED | OUTPATIENT
Start: 2025-06-10 | End: 2025-06-20

## 2025-06-10 RX ORDER — HYDROCODONE BITARTRATE AND ACETAMINOPHEN 10; 325 MG/1; MG/1
1 TABLET ORAL EVERY 6 HOURS PRN
Qty: 90 TABLET | Refills: 0 | Status: SHIPPED | OUTPATIENT
Start: 2025-06-10

## 2025-06-10 RX ORDER — PROPRANOLOL HYDROCHLORIDE 10 MG/1
10 TABLET ORAL 2 TIMES DAILY
Qty: 60 TABLET | Refills: 0 | Status: SHIPPED | OUTPATIENT
Start: 2025-06-10

## 2025-06-10 NOTE — PROGRESS NOTES
"Subjective   Rohini Burch is a 65 y.o. female.     Chief Complaint   Patient presents with    Multiple joint pain       History of Present Illness     Joint pain-ongoing.  She has pain in multiple joints. She reports \"my knee about kill me, my hip\".  She does care for her small grandson.    HA-with elevation of her BP.  She has had some dizziness.  She is noted to be 162/96.  It has been present at least 2 weeks or more.  She reports that she took antibiotics as she felt it was sinus.  She has some blurry vision when the pain is present.  She is hurting daily and \"more of the evening\".  Nurtec helps but \"it comes back\".  She is not sleeping well.  She is not having dizziness.  She reports sensation of pressure in her face when HA is present.  She reports that it does not feel the same as when she had occipital neuralgia.  No facial numbness.  She has taken some clonidine from when she had some intermittent BP elevations in the past.  She has been taking Excedrin migraine.    Small vessel disease-on Plavix 75 mg.  She is also on crestor 20 mg.  Patient denies any negative side effects of cholesterol medication.  No reported myalgia or myopathies.Patient denies any bleeding of gums, melena, hematochezia, hematuria, or epistaxis.    Chronic allergic rhinitis-ongoing.  Patient currently ordered Zyrtec and allergy eyedrops.  She does take as needed Sudafed.  She denies any recent use.    The following portions of the patient's history were reviewed and updated as appropriate: CC, ROS, allergies, current medications, past family history, past medical history, past social history, past surgical history and problem list.      Review of Systems   Constitutional:  Positive for fatigue. Negative for appetite change and fever.   HENT:  Negative for congestion, ear pain, nosebleeds, postnasal drip, rhinorrhea, sore throat, tinnitus, trouble swallowing and voice change.    Eyes:  Positive for visual disturbance. Negative for " "blurred vision and photophobia.   Respiratory:  Negative for cough, chest tightness, shortness of breath and wheezing.    Cardiovascular:  Negative for chest pain and palpitations.   Gastrointestinal:  Negative for abdominal pain, blood in stool, constipation, diarrhea, nausea, vomiting and GERD.   Endocrine: Negative for cold intolerance, heat intolerance and polydipsia.   Genitourinary:  Negative for decreased urine volume, difficulty urinating, dysuria and hematuria.   Musculoskeletal:  Positive for arthralgias, back pain and myalgias. Negative for gait problem.   Skin:  Negative for color change, pallor and wound.   Allergic/Immunologic: Negative.    Neurological:  Positive for headache. Negative for dizziness, syncope and numbness.   Hematological: Negative.    Psychiatric/Behavioral:  Positive for sleep disturbance and stress. Negative for decreased concentration, suicidal ideas and depressed mood. The patient is not nervous/anxious.    All other systems reviewed and are negative.      Objective     /80   Pulse 79   Resp 14   Ht 165.1 cm (65\")   Wt 58 kg (127 lb 12.8 oz)   SpO2 96%   Breastfeeding No   BMI 21.27 kg/m²     Physical Exam  Vitals reviewed.   Constitutional:       General: She is not in acute distress.     Appearance: She is well-developed. She is not diaphoretic.   HENT:      Head: Normocephalic and atraumatic.      Jaw: No tenderness.      Right Ear: Hearing, tympanic membrane, ear canal and external ear normal.      Left Ear: Hearing, tympanic membrane, ear canal and external ear normal.      Nose: Congestion and rhinorrhea present. No nasal tenderness.      Right Sinus: No maxillary sinus tenderness or frontal sinus tenderness.      Left Sinus: Maxillary sinus tenderness and frontal sinus tenderness present.      Mouth/Throat:      Lips: Pink.      Mouth: Mucous membranes are moist.      Pharynx: Oropharynx is clear. Uvula midline.   Eyes:      General: Lids are normal. No scleral " icterus.     Extraocular Movements:      Right eye: Normal extraocular motion and no nystagmus.      Left eye: Normal extraocular motion and no nystagmus.      Conjunctiva/sclera: Conjunctivae normal.      Pupils: Pupils are equal, round, and reactive to light.   Neck:      Thyroid: No thyromegaly or thyroid tenderness.      Vascular: No carotid bruit or JVD.      Trachea: No tracheal tenderness.   Cardiovascular:      Rate and Rhythm: Normal rate and regular rhythm.      Pulses:           Dorsalis pedis pulses are 2+ on the right side and 2+ on the left side.        Posterior tibial pulses are 2+ on the right side and 2+ on the left side.      Heart sounds: Normal heart sounds, S1 normal and S2 normal. No murmur heard.  Pulmonary:      Effort: Pulmonary effort is normal. No accessory muscle usage, prolonged expiration or respiratory distress.      Breath sounds: Normal breath sounds.   Chest:      Chest wall: No tenderness.   Abdominal:      General: Bowel sounds are normal. There is no distension.      Palpations: Abdomen is soft. There is no hepatomegaly, splenomegaly or mass.      Tenderness: There is no abdominal tenderness.   Musculoskeletal:         General: Tenderness present.      Cervical back: Normal range of motion and neck supple.      Thoracic back: Tenderness present.      Lumbar back: Tenderness present.      Right hip: Tenderness present.      Left hip: Tenderness present.      Right knee: Tenderness present.      Left knee: Tenderness present.      Right lower leg: No edema.      Left lower leg: No edema.      Comments: No muscular atrophy or flaccidity.   Lymphadenopathy:      Head:      Right side of head: No submental or submandibular adenopathy.      Left side of head: No submental or submandibular adenopathy.      Cervical: No cervical adenopathy.      Right cervical: No superficial cervical adenopathy.     Left cervical: No superficial cervical adenopathy.   Skin:     General: Skin is warm  and dry.      Capillary Refill: Capillary refill takes less than 2 seconds.      Coloration: Skin is not jaundiced or pale.      Findings: No erythema.      Nails: There is no clubbing.   Neurological:      Mental Status: She is alert and oriented to person, place, and time.      Cranial Nerves: No cranial nerve deficit or facial asymmetry.      Sensory: No sensory deficit.      Motor: No weakness, tremor, atrophy or abnormal muscle tone.      Coordination: Coordination normal.      Deep Tendon Reflexes: Reflexes are normal and symmetric.   Psychiatric:         Attention and Perception: She is attentive.         Mood and Affect: Mood normal. Mood is not anxious or depressed.         Speech: Speech normal.         Behavior: Behavior normal. Behavior is cooperative.         Thought Content: Thought content normal.         Cognition and Memory: Cognition normal.         Judgment: Judgment normal.         Diagnoses and all orders for this visit:    1. Elevated blood pressure reading (Primary)  Comments:  will add propranolol  Orders:  -     propranolol (INDERAL) 10 MG tablet; Take 1 tablet by mouth 2 (Two) Times a Day.  Dispense: 60 tablet; Refill: 0    2. Multiple joint pain  Comments:  Avoid overuse activity.  Continue Norco and Celebrex as directed  Orders:  -     HYDROcodone-acetaminophen (Norco)  MG per tablet; Take 1 tablet by mouth Every 6 (Six) Hours As Needed for Moderate Pain.  Dispense: 90 tablet; Refill: 0    3. Seasonal allergic rhinitis due to pollen  Comments:  continue zyrtec  avoid known allergy and respiratory triggers  Orders:  -     cetirizine (zyrTEC) 10 MG tablet; Take 1 tablet by mouth Daily As Needed for Allergies.  Dispense: 30 tablet; Refill: 5    4. Acute URI  Comments:  Finish all of Bactrim.  Continue Zyrtec.  Warm compresses for face.  Orders:  -     sulfamethoxazole-trimethoprim (BACTRIM DS,SEPTRA DS) 800-160 MG per tablet; Take 1 tablet by mouth Every 12 (Twelve) Hours for 10 days.   Dispense: 20 tablet; Refill: 0    5. Small vessel disease, cerebrovascular  Assessment & Plan:  Continue plavix 75 mg and Crestor 20 mg  Report any negative side effects.   Encouraged to pursue a low-cholesterol diet including limited fried foods and low-fat foods.  Be active as physically able.      6. Other headache syndrome  Comments:  will change nurtec to QOD for prophylaxis  will consider neuro eval  Orders:  -     rimegepant sulfate ODT (Nurtec) 75 MG disintegrating tablet; Place 1 tablet under the tongue Every Other Day for 1 dose.  Dispense: 15 tablet; Refill: 5       BMI is within normal parameters. No other follow-up for BMI required.    Understands disease processes and need for medications.  Understands reasons for urgent and emergent care.  Patient (& family) verbalized agreement for treatment plan.   Emotional support and active listening provided.  Patient provided time to verbalize feelings.    MICHELLE/PMDP reviewed today and consistent.  Will refill prescribed controlled medication today.  Patient is aware they cannot receive narcotics from any other provider except if under care of pain management or speciality clinic.  Risk and benefits of medication use has been reviewed.  History and physical exam exhibit continued safe and appropriate use of controlled substances.  The patient is aware of the potential for addiction and dependence.  This patient has been made aware of the appropriate use of such medications, including potential risk of somnolence, limited ability to drive and / or work safely, and potential for overdose.    It has also been made clear that these medications are for use by this patient only, without concomitant use of alcohol or other substances unless prescribed/advised by medical provider.  Patient understands they may be subject to UDS and pill counts at random.    Patient considered to be low risk for addiction due to use of single controlled medications.  Patient  understands and accepts these risks.  Patient need for medication will be reassessed at each visit.  Doses will be adjusted according to patient need and findings.    Goal of TX: Patient will not have any adverse reactions of medication.  Patient have reduction in pain symptoms with use of PRN Norco as directed.  Patient will not have any adverse side effects from medication.  Patient will be able to remain active in an outside of home without interference from pain symptoms.    [facility] Patient Controlled Substance Report (from 6/10/2024 to 6/10/2025)    Dispensed  Strength Quantity Days Supply Provider Pharmacy   05/13/2025 Hydrocodone Bitartrate/Ac 325MG/10MG 90 each 30 [facility]AN,[facility] [place]   04/15/2025 Hydrocodone Bitartrate/Ac 325MG/10MG 90 each 30 K[facility]AN,J[facility] [place]   03/18/2025 Hydrocodone Bitartrate/Ac 325MG/10MG 90 each 30 [facility]AN,[facility] [place]   02/18/2025 Hydrocodone Bitartrate/Ac 325MG/10MG 90 each 30 [facility]AN,J[facility] [place]        RTC 1 month, sooner if needed.             This document has been electronically signed by:  STEVE Gonsales FNP-C Dragon disclaimer:  Part of this note may be an electronic transcription/translation of spoken language to printed text using the Dragon Dictation System.

## 2025-06-25 ENCOUNTER — OFFICE VISIT (OUTPATIENT)
Dept: FAMILY MEDICINE CLINIC | Facility: CLINIC | Age: 65
End: 2025-06-25
Payer: COMMERCIAL

## 2025-06-25 VITALS
DIASTOLIC BLOOD PRESSURE: 80 MMHG | HEIGHT: 65 IN | SYSTOLIC BLOOD PRESSURE: 130 MMHG | HEART RATE: 76 BPM | BODY MASS INDEX: 21.66 KG/M2 | OXYGEN SATURATION: 100 % | WEIGHT: 130 LBS | TEMPERATURE: 98 F

## 2025-06-25 DIAGNOSIS — K11.20 SIALOADENITIS OF SUBMANDIBULAR GLAND: Primary | ICD-10-CM

## 2025-06-25 PROCEDURE — 1159F MED LIST DOCD IN RCRD: CPT | Performed by: PHYSICIAN ASSISTANT

## 2025-06-25 PROCEDURE — 1125F AMNT PAIN NOTED PAIN PRSNT: CPT | Performed by: PHYSICIAN ASSISTANT

## 2025-06-25 PROCEDURE — 1160F RVW MEDS BY RX/DR IN RCRD: CPT | Performed by: PHYSICIAN ASSISTANT

## 2025-06-25 PROCEDURE — 99213 OFFICE O/P EST LOW 20 MIN: CPT | Performed by: PHYSICIAN ASSISTANT

## 2025-06-26 NOTE — PROGRESS NOTES
Subjective        Chief Complaint  Cyst (Right side of neck)    Subjective      Rohini Burch is a 65 y.o. female who presents today to [facility] for Cyst (Right side of neck).     Cyst (Right side of neck)  She reports of having about 3 weeks of sinus congestion and pressure.  She has also had discomfort in her right ear.  She was seen by her PCP about 2 weeks ago here in the office.  She was placed on Bactrim for 10 days which she has since completed.  She reports some improvement in her sinus congestion, however, over the last couple days has noted some swelling under her right jaw and in front of the ear.  She has also had some more ear discomfort on that side.  No fever or chills.  Denies any cough or chest congestion.      Current Outpatient Medications:     celecoxib (CeleBREX) 100 MG capsule, Take 1 capsule by mouth 2 (Two) Times a Day As Needed for Mild Pain., Disp: 60 capsule, Rfl: 5    cetirizine (zyrTEC) 10 MG tablet, Take 1 tablet by mouth Daily As Needed for Allergies., Disp: 30 tablet, Rfl: 5    clopidogrel (Plavix) 75 MG tablet, Take 1 tablet by mouth Daily., Disp: 30 tablet, Rfl: 5    HYDROcodone-acetaminophen (Norco)  MG per tablet, Take 1 tablet by mouth Every 6 (Six) Hours As Needed for Moderate Pain., Disp: 90 tablet, Rfl: 0    lidocaine (XYLOCAINE) 2 % jelly, Apply  topically to the appropriate area as directed As Needed for Mild Pain., Disp: 1 each, Rfl: 0    Naloxone HCl 4 MG/0.25ML liquid, Call 911. Don't prime. Jameson in 1 nostril for overdose. Repeat in 2-3 minutes in other nostril if no or minimal breathing/responsiveness., Disp: 2 each, Rfl: 0    ondansetron (Zofran) 4 MG tablet, Take 1 tablet by mouth Every 8 (Eight) Hours As Needed for Nausea or Vomiting., Disp: 30 tablet, Rfl: 2    propranolol (INDERAL) 10 MG tablet, Take 1 tablet by mouth 2 (Two) Times a Day., Disp: 60 tablet, Rfl: 0    rosuvastatin (Crestor) 20 MG tablet, Take 1 tablet by  "mouth Daily., Disp: 30 tablet, Rfl: 5    amoxicillin-clavulanate (AUGMENTIN) 875-125 MG per tablet, Take 1 tablet by mouth Every 12 (Twelve) Hours for 10 days., Disp: 20 tablet, Rfl: 0      No Known Allergies    Objective     Objective   Vital Signs:  /80   Pulse 76   Temp 98 °F (36.7 °C) (Temporal)   Ht 165.1 cm (65\")   Wt 59 kg (130 lb)   SpO2 100%   BMI 21.63 kg/m²   Estimated body mass index is 21.63 kg/m² as calculated from the following:    Height as of this encounter: 165.1 cm (65\").    Weight as of this encounter: 59 kg (130 lb).    BMI is within normal parameters. No other follow-up for BMI required.    Past Medical History:   Diagnosis Date    Arthritis     Elevated cholesterol     Hernia of fascia      Past Surgical History:   Procedure Laterality Date    EXCISION LESION Left 8/21/2024    Procedure: EXCISION LESION;  Surgeon: Rosalva Mitchell MD;  Location: [facility] OR;  Service: Robotics - DaVinci;  Laterality: Left;    HYSTERECTOMY      complete    INGUINAL HERNIA REPAIR Left 8/21/2024    Procedure: INGUINAL HERNIA REPAIR LAPAROSCOPIC WITH DAVINCI ROBOT: left;  Surgeon: Rosalva Mitchell MD;  Location: [facility] OR;  Service: Robotics - DaVinci;  Laterality: Left;    KNEE SURGERY       Social History     Socioeconomic History    Marital status:      Spouse name: dolores    Number of children: 1    Years of education: 12   Tobacco Use    Smoking status: Never    Smokeless tobacco: Current   Vaping Use    Vaping status: Never Used   Substance and Sexual Activity    Alcohol use: No    Drug use: Never    Sexual activity: Defer        Physical Exam  Vitals and nursing note reviewed.   Constitutional:       General: She is not in acute distress.     Appearance: She is well-developed. She is not diaphoretic.   HENT:      Head: Normocephalic and atraumatic.      Right Ear: Tympanic membrane, ear canal and external ear normal.      Left Ear: Tympanic membrane, ear canal and external ear " normal.      Nose:      Comments: Mild palpable swelling in the right submandibular area.  This area is tender.  No warmth or redness appreciated.     Mouth/Throat:      Pharynx: No oropharyngeal exudate or posterior oropharyngeal erythema.      Comments: Clear postnasal drip present.  Eyes:      General: No scleral icterus.        Right eye: No discharge.         Left eye: No discharge.      Conjunctiva/sclera: Conjunctivae normal.   Cardiovascular:      Rate and Rhythm: Normal rate and regular rhythm.      Heart sounds: Normal heart sounds. No murmur heard.     No friction rub. No gallop.   Pulmonary:      Effort: Pulmonary effort is normal. No respiratory distress.      Breath sounds: Normal breath sounds. No wheezing or rales.   Chest:      Chest wall: No tenderness.   Musculoskeletal:         General: Normal range of motion.      Cervical back: Normal range of motion and neck supple.   Lymphadenopathy:      Cervical: No cervical adenopathy.   Skin:     General: Skin is warm and dry.      Coloration: Skin is not pale.      Findings: No erythema or rash.   Neurological:      Mental Status: She is alert and oriented to person, place, and time.   Psychiatric:         Behavior: Behavior normal.          Result Review :  The following data was reviewed by: KAREN Rangel on 06/25/2025:  Hemoglobin A1C   Date Value Ref Range Status   06/03/2024 5.20 4.80 - 5.60 % Final     TSH   Date Value Ref Range Status   06/03/2024 1.670 0.270 - 4.200 uIU/mL Final     HDL Cholesterol   Date Value Ref Range Status   06/03/2024 76 (H) 40 - 60 mg/dL Final     LDL Cholesterol    Date Value Ref Range Status   06/03/2024 104 (H) 0 - 100 mg/dL Final     Triglycerides   Date Value Ref Range Status   06/03/2024 83 0 - 150 mg/dL Final           Assessment / Plan         Assessment   Diagnoses and all orders for this visit:    1. Sialoadenitis of submandibular gland (Primary)  -It is felt she most likely has sialadenitis of the right  submandibular gland.  Encouraged use of warm compresses and gentle massage.  - No external signs of infection with any redness or warmth.  She has had no fever or chills.  - Will cover for persistent sinusitis as well as possible infection of the gland with Augmentin 875-125 mg twice daily x 10 days.  -Monitor for any new or worsening symptoms and return to clinic should concerns arise.  -     amoxicillin-clavulanate (AUGMENTIN) 875-125 MG per tablet; Take 1 tablet by mouth Every 12 (Twelve) Hours for 10 days.  Dispense: 20 tablet; Refill: 0       New Medications Ordered This Visit   Medications    amoxicillin-clavulanate (AUGMENTIN) 875-125 MG per tablet     Sig: Take 1 tablet by mouth Every 12 (Twelve) Hours for 10 days.     Dispense:  20 tablet     Refill:  0     Follow Up   Return if symptoms worsen or fail to improve.    Patient was given instructions and counseling regarding her condition or for health maintenance advice. Please see specific information pulled into the AVS if appropriate.       This document has been electronically signed by KAREN Rangel   June 26, 2025 11:19 EDT    Dictated Utilizing Dragon Dictation: Part of this note may be an electronic transcription/translation of spoken language to printed text using the Dragon Dictation System.

## 2025-07-03 ENCOUNTER — OFFICE VISIT (OUTPATIENT)
Dept: FAMILY MEDICINE CLINIC | Facility: CLINIC | Age: 65
End: 2025-07-03
Payer: COMMERCIAL

## 2025-07-03 VITALS
OXYGEN SATURATION: 98 % | HEART RATE: 83 BPM | WEIGHT: 125.6 LBS | SYSTOLIC BLOOD PRESSURE: 136 MMHG | BODY MASS INDEX: 20.93 KG/M2 | RESPIRATION RATE: 16 BRPM | HEIGHT: 65 IN | DIASTOLIC BLOOD PRESSURE: 76 MMHG

## 2025-07-03 DIAGNOSIS — M25.50 MULTIPLE JOINT PAIN: Primary | ICD-10-CM

## 2025-07-03 DIAGNOSIS — G43.819 OTHER MIGRAINE WITHOUT STATUS MIGRAINOSUS, INTRACTABLE: ICD-10-CM

## 2025-07-03 DIAGNOSIS — I83.813 VARICOSE VEINS OF BOTH LOWER EXTREMITIES WITH PAIN: ICD-10-CM

## 2025-07-03 DIAGNOSIS — J06.9 ACUTE URI: ICD-10-CM

## 2025-07-03 PROCEDURE — 99214 OFFICE O/P EST MOD 30 MIN: CPT | Performed by: NURSE PRACTITIONER

## 2025-07-03 PROCEDURE — 1125F AMNT PAIN NOTED PAIN PRSNT: CPT | Performed by: NURSE PRACTITIONER

## 2025-07-03 PROCEDURE — 1159F MED LIST DOCD IN RCRD: CPT | Performed by: NURSE PRACTITIONER

## 2025-07-03 PROCEDURE — 1160F RVW MEDS BY RX/DR IN RCRD: CPT | Performed by: NURSE PRACTITIONER

## 2025-07-03 RX ORDER — SULFAMETHOXAZOLE AND TRIMETHOPRIM 800; 160 MG/1; MG/1
1 TABLET ORAL EVERY 12 HOURS
Qty: 20 TABLET | Refills: 0 | Status: SHIPPED | OUTPATIENT
Start: 2025-07-03 | End: 2025-07-13

## 2025-07-03 RX ORDER — HYDROCODONE BITARTRATE AND ACETAMINOPHEN 10; 325 MG/1; MG/1
1 TABLET ORAL EVERY 6 HOURS PRN
Qty: 90 TABLET | Refills: 0 | Status: SHIPPED | OUTPATIENT
Start: 2025-07-03

## 2025-07-03 NOTE — PROGRESS NOTES
Subjective   Rohini Burch is a 65 y.o. female.     Chief Complaint   Patient presents with    Elevated blood pressure reading       History of Present Illness     BP-improved today and she reports she has been checking at home with more normal readings.  HA's have been better other some some acute sinus problem today. She is taking propranolol 10 mg BID.  She has not noted any negative side effects.  She has not noted any increased fatigue.    Facial pain-near her nose on the left.  She reports that she has a tender area at the base of her nose.  She has some sinus pressure.  She reports yesterday she had some bleeding.  She has generalized thick secretions that are blood tinged.  She reports she has not taken any Mucinex to thin secretions.  She reports that she has mostly burning in her nostril.  No specific tooth pain.  She did get bumped in the face by her grandchild a few days ago.  She has some generalized ear pain on the left as well.  She is not having jaw pain or clicking on the left.  She is taking zyrtec.  She has been treated for sialadenitis since her last visit with Augmentin.    Joint pain-chronic and ongoing.  She is on Celebrex 100 mg PRN and Norco 10 mg 3 times daily as needed.  She has chronic varicosities in her BLE and continues to have leg pain.  She is wearing her compression socks less as it is summer.  She is taking medication as directed.  She has been more active outdoors and with her family and grandchild.  She feels this increased activity has caused her to have some increased pain symptoms.  Allergies-ongoing.  Patient has chronic allergic rhinitis with itchy watery eyes.  She is using Zyrtec 10 mg and over-the-counter eyedrops, [place].    The following portions of the patient's history were reviewed and updated as appropriate: CC, ROS, allergies, current medications, past family history, past medical history, past social history, past surgical history and problem list.      Review of  "Systems   Constitutional:  Positive for fatigue. Negative for appetite change and fever.   HENT:  Positive for congestion, facial swelling, rhinorrhea and sinus pressure. Negative for ear pain, nosebleeds, postnasal drip, sore throat, tinnitus, trouble swallowing and voice change.    Eyes:  Positive for itching. Negative for blurred vision, photophobia and visual disturbance.   Respiratory:  Negative for cough, chest tightness, shortness of breath and wheezing.    Cardiovascular:  Negative for chest pain, palpitations and leg swelling.   Gastrointestinal:  Negative for abdominal pain, blood in stool, constipation, diarrhea, nausea, vomiting and GERD.   Endocrine: Negative for cold intolerance, heat intolerance and polydipsia.   Genitourinary:  Negative for decreased urine volume, difficulty urinating, dysuria and hematuria.   Musculoskeletal:  Positive for arthralgias, back pain, gait problem and myalgias.        Generalized increase in stiffness and pain   Skin:  Negative for color change, pallor and wound.   Allergic/Immunologic: Negative.    Neurological:  Negative for dizziness, syncope, numbness and headache.   Hematological:  Positive for adenopathy (improving under her right side of neck).   Psychiatric/Behavioral:  Negative for decreased concentration, sleep disturbance, suicidal ideas and depressed mood. The patient is not nervous/anxious.    All other systems reviewed and are negative.      Objective     /76   Pulse 83   Resp 16   Ht 165.1 cm (65\")   Wt 57 kg (125 lb 9.6 oz)   SpO2 98%   BMI 20.90 kg/m²     Physical Exam  Vitals reviewed.   Constitutional:       General: She is not in acute distress.     Appearance: She is well-developed. She is not diaphoretic.   HENT:      Head: Normocephalic and atraumatic.      Jaw: No tenderness.      Right Ear: Hearing, tympanic membrane, ear canal and external ear normal.      Left Ear: Hearing, tympanic membrane, ear canal and external ear normal.      " Nose: Congestion and rhinorrhea present. No nasal tenderness.      Right Sinus: No maxillary sinus tenderness or frontal sinus tenderness.      Left Sinus: Maxillary sinus tenderness and frontal sinus tenderness present.      Mouth/Throat:      Lips: Pink.      Mouth: Mucous membranes are moist.      Pharynx: Oropharynx is clear. Uvula midline.   Eyes:      General: Lids are normal. No scleral icterus.     Extraocular Movements:      Right eye: Normal extraocular motion and no nystagmus.      Left eye: Normal extraocular motion and no nystagmus.      Conjunctiva/sclera: Conjunctivae normal.      Pupils: Pupils are equal, round, and reactive to light.   Neck:      Thyroid: No thyromegaly or thyroid tenderness.      Vascular: No carotid bruit or JVD.      Trachea: No tracheal tenderness.   Cardiovascular:      Rate and Rhythm: Normal rate and regular rhythm.      Pulses:           Dorsalis pedis pulses are 2+ on the right side and 2+ on the left side.        Posterior tibial pulses are 2+ on the right side and 2+ on the left side.      Heart sounds: Normal heart sounds, S1 normal and S2 normal. No murmur heard.  Pulmonary:      Effort: Pulmonary effort is normal. No accessory muscle usage, prolonged expiration or respiratory distress.      Breath sounds: Normal breath sounds.   Chest:      Chest wall: No tenderness.   Abdominal:      General: Bowel sounds are normal. There is no distension.      Palpations: Abdomen is soft. There is no hepatomegaly, splenomegaly or mass.      Tenderness: There is no abdominal tenderness.   Musculoskeletal:         General: Tenderness present.      Cervical back: Normal range of motion and neck supple.      Thoracic back: Tenderness present.      Lumbar back: Tenderness present.      Right hip: Tenderness present.      Left hip: Tenderness present.      Right knee: Tenderness present.      Left knee: Tenderness present.      Right lower leg: No edema.      Left lower leg: No edema.       Comments: No muscular atrophy or flaccidity.   Lymphadenopathy:      Head:      Right side of head: No submental or submandibular adenopathy.      Left side of head: No submental or submandibular adenopathy.      Cervical: Cervical adenopathy present.      Right cervical: Superficial cervical adenopathy present.      Left cervical: Superficial cervical adenopathy present.   Skin:     General: Skin is warm and dry.      Capillary Refill: Capillary refill takes less than 2 seconds.      Coloration: Skin is not jaundiced or pale.      Findings: No erythema.      Nails: There is no clubbing.   Neurological:      Mental Status: She is alert and oriented to person, place, and time.      Cranial Nerves: No cranial nerve deficit or facial asymmetry.      Sensory: No sensory deficit.      Motor: No weakness, tremor, atrophy or abnormal muscle tone.      Coordination: Coordination normal.      Deep Tendon Reflexes: Reflexes are normal and symmetric.   Psychiatric:         Attention and Perception: She is attentive.         Mood and Affect: Mood normal. Mood is not anxious or depressed.         Speech: Speech normal.         Behavior: Behavior normal. Behavior is cooperative.         Thought Content: Thought content normal.         Cognition and Memory: Cognition normal.         Judgment: Judgment normal.         Diagnoses and all orders for this visit:    1. Multiple joint pain (Primary)  Comments:  Avoid overuse activity.  Continue Norco and Celebrex as directed  Orders:  -     HYDROcodone-acetaminophen (Norco)  MG per tablet; Take 1 tablet by mouth Every 6 (Six) Hours As Needed for Moderate Pain.  Dispense: 90 tablet; Refill: 0    2. Varicose veins of both lower extremities with pain    3. Other migraine without status migrainosus, intractable  Comments:  sample of nurtec given.  report new or worsening symptoms  Orders:  -     rimegepant sulfate ODT (Nurtec) 75 MG disintegrating tablet; Place 1 tablet under the  tongue 1 (One) Time for 1 dose. 2 boxes of 2 tabs  Lot [id number] exp 06/2028  Dispense: 4 tablet; Refill: 0    4. Acute URI  Comments:  comple course of antibiotics  Orders:  -     sulfamethoxazole-trimethoprim (BACTRIM DS,SEPTRA DS) 800-160 MG per tablet; Take 1 tablet by mouth Every 12 (Twelve) Hours for 10 days.  Dispense: 20 tablet; Refill: 0       BMI is within normal parameters. No other follow-up for BMI required.      Understands disease processes and need for medications.  Understands reasons for urgent and emergent care.  Patient (& family) verbalized agreement for treatment plan.   Emotional support and active listening provided.  Patient provided time to verbalize feelings.    MICHELLE/PMDP reviewed today and consistent.  Will refill prescribed controlled medication today.  Patient is aware they cannot receive narcotics from any other provider except if under care of pain management or speciality clinic.  Risk and benefits of medication use has been reviewed.  History and physical exam exhibit continued safe and appropriate use of controlled substances.  The patient is aware of the potential for addiction and dependence.  This patient has been made aware of the appropriate use of such medications, including potential risk of somnolence, limited ability to drive and / or work safely, and potential for overdose.    It has also been made clear that these medications are for use by this patient only, without concomitant use of alcohol or other substances unless prescribed/advised by medical provider.  Patient understands they may be subject to UDS and pill counts at random.    Patient considered to be low risk for addiction due to use of single controlled medications.  Patient understands and accepts these risks.  Patient need for medication will be reassessed at each visit.  Doses will be adjusted according to patient need and findings.    Goal of [place]: Patient will not have any adverse reactions of medication.   Patient have reduction in pain symptoms with use of PRN Norco as directed.  Patient will not have any adverse side effects from medication.  Patient will be able to remain active in an outside of home without interference from pain symptoms.    MICHELLE Patient Controlled Substance Report (from 7/3/2024 to 7/3/2025)    Dispensed  Strength Quantity Days Supply Provider Pharmacy   06/10/2025 Hydrocodone Bitartrate/Ac 325MG/10MG 90 each 22 KISHAN,JOHN [facility]   05/13/2025 Hydrocodone Bitartrate/Ac 325MG/10MG 90 each 30 KISHAN,JOHN [facility]   04/15/2025 Hydrocodone Bitartrate/Ac 325MG/10MG 90 each 30 KISHAN,[facility]   03/18/2025 Hydrocodone Bitartrate/Ac 325MG/10MG 90 each 30 KISHAN,[facility]   02/18/2025 Hydrocodone Bitartrate/Ac 325MG/10MG 90 each 30 KISHAN,[facility]   01/15/2025 Hydrocodone Bitartrate/Ac 325MG/10MG 90 each 30 KISHAN,[facility]        RTC 1 month, sooner if needed.           This document has been electronically signed by:  STEVE Gonsales FNP-C Dragon disclaimer:  Part of this note may be an electronic transcription/translation of spoken language to printed text using the Dragon Dictation System.

## 2025-07-16 ENCOUNTER — TELEPHONE (OUTPATIENT)
Dept: FAMILY MEDICINE CLINIC | Facility: CLINIC | Age: 65
End: 2025-07-16
Payer: COMMERCIAL

## 2025-07-16 NOTE — TELEPHONE ENCOUNTER
HUB OK TO RELAY:    I called to confirm her appointment for 07/17. The voicemail is not setup.    If patient calls back to discuss reason for the call please confirm the appointment.

## 2025-07-17 ENCOUNTER — OFFICE VISIT (OUTPATIENT)
Dept: FAMILY MEDICINE CLINIC | Facility: CLINIC | Age: 65
End: 2025-07-17
Payer: COMMERCIAL

## 2025-07-17 VITALS
TEMPERATURE: 97.1 F | HEART RATE: 76 BPM | SYSTOLIC BLOOD PRESSURE: 122 MMHG | OXYGEN SATURATION: 92 % | DIASTOLIC BLOOD PRESSURE: 70 MMHG | BODY MASS INDEX: 20.99 KG/M2 | HEIGHT: 65 IN | WEIGHT: 126 LBS

## 2025-07-17 DIAGNOSIS — J30.1 SEASONAL ALLERGIC RHINITIS DUE TO POLLEN: ICD-10-CM

## 2025-07-17 DIAGNOSIS — R09.81 NASAL CONGESTION: ICD-10-CM

## 2025-07-17 DIAGNOSIS — G43.819 OTHER MIGRAINE WITHOUT STATUS MIGRAINOSUS, INTRACTABLE: ICD-10-CM

## 2025-07-17 DIAGNOSIS — J06.9 ACUTE URI: ICD-10-CM

## 2025-07-17 DIAGNOSIS — R05.9 COUGH, UNSPECIFIED TYPE: Primary | ICD-10-CM

## 2025-07-17 LAB
B PARAPERT DNA SPEC QL NAA+PROBE: NOT DETECTED
B PERT DNA SPEC QL NAA+PROBE: NOT DETECTED
C PNEUM DNA NPH QL NAA+NON-PROBE: NOT DETECTED
FLUAV SUBTYP SPEC NAA+PROBE: NOT DETECTED
FLUBV RNA NPH QL NAA+NON-PROBE: NOT DETECTED
HADV DNA SPEC NAA+PROBE: NOT DETECTED
HCOV 229E RNA SPEC QL NAA+PROBE: NOT DETECTED
HCOV HKU1 RNA SPEC QL NAA+PROBE: NOT DETECTED
HCOV NL63 RNA SPEC QL NAA+PROBE: NOT DETECTED
HCOV OC43 RNA SPEC QL NAA+PROBE: NOT DETECTED
HMPV RNA NPH QL NAA+NON-PROBE: NOT DETECTED
HPIV1 RNA ISLT QL NAA+PROBE: NOT DETECTED
HPIV2 RNA SPEC QL NAA+PROBE: NOT DETECTED
HPIV3 RNA NPH QL NAA+PROBE: NOT DETECTED
HPIV4 P GENE NPH QL NAA+PROBE: NOT DETECTED
M PNEUMO IGG SER IA-ACNC: NOT DETECTED
RHINOVIRUS RNA SPEC NAA+PROBE: NOT DETECTED
RSV RNA NPH QL NAA+NON-PROBE: NOT DETECTED
SARS-COV-2 RNA RESP QL NAA+PROBE: NOT DETECTED

## 2025-07-17 PROCEDURE — 0202U NFCT DS 22 TRGT SARS-COV-2: CPT | Performed by: NURSE PRACTITIONER

## 2025-07-17 RX ORDER — METHYLPREDNISOLONE 4 MG/1
TABLET ORAL
Qty: 21 TABLET | Refills: 0 | Status: SHIPPED | OUTPATIENT
Start: 2025-07-17 | End: 2025-07-18

## 2025-07-17 RX ORDER — PSEUDOEPHEDRINE HCL 120 MG/1
120 TABLET, FILM COATED, EXTENDED RELEASE ORAL DAILY
Qty: 20 TABLET | Refills: 0 | Status: SHIPPED | OUTPATIENT
Start: 2025-07-17

## 2025-07-17 RX ORDER — DOXYCYCLINE 100 MG/1
100 CAPSULE ORAL EVERY 12 HOURS SCHEDULED
Qty: 20 CAPSULE | Refills: 0 | Status: SHIPPED | OUTPATIENT
Start: 2025-07-17

## 2025-07-17 RX ORDER — METHYLPREDNISOLONE ACETATE 80 MG/ML
80 INJECTION, SUSPENSION INTRA-ARTICULAR; INTRALESIONAL; INTRAMUSCULAR; SOFT TISSUE ONCE
Status: COMPLETED | OUTPATIENT
Start: 2025-07-17 | End: 2025-07-17

## 2025-07-17 RX ADMIN — METHYLPREDNISOLONE ACETATE 80 MG: 80 INJECTION, SUSPENSION INTRA-ARTICULAR; INTRALESIONAL; INTRAMUSCULAR; SOFT TISSUE at 13:09

## 2025-07-17 NOTE — PROGRESS NOTES
"Subjective   Rohini Burch is a 65 y.o. female.     Chief Complaint   Patient presents with    Nasal Congestion       History of Present Illness     Nasal congestion/chronic sinusitis-reports that it \"comes and goes\".  She is feeling very fatigued.  She has facial pain and sore throat.  She has used heated and cool compresses.  She has obtained some OTC Allegra to take.   She has obtained an OTC mucinex spray.    Headache/history of migraine-she reports that it continues to be ongoing.  She has dental pain and radiation to her temples.  She does not have any bad teeth.  She reports that it feels just like pressure and is throbbing.  She feels that her facial bones are sore.  Her throat is sore on the left side mostly.   She reports that she has taken antibiotics but it does not seem to improve.  She reports that nothing is different.  She cannot think of any new exposures.  She is currently on a course of bactrim.      The following portions of the patient's history were reviewed and updated as appropriate: CC, ROS, allergies, current medications, past family history, past medical history, past social history, past surgical history and problem list.      Review of Systems   Constitutional:  Positive for fatigue. Negative for appetite change and fever.   HENT:  Positive for congestion, facial swelling, rhinorrhea and sinus pressure. Negative for ear pain, nosebleeds, postnasal drip, sore throat, tinnitus, trouble swallowing and voice change.    Eyes:  Positive for itching. Negative for blurred vision, photophobia and visual disturbance.   Respiratory:  Negative for cough, chest tightness, shortness of breath and wheezing.    Cardiovascular:  Negative for chest pain, palpitations and leg swelling.   Gastrointestinal:  Negative for abdominal pain, blood in stool, constipation, diarrhea, nausea, vomiting and GERD.   Endocrine: Negative for cold intolerance, heat intolerance and polydipsia.   Genitourinary:  Negative " "for decreased urine volume, difficulty urinating, dysuria and hematuria.   Musculoskeletal:  Positive for arthralgias, back pain, gait problem and myalgias.        Generalized increase in stiffness and pain   Skin:  Negative for color change, pallor and wound.   Allergic/Immunologic: Negative.    Neurological:  Negative for dizziness, syncope, numbness and headache.   Hematological:  Positive for adenopathy (improving under her right side of neck).   Psychiatric/Behavioral:  Negative for decreased concentration, sleep disturbance, suicidal ideas and depressed mood. The patient is not nervous/anxious.    All other systems reviewed and are negative.      Objective     /70   Pulse 76   Temp 97.1 °F (36.2 °C) (Temporal)   Ht 165.1 cm (65\")   Wt 57.2 kg (126 lb)   SpO2 92%   BMI 20.97 kg/m²     Physical Exam  Vitals reviewed.   Constitutional:       General: She is not in acute distress.     Appearance: She is well-developed. She is not diaphoretic.   HENT:      Head: Normocephalic and atraumatic.      Jaw: No tenderness.      Right Ear: Hearing, ear canal and external ear normal. No middle ear effusion. Tympanic membrane is retracted. Tympanic membrane is not erythematous.      Left Ear: Hearing, ear canal and external ear normal.  No middle ear effusion. Tympanic membrane is retracted. Tympanic membrane is not erythematous.      Nose: Congestion and rhinorrhea present. No nasal tenderness.      Right Sinus: No maxillary sinus tenderness or frontal sinus tenderness.      Left Sinus: Maxillary sinus tenderness and frontal sinus tenderness present.      Mouth/Throat:      Lips: Pink.      Mouth: Mucous membranes are moist.      Pharynx: Oropharynx is clear. Uvula midline.   Eyes:      General: Lids are normal. No scleral icterus.     Extraocular Movements:      Right eye: Normal extraocular motion and no nystagmus.      Left eye: Normal extraocular motion and no nystagmus.      Conjunctiva/sclera: Conjunctivae " normal.      Pupils: Pupils are equal, round, and reactive to light.   Neck:      Thyroid: No thyromegaly or thyroid tenderness.      Vascular: No carotid bruit or JVD.      Trachea: No tracheal tenderness.   Cardiovascular:      Rate and Rhythm: Normal rate and regular rhythm.      Pulses:           Dorsalis pedis pulses are 2+ on the right side and 2+ on the left side.        Posterior tibial pulses are 2+ on the right side and 2+ on the left side.      Heart sounds: Normal heart sounds, S1 normal and S2 normal. No murmur heard.  Pulmonary:      Effort: Pulmonary effort is normal. No accessory muscle usage, prolonged expiration or respiratory distress.      Breath sounds: Normal breath sounds.   Chest:      Chest wall: No tenderness.   Abdominal:      General: Bowel sounds are normal. There is no distension.      Palpations: Abdomen is soft. There is no hepatomegaly, splenomegaly or mass.      Tenderness: There is no abdominal tenderness.   Musculoskeletal:         General: Tenderness present.      Cervical back: Normal range of motion and neck supple.      Thoracic back: Tenderness present.      Lumbar back: Tenderness present.      Right hip: Tenderness present.      Left hip: Tenderness present.      Right knee: Tenderness present.      Left knee: Tenderness present.      Right lower leg: No edema.      Left lower leg: No edema.      Comments: No muscular atrophy or flaccidity.   Lymphadenopathy:      Head:      Right side of head: No submental or submandibular adenopathy.      Left side of head: No submental or submandibular adenopathy.      Cervical: Cervical adenopathy present.      Right cervical: Superficial cervical adenopathy present.      Left cervical: Superficial cervical adenopathy present.   Skin:     General: Skin is warm and dry.      Capillary Refill: Capillary refill takes less than 2 seconds.      Coloration: Skin is not jaundiced or pale.      Findings: No erythema.      Nails: There is no  clubbing.   Neurological:      Mental Status: She is alert and oriented to person, place, and time.      Cranial Nerves: No cranial nerve deficit or facial asymmetry.      Sensory: No sensory deficit.      Motor: No weakness, tremor, atrophy or abnormal muscle tone.      Coordination: Coordination normal.      Deep Tendon Reflexes: Reflexes are normal and symmetric.   Psychiatric:         Attention and Perception: She is attentive.         Mood and Affect: Mood normal. Mood is not anxious or depressed.         Speech: Speech normal.         Behavior: Behavior normal. Behavior is cooperative.         Thought Content: Thought content normal.         Cognition and Memory: Cognition normal.         Judgment: Judgment normal.         Diagnoses and all orders for this visit:    1. Cough, unspecified type (Primary)  -     Respiratory Panel PCR w/COVID-19(SARS-CoV-2) [facility] In-House, NP Swab in UTM/VTM, 2 HR TAT - Swab, Nasopharynx; Future  -     Respiratory Panel PCR w/COVID-19(SARS-CoV-2) [facility] GUADALUPE/[facility] In-House, NP Swab in UTM/VTM, 2 HR TAT - Swab, Nasopharynx  -     doxycycline (MONODOX) 100 MG capsule; Take 1 capsule by mouth Every 12 (Twelve) Hours.  Dispense: 20 capsule; Refill: 0    2. Acute URI  -     pseudoephedrine (SUDAFED) 120 MG 12 hr tablet; Take 1 tablet by mouth Daily.  Dispense: 20 tablet; Refill: 0  -     Discontinue: methylPREDNISolone (MEDROL) 4 MG dose pack; Take as directed on package instructions.  Dispense: 21 tablet; Refill: 0  -     methylPREDNISolone acetate (DEPO-medrol) injection 80 mg  -     cefTRIAXone (ROCEPHIN) 1 g in lidocaine (XYLOCAINE) 1 % IM only syringe  -     doxycycline (MONODOX) 100 MG capsule; Take 1 capsule by mouth Every 12 (Twelve) Hours.  Dispense: 20 capsule; Refill: 0    3. Nasal congestion  -     pseudoephedrine (SUDAFED) 120 MG 12 hr tablet; Take 1 tablet by mouth Daily.  Dispense: 20 tablet; Refill: 0  -     Discontinue: methylPREDNISolone  (MEDROL) 4 MG dose pack; Take as directed on package instructions.  Dispense: 21 tablet; Refill: 0  -     methylPREDNISolone acetate (DEPO-medrol) injection 80 mg  -     cefTRIAXone (ROCEPHIN) 1 g in lidocaine (XYLOCAINE) 1 % IM only syringe  -     doxycycline (MONODOX) 100 MG capsule; Take 1 capsule by mouth Every 12 (Twelve) Hours.  Dispense: 20 capsule; Refill: 0    4. Seasonal allergic rhinitis due to pollen  -     XR sinuses 3+ vw    5. Other migraine without status migrainosus, intractable  -     XR sinuses 3+ vw       BMI is within normal parameters. No other follow-up for BMI required.    Understands disease processes and need for medications.  Understands reasons for urgent and emergent care.  Patient (& family) verbalized agreement for treatment plan.   Emotional support and active listening provided.  Patient provided time to verbalize feelings.    Steroids for home.  Understands risk for lower immune response and to avoid others who are sick.   Understands increased risk for sunburn with prolonged exposure or tanning.  Understands may have facial flushing and possible jitteriness.  Take early in the AM.   Instructed to complete all of antibiotics for acute illness.  Increase PO fluids, avoid/limit caffeine.  Do not save any of the meds for later use.  Rest PRN  Injections today while in the office for acute symptom relief.     RTC PRN 3-5 days for worsening or non resolving symptoms  Keep scheduled follow up.           This document has been electronically signed by:  STEVE Gonsales FNP-C Dragon disclaimer:  Part of this note may be an electronic transcription/translation of spoken language to printed text using the Dragon Dictation System.

## 2025-07-18 ENCOUNTER — APPOINTMENT (OUTPATIENT)
Dept: CT IMAGING | Facility: HOSPITAL | Age: 65
End: 2025-07-18
Payer: COMMERCIAL

## 2025-07-18 ENCOUNTER — HOSPITAL ENCOUNTER (EMERGENCY)
Facility: HOSPITAL | Age: 65
Discharge: HOME | End: 2025-07-18
Attending: STUDENT IN AN ORGANIZED HEALTH CARE EDUCATION/TRAINING PROGRAM
Payer: COMMERCIAL

## 2025-07-18 VITALS
HEIGHT: 65 IN | SYSTOLIC BLOOD PRESSURE: 156 MMHG | BODY MASS INDEX: 25.83 KG/M2 | TEMPERATURE: 97.5 F | HEART RATE: 107 BPM | RESPIRATION RATE: 16 BRPM | WEIGHT: 155 LBS | OXYGEN SATURATION: 97 % | DIASTOLIC BLOOD PRESSURE: 90 MMHG

## 2025-07-18 DIAGNOSIS — G43.819 OTHER MIGRAINE WITHOUT STATUS MIGRAINOSUS, INTRACTABLE: ICD-10-CM

## 2025-07-18 DIAGNOSIS — J32.9 CHRONIC RHINOSINUSITIS WITH MULTIPLE NASAL POLYPS: Primary | ICD-10-CM

## 2025-07-18 DIAGNOSIS — J33.9 CHRONIC RHINOSINUSITIS WITH MULTIPLE NASAL POLYPS: Primary | ICD-10-CM

## 2025-07-18 DIAGNOSIS — Z87.898 HISTORY OF EPISTAXIS: ICD-10-CM

## 2025-07-18 DIAGNOSIS — J30.1 SEASONAL ALLERGIC RHINITIS DUE TO POLLEN: Primary | ICD-10-CM

## 2025-07-18 LAB
ALBUMIN SERPL-MCNC: 3.7 G/DL (ref 3.5–5.2)
ALBUMIN/GLOB SERPL: 0.9 G/DL
ALP SERPL-CCNC: 80 U/L (ref 39–117)
ALT SERPL W P-5'-P-CCNC: 7 U/L (ref 1–33)
ANION GAP SERPL CALCULATED.3IONS-SCNC: 13.9 MMOL/L (ref 5–15)
APTT PPP: 32.6 SECONDS (ref 24.5–35.9)
AST SERPL-CCNC: 17 U/L (ref 1–32)
BASOPHILS # BLD AUTO: 0.11 10*3/MM3 (ref 0–0.2)
BASOPHILS NFR BLD AUTO: 1 % (ref 0–1.5)
BILIRUB SERPL-MCNC: 0.3 MG/DL (ref 0–1.2)
BUN SERPL-MCNC: 12.4 MG/DL (ref 8–23)
BUN/CREAT SERPL: 20.7 (ref 7–25)
CALCIUM SPEC-SCNC: 9.1 MG/DL (ref 8.6–10.5)
CHLORIDE SERPL-SCNC: 96 MMOL/L (ref 98–107)
CO2 SERPL-SCNC: 24.1 MMOL/L (ref 22–29)
CREAT SERPL-MCNC: 0.6 MG/DL (ref 0.57–1)
DEPRECATED RDW RBC AUTO: 46.5 FL (ref 37–54)
EGFRCR SERPLBLD CKD-EPI 2021: 100.4 ML/MIN/1.73
EOSINOPHIL # BLD AUTO: 0.05 10*3/MM3 (ref 0–0.4)
EOSINOPHIL NFR BLD AUTO: 0.5 % (ref 0.3–6.2)
ERYTHROCYTE [DISTWIDTH] IN BLOOD BY AUTOMATED COUNT: 12.2 % (ref 12.3–15.4)
GLOBULIN UR ELPH-MCNC: 3.9 GM/DL
GLUCOSE SERPL-MCNC: 105 MG/DL (ref 65–99)
HCT VFR BLD AUTO: 36.8 % (ref 34–46.6)
HGB BLD-MCNC: 11.8 G/DL (ref 12–15.9)
IMM GRANULOCYTES # BLD AUTO: 0.09 10*3/MM3 (ref 0–0.05)
IMM GRANULOCYTES NFR BLD AUTO: 0.8 % (ref 0–0.5)
INR PPP: 0.99 (ref 0.9–1.1)
LYMPHOCYTES # BLD AUTO: 0.8 10*3/MM3 (ref 0.7–3.1)
LYMPHOCYTES NFR BLD AUTO: 7.4 % (ref 19.6–45.3)
MCH RBC QN AUTO: 32.9 PG (ref 26.6–33)
MCHC RBC AUTO-ENTMCNC: 32.1 G/DL (ref 31.5–35.7)
MCV RBC AUTO: 102.5 FL (ref 79–97)
MONOCYTES # BLD AUTO: 1.04 10*3/MM3 (ref 0.1–0.9)
MONOCYTES NFR BLD AUTO: 9.6 % (ref 5–12)
NEUTROPHILS NFR BLD AUTO: 8.79 10*3/MM3 (ref 1.7–7)
NEUTROPHILS NFR BLD AUTO: 80.7 % (ref 42.7–76)
NRBC BLD AUTO-RTO: 0 /100 WBC (ref 0–0.2)
PLATELET # BLD AUTO: 383 10*3/MM3 (ref 140–450)
PMV BLD AUTO: 8.8 FL (ref 6–12)
POTASSIUM SERPL-SCNC: 4.3 MMOL/L (ref 3.5–5.2)
PROT SERPL-MCNC: 7.6 G/DL (ref 6–8.5)
PROTHROMBIN TIME: 13.7 SECONDS (ref 12.5–15.2)
RBC # BLD AUTO: 3.59 10*6/MM3 (ref 3.77–5.28)
SODIUM SERPL-SCNC: 134 MMOL/L (ref 136–145)
WBC NRBC COR # BLD AUTO: 10.88 10*3/MM3 (ref 3.4–10.8)

## 2025-07-18 PROCEDURE — 80053 COMPREHEN METABOLIC PANEL: CPT | Performed by: PHYSICIAN ASSISTANT

## 2025-07-18 PROCEDURE — 25010000002 METHYLPREDNISOLONE PER 125 MG: Performed by: PHYSICIAN ASSISTANT

## 2025-07-18 PROCEDURE — 70450 CT HEAD/BRAIN W/O DYE: CPT

## 2025-07-18 PROCEDURE — 85610 PROTHROMBIN TIME: CPT | Performed by: PHYSICIAN ASSISTANT

## 2025-07-18 PROCEDURE — 25010000002 HYDRALAZINE PER 20 MG: Performed by: PHYSICIAN ASSISTANT

## 2025-07-18 PROCEDURE — 96374 THER/PROPH/DIAG INJ IV PUSH: CPT

## 2025-07-18 PROCEDURE — 85730 THROMBOPLASTIN TIME PARTIAL: CPT | Performed by: PHYSICIAN ASSISTANT

## 2025-07-18 PROCEDURE — 70450 CT HEAD/BRAIN W/O DYE: CPT | Performed by: RADIOLOGY

## 2025-07-18 PROCEDURE — 96375 TX/PRO/DX INJ NEW DRUG ADDON: CPT

## 2025-07-18 PROCEDURE — 99284 EMERGENCY DEPT VISIT MOD MDM: CPT

## 2025-07-18 PROCEDURE — 85025 COMPLETE CBC W/AUTO DIFF WBC: CPT | Performed by: PHYSICIAN ASSISTANT

## 2025-07-18 PROCEDURE — 36415 COLL VENOUS BLD VENIPUNCTURE: CPT

## 2025-07-18 PROCEDURE — 70486 CT MAXILLOFACIAL W/O DYE: CPT | Performed by: RADIOLOGY

## 2025-07-18 PROCEDURE — 70486 CT MAXILLOFACIAL W/O DYE: CPT

## 2025-07-18 RX ORDER — DEXAMETHASONE 6 MG/1
6 TABLET ORAL
Qty: 7 TABLET | Refills: 0 | Status: SHIPPED | OUTPATIENT
Start: 2025-07-18

## 2025-07-18 RX ORDER — HYDRALAZINE HYDROCHLORIDE 20 MG/ML
10 INJECTION INTRAMUSCULAR; INTRAVENOUS ONCE
Status: COMPLETED | OUTPATIENT
Start: 2025-07-18 | End: 2025-07-18

## 2025-07-18 RX ADMIN — METHYLPREDNISOLONE SODIUM SUCCINATE 80 MG: 125 INJECTION INTRAMUSCULAR; INTRAVENOUS at 15:11

## 2025-07-18 RX ADMIN — HYDRALAZINE HYDROCHLORIDE 10 MG: 20 INJECTION INTRAMUSCULAR; INTRAVENOUS at 15:12

## 2025-07-18 NOTE — ED PROVIDER NOTES
Subjective   History of Present Illness  65-year-old female presents to the ER chief complaint of epistaxis.  Patient originally presented to [facility] however was not satisfied with treatment.  Patient is not on any type of anticoagulation.  She states her nosebleed started earlier this morning.  Suffers from chronic sinusitis.  Has been on several antibiotics over the last 2 months secondary to her sinus problems.        Review of Systems   Constitutional: Negative.  Negative for fever.   HENT:  Positive for nosebleeds, rhinorrhea, sinus pressure and sinus pain.    Respiratory: Negative.     Cardiovascular: Negative.  Negative for chest pain.   Gastrointestinal: Negative.  Negative for abdominal pain.   Endocrine: Negative.    Genitourinary: Negative.  Negative for dysuria.   Skin: Negative.    Neurological: Negative.    Psychiatric/Behavioral: Negative.     All other systems reviewed and are negative.      Past Medical History:   Diagnosis Date    Arthritis     Elevated cholesterol     Hernia of fascia        No Known Allergies    Past Surgical History:   Procedure Laterality Date    EXCISION LESION Left 8/21/2024    Procedure: EXCISION LESION;  Surgeon: Rosalva Mitchell MD;  Location: [facility];  Service: Robotics - DaVinci;  Laterality: Left;    HYSTERECTOMY      complete    INGUINAL HERNIA REPAIR Left 8/21/2024    Procedure: INGUINAL HERNIA REPAIR LAPAROSCOPIC WITH DAVINCI ROBOT: left;  Surgeon: Rosalva Mitchell MD;  Location: [facility] OR;  Service: Robotics - DaVinci;  Laterality: Left;    KNEE SURGERY         Family History   Problem Relation Age of Onset    Hypertension Mother        Social History     Socioeconomic History    Marital status:      Spouse name: dolores    Number of children: 1    Years of education: 12   Tobacco Use    Smoking status: Never    Smokeless tobacco: Current   Vaping Use    Vaping status: Never Used   Substance and Sexual Activity    Alcohol use: No    Drug use:  Never    Sexual activity: Defer           Objective   Physical Exam  Vitals and nursing note reviewed.   Constitutional:       General: She is not in acute distress.     Appearance: She is well-developed. She is not diaphoretic.   HENT:      Head: Normocephalic and atraumatic.      Right Ear: External ear normal.      Left Ear: External ear normal.      Nose: Congestion and rhinorrhea present.      Comments: Nasal polyps identified within the bilateral naris.  There is residual blood within the naris.  No active bleeding identified.  Eyes:      Conjunctiva/sclera: Conjunctivae normal.   Neck:      Vascular: No JVD.      Trachea: No tracheal deviation.   Cardiovascular:      Rate and Rhythm: Normal rate and regular rhythm.      Heart sounds: Normal heart sounds. No murmur heard.  Pulmonary:      Effort: Pulmonary effort is normal. No respiratory distress.      Breath sounds: Normal breath sounds. No wheezing.   Abdominal:      Palpations: Abdomen is soft.      Tenderness: There is no abdominal tenderness.   Musculoskeletal:         General: No deformity. Normal range of motion.      Cervical back: Normal range of motion and neck supple.   Skin:     General: Skin is warm and dry.      Coloration: Skin is not pale.      Findings: No erythema or rash.   Neurological:      Mental Status: She is alert and oriented to person, place, and time.      Cranial Nerves: No cranial nerve deficit.   Psychiatric:         Behavior: Behavior normal.         Thought Content: Thought content normal.         Procedures           ED Course  ED Course as of 07/18/25 1606   Fri Jul 18, 2025   1543 CT facial rad interpreted:  .  No focal soft tissue edema identified.  2.  No fracture or dislocation identified.  3.  Subtotal opacification of the nasopharynx with variable density most  consistent with endoluminal blood products and packing material given  history of epistaxis.  4.  Chronic appearing left greater than right maxillary  sinusitis.  5.  Partial opacification of the ethmoid air cells consistent with  chronic ethmoid sinusitis.   [RB]   1544 CT head rad interpreted:  1.  No CT evidence of acute intracranial abnormality.  2. Opacification of the nasopharynx . May represent endoluminal blood  products given history of epistaxis.  3.  Very minimal chronic small vessel ischemic disease noted.  4. Paranasal sinusitis.   [RB]      ED Course User Index  [RB] Francisco Ruelas II, PA                                                       Medical Decision Making  Amount and/or Complexity of Data Reviewed  Labs: ordered.  Radiology: ordered.    Risk  Prescription drug management.        Final diagnoses:   Chronic rhinosinusitis with multiple nasal polyps   History of epistaxis       ED Disposition  ED Disposition       ED Disposition   Discharge    Condition   Stable    Comment   --               Brett Edwards MD  [address] [facility] [address] [phone]    Schedule an appointment as soon as possible for a visit            Medication List        New Prescriptions      dexAMETHasone 6 MG tablet  Commonly known as: DECADRON  Take 1 tablet by mouth Daily With Breakfast.            Stop      methylPREDNISolone 4 MG dose pack  Commonly known as: MEDROL               Where to Get Your Medications        These medications were sent to [facility] [place], [place] - [address] - [phone] [facility] [phone] FX  [address] [facility] [address]      Phone: [phone]   dexAMETHasone 6 MG tablet            Francisco Ruelas II, PA  07/18/25 7770

## 2025-07-21 DIAGNOSIS — R03.0 ELEVATED BLOOD PRESSURE READING: ICD-10-CM

## 2025-07-22 DIAGNOSIS — J33.9 CHRONIC RHINOSINUSITIS WITH MULTIPLE NASAL POLYPS: Primary | ICD-10-CM

## 2025-07-22 DIAGNOSIS — J32.9 CHRONIC RHINOSINUSITIS WITH MULTIPLE NASAL POLYPS: Primary | ICD-10-CM

## 2025-07-22 DIAGNOSIS — R04.0 EPISTAXIS, RECURRENT: ICD-10-CM

## 2025-07-22 RX ORDER — PROPRANOLOL HYDROCHLORIDE 10 MG/1
10 TABLET ORAL 2 TIMES DAILY
Qty: 60 TABLET | Refills: 0 | Status: SHIPPED | OUTPATIENT
Start: 2025-07-22

## 2025-08-04 ENCOUNTER — OFFICE VISIT (OUTPATIENT)
Dept: FAMILY MEDICINE CLINIC | Facility: CLINIC | Age: 65
End: 2025-08-04
Payer: COMMERCIAL

## 2025-08-04 VITALS
HEIGHT: 65 IN | RESPIRATION RATE: 18 BRPM | HEART RATE: 91 BPM | SYSTOLIC BLOOD PRESSURE: 136 MMHG | OXYGEN SATURATION: 96 % | DIASTOLIC BLOOD PRESSURE: 82 MMHG | WEIGHT: 126.4 LBS | BODY MASS INDEX: 21.06 KG/M2

## 2025-08-04 DIAGNOSIS — M25.50 MULTIPLE JOINT PAIN: ICD-10-CM

## 2025-08-04 DIAGNOSIS — R03.0 ELEVATED BLOOD-PRESSURE READING WITHOUT DIAGNOSIS OF HYPERTENSION: ICD-10-CM

## 2025-08-04 DIAGNOSIS — I67.9 SMALL VESSEL DISEASE, CEREBROVASCULAR: ICD-10-CM

## 2025-08-04 DIAGNOSIS — J30.1 SEASONAL ALLERGIC RHINITIS DUE TO POLLEN: ICD-10-CM

## 2025-08-04 DIAGNOSIS — Z09 ENCOUNTER FOR EXAMINATION FOLLOWING TREATMENT AT HOSPITAL: Primary | ICD-10-CM

## 2025-08-04 DIAGNOSIS — I83.813 VARICOSE VEINS OF BOTH LOWER EXTREMITIES WITH PAIN: ICD-10-CM

## 2025-08-04 RX ORDER — HYDROCODONE BITARTRATE AND ACETAMINOPHEN 10; 325 MG/1; MG/1
1 TABLET ORAL EVERY 6 HOURS PRN
Qty: 90 TABLET | Refills: 0 | Status: SHIPPED | OUTPATIENT
Start: 2025-08-04